# Patient Record
Sex: FEMALE | Race: WHITE | Employment: OTHER | URBAN - NONMETROPOLITAN AREA
[De-identification: names, ages, dates, MRNs, and addresses within clinical notes are randomized per-mention and may not be internally consistent; named-entity substitution may affect disease eponyms.]

---

## 2017-01-12 ENCOUNTER — TELEPHONE (OUTPATIENT)
Dept: PRIMARY CARE CLINIC | Age: 72
End: 2017-01-12

## 2017-01-16 ENCOUNTER — HOSPITAL ENCOUNTER (OUTPATIENT)
Dept: PULMONOLOGY | Age: 72
Discharge: HOME | End: 2017-01-16
Payer: MEDICARE

## 2017-01-16 DIAGNOSIS — R06.09 DYSPNEA ON EXERTION: ICD-10-CM

## 2017-01-16 LAB
BASE EXCESS ARTERIAL: 1.7 MMOL/L (ref -2–2)
CARBOXYHEMOGLOBIN ARTERIAL: 1.3 % (ref 0–5)
HCO3 ARTERIAL: 26.6 MMOL/L (ref 22–26)
HEMOGLOBIN, ART, EXTENDED: 11.6 G/DL (ref 12–16)
METHEMOGLOBIN ARTERIAL: 1.1 %
O2 CONTENT ARTERIAL: 15.6 ML/DL
O2 SAT, ARTERIAL: 95.2 %
O2 THERAPY: ABNORMAL
PCO2 ARTERIAL: 42 MMHG (ref 35–45)
PH ARTERIAL: 7.41 (ref 7.35–7.45)
PO2 ARTERIAL: 77 MMHG (ref 80–100)
POTASSIUM, WHOLE BLOOD: 4.2

## 2017-01-16 PROCEDURE — 94060 EVALUATION OF WHEEZING: CPT

## 2017-01-16 PROCEDURE — 6360000002 HC RX W HCPCS: Performed by: INTERNAL MEDICINE

## 2017-01-16 PROCEDURE — 36600 WITHDRAWAL OF ARTERIAL BLOOD: CPT

## 2017-01-16 PROCEDURE — 94727 GAS DIL/WSHOT DETER LNG VOL: CPT

## 2017-01-16 PROCEDURE — 82803 BLOOD GASES ANY COMBINATION: CPT

## 2017-01-16 PROCEDURE — 94729 DIFFUSING CAPACITY: CPT

## 2017-01-16 PROCEDURE — 84132 ASSAY OF SERUM POTASSIUM: CPT

## 2017-01-16 RX ORDER — ALBUTEROL SULFATE 2.5 MG/3ML
2.5 SOLUTION RESPIRATORY (INHALATION) EVERY 6 HOURS PRN
Status: DISCONTINUED | OUTPATIENT
Start: 2017-01-16 | End: 2017-01-18 | Stop reason: HOSPADM

## 2017-01-27 ENCOUNTER — TELEPHONE (OUTPATIENT)
Dept: PRIMARY CARE CLINIC | Age: 72
End: 2017-01-27

## 2017-03-24 ENCOUNTER — TELEPHONE (OUTPATIENT)
Dept: GASTROENTEROLOGY | Facility: CLINIC | Age: 72
End: 2017-03-24

## 2017-03-27 ENCOUNTER — OFFICE VISIT (OUTPATIENT)
Dept: PRIMARY CARE CLINIC | Age: 72
End: 2017-03-27
Payer: MEDICARE

## 2017-03-27 VITALS
BODY MASS INDEX: 32.77 KG/M2 | HEART RATE: 78 BPM | HEIGHT: 67 IN | DIASTOLIC BLOOD PRESSURE: 81 MMHG | WEIGHT: 208.8 LBS | OXYGEN SATURATION: 98 % | RESPIRATION RATE: 18 BRPM | TEMPERATURE: 98.4 F | SYSTOLIC BLOOD PRESSURE: 121 MMHG

## 2017-03-27 DIAGNOSIS — H61.23 EXCESSIVE CERUMEN IN BOTH EAR CANALS: ICD-10-CM

## 2017-03-27 DIAGNOSIS — R05.9 COUGH: Primary | ICD-10-CM

## 2017-03-27 DIAGNOSIS — J30.9 ALLERGIC RHINITIS, UNSPECIFIED ALLERGIC RHINITIS TRIGGER, UNSPECIFIED RHINITIS SEASONALITY: ICD-10-CM

## 2017-03-27 PROCEDURE — 3014F SCREEN MAMMO DOC REV: CPT | Performed by: NURSE PRACTITIONER

## 2017-03-27 PROCEDURE — 1123F ACP DISCUSS/DSCN MKR DOCD: CPT | Performed by: NURSE PRACTITIONER

## 2017-03-27 PROCEDURE — G8484 FLU IMMUNIZE NO ADMIN: HCPCS | Performed by: NURSE PRACTITIONER

## 2017-03-27 PROCEDURE — 3017F COLORECTAL CA SCREEN DOC REV: CPT | Performed by: NURSE PRACTITIONER

## 2017-03-27 PROCEDURE — G8400 PT W/DXA NO RESULTS DOC: HCPCS | Performed by: NURSE PRACTITIONER

## 2017-03-27 PROCEDURE — G8419 CALC BMI OUT NRM PARAM NOF/U: HCPCS | Performed by: NURSE PRACTITIONER

## 2017-03-27 PROCEDURE — 4040F PNEUMOC VAC/ADMIN/RCVD: CPT | Performed by: NURSE PRACTITIONER

## 2017-03-27 PROCEDURE — G8427 DOCREV CUR MEDS BY ELIG CLIN: HCPCS | Performed by: NURSE PRACTITIONER

## 2017-03-27 PROCEDURE — 1036F TOBACCO NON-USER: CPT | Performed by: NURSE PRACTITIONER

## 2017-03-27 PROCEDURE — 1090F PRES/ABSN URINE INCON ASSESS: CPT | Performed by: NURSE PRACTITIONER

## 2017-03-27 PROCEDURE — 99213 OFFICE O/P EST LOW 20 MIN: CPT | Performed by: NURSE PRACTITIONER

## 2017-03-27 RX ORDER — BENZONATATE 100 MG/1
100 CAPSULE ORAL 3 TIMES DAILY PRN
Qty: 90 CAPSULE | Refills: 0 | Status: SHIPPED | OUTPATIENT
Start: 2017-03-27 | End: 2017-04-03

## 2017-03-27 ASSESSMENT — ENCOUNTER SYMPTOMS
SINUS PRESSURE: 1
COUGH: 1
EYES NEGATIVE: 1
SORE THROAT: 1
GASTROINTESTINAL NEGATIVE: 1

## 2017-03-29 RX ORDER — SIMVASTATIN 10 MG
10 TABLET ORAL NIGHTLY
Qty: 30 TABLET | Refills: 11 | Status: SHIPPED | OUTPATIENT
Start: 2017-03-29 | End: 2018-01-18 | Stop reason: SDUPTHER

## 2017-03-29 RX ORDER — MESALAMINE 1.2 G/1
TABLET, DELAYED RELEASE ORAL
Qty: 120 TABLET | Refills: 5 | Status: SHIPPED | OUTPATIENT
Start: 2017-03-29 | End: 2017-08-17 | Stop reason: SDUPTHER

## 2017-03-29 NOTE — TELEPHONE ENCOUNTER
Pharmacy faxed refill request for Sabrina.  Past last seen 3/2016.  Note sent back patient needs a follow up appointment to continue refills.

## 2017-04-28 RX ORDER — FLUTICASONE PROPIONATE 50 MCG
2 SPRAY, SUSPENSION (ML) NASAL DAILY
Qty: 1 BOTTLE | Refills: 5 | Status: SHIPPED | OUTPATIENT
Start: 2017-04-28 | End: 2017-12-27 | Stop reason: SDUPTHER

## 2017-04-28 RX ORDER — RAMIPRIL 10 MG/1
10 CAPSULE ORAL DAILY
Qty: 30 CAPSULE | Refills: 5 | Status: SHIPPED | OUTPATIENT
Start: 2017-04-28 | End: 2017-12-27 | Stop reason: SDUPTHER

## 2017-06-29 ENCOUNTER — OFFICE VISIT (OUTPATIENT)
Dept: PRIMARY CARE CLINIC | Age: 72
End: 2017-06-29
Payer: MEDICARE

## 2017-06-29 VITALS
TEMPERATURE: 98.6 F | OXYGEN SATURATION: 98 % | DIASTOLIC BLOOD PRESSURE: 72 MMHG | WEIGHT: 207 LBS | SYSTOLIC BLOOD PRESSURE: 118 MMHG | HEIGHT: 67 IN | HEART RATE: 89 BPM | RESPIRATION RATE: 16 BRPM | BODY MASS INDEX: 32.49 KG/M2

## 2017-06-29 DIAGNOSIS — Z13.820 OSTEOPOROSIS SCREENING: ICD-10-CM

## 2017-06-29 DIAGNOSIS — Z23 NEED FOR PROPHYLACTIC VACCINATION AGAINST STREPTOCOCCUS PNEUMONIAE (PNEUMOCOCCUS): ICD-10-CM

## 2017-06-29 DIAGNOSIS — K51.80 OTHER ULCERATIVE COLITIS WITHOUT COMPLICATION (HCC): ICD-10-CM

## 2017-06-29 DIAGNOSIS — H61.23 BILATERAL IMPACTED CERUMEN: ICD-10-CM

## 2017-06-29 DIAGNOSIS — C64.2 RENAL CELL CARCINOMA, LEFT (HCC): Primary | ICD-10-CM

## 2017-06-29 PROCEDURE — G8400 PT W/DXA NO RESULTS DOC: HCPCS | Performed by: INTERNAL MEDICINE

## 2017-06-29 PROCEDURE — 1123F ACP DISCUSS/DSCN MKR DOCD: CPT | Performed by: INTERNAL MEDICINE

## 2017-06-29 PROCEDURE — 3017F COLORECTAL CA SCREEN DOC REV: CPT | Performed by: INTERNAL MEDICINE

## 2017-06-29 PROCEDURE — 3014F SCREEN MAMMO DOC REV: CPT | Performed by: INTERNAL MEDICINE

## 2017-06-29 PROCEDURE — G8417 CALC BMI ABV UP PARAM F/U: HCPCS | Performed by: INTERNAL MEDICINE

## 2017-06-29 PROCEDURE — 4040F PNEUMOC VAC/ADMIN/RCVD: CPT | Performed by: INTERNAL MEDICINE

## 2017-06-29 PROCEDURE — 1036F TOBACCO NON-USER: CPT | Performed by: INTERNAL MEDICINE

## 2017-06-29 PROCEDURE — 1090F PRES/ABSN URINE INCON ASSESS: CPT | Performed by: INTERNAL MEDICINE

## 2017-06-29 PROCEDURE — G8427 DOCREV CUR MEDS BY ELIG CLIN: HCPCS | Performed by: INTERNAL MEDICINE

## 2017-06-29 PROCEDURE — 99214 OFFICE O/P EST MOD 30 MIN: CPT | Performed by: INTERNAL MEDICINE

## 2017-06-29 RX ORDER — RALOXIFENE HYDROCHLORIDE 60 MG/1
60 TABLET, FILM COATED ORAL DAILY
COMMUNITY
End: 2017-06-29 | Stop reason: SDUPTHER

## 2017-06-29 RX ORDER — RALOXIFENE HYDROCHLORIDE 60 MG/1
60 TABLET, FILM COATED ORAL DAILY
Qty: 30 TABLET | Refills: 5 | Status: SHIPPED | OUTPATIENT
Start: 2017-06-29 | End: 2017-12-27 | Stop reason: SDUPTHER

## 2017-06-29 RX ORDER — ESOMEPRAZOLE MAGNESIUM 20 MG/1
20 FOR SUSPENSION ORAL DAILY
COMMUNITY

## 2017-06-29 RX ORDER — TRAMADOL HYDROCHLORIDE 50 MG/1
50 TABLET ORAL EVERY 6 HOURS PRN
Qty: 90 TABLET | Refills: 3 | Status: SHIPPED | OUTPATIENT
Start: 2017-06-29 | End: 2017-07-29

## 2017-06-29 ASSESSMENT — PATIENT HEALTH QUESTIONNAIRE - PHQ9
2. FEELING DOWN, DEPRESSED OR HOPELESS: 0
SUM OF ALL RESPONSES TO PHQ9 QUESTIONS 1 & 2: 0
SUM OF ALL RESPONSES TO PHQ QUESTIONS 1-9: 0
1. LITTLE INTEREST OR PLEASURE IN DOING THINGS: 0

## 2017-06-29 ASSESSMENT — ENCOUNTER SYMPTOMS
CONSTIPATION: 0
COUGH: 0
DIARRHEA: 0
BACK PAIN: 0
NAUSEA: 0
SHORTNESS OF BREATH: 1
VOMITING: 0

## 2017-06-30 ENCOUNTER — PATIENT MESSAGE (OUTPATIENT)
Dept: PRIMARY CARE CLINIC | Age: 72
End: 2017-06-30

## 2017-07-20 ENCOUNTER — OFFICE VISIT (OUTPATIENT)
Dept: OTOLARYNGOLOGY | Age: 72
End: 2017-07-20
Payer: MEDICARE

## 2017-07-20 VITALS
HEART RATE: 80 BPM | RESPIRATION RATE: 20 BRPM | DIASTOLIC BLOOD PRESSURE: 64 MMHG | HEIGHT: 67 IN | WEIGHT: 207 LBS | OXYGEN SATURATION: 99 % | BODY MASS INDEX: 32.49 KG/M2 | SYSTOLIC BLOOD PRESSURE: 128 MMHG

## 2017-07-20 DIAGNOSIS — H61.23 BILATERAL IMPACTED CERUMEN: Primary | ICD-10-CM

## 2017-07-20 PROCEDURE — 1090F PRES/ABSN URINE INCON ASSESS: CPT | Performed by: OTOLARYNGOLOGY

## 2017-07-20 PROCEDURE — 3014F SCREEN MAMMO DOC REV: CPT | Performed by: OTOLARYNGOLOGY

## 2017-07-20 PROCEDURE — 99202 OFFICE O/P NEW SF 15 MIN: CPT | Performed by: OTOLARYNGOLOGY

## 2017-07-20 PROCEDURE — 1123F ACP DISCUSS/DSCN MKR DOCD: CPT | Performed by: OTOLARYNGOLOGY

## 2017-07-20 PROCEDURE — 4040F PNEUMOC VAC/ADMIN/RCVD: CPT | Performed by: OTOLARYNGOLOGY

## 2017-07-20 PROCEDURE — G8427 DOCREV CUR MEDS BY ELIG CLIN: HCPCS | Performed by: OTOLARYNGOLOGY

## 2017-07-20 PROCEDURE — G8400 PT W/DXA NO RESULTS DOC: HCPCS | Performed by: OTOLARYNGOLOGY

## 2017-07-20 PROCEDURE — G8417 CALC BMI ABV UP PARAM F/U: HCPCS | Performed by: OTOLARYNGOLOGY

## 2017-07-20 PROCEDURE — 3017F COLORECTAL CA SCREEN DOC REV: CPT | Performed by: OTOLARYNGOLOGY

## 2017-07-20 PROCEDURE — 1036F TOBACCO NON-USER: CPT | Performed by: OTOLARYNGOLOGY

## 2017-07-20 PROCEDURE — 69210 REMOVE IMPACTED EAR WAX UNI: CPT | Performed by: OTOLARYNGOLOGY

## 2017-07-20 ASSESSMENT — ENCOUNTER SYMPTOMS
GASTROINTESTINAL NEGATIVE: 1
ALLERGIC/IMMUNOLOGIC NEGATIVE: 1
RESPIRATORY NEGATIVE: 1
EYES NEGATIVE: 1

## 2017-08-17 ENCOUNTER — OFFICE VISIT (OUTPATIENT)
Dept: GASTROENTEROLOGY | Facility: CLINIC | Age: 72
End: 2017-08-17

## 2017-08-17 VITALS
BODY MASS INDEX: 33.27 KG/M2 | DIASTOLIC BLOOD PRESSURE: 76 MMHG | SYSTOLIC BLOOD PRESSURE: 120 MMHG | HEART RATE: 87 BPM | WEIGHT: 212 LBS | HEIGHT: 67 IN | TEMPERATURE: 98.4 F

## 2017-08-17 DIAGNOSIS — K51.90 ULCERATIVE COLITIS WITHOUT COMPLICATIONS, UNSPECIFIED LOCATION (HCC): Primary | ICD-10-CM

## 2017-08-17 PROCEDURE — 99213 OFFICE O/P EST LOW 20 MIN: CPT | Performed by: INTERNAL MEDICINE

## 2017-08-17 RX ORDER — MESALAMINE 1.2 G/1
TABLET, DELAYED RELEASE ORAL
Qty: 120 TABLET | Refills: 11 | Status: SHIPPED | OUTPATIENT
Start: 2017-08-17 | End: 2018-08-02 | Stop reason: SDUPTHER

## 2017-08-17 NOTE — PROGRESS NOTES
Chief Complaint   Patient presents with   • Ulcerative Colitis     yearly follow up yearly on uc     Subjective   HPI    Coco Steele is a 71 y.o. female who presents with a history of Ulcerative Colitis.   Status of disease is active and stable.  The severity is described as Mild.  She reports abdominal cramping and diarrhea occasional BRB on toilet paper.  Stools occur 2-3 times in the morning, up to 5, loose.  LUQ pain since starting physical therapy.  Not associated with BM.  Utilizes stool softeners.  Previous diagnostic test include  colonoscopy (2016).      Past Medical History:   Diagnosis Date   • Abdominal pain, epigastric    • Abdominal pain, left lower quadrant    • Abnormal findings on diagnostic imaging of abdomen     ABFND, RADIOLOGICAL, ABDOMINAL AREA    • Allergic rhinitis    • Arthritis    • Chronic ulcerative colitis without complication    • Constipation    • Diarrhea    • Diverticulosis    • Encounter for Hemoccult screening     HEMOCCULT POSITIVE STOOLS    • Gastroesophageal reflux disease     esophagitis presence not specified   • GERD (gastroesophageal reflux disease)    • Herpes zoster    • Hypertension    • Nausea    • Obesity    • Rectal bleeding    • Rectal bleeding    • Regurgitation    • Ulcerative colitis      Outpatient Prescriptions Marked as Taking for the 8/17/17 encounter (Office Visit) with David Alonzo, DO   Medication Sig Dispense Refill   • Calcium Carbonate (CALCIUM 600) 1500 (600 CA) MG tablet Take  by mouth daily.     • esomeprazole (NexIUM) 40 MG capsule Take 40 mg by mouth daily.     • fluticasone (FLONASE) 50 MCG/ACT nasal spray into each nostril daily.     • fluticasone-salmeterol (ADVAIR DISKUS) 250-50 MCG/DOSE DISKUS Inhale 2 (Two) Times a Day.     • furosemide (LASIX) 20 MG tablet Take 20 mg by mouth daily.     • mesalamine (LIALDA) 1.2 g EC tablet Take 2 Tablets every 12 hours 120 tablet 11   • Multiple Vitamins-Minerals (CENTRUM SILVER PO) Take  by mouth daily.      • raloxifene (EVISTA) 60 MG tablet Take 60 mg by mouth daily.     • ramipril (ALTACE) 10 MG capsule Take 10 mg by mouth daily.     • ranitidine (ZANTAC) 150 MG capsule Take 150 mg by mouth as needed.     • simvastatin (ZOCOR) 10 MG tablet Take 10 mg by mouth daily.     • [DISCONTINUED] mesalamine (LIALDA) 1.2 G EC tablet Take 2 Tablets every 12 hours 120 tablet 5     No Known Allergies  Social History     Social History   • Marital status:      Spouse name: N/A   • Number of children: N/A   • Years of education: N/A     Occupational History   • Not on file.     Social History Main Topics   • Smoking status: Never Smoker   • Smokeless tobacco: Never Used   • Alcohol use No   • Drug use: No   • Sexual activity: Not on file      Comment: menopause     Other Topics Concern   • Not on file     Social History Narrative     Family History   Problem Relation Age of Onset   • Colon polyps Mother      Review of Systems   Constitutional: Negative for fatigue, fever and unexpected weight change.   HENT: Negative for hearing loss, sore throat and voice change.    Eyes: Negative for visual disturbance.   Respiratory: Negative for cough, shortness of breath and wheezing.    Cardiovascular: Negative for chest pain and palpitations.   Gastrointestinal: Positive for abdominal pain. Negative for blood in stool and vomiting.   Endocrine: Negative for polydipsia and polyuria.   Genitourinary: Negative for difficulty urinating, dysuria, hematuria and urgency.   Musculoskeletal: Negative for joint swelling and myalgias.   Skin: Negative for color change, rash and wound.   Neurological: Negative for dizziness, tremors, seizures and syncope.   Hematological: Does not bruise/bleed easily.   Psychiatric/Behavioral: Negative for agitation and confusion. The patient is not nervous/anxious.      Objective   Vitals:    08/17/17 1305   BP: 120/76   Pulse: 87   Temp: 98.4 °F (36.9 °C)     Physical Exam   Constitutional: She is oriented  to person, place, and time. She appears well-developed and well-nourished.   HENT:   Head: Normocephalic and atraumatic.   Eyes: Conjunctivae are normal. Pupils are equal, round, and reactive to light. No scleral icterus.   Neck: No JVD present. No thyroid mass and no thyromegaly present.   Cardiovascular: Normal rate, regular rhythm and normal heart sounds.  Exam reveals no gallop and no friction rub.    No murmur heard.  Pulmonary/Chest: Effort normal and breath sounds normal. No accessory muscle usage. No respiratory distress. She has no wheezes. She has no rales.   Abdominal: Soft. Bowel sounds are normal. She exhibits no distension, no ascites and no mass. There is no splenomegaly or hepatomegaly. There is no tenderness. There is no rebound and no guarding.   Genitourinary:   Genitourinary Comments: Rectal-Did not examine   Musculoskeletal: Normal range of motion. She exhibits no edema.   Neurological: She is alert and oriented to person, place, and time.   Deemed a reliable historian, able to converse without difficulty and able to move all extremities without difficulty   Skin: Skin is warm and dry.   Psychiatric: She has a normal mood and affect. Her behavior is normal.     Imaging Results (most recent)     None        Assessment/Plan   Coco was seen today for ulcerative colitis.    Diagnoses and all orders for this visit:    Ulcerative colitis without complications, unspecified location  -     mesalamine (LIALDA) 1.2 g EC tablet; Take 2 Tablets every 12 hours    Abd pain r/t musculoskeletal???  Cscope in 2020  Stop stool softener, increase fiber  Cautiously may use NSAIDs for arthritis  * Surgery not found *  .

## 2017-10-02 DIAGNOSIS — C64.2 RENAL CELL CARCINOMA, LEFT (HCC): Primary | ICD-10-CM

## 2017-10-06 ENCOUNTER — HOSPITAL ENCOUNTER (OUTPATIENT)
Dept: ULTRASOUND IMAGING | Facility: HOSPITAL | Age: 72
Discharge: HOME | End: 2017-10-06
Attending: UROLOGY | Admitting: UROLOGY

## 2017-10-06 PROCEDURE — 76775 US EXAM ABDO BACK WALL LIM: CPT

## 2017-10-09 ENCOUNTER — OFFICE VISIT (OUTPATIENT)
Dept: UROLOGY | Facility: CLINIC | Age: 72
End: 2017-10-09

## 2017-10-09 VITALS
HEIGHT: 67 IN | WEIGHT: 212 LBS | SYSTOLIC BLOOD PRESSURE: 110 MMHG | DIASTOLIC BLOOD PRESSURE: 78 MMHG | TEMPERATURE: 96.7 F | BODY MASS INDEX: 33.27 KG/M2

## 2017-10-09 DIAGNOSIS — C64.2 RENAL CELL CARCINOMA, LEFT (HCC): Primary | ICD-10-CM

## 2017-10-09 DIAGNOSIS — N28.1 RENAL CYST, LEFT: ICD-10-CM

## 2017-10-09 LAB
BILIRUB BLD-MCNC: NEGATIVE MG/DL
CLARITY, POC: CLEAR
COLOR UR: YELLOW
GLUCOSE UR STRIP-MCNC: NEGATIVE MG/DL
KETONES UR QL: NEGATIVE
LEUKOCYTE EST, POC: NEGATIVE
NITRITE UR-MCNC: NEGATIVE MG/ML
PH UR: 5.5 [PH] (ref 5–8)
PROT UR STRIP-MCNC: NEGATIVE MG/DL
RBC # UR STRIP: NEGATIVE /UL
SP GR UR: 1.01 (ref 1–1.03)
UROBILINOGEN UR QL: NORMAL

## 2017-10-09 PROCEDURE — 81003 URINALYSIS AUTO W/O SCOPE: CPT | Performed by: UROLOGY

## 2017-10-09 PROCEDURE — 99213 OFFICE O/P EST LOW 20 MIN: CPT | Performed by: UROLOGY

## 2017-10-09 NOTE — PROGRESS NOTES
Ms. Steele is 71 y.o. female    CHIEF COMPLAINT: I am here to follow up on my renal cyst    HPI  Renal Cell Carcinoma  Patient is here for follow up for Renal Cell Carcinoma.  Renal Cell was diagnosed in 2014.  Pathologic staging is T1a.  Pathologic Histology is clear cell carcinoma. Previous management includes partial nephrectomy. .  Associated symptoms include no evidence of hematuria or flank pain.  Weight is been stable.. Severity, or the disease state, can be defined as Remission.       The following portions of the patient's history were reviewed and updated as appropriate: allergies, current medications, past family history, past medical history, past social history, past surgical history and problem list.    Review of Systems   Constitutional: Negative for chills and fever.   Gastrointestinal: Negative for abdominal pain, anal bleeding and blood in stool.   Genitourinary: Negative for flank pain and hematuria.         Current Outpatient Prescriptions:   •  Calcium Carbonate (CALCIUM 600) 1500 (600 CA) MG tablet, Take  by mouth daily., Disp: , Rfl:   •  esomeprazole (NexIUM) 40 MG capsule, Take 40 mg by mouth daily., Disp: , Rfl:   •  fluticasone (FLONASE) 50 MCG/ACT nasal spray, into each nostril daily., Disp: , Rfl:   •  fluticasone-salmeterol (ADVAIR DISKUS) 250-50 MCG/DOSE DISKUS, Inhale 2 (Two) Times a Day., Disp: , Rfl:   •  furosemide (LASIX) 20 MG tablet, Take 20 mg by mouth daily., Disp: , Rfl:   •  mesalamine (LIALDA) 1.2 g EC tablet, Take 2 Tablets every 12 hours, Disp: 120 tablet, Rfl: 11  •  Multiple Vitamins-Minerals (CENTRUM SILVER PO), Take  by mouth daily., Disp: , Rfl:   •  raloxifene (EVISTA) 60 MG tablet, Take 60 mg by mouth daily., Disp: , Rfl:   •  ramipril (ALTACE) 10 MG capsule, Take 10 mg by mouth daily., Disp: , Rfl:   •  ranitidine (ZANTAC) 150 MG capsule, Take 150 mg by mouth as needed., Disp: , Rfl:   •  simvastatin (ZOCOR) 10 MG tablet, Take 10 mg by mouth daily., Disp: , Rfl:      Past Medical History:   Diagnosis Date   • Abdominal pain, epigastric    • Abdominal pain, left lower quadrant    • Abnormal findings on diagnostic imaging of abdomen     ABFND, RADIOLOGICAL, ABDOMINAL AREA    • Allergic rhinitis    • Arthritis    • Chronic ulcerative colitis without complication    • Constipation    • Diarrhea    • Diverticulosis    • Encounter for Hemoccult screening     HEMOCCULT POSITIVE STOOLS    • Gastroesophageal reflux disease     esophagitis presence not specified   • GERD (gastroesophageal reflux disease)    • Herpes zoster    • Hypertension    • Nausea    • Obesity    • Rectal bleeding    • Rectal bleeding    • Regurgitation    • Ulcerative colitis        Past Surgical History:   Procedure Laterality Date   • CHOLECYSTECTOMY     • COLONOSCOPY  09/23/2014    left side bx-minimal active inflammation - 2 yr   • COLONOSCOPY  07/09/2014    active inflammation to extent of limited lower colonoscopy/45 cm   • COLONOSCOPY  02/21/2013    mild inflammation in sigmoid, left-sided diverticulosis   • COLONOSCOPY  03/05/2010    very tortuous colon not allowing visual of cecal base - 5 yrs   • COLONOSCOPY  11/14/2003    FAIRLY TORTUOUS AND SPASMODIC COLON - 5 YR   • COLONOSCOPY Left 9/28/2016    Procedure: COLONOSCOPY WITH ANESTHESIA;  Surgeon: David Alonzo DO;  Location: [facility] ENDOSCOPY;  Service:    • ENDOSCOPY  03/14/2014    Normal   • ENDOSCOPY  02/09/2006    MILD GASTRITIS   • NEPHRECTOMY PARTIAL Left    • OTHER SURGICAL HISTORY      Bilateral Eyelid Surgery    • TONSILLECTOMY     • WRIST SURGERY Left        Social History     Social History   • Marital status:      Spouse name: N/A   • Number of children: N/A   • Years of education: N/A     Social History Main Topics   • Smoking status: Never Smoker   • Smokeless tobacco: Never Used   • Alcohol use No   • Drug use: No   • Sexual activity: Not Asked      Comment: menopause     Other Topics Concern   • None     Social History  "Narrative       Family History   Problem Relation Age of Onset   • Colon polyps Mother        /78  Temp 96.7 °F (35.9 °C)  Ht 67\" (170.2 cm)  Wt 212 lb (96.2 kg)  BMI 33.2 kg/m2    Physical Exam  Alert and oriented ×3  Not agitated or distressed  No obvious deformities  No respiratory distress  Skin without pallor or diaphoresis      Results for orders placed or performed in visit on 10/09/17   POC Urinalysis Dipstick, Automated   Result Value Ref Range    Color Yellow Yellow, Straw, Dark Yellow, Sera    Clarity, UA Clear Clear    Glucose, UA Negative Negative, 1000 mg/dL (3+) mg/dL    Bilirubin Negative Negative    Ketones, UA Negative Negative    Specific Gravity  1.015 1.005 - 1.030    Blood, UA Negative Negative    pH, Urine 5.5 5.0 - 8.0    Protein, POC Negative Negative mg/dL    Urobilinogen, UA Normal Normal    Leukocytes Negative Negative    Nitrite, UA Negative Negative   Independent renal ultrasound review  The renal ultrasound is available for me to review.  Treatment recommendations require an independent review.  This film has been reviewed by the radiologist to determine any non urologic abnormalities that are presents.  However, I very closely inspected the kidneys for size, symmetry, contour, parenchymal thickness, perinephric reaction, presence of calcifications, and intrarenal dilation of the collecting system.  This US shows A simple left renal cyst is less than 2 cm but otherwise no abnormality.  Specifically no hydronephrosis or recurrent mass.  The right kidney is also normal.      Assessment and Plan  Diagnoses and all orders for this visit:    Renal cell carcinoma, left  -     Cancel: CT abdomen w wo contrast; Future  -     CT Abdomen Kidney With & Without Contrast; Future    Renal cyst, left  -     POC Urinalysis Dipstick, Automated    -no evidence of recurrence of left renal cell carcinoma.  Her left renal cyst is unchanged in size.    Sujit Luna MD  10/09/17  10:06 " AM      Cc: Brennan Lofton, DO

## 2017-12-27 RX ORDER — FUROSEMIDE 20 MG/1
20 TABLET ORAL DAILY PRN
Qty: 30 TABLET | Refills: 0 | Status: SHIPPED | OUTPATIENT
Start: 2017-12-27 | End: 2018-01-18 | Stop reason: SDUPTHER

## 2017-12-27 RX ORDER — RALOXIFENE HYDROCHLORIDE 60 MG/1
60 TABLET, FILM COATED ORAL DAILY
Qty: 30 TABLET | Refills: 0 | Status: SHIPPED | OUTPATIENT
Start: 2017-12-27 | End: 2018-01-18 | Stop reason: SDUPTHER

## 2017-12-27 RX ORDER — FLUTICASONE PROPIONATE 50 MCG
2 SPRAY, SUSPENSION (ML) NASAL DAILY
Qty: 1 BOTTLE | Refills: 0 | Status: SHIPPED | OUTPATIENT
Start: 2017-12-27 | End: 2018-01-18 | Stop reason: SDUPTHER

## 2017-12-27 RX ORDER — RAMIPRIL 10 MG/1
10 CAPSULE ORAL DAILY
Qty: 30 CAPSULE | Refills: 0 | Status: SHIPPED | OUTPATIENT
Start: 2017-12-27 | End: 2018-01-18 | Stop reason: SDUPTHER

## 2018-01-04 ENCOUNTER — TRANSCRIBE ORDERS (OUTPATIENT)
Dept: ADMINISTRATIVE | Facility: HOSPITAL | Age: 73
End: 2018-01-04

## 2018-01-04 ENCOUNTER — LAB (OUTPATIENT)
Dept: LAB | Facility: HOSPITAL | Age: 73
End: 2018-01-04
Attending: PEDIATRICS

## 2018-01-04 DIAGNOSIS — R50.9 FEVER, UNSPECIFIED FEVER CAUSE: ICD-10-CM

## 2018-01-04 DIAGNOSIS — R50.9 FEVER, UNSPECIFIED FEVER CAUSE: Primary | ICD-10-CM

## 2018-01-04 LAB
FLUAV AG NPH QL: NEGATIVE
FLUBV AG NPH QL IA: NEGATIVE
S PYO AG THROAT QL: NEGATIVE

## 2018-01-04 PROCEDURE — 87081 CULTURE SCREEN ONLY: CPT | Performed by: PEDIATRICS

## 2018-01-04 PROCEDURE — 87880 STREP A ASSAY W/OPTIC: CPT

## 2018-01-04 PROCEDURE — 87804 INFLUENZA ASSAY W/OPTIC: CPT

## 2018-01-06 LAB — BACTERIA SPEC AEROBE CULT: NORMAL

## 2018-01-17 DIAGNOSIS — Z23 NEED FOR PROPHYLACTIC VACCINATION AGAINST STREPTOCOCCUS PNEUMONIAE (PNEUMOCOCCUS): ICD-10-CM

## 2018-01-17 DIAGNOSIS — Z13.820 OSTEOPOROSIS SCREENING: ICD-10-CM

## 2018-01-17 DIAGNOSIS — K51.90 ULCERATIVE COLITIS WITHOUT COMPLICATIONS, UNSPECIFIED LOCATION (HCC): ICD-10-CM

## 2018-01-17 DIAGNOSIS — C64.2 RENAL CELL CARCINOMA, LEFT (HCC): ICD-10-CM

## 2018-01-17 DIAGNOSIS — H61.23 BILATERAL IMPACTED CERUMEN: ICD-10-CM

## 2018-01-17 LAB
ALBUMIN SERPL-MCNC: 4.2 G/DL (ref 3.5–5.2)
ALP BLD-CCNC: 48 U/L (ref 35–104)
ALT SERPL-CCNC: 22 U/L (ref 5–33)
ANION GAP SERPL CALCULATED.3IONS-SCNC: 13 MMOL/L (ref 7–19)
AST SERPL-CCNC: 26 U/L (ref 5–32)
BILIRUB SERPL-MCNC: 0.3 MG/DL (ref 0.2–1.2)
BUN BLDV-MCNC: 16 MG/DL (ref 8–23)
CALCIUM SERPL-MCNC: 9.3 MG/DL (ref 8.8–10.2)
CHLORIDE BLD-SCNC: 104 MMOL/L (ref 98–111)
CO2: 27 MMOL/L (ref 22–29)
CREAT SERPL-MCNC: 1 MG/DL (ref 0.5–0.9)
CREATININE URINE: 224.8 MG/DL (ref 4.2–622)
GFR NON-AFRICAN AMERICAN: 55
GLUCOSE BLD-MCNC: 96 MG/DL (ref 74–109)
HCT VFR BLD CALC: 39.5 % (ref 37–47)
HEMOGLOBIN: 12.5 G/DL (ref 12–16)
MCH RBC QN AUTO: 29.5 PG (ref 27–31)
MCHC RBC AUTO-ENTMCNC: 31.6 G/DL (ref 33–37)
MCV RBC AUTO: 93.2 FL (ref 81–99)
PDW BLD-RTO: 13.2 % (ref 11.5–14.5)
PLATELET # BLD: 237 K/UL (ref 130–400)
PMV BLD AUTO: 10.1 FL (ref 9.4–12.3)
POTASSIUM SERPL-SCNC: 4.6 MMOL/L (ref 3.5–5)
RBC # BLD: 4.24 M/UL (ref 4.2–5.4)
SODIUM BLD-SCNC: 144 MMOL/L (ref 136–145)
TOTAL PROTEIN: 7.2 G/DL (ref 6.6–8.7)
WBC # BLD: 4.7 K/UL (ref 4.8–10.8)

## 2018-01-18 ENCOUNTER — OFFICE VISIT (OUTPATIENT)
Dept: PRIMARY CARE CLINIC | Age: 73
End: 2018-01-18
Payer: MEDICARE

## 2018-01-18 VITALS
HEIGHT: 67 IN | DIASTOLIC BLOOD PRESSURE: 76 MMHG | OXYGEN SATURATION: 98 % | SYSTOLIC BLOOD PRESSURE: 120 MMHG | WEIGHT: 208.8 LBS | BODY MASS INDEX: 32.77 KG/M2 | HEART RATE: 84 BPM

## 2018-01-18 DIAGNOSIS — I10 ESSENTIAL HYPERTENSION: ICD-10-CM

## 2018-01-18 DIAGNOSIS — C64.2 RENAL CELL CARCINOMA, LEFT (HCC): Primary | ICD-10-CM

## 2018-01-18 DIAGNOSIS — K51.80 OTHER ULCERATIVE COLITIS WITHOUT COMPLICATION (HCC): ICD-10-CM

## 2018-01-18 PROCEDURE — 4040F PNEUMOC VAC/ADMIN/RCVD: CPT | Performed by: INTERNAL MEDICINE

## 2018-01-18 PROCEDURE — 1036F TOBACCO NON-USER: CPT | Performed by: INTERNAL MEDICINE

## 2018-01-18 PROCEDURE — G8427 DOCREV CUR MEDS BY ELIG CLIN: HCPCS | Performed by: INTERNAL MEDICINE

## 2018-01-18 PROCEDURE — 1090F PRES/ABSN URINE INCON ASSESS: CPT | Performed by: INTERNAL MEDICINE

## 2018-01-18 PROCEDURE — 3014F SCREEN MAMMO DOC REV: CPT | Performed by: INTERNAL MEDICINE

## 2018-01-18 PROCEDURE — 99214 OFFICE O/P EST MOD 30 MIN: CPT | Performed by: INTERNAL MEDICINE

## 2018-01-18 PROCEDURE — G8417 CALC BMI ABV UP PARAM F/U: HCPCS | Performed by: INTERNAL MEDICINE

## 2018-01-18 PROCEDURE — 3017F COLORECTAL CA SCREEN DOC REV: CPT | Performed by: INTERNAL MEDICINE

## 2018-01-18 PROCEDURE — G8484 FLU IMMUNIZE NO ADMIN: HCPCS | Performed by: INTERNAL MEDICINE

## 2018-01-18 PROCEDURE — G8400 PT W/DXA NO RESULTS DOC: HCPCS | Performed by: INTERNAL MEDICINE

## 2018-01-18 PROCEDURE — 1123F ACP DISCUSS/DSCN MKR DOCD: CPT | Performed by: INTERNAL MEDICINE

## 2018-01-18 RX ORDER — FLUTICASONE PROPIONATE 50 MCG
2 SPRAY, SUSPENSION (ML) NASAL DAILY
Qty: 1 BOTTLE | Refills: 2 | Status: SHIPPED | OUTPATIENT
Start: 2018-01-18 | End: 2018-01-18 | Stop reason: SDUPTHER

## 2018-01-18 RX ORDER — SIMVASTATIN 10 MG
10 TABLET ORAL NIGHTLY
Qty: 30 TABLET | Refills: 11 | Status: SHIPPED | OUTPATIENT
Start: 2018-01-18

## 2018-01-18 RX ORDER — FUROSEMIDE 20 MG/1
20 TABLET ORAL DAILY PRN
Qty: 30 TABLET | Refills: 5 | Status: SHIPPED | OUTPATIENT
Start: 2018-01-18 | End: 2018-05-15 | Stop reason: SDUPTHER

## 2018-01-18 RX ORDER — RALOXIFENE HYDROCHLORIDE 60 MG/1
60 TABLET, FILM COATED ORAL DAILY
Qty: 30 TABLET | Refills: 3 | Status: SHIPPED | OUTPATIENT
Start: 2018-01-18 | End: 2018-01-18 | Stop reason: SDUPTHER

## 2018-01-18 RX ORDER — RAMIPRIL 10 MG/1
10 CAPSULE ORAL DAILY
Qty: 30 CAPSULE | Refills: 3 | Status: SHIPPED | OUTPATIENT
Start: 2018-01-18 | End: 2018-01-18 | Stop reason: SDUPTHER

## 2018-01-18 RX ORDER — RAMIPRIL 10 MG/1
10 CAPSULE ORAL DAILY
Qty: 30 CAPSULE | Refills: 5 | Status: SHIPPED | OUTPATIENT
Start: 2018-01-18 | End: 2018-05-15 | Stop reason: SDUPTHER

## 2018-01-18 RX ORDER — RALOXIFENE HYDROCHLORIDE 60 MG/1
60 TABLET, FILM COATED ORAL DAILY
Qty: 30 TABLET | Refills: 5 | Status: SHIPPED | OUTPATIENT
Start: 2018-01-18 | End: 2018-05-15 | Stop reason: SDUPTHER

## 2018-01-18 RX ORDER — FUROSEMIDE 20 MG/1
20 TABLET ORAL DAILY PRN
Qty: 30 TABLET | Refills: 3 | Status: SHIPPED | OUTPATIENT
Start: 2018-01-18 | End: 2018-01-18 | Stop reason: SDUPTHER

## 2018-01-18 RX ORDER — FLUTICASONE PROPIONATE 50 MCG
2 SPRAY, SUSPENSION (ML) NASAL DAILY
Qty: 1 BOTTLE | Refills: 5 | Status: SHIPPED | OUTPATIENT
Start: 2018-01-18 | End: 2018-05-15 | Stop reason: SDUPTHER

## 2018-01-18 ASSESSMENT — ENCOUNTER SYMPTOMS
SINUS PAIN: 1
VOMITING: 0
DIARRHEA: 0
CONSTIPATION: 0
NAUSEA: 0
SHORTNESS OF BREATH: 0
BACK PAIN: 0
RHINORRHEA: 1
COUGH: 0

## 2018-01-18 NOTE — PROGRESS NOTES
30 tablet     Refill:  5    furosemide (LASIX) 20 MG tablet     Sig: Take 1 tablet by mouth daily as needed (edema)     Dispense:  30 tablet     Refill:  5    fluticasone-salmeterol (ADVAIR) 250-50 MCG/DOSE AEPB     Sig: Inhale 1 puff into the lungs every 12 hours     Dispense:  60 each     Refill:  11    fluticasone (FLONASE) 50 MCG/ACT nasal spray     Si sprays by Nasal route daily     Dispense:  1 Bottle     Refill:  5       Patient given educational materials - see patient instructions. Discussed use, benefit, and side effects of prescribed medications. All patient questions answered. Pt voiced understanding. Reviewed health maintenance. Instructed to continue current medications, diet and exercise. Patient agreed with treatment plan. Follow up as directed.      Electronically signed by Loyola Nageotte, DO on 2018 at 10:44 AM

## 2018-03-08 ENCOUNTER — TELEPHONE (OUTPATIENT)
Dept: UROLOGY | Facility: CLINIC | Age: 73
End: 2018-03-08

## 2018-03-08 NOTE — TELEPHONE ENCOUNTER
Patient called and said she usually only come once a year to see Dr Luna. His notes says 6 months. Can you ask Dr Luna about this?

## 2018-05-15 RX ORDER — RAMIPRIL 10 MG/1
10 CAPSULE ORAL DAILY
Qty: 30 CAPSULE | Refills: 5 | Status: SHIPPED | OUTPATIENT
Start: 2018-05-15 | End: 2018-11-12 | Stop reason: SDUPTHER

## 2018-05-15 RX ORDER — RALOXIFENE HYDROCHLORIDE 60 MG/1
60 TABLET, FILM COATED ORAL DAILY
Qty: 30 TABLET | Refills: 5 | Status: SHIPPED | OUTPATIENT
Start: 2018-05-15 | End: 2018-11-12 | Stop reason: SDUPTHER

## 2018-05-15 RX ORDER — FLUTICASONE PROPIONATE 50 MCG
2 SPRAY, SUSPENSION (ML) NASAL DAILY
Qty: 1 BOTTLE | Refills: 5 | Status: SHIPPED | OUTPATIENT
Start: 2018-05-15 | End: 2018-11-12 | Stop reason: SDUPTHER

## 2018-05-15 RX ORDER — FUROSEMIDE 20 MG/1
20 TABLET ORAL DAILY PRN
Qty: 30 TABLET | Refills: 5 | Status: SHIPPED | OUTPATIENT
Start: 2018-05-15 | End: 2018-12-15 | Stop reason: SDUPTHER

## 2018-07-18 ENCOUNTER — OFFICE VISIT (OUTPATIENT)
Dept: PRIMARY CARE CLINIC | Age: 73
End: 2018-07-18
Payer: MEDICARE

## 2018-07-18 ENCOUNTER — TRANSCRIBE ORDERS (OUTPATIENT)
Dept: SLEEP MEDICINE | Facility: HOSPITAL | Age: 73
End: 2018-07-18

## 2018-07-18 VITALS
SYSTOLIC BLOOD PRESSURE: 124 MMHG | DIASTOLIC BLOOD PRESSURE: 68 MMHG | HEIGHT: 67 IN | HEART RATE: 82 BPM | OXYGEN SATURATION: 98 % | TEMPERATURE: 99 F | WEIGHT: 215.2 LBS | BODY MASS INDEX: 33.78 KG/M2

## 2018-07-18 DIAGNOSIS — M70.52 PES ANSERINUS BURSITIS OF LEFT KNEE: Primary | ICD-10-CM

## 2018-07-18 DIAGNOSIS — K51.90 ULCERATIVE COLITIS WITHOUT COMPLICATIONS, UNSPECIFIED LOCATION (HCC): ICD-10-CM

## 2018-07-18 DIAGNOSIS — Z12.31 ENCOUNTER FOR SCREENING MAMMOGRAM FOR MALIGNANT NEOPLASM OF BREAST: Primary | ICD-10-CM

## 2018-07-18 DIAGNOSIS — I10 ESSENTIAL HYPERTENSION: ICD-10-CM

## 2018-07-18 DIAGNOSIS — Z23 NEED FOR PROPHYLACTIC VACCINATION AGAINST STREPTOCOCCUS PNEUMONIAE (PNEUMOCOCCUS): ICD-10-CM

## 2018-07-18 DIAGNOSIS — Z12.31 ENCOUNTER FOR SCREENING MAMMOGRAM FOR BREAST CANCER: ICD-10-CM

## 2018-07-18 DIAGNOSIS — C64.2 RENAL CELL CARCINOMA, LEFT (HCC): ICD-10-CM

## 2018-07-18 PROCEDURE — 1036F TOBACCO NON-USER: CPT | Performed by: FAMILY MEDICINE

## 2018-07-18 PROCEDURE — G8400 PT W/DXA NO RESULTS DOC: HCPCS | Performed by: FAMILY MEDICINE

## 2018-07-18 PROCEDURE — 20551 NJX 1 TENDON ORIGIN/INSJ: CPT | Performed by: FAMILY MEDICINE

## 2018-07-18 PROCEDURE — 90670 PCV13 VACCINE IM: CPT | Performed by: FAMILY MEDICINE

## 2018-07-18 PROCEDURE — G0009 ADMIN PNEUMOCOCCAL VACCINE: HCPCS | Performed by: FAMILY MEDICINE

## 2018-07-18 PROCEDURE — 99214 OFFICE O/P EST MOD 30 MIN: CPT | Performed by: FAMILY MEDICINE

## 2018-07-18 PROCEDURE — G8417 CALC BMI ABV UP PARAM F/U: HCPCS | Performed by: FAMILY MEDICINE

## 2018-07-18 PROCEDURE — 1123F ACP DISCUSS/DSCN MKR DOCD: CPT | Performed by: FAMILY MEDICINE

## 2018-07-18 PROCEDURE — 4040F PNEUMOC VAC/ADMIN/RCVD: CPT | Performed by: FAMILY MEDICINE

## 2018-07-18 PROCEDURE — 1101F PT FALLS ASSESS-DOCD LE1/YR: CPT | Performed by: FAMILY MEDICINE

## 2018-07-18 PROCEDURE — 3017F COLORECTAL CA SCREEN DOC REV: CPT | Performed by: FAMILY MEDICINE

## 2018-07-18 PROCEDURE — G8427 DOCREV CUR MEDS BY ELIG CLIN: HCPCS | Performed by: FAMILY MEDICINE

## 2018-07-18 PROCEDURE — 1090F PRES/ABSN URINE INCON ASSESS: CPT | Performed by: FAMILY MEDICINE

## 2018-07-18 RX ORDER — TRIAMCINOLONE ACETONIDE 40 MG/ML
40 INJECTION, SUSPENSION INTRA-ARTICULAR; INTRAMUSCULAR ONCE
Status: COMPLETED | OUTPATIENT
Start: 2018-07-18 | End: 2018-07-18

## 2018-07-18 RX ADMIN — TRIAMCINOLONE ACETONIDE 40 MG: 40 INJECTION, SUSPENSION INTRA-ARTICULAR; INTRAMUSCULAR at 14:14

## 2018-07-18 ASSESSMENT — ENCOUNTER SYMPTOMS
COUGH: 0
SHORTNESS OF BREATH: 0
COLOR CHANGE: 0

## 2018-07-18 NOTE — PROGRESS NOTES
Rashmi Gibson is a 71 y.o. female    Chief Complaint   Patient presents with    Follow-up     6 months       Knee Pain    There was no injury mechanism. The pain is present in the left knee. The quality of the pain is described as aching. The pain is moderate. The pain has been worsening since onset. Pertinent negatives include no inability to bear weight, loss of motion, loss of sensation, muscle weakness, numbness or tingling. She reports no foreign bodies present. The symptoms are aggravated by movement, palpation and weight bearing. She has tried rest for the symptoms. The treatment provided moderate relief. Review of Systems   Constitutional: Negative for chills and fever. Respiratory: Negative for cough and shortness of breath. Cardiovascular: Negative for chest pain and leg swelling. Musculoskeletal: Positive for neck pain. Skin: Negative for color change and rash. Neurological: Negative for tingling and numbness. Prior to Admission medications    Medication Sig Start Date End Date Taking?  Authorizing Provider   ramipril (ALTACE) 10 MG capsule Take 1 capsule by mouth daily 5/15/18  Yes Ni Gomez MD   raloxifene (EVISTA) 60 MG tablet Take 1 tablet by mouth daily 5/15/18  Yes Ni Gomez MD   furosemide (LASIX) 20 MG tablet Take 1 tablet by mouth daily as needed (edema) 5/15/18  Yes Ni Gomez MD   fluticasone [facility]) 50 MCG/ACT nasal spray 2 sprays by Nasal route daily 5/15/18  Yes Ni Gomez MD   simvastatin (ZOCOR) 10 MG tablet Take 1 tablet by mouth nightly 1/18/18  Yes Cricket Candelaria, DO   fluticasone-salmeterol (ADVAIR) 250-50 MCG/DOSE AEPB Inhale 1 puff into the lungs every 12 hours 1/18/18  Yes Cricket Candelaria, DO   esomeprazole Magnesium (NEXIUM) 20 MG PACK Take 20 mg by mouth daily   Yes Historical Provider, MD   Cetirizine HCl (ZYRTEC ALLERGY) 10 MG CAPS Take 10 mg by mouth daily 3/27/17  Yes MINA Leger

## 2018-07-18 NOTE — PROGRESS NOTES
After obtaining consent, and per orders of Dr. Yogesh Alas, injection of Prevnar-13 given in Left deltoid by Gena Carias. Patient instructed to remain in clinic for 20 minutes afterwards, and to report any adverse reaction to me immediately.

## 2018-07-26 ENCOUNTER — HOSPITAL ENCOUNTER (OUTPATIENT)
Dept: MAMMOGRAPHY | Facility: HOSPITAL | Age: 73
Discharge: HOME | End: 2018-07-26
Attending: FAMILY MEDICINE | Admitting: FAMILY MEDICINE

## 2018-07-26 DIAGNOSIS — Z12.31 ENCOUNTER FOR SCREENING MAMMOGRAM FOR MALIGNANT NEOPLASM OF BREAST: ICD-10-CM

## 2018-07-26 PROCEDURE — 77063 BREAST TOMOSYNTHESIS BI: CPT

## 2018-07-26 PROCEDURE — 77067 SCR MAMMO BI INCL CAD: CPT

## 2018-08-02 ENCOUNTER — OFFICE VISIT (OUTPATIENT)
Dept: GASTROENTEROLOGY | Facility: CLINIC | Age: 73
End: 2018-08-02

## 2018-08-02 VITALS
SYSTOLIC BLOOD PRESSURE: 120 MMHG | WEIGHT: 212 LBS | TEMPERATURE: 97 F | HEIGHT: 67 IN | DIASTOLIC BLOOD PRESSURE: 80 MMHG | OXYGEN SATURATION: 98 % | HEART RATE: 76 BPM | BODY MASS INDEX: 33.27 KG/M2

## 2018-08-02 DIAGNOSIS — K51.90 ULCERATIVE COLITIS WITHOUT COMPLICATIONS, UNSPECIFIED LOCATION (HCC): ICD-10-CM

## 2018-08-02 DIAGNOSIS — K51.00 ULCERATIVE PANCOLITIS WITHOUT COMPLICATION (HCC): Primary | ICD-10-CM

## 2018-08-02 PROCEDURE — 99213 OFFICE O/P EST LOW 20 MIN: CPT | Performed by: INTERNAL MEDICINE

## 2018-08-02 RX ORDER — MESALAMINE 1.2 G/1
TABLET, DELAYED RELEASE ORAL
Qty: 120 TABLET | Refills: 11 | Status: SHIPPED | OUTPATIENT
Start: 2018-08-02 | End: 2018-11-28 | Stop reason: CLARIF

## 2018-08-02 NOTE — PROGRESS NOTES
Chief Complaint   Patient presents with   • Ulcerative Colitis     yearly follow up on u c       Subjective     Ulcerative Colitis   This is a recurrent problem. The current episode started more than 1 month ago. The problem occurs intermittently. The problem has been unchanged. Pertinent negatives include no abdominal pain, anorexia, change in bowel habit, chest pain, coughing, fatigue, fever, joint swelling, myalgias, nausea, rash, sore throat, vomiting or weakness. Nothing aggravates the symptoms. Treatments tried: Lialda BID. The treatment provided significant relief.       Past Medical History:   Diagnosis Date   • Abdominal pain, epigastric    • Abdominal pain, left lower quadrant    • Abnormal findings on diagnostic imaging of abdomen     ABFND, RADIOLOGICAL, ABDOMINAL AREA    • Allergic rhinitis    • Arthritis    • Chronic ulcerative colitis without complication (CMS/HCC)    • Constipation    • Diarrhea    • Diverticulosis    • Encounter for Hemoccult screening     HEMOCCULT POSITIVE STOOLS    • Gastroesophageal reflux disease     esophagitis presence not specified   • GERD (gastroesophageal reflux disease)    • Herpes zoster    • Hypertension    • Nausea    • Obesity    • Rectal bleeding    • Rectal bleeding    • Regurgitation    • Ulcerative colitis (CMS/HCC)        Past Surgical History:   Procedure Laterality Date   • CHOLECYSTECTOMY     • COLONOSCOPY  09/23/2014    left side bx-minimal active inflammation - 2 yr   • COLONOSCOPY  07/09/2014    active inflammation to extent of limited lower colonoscopy/45 cm   • COLONOSCOPY  02/21/2013    mild inflammation in sigmoid, left-sided diverticulosis   • COLONOSCOPY  03/05/2010    very tortuous colon not allowing visual of cecal base - 5 yrs   • COLONOSCOPY  11/14/2003    FAIRLY TORTUOUS AND SPASMODIC COLON - 5 YR   • COLONOSCOPY Left 9/28/2016    Procedure: COLONOSCOPY WITH ANESTHESIA;  Surgeon: David Alonzo DO;  Location: [facility] ENDOSCOPY;  Service:    •  ENDOSCOPY  03/14/2014    Normal   • ENDOSCOPY  02/09/2006    MILD GASTRITIS   • NEPHRECTOMY PARTIAL Left    • OTHER SURGICAL HISTORY      Bilateral Eyelid Surgery    • TONSILLECTOMY     • WRIST SURGERY Left        Outpatient Prescriptions Marked as Taking for the 8/2/18 encounter (Office Visit) with David Alonzo, DO   Medication Sig Dispense Refill   • Calcium Carbonate (CALCIUM 600) 1500 (600 CA) MG tablet Take  by mouth daily.     • esomeprazole (NexIUM) 40 MG capsule Take 40 mg by mouth daily.     • fluticasone (FLONASE) 50 MCG/ACT nasal spray into each nostril daily.     • fluticasone-salmeterol (ADVAIR DISKUS) 250-50 MCG/DOSE DISKUS Inhale 2 (Two) Times a Day.     • furosemide (LASIX) 20 MG tablet Take 20 mg by mouth daily.     • mesalamine (LIALDA) 1.2 g EC tablet Take 2 Tablets every 12 hours 120 tablet 11   • Multiple Vitamins-Minerals (CENTRUM SILVER PO) Take  by mouth daily.     • raloxifene (EVISTA) 60 MG tablet Take 60 mg by mouth daily.     • ramipril (ALTACE) 10 MG capsule Take 10 mg by mouth daily.     • ranitidine (ZANTAC) 150 MG capsule Take 150 mg by mouth as needed.     • simvastatin (ZOCOR) 10 MG tablet Take 10 mg by mouth daily.         No Known Allergies    Social History     Social History   • Marital status:      Spouse name: N/A   • Number of children: N/A   • Years of education: N/A     Occupational History   • Not on file.     Social History Main Topics   • Smoking status: Never Smoker   • Smokeless tobacco: Never Used   • Alcohol use No   • Drug use: No   • Sexual activity: Not on file      Comment: menopause     Other Topics Concern   • Not on file     Social History Narrative   • No narrative on file       Family History   Problem Relation Age of Onset   • Colon polyps Mother    • Breast cancer Neg Hx        Review of Systems   Constitutional: Negative for fatigue, fever and unexpected weight change.   HENT: Negative for hearing loss, sore throat and voice change.    Eyes:  "Negative for visual disturbance.   Respiratory: Negative for cough, shortness of breath and wheezing.    Cardiovascular: Negative for chest pain and palpitations.   Gastrointestinal: Negative for abdominal pain, anorexia, blood in stool, change in bowel habit, nausea and vomiting.   Endocrine: Negative for polydipsia and polyuria.   Genitourinary: Negative for difficulty urinating, dysuria, hematuria and urgency.   Musculoskeletal: Negative for joint swelling and myalgias.   Skin: Negative for color change, rash and wound.   Neurological: Negative for dizziness, tremors, seizures, syncope and weakness.   Hematological: Does not bruise/bleed easily.   Psychiatric/Behavioral: Negative for agitation and confusion. The patient is not nervous/anxious.        Objective     Vitals:    08/02/18 1346   BP: 120/80   Pulse: 76   Temp: 97 °F (36.1 °C)   SpO2: 98%   Weight: 96.2 kg (212 lb)   Height: 170.2 cm (67\")     Body mass index is 33.2 kg/m².    Physical Exam   Constitutional: She is oriented to person, place, and time. She appears well-developed and well-nourished.   HENT:   Head: Normocephalic and atraumatic.   Eyes: Pupils are equal, round, and reactive to light. Conjunctivae are normal. No scleral icterus.   Neck: No JVD present. No thyroid mass and no thyromegaly present.   Cardiovascular: Normal rate, regular rhythm and normal heart sounds.  Exam reveals no gallop and no friction rub.    No murmur heard.  Pulmonary/Chest: Effort normal and breath sounds normal. No accessory muscle usage. No respiratory distress. She has no wheezes. She has no rales.   Abdominal: Soft. Bowel sounds are normal. She exhibits no distension, no ascites and no mass. There is no splenomegaly or hepatomegaly. There is no tenderness. There is no rebound and no guarding.   Genitourinary:   Genitourinary Comments: Rectal-Did not examine   Musculoskeletal: Normal range of motion. She exhibits no edema.   Neurological: She is alert and oriented " to person, place, and time.   Deemed a reliable historian, able to converse without difficulty and able to move all extremities without difficulty   Skin: Skin is warm and dry.   Psychiatric: She has a normal mood and affect. Her behavior is normal.       Imaging Results (most recent)     None          Body mass index is 33.2 kg/m².    Assessment/Plan     Coco was seen today for ulcerative colitis.    Diagnoses and all orders for this visit:    Ulcerative pancolitis without complication (CMS/[facility])    plan  To have the pt f/u in 6 months or sooner if sx dictate  c-scope 2020    Continue Lialda  * Surgery not found *    Patient's Body mass index is 33.2 kg/m². BMI is above normal parameters. Recommendations include: no follow-up required.      There are no Patient Instructions on file for this visit.

## 2018-10-02 NOTE — PROGRESS NOTES
Ms. Steele is 72 y.o. female    CHIEF COMPLAINT: I am here for follow up on left renal cell carcinoma.     HPI  Follow-up renal cell carcinoma  Location: Left kidney  Quality:  Clear cell adenocarcinoma of the kidney  Severity: Organ confined disease grade 1/4 (low risk lesion).  Duration: Diagnosed October 2014  Timing: Unknown onset  Context: Evaluation of mass found on CT as incidental finding.  Modifying factors: No evidence recurrent since robot-assisted lap scopic partial nephrectomy  Associated signs or symptoms: No hematuria or flank pain  History related to this issue:   - 10/2014: Left Robot Assist Partial nephrectomy    Final Diagnosis   Left kidney, wedge resection:         A.     Renal clear cell carcinoma, Sahra nuclear grade 1.         B.     Greatest tumor dimension is 2.1 cm.          C.     Tumor confined within the renal capsule.          D.     Renal parenchymal and perinephric adipose tissue margins free of    malignant infiltrate.   Other issues to be addressed at this visit:         The following portions of the patient's history were reviewed and updated as appropriate: allergies, current medications, past family history, past medical history, past social history, past surgical history and problem list.      Review of Systems   Constitutional: Negative for chills and fever.   Gastrointestinal: Negative for abdominal pain, anal bleeding and blood in stool.   Genitourinary: Negative for dysuria, frequency, hematuria and urgency.           Current Outpatient Prescriptions:   •  Calcium Carbonate (CALCIUM 600) 1500 (600 CA) MG tablet, Take  by mouth daily., Disp: , Rfl:   •  esomeprazole (NexIUM) 40 MG capsule, Take 40 mg by mouth daily., Disp: , Rfl:   •  fluticasone (FLONASE) 50 MCG/ACT nasal spray, into each nostril daily., Disp: , Rfl:   •  fluticasone-salmeterol (ADVAIR DISKUS) 250-50 MCG/DOSE DISKUS, Inhale 2 (Two) Times a Day., Disp: , Rfl:   •  furosemide (LASIX) 20 MG tablet, Take 20  mg by mouth daily., Disp: , Rfl:   •  mesalamine (LIALDA) 1.2 g EC tablet, Take 2 Tablets every 12 hours, Disp: 120 tablet, Rfl: 11  •  Multiple Vitamins-Minerals (CENTRUM SILVER PO), Take  by mouth daily., Disp: , Rfl:   •  raloxifene (EVISTA) 60 MG tablet, Take 60 mg by mouth daily., Disp: , Rfl:   •  ramipril (ALTACE) 10 MG capsule, Take 10 mg by mouth daily., Disp: , Rfl:   •  ranitidine (ZANTAC) 150 MG capsule, Take 150 mg by mouth as needed., Disp: , Rfl:   •  simvastatin (ZOCOR) 10 MG tablet, Take 10 mg by mouth daily., Disp: , Rfl:     Past Medical History:   Diagnosis Date   • Abdominal pain, epigastric    • Abdominal pain, left lower quadrant    • Abnormal findings on diagnostic imaging of abdomen     ABFND, RADIOLOGICAL, ABDOMINAL AREA    • Allergic rhinitis    • Arthritis    • Chronic ulcerative colitis without complication (CMS/HCC)    • Constipation    • Diarrhea    • Diverticulosis    • Encounter for Hemoccult screening     HEMOCCULT POSITIVE STOOLS    • Gastroesophageal reflux disease     esophagitis presence not specified   • GERD (gastroesophageal reflux disease)    • Herpes zoster    • Hypertension    • Nausea    • Obesity    • Rectal bleeding    • Rectal bleeding    • Regurgitation    • Ulcerative colitis (CMS/HCC)        Past Surgical History:   Procedure Laterality Date   • CHOLECYSTECTOMY     • COLONOSCOPY  09/23/2014    left side bx-minimal active inflammation - 2 yr   • COLONOSCOPY  07/09/2014    active inflammation to extent of limited lower colonoscopy/45 cm   • COLONOSCOPY  02/21/2013    mild inflammation in sigmoid, left-sided diverticulosis   • COLONOSCOPY  03/05/2010    very tortuous colon not allowing visual of cecal base - 5 yrs   • COLONOSCOPY  11/14/2003    FAIRLY TORTUOUS AND SPASMODIC COLON - 5 YR   • COLONOSCOPY Left 9/28/2016    Procedure: COLONOSCOPY WITH ANESTHESIA;  Surgeon: David Alonzo DO;  Location: [facility] ENDOSCOPY;  Service:    • ENDOSCOPY  03/14/2014    Normal   •  "ENDOSCOPY  02/09/2006    MILD GASTRITIS   • NEPHRECTOMY PARTIAL Left    • OTHER SURGICAL HISTORY      Bilateral Eyelid Surgery    • TONSILLECTOMY     • WRIST SURGERY Left        Social History     Social History   • Marital status:      Social History Main Topics   • Smoking status: Never Smoker   • Smokeless tobacco: Never Used   • Alcohol use No   • Drug use: No     Other Topics Concern   • Not on file       Family History   Problem Relation Age of Onset   • Colon polyps Mother    • Breast cancer Neg Hx          /76   Temp 97.5 °F (36.4 °C)   Ht 170.2 cm (67\")   Wt 97.1 kg (214 lb)   BMI 33.52 kg/m²       Physical Exam  Constitutional:  They  appear well-developed and well-nourished. There are no obvious deformities. No distress.   Pulmonary/Chest: Effort normal.   GI: Soft. The patient exhibits no distension and no mass. There is no tenderness. There is no rebound and no guarding. No hernia.   Neurological: Patient is alert and oriented to person, place, and time.   Skin: Skin is warm and dry. Not diaphoretic.   Psychiatric:  normal mood and affect. Not agitated.         Data  Independent review of CT scan of the abdomen with and without contrast  The patient has undergone a CT scan of the abdomen with and without intravenous contrast along with coronal reconstructive images.  Assessment of the renal parenchyma with regards to thickness, scarring, symmetry in appearance and function, presence of masses both pre-and postcontrast, and calcifications are noted.  The collecting system with regard to dilatation, presence of calcifications, and masses were reviewed.  The course and caliber the ureters also noted.  The renal vessels and retroperitoneum is inspected for pathology.  The solid viscera and bowel pattern are briefly reviewed, but will also be inspected by the radiologist.  This study shows area of previous partial nephrectomy show some scarring but otherwise negative study.  She does have " some small renal cysts noted that appear benign..      Assessment and Plan  Diagnoses and all orders for this visit:    Renal cell carcinoma, left (CMS/HCC)  -     POC Urinalysis Dipstick, Multipro  -     CT Abdomen Kidney With & Without Contrast; Future      No evidence of recurrent disease.       (Please note that portions of this note were completed with a voice recognition program.)  Sujit Luna MD  10/08/18  11:28 AM      Cc: Mitchell Delgado MD

## 2018-10-05 ENCOUNTER — HOSPITAL ENCOUNTER (OUTPATIENT)
Dept: CT IMAGING | Facility: HOSPITAL | Age: 73
Discharge: HOME | End: 2018-10-05
Attending: UROLOGY | Admitting: UROLOGY

## 2018-10-05 DIAGNOSIS — C64.2 RENAL CELL CARCINOMA, LEFT (HCC): ICD-10-CM

## 2018-10-05 LAB — CREAT BLDA-MCNC: 1 MG/DL (ref 0.6–1.3)

## 2018-10-05 PROCEDURE — 25010000002 IOPAMIDOL 61 % SOLUTION: Performed by: UROLOGY

## 2018-10-05 PROCEDURE — 74170 CT ABD WO CNTRST FLWD CNTRST: CPT

## 2018-10-05 PROCEDURE — 82565 ASSAY OF CREATININE: CPT

## 2018-10-05 RX ADMIN — IOPAMIDOL 100 ML: 612 INJECTION, SOLUTION INTRAVENOUS at 10:32

## 2018-10-08 ENCOUNTER — OFFICE VISIT (OUTPATIENT)
Dept: UROLOGY | Facility: CLINIC | Age: 73
End: 2018-10-08

## 2018-10-08 VITALS
WEIGHT: 214 LBS | SYSTOLIC BLOOD PRESSURE: 140 MMHG | BODY MASS INDEX: 33.59 KG/M2 | TEMPERATURE: 97.5 F | DIASTOLIC BLOOD PRESSURE: 76 MMHG | HEIGHT: 67 IN

## 2018-10-08 DIAGNOSIS — C64.2 RENAL CELL CARCINOMA, LEFT (HCC): Primary | ICD-10-CM

## 2018-10-08 LAB
BILIRUB BLD-MCNC: NEGATIVE MG/DL
CLARITY, POC: CLEAR
COLOR UR: YELLOW
GLUCOSE UR STRIP-MCNC: NEGATIVE MG/DL
KETONES UR QL: NEGATIVE
LEUKOCYTE EST, POC: NEGATIVE
NITRITE UR-MCNC: NEGATIVE MG/ML
PH UR: 6 [PH] (ref 5–8)
PROT UR STRIP-MCNC: NEGATIVE MG/DL
RBC # UR STRIP: NEGATIVE /UL
SP GR UR: 1 (ref 1–1.03)
UROBILINOGEN UR QL: NORMAL

## 2018-10-08 PROCEDURE — 99213 OFFICE O/P EST LOW 20 MIN: CPT | Performed by: UROLOGY

## 2018-10-08 PROCEDURE — 81003 URINALYSIS AUTO W/O SCOPE: CPT | Performed by: UROLOGY

## 2018-10-08 NOTE — PATIENT INSTRUCTIONS

## 2018-11-12 DIAGNOSIS — I10 ESSENTIAL HYPERTENSION: Primary | ICD-10-CM

## 2018-11-12 DIAGNOSIS — J30.9 ALLERGIC RHINITIS, UNSPECIFIED SEASONALITY, UNSPECIFIED TRIGGER: ICD-10-CM

## 2018-11-12 DIAGNOSIS — M19.90 OSTEOARTHRITIS, UNSPECIFIED OSTEOARTHRITIS TYPE, UNSPECIFIED SITE: ICD-10-CM

## 2018-11-12 RX ORDER — FLUTICASONE PROPIONATE 50 MCG
2 SPRAY, SUSPENSION (ML) NASAL DAILY
Qty: 1 BOTTLE | Refills: 5 | Status: SHIPPED | OUTPATIENT
Start: 2018-11-12 | End: 2018-12-12

## 2018-11-12 RX ORDER — RAMIPRIL 10 MG/1
10 CAPSULE ORAL DAILY
Qty: 30 CAPSULE | Refills: 5 | Status: SHIPPED | OUTPATIENT
Start: 2018-11-12 | End: 2018-12-12

## 2018-11-12 RX ORDER — RALOXIFENE HYDROCHLORIDE 60 MG/1
60 TABLET, FILM COATED ORAL DAILY
Qty: 30 TABLET | Refills: 5 | Status: SHIPPED | OUTPATIENT
Start: 2018-11-12

## 2018-11-12 NOTE — TELEPHONE ENCOUNTER
From: Gamaliel Gomez  Sent: 11/11/2018 8:36 PM CST  Subject: Medication Renewal Request    Alisson Rendon would like a refill of the following medications:     ramipril (ALTACE) 10 MG capsule [Davie Vallejo MD]     raloxifene (EVISTA) 60 MG tablet [Davie Vallejo MD]     fluticasone [facility]) 50 MCG/ACT nasal spray Arvind Peterson MD]    Preferred pharmacy: [place] [id number] [address] - P [phone] F [phone]

## 2018-11-28 ENCOUNTER — OFFICE VISIT (OUTPATIENT)
Dept: GASTROENTEROLOGY | Facility: CLINIC | Age: 73
End: 2018-11-28

## 2018-11-28 VITALS
HEART RATE: 74 BPM | TEMPERATURE: 96.6 F | DIASTOLIC BLOOD PRESSURE: 78 MMHG | OXYGEN SATURATION: 98 % | BODY MASS INDEX: 34.59 KG/M2 | WEIGHT: 220.4 LBS | SYSTOLIC BLOOD PRESSURE: 120 MMHG | HEIGHT: 67 IN

## 2018-11-28 DIAGNOSIS — K51.90 ULCERATIVE COLITIS WITHOUT COMPLICATIONS, UNSPECIFIED LOCATION (HCC): Primary | ICD-10-CM

## 2018-11-28 PROCEDURE — 99213 OFFICE O/P EST LOW 20 MIN: CPT | Performed by: INTERNAL MEDICINE

## 2018-11-28 RX ORDER — MESALAMINE 1.2 G/1
2400 TABLET, DELAYED RELEASE ORAL 2 TIMES DAILY
Qty: 120 TABLET | Refills: 11 | Status: SHIPPED | OUTPATIENT
Start: 2018-11-28 | End: 2018-12-28

## 2018-11-28 NOTE — PROGRESS NOTES
Chief Complaint   Patient presents with   • Medication Problem     insurance coverage       Subjective     HPI     Hx of UC, dx in 2014.  Coarse is constant.  Currently maintained on Lialda.  Insurance will stop coverage of Lialda in Jan 2019.  She intermittently experiences rectal bleeding.  Bowels move regular and without difficulty.  No weight loss.  No abdominal pain,    Colonoscopy 2016.  Currently maintained on Lialda 2.4 bid.    Past Medical History:   Diagnosis Date   • Abdominal pain, epigastric    • Abdominal pain, left lower quadrant    • Abnormal findings on diagnostic imaging of abdomen     ABFND, RADIOLOGICAL, ABDOMINAL AREA    • Allergic rhinitis    • Arthritis    • Chronic ulcerative colitis without complication (CMS/HCC)    • Constipation    • Diarrhea    • Diverticulosis    • Encounter for Hemoccult screening     HEMOCCULT POSITIVE STOOLS    • Gastroesophageal reflux disease     esophagitis presence not specified   • GERD (gastroesophageal reflux disease)    • Herpes zoster    • Hypertension    • Nausea    • Obesity    • Rectal bleeding    • Rectal bleeding    • Regurgitation    • Ulcerative colitis (CMS/HCC)        Past Surgical History:   Procedure Laterality Date   • CHOLECYSTECTOMY     • COLONOSCOPY  09/23/2014    left side bx-minimal active inflammation - 2 yr   • COLONOSCOPY  07/09/2014    active inflammation to extent of limited lower colonoscopy/45 cm   • COLONOSCOPY  02/21/2013    mild inflammation in sigmoid, left-sided diverticulosis   • COLONOSCOPY  03/05/2010    very tortuous colon not allowing visual of cecal base - 5 yrs   • COLONOSCOPY  11/14/2003    FAIRLY TORTUOUS AND SPASMODIC COLON - 5 YR   • ENDOSCOPY  03/14/2014    Normal   • ENDOSCOPY  02/09/2006    MILD GASTRITIS   • NEPHRECTOMY PARTIAL Left    • OTHER SURGICAL HISTORY      Bilateral Eyelid Surgery    • TONSILLECTOMY     • WRIST SURGERY Left        Outpatient Medications Marked as Taking for the 11/28/18 encounter (Office  Visit) with David Alonzo, DO   Medication Sig Dispense Refill   • Calcium Carbonate (CALCIUM 600) 1500 (600 CA) MG tablet Take  by mouth daily.     • esomeprazole (NexIUM) 40 MG capsule Take 40 mg by mouth daily.     • fluticasone (FLONASE) 50 MCG/ACT nasal spray into each nostril daily.     • fluticasone-salmeterol (ADVAIR DISKUS) 250-50 MCG/DOSE DISKUS Inhale 2 (Two) Times a Day.     • furosemide (LASIX) 20 MG tablet Take 20 mg by mouth daily.     • Multiple Vitamins-Minerals (CENTRUM SILVER PO) Take  by mouth daily.     • raloxifene (EVISTA) 60 MG tablet Take 60 mg by mouth daily.     • ramipril (ALTACE) 10 MG capsule Take 10 mg by mouth daily.     • ranitidine (ZANTAC) 150 MG capsule Take 150 mg by mouth as needed.     • simvastatin (ZOCOR) 10 MG tablet Take 10 mg by mouth daily.     • [DISCONTINUED] mesalamine (LIALDA) 1.2 g EC tablet Take 2 Tablets every 12 hours 120 tablet 11       No Known Allergies    Social History     Socioeconomic History   • Marital status:      Spouse name: Not on file   • Number of children: Not on file   • Years of education: Not on file   • Highest education level: Not on file   Social Needs   • Financial resource strain: Not on file   • Food insecurity - worry: Not on file   • Food insecurity - inability: Not on file   • Transportation needs - medical: Not on file   • Transportation needs - non-medical: Not on file   Occupational History   • Not on file   Tobacco Use   • Smoking status: Never Smoker   • Smokeless tobacco: Never Used   Substance and Sexual Activity   • Alcohol use: No   • Drug use: No   • Sexual activity: Not on file     Comment: menopause   Other Topics Concern   • Not on file   Social History Narrative   • Not on file       Family History   Problem Relation Age of Onset   • Colon polyps Mother    • Breast cancer Neg Hx        Review of Systems   Constitutional: Negative for fatigue, fever and unexpected weight change.   HENT: Negative for hearing loss,  "sore throat and voice change.    Eyes: Negative for visual disturbance.   Respiratory: Negative for cough, shortness of breath and wheezing.    Cardiovascular: Negative for chest pain and palpitations.   Gastrointestinal: Negative for abdominal pain, blood in stool and vomiting.   Endocrine: Negative for polydipsia and polyuria.   Genitourinary: Negative for difficulty urinating, dysuria, hematuria and urgency.   Musculoskeletal: Negative for joint swelling and myalgias.   Skin: Negative for color change, rash and wound.   Neurological: Negative for dizziness, tremors, seizures and syncope.   Hematological: Does not bruise/bleed easily.   Psychiatric/Behavioral: Negative for agitation and confusion. The patient is not nervous/anxious.        Objective     Vitals:    11/28/18 1432   BP: 120/78   Pulse: 74   Temp: 96.6 °F (35.9 °C)   SpO2: 98%   Weight: 100 kg (220 lb 6.4 oz)   Height: 170.2 cm (67\")     Body mass index is 34.52 kg/m².    Physical Exam   Constitutional: She is oriented to person, place, and time. She appears well-developed and well-nourished. She is cooperative.   HENT:   Head: Normocephalic and atraumatic.   Eyes: Conjunctivae are normal. Pupils are equal, round, and reactive to light. No scleral icterus.   Neck: Normal range of motion. Neck supple. No JVD present. No thyroid mass and no thyromegaly present.   Cardiovascular: Normal rate, regular rhythm and normal heart sounds. Exam reveals no gallop and no friction rub.   No murmur heard.  Pulmonary/Chest: Effort normal and breath sounds normal. No accessory muscle usage. No respiratory distress. She has no wheezes. She has no rales.   Abdominal: Soft. Normal appearance and bowel sounds are normal. She exhibits no distension, no ascites and no mass. There is no hepatosplenomegaly. There is no tenderness. There is no rebound and no guarding.   Musculoskeletal: Normal range of motion. She exhibits no edema or tenderness.     Vascular Status -  Her " right foot exhibits normal foot vasculature  and no edema. Her left foot exhibits normal foot vasculature  and no edema.  Lymphadenopathy:     She has no cervical adenopathy.   Neurological: She is alert and oriented to person, place, and time. She has normal strength. Gait normal.   Skin: Skin is warm, dry and intact. No rash noted.       Imaging Results (most recent)     None          Body mass index is 34.52 kg/m².    Assessment/Plan     Ccoo was seen today for medication problem.    Diagnoses and all orders for this visit:    Ulcerative colitis without complications, unspecified location (CMS/HCC)  -     mesalamine (LIALDA) 1.2 g EC tablet; Take 2 tablets by mouth 2 (Two) Times a Day for 30 days.        * Surgery not found *     Mesalamine 1.2 gm and mesalamine 800 mg daily are both approved on pt insurance per notification brought in from insurance company    Anticipate repeat cscope in 2020, sooner if sx develop that warrant earlier procedure    F/u 6 months    Patient's Body mass index is 34.52 kg/m². BMI is above normal parameters. Recommendations include: no follow-up required.      There are no Patient Instructions on file for this visit.

## 2018-12-16 RX ORDER — FUROSEMIDE 20 MG/1
TABLET ORAL
Qty: 30 TABLET | Refills: 0 | Status: SHIPPED | OUTPATIENT
Start: 2018-12-16

## 2019-05-23 ENCOUNTER — OFFICE VISIT (OUTPATIENT)
Dept: GASTROENTEROLOGY | Facility: CLINIC | Age: 74
End: 2019-05-23

## 2019-05-23 VITALS
HEIGHT: 67 IN | BODY MASS INDEX: 34.21 KG/M2 | DIASTOLIC BLOOD PRESSURE: 80 MMHG | WEIGHT: 218 LBS | OXYGEN SATURATION: 99 % | TEMPERATURE: 97 F | HEART RATE: 72 BPM | SYSTOLIC BLOOD PRESSURE: 120 MMHG

## 2019-05-23 DIAGNOSIS — K51.90 ULCERATIVE COLITIS WITHOUT COMPLICATIONS, UNSPECIFIED LOCATION (HCC): Primary | ICD-10-CM

## 2019-05-23 DIAGNOSIS — K59.00 CONSTIPATION, UNSPECIFIED CONSTIPATION TYPE: ICD-10-CM

## 2019-05-23 PROCEDURE — 99213 OFFICE O/P EST LOW 20 MIN: CPT | Performed by: INTERNAL MEDICINE

## 2019-05-23 RX ORDER — MESALAMINE 1.2 G/1
1200 TABLET, DELAYED RELEASE ORAL 2 TIMES DAILY
COMMUNITY
End: 2020-07-02 | Stop reason: SDUPTHER

## 2019-05-23 RX ORDER — SENNOSIDES 8.6 MG
650 CAPSULE ORAL EVERY 8 HOURS PRN
COMMUNITY

## 2019-05-23 RX ORDER — DOCUSATE SODIUM 100 MG/1
100 CAPSULE, LIQUID FILLED ORAL DAILY
COMMUNITY

## 2019-05-23 RX ORDER — MELATONIN 10 MG
CAPSULE ORAL
COMMUNITY

## 2019-05-23 RX ORDER — CETIRIZINE HYDROCHLORIDE 5 MG/1
5 TABLET ORAL DAILY
COMMUNITY
End: 2020-05-22

## 2019-05-23 NOTE — PROGRESS NOTES
Chief Complaint   Patient presents with   • Ulcerative Colitis     was seen 6 months ago doing ok        PCP: Josue Whitaker MD  REFER: No ref. provider found      Subjective   HPI    Coco Steele is a 73 y.o. female who presents with a history of Ulcerative Colitis.   Status of disease is quiet and stable.  The severity is described as Mild.  She denies  abdominal cramping, diarrhea, bloody stool and anorexia.  Previous diagnostic test include  colonoscopy.  Maintained on 4 mesalamine per day.  Previous sx improved with starting Mesalamine.  More difficulty with constipation, Metamucil improved constipation.    Past Medical History:   Diagnosis Date   • Abdominal pain, epigastric    • Abdominal pain, left lower quadrant    • Abnormal findings on diagnostic imaging of abdomen     ABFND, RADIOLOGICAL, ABDOMINAL AREA    • Allergic rhinitis    • Arthritis    • Chronic ulcerative colitis without complication (CMS/HCC)    • Constipation    • Diarrhea    • Diverticulosis    • Encounter for Hemoccult screening     HEMOCCULT POSITIVE STOOLS    • Gastroesophageal reflux disease     esophagitis presence not specified   • GERD (gastroesophageal reflux disease)    • Herpes zoster    • Hypertension    • Nausea    • Obesity    • Rectal bleeding    • Rectal bleeding    • Regurgitation    • Ulcerative colitis (CMS/HCC)      Outpatient Medications Marked as Taking for the 5/23/19 encounter (Office Visit) with David Alonzo, DO   Medication Sig Dispense Refill   • acetaminophen (TYLENOL) 650 MG 8 hr tablet Take 650 mg by mouth Every 8 (Eight) Hours As Needed for Mild Pain .     • Calcium Carbonate (CALCIUM 600) 1500 (600 CA) MG tablet Take  by mouth daily.     • cetirizine (zyrTEC) 5 MG tablet Take 5 mg by mouth Daily.     • docusate sodium (COLACE) 100 MG capsule Take 100 mg by mouth Daily.     • esomeprazole (NexIUM) 40 MG capsule Take 40 mg by mouth daily.     • fluticasone (FLONASE) 50 MCG/ACT nasal spray into each  nostril daily.     • fluticasone-salmeterol (ADVAIR DISKUS) 250-50 MCG/DOSE DISKUS Inhale 2 (Two) Times a Day.     • furosemide (LASIX) 20 MG tablet Take 20 mg by mouth daily.     • Melatonin 10 MG capsule Take  by mouth.     • mesalamine (LIALDA) 1.2 g EC tablet Take 1,200 mg by mouth 2 (Two) Times a Day.     • Multiple Vitamins-Minerals (CENTRUM SILVER PO) Take  by mouth daily.     • Potassium 99 MG tablet Take  by mouth.     • psyllium (METAMUCIL) 58.6 % packet Take 1 packet by mouth Daily.     • raloxifene (EVISTA) 60 MG tablet Take 60 mg by mouth daily.     • ramipril (ALTACE) 10 MG capsule Take 10 mg by mouth daily.     • ranitidine (ZANTAC) 150 MG capsule Take 150 mg by mouth as needed.     • simvastatin (ZOCOR) 10 MG tablet Take 10 mg by mouth daily.     • SUPER B COMPLEX/C PO Take  by mouth.       No Known Allergies  Social History     Socioeconomic History   • Marital status:      Spouse name: Not on file   • Number of children: Not on file   • Years of education: Not on file   • Highest education level: Not on file   Tobacco Use   • Smoking status: Never Smoker   • Smokeless tobacco: Never Used   Substance and Sexual Activity   • Alcohol use: No   • Drug use: No     Family History   Problem Relation Age of Onset   • Colon polyps Mother    • Breast cancer Neg Hx      Review of Systems   Constitutional: Negative for fatigue, fever and unexpected weight change.   HENT: Negative for hearing loss, sore throat and voice change.    Eyes: Negative for visual disturbance.   Respiratory: Negative for cough, shortness of breath and wheezing.    Cardiovascular: Negative for chest pain and palpitations.   Gastrointestinal: Positive for constipation. Negative for abdominal pain, blood in stool and vomiting.   Endocrine: Negative for polydipsia and polyuria.   Genitourinary: Negative for difficulty urinating, dysuria, hematuria and urgency.   Musculoskeletal: Negative for joint swelling and myalgias.   Skin:  Negative for color change, rash and wound.   Neurological: Negative for dizziness, tremors, seizures and syncope.   Hematological: Does not bruise/bleed easily.   Psychiatric/Behavioral: Negative for agitation and confusion. The patient is not nervous/anxious.      Objective   Vitals:    05/23/19 1325   BP: 120/80   Pulse: 72   Temp: 97 °F (36.1 °C)   SpO2: 99%     Physical Exam   Constitutional: She is oriented to person, place, and time. She appears well-developed and well-nourished. She is cooperative.   HENT:   Head: Normocephalic and atraumatic.   Eyes: Conjunctivae are normal. Pupils are equal, round, and reactive to light. No scleral icterus.   Neck: Normal range of motion. Neck supple. No JVD present. No thyroid mass and no thyromegaly present.   Cardiovascular: Normal rate, regular rhythm and normal heart sounds. Exam reveals no gallop and no friction rub.   No murmur heard.  Pulmonary/Chest: Effort normal and breath sounds normal. No accessory muscle usage. No respiratory distress. She has no wheezes. She has no rales.   Abdominal: Soft. Normal appearance and bowel sounds are normal. She exhibits no distension, no ascites and no mass. There is no hepatosplenomegaly. There is no tenderness. There is no rebound and no guarding.   Musculoskeletal: Normal range of motion. She exhibits no edema or tenderness.     Vascular Status -  Her right foot exhibits normal foot vasculature  and no edema. Her left foot exhibits normal foot vasculature  and no edema.  Lymphadenopathy:     She has no cervical adenopathy.   Neurological: She is alert and oriented to person, place, and time. She has normal strength. Gait normal.   Skin: Skin is warm, dry and intact. No rash noted.     Imaging Results (most recent)     None        Body mass index is 34.14 kg/m².  Assessment/Plan   Coco was seen today for ulcerative colitis.    Diagnoses and all orders for this visit:    Ulcerative colitis without complications, unspecified  location (CMS/HCC)    Constipation, unspecified constipation type      * Surgery not found *    Cont Metamucil  If constipation worsens decrease mesalamine by one pill to try and achieve relief  Colonoscopy anticipated 2020, unless sx develop that warrants procedure  Return office 1 yr, schedule colonoscopy at that time.    Patient's Body mass index is 34.14 kg/m². BMI is above normal parameters. Recommendations include: no follow up.

## 2019-08-28 ENCOUNTER — TRANSCRIBE ORDERS (OUTPATIENT)
Dept: ADMINISTRATIVE | Facility: HOSPITAL | Age: 74
End: 2019-08-28

## 2019-08-28 DIAGNOSIS — Z78.0 POST-MENOPAUSE: Primary | ICD-10-CM

## 2019-09-04 ENCOUNTER — TRANSCRIBE ORDERS (OUTPATIENT)
Dept: ADMINISTRATIVE | Facility: HOSPITAL | Age: 74
End: 2019-09-04

## 2019-09-04 DIAGNOSIS — Z12.39 BREAST SCREENING: Primary | ICD-10-CM

## 2019-09-09 ENCOUNTER — HOSPITAL ENCOUNTER (OUTPATIENT)
Dept: MAMMOGRAPHY | Facility: HOSPITAL | Age: 74
Discharge: HOME | End: 2019-09-09
Admitting: INTERNAL MEDICINE

## 2019-09-09 ENCOUNTER — HOSPITAL ENCOUNTER (OUTPATIENT)
Dept: BONE DENSITY | Facility: HOSPITAL | Age: 74
Discharge: HOME | End: 2019-09-09

## 2019-09-09 DIAGNOSIS — Z12.39 BREAST SCREENING: ICD-10-CM

## 2019-09-09 DIAGNOSIS — Z78.0 POST-MENOPAUSE: ICD-10-CM

## 2019-09-09 PROCEDURE — 77067 SCR MAMMO BI INCL CAD: CPT

## 2019-09-09 PROCEDURE — 77063 BREAST TOMOSYNTHESIS BI: CPT

## 2019-09-09 PROCEDURE — 77080 DXA BONE DENSITY AXIAL: CPT

## 2019-10-07 ENCOUNTER — HOSPITAL ENCOUNTER (OUTPATIENT)
Dept: CT IMAGING | Facility: HOSPITAL | Age: 74
Discharge: HOME | End: 2019-10-07
Admitting: UROLOGY

## 2019-10-07 DIAGNOSIS — C64.2 RENAL CELL CARCINOMA, LEFT (HCC): ICD-10-CM

## 2019-10-07 LAB — CREAT BLDA-MCNC: 1 MG/DL (ref 0.6–1.3)

## 2019-10-07 PROCEDURE — 74170 CT ABD WO CNTRST FLWD CNTRST: CPT

## 2019-10-07 PROCEDURE — 25010000002 IOPAMIDOL 61 % SOLUTION: Performed by: UROLOGY

## 2019-10-07 PROCEDURE — 82565 ASSAY OF CREATININE: CPT

## 2019-10-07 RX ADMIN — IOPAMIDOL 100 ML: 612 INJECTION, SOLUTION INTRAVENOUS at 08:58

## 2019-10-21 NOTE — PROGRESS NOTES
Ms. Steele is 73 y.o. female    CHIEF COMPLAINT: I am here for my yearly follow up for renal cell carcinoma.     HPI  Follow-up renal cell carcinoma  Location: Left kidney  Quality:  Clear cell adenocarcinoma of the kidney  Severity: Organ confined disease grade 1/4 (low risk lesion).  Duration: Diagnosed October 2014  Timing: Unknown onset  Context: Evaluation of mass found on CT as incidental finding.  Modifying factors: No evidence recurrent since robot-assisted lap scopic partial nephrectomy  Associated signs or symptoms: No hematuria or flank pain  History related to this issue:   - 10/2014: Left Robot Assist Partial nephrectomy                          Final Diagnosis              Left kidney, wedge resection:               A.     Renal clear cell carcinoma, Sahra nuclear grade 1.               B.     Greatest tumor dimension is 2.1 cm.                C.     Tumor confined within the renal capsule.                D.     Renal parenchymal and perinephric adipose tissue margins free of                          malignant infiltrate.   Other issues to be addressed at this visit:        The following portions of the patient's history were reviewed and updated as appropriate: allergies, current medications, past family history, past medical history, past social history, past surgical history and problem list.      Review of Systems   Constitutional: Negative for chills and fever.   Gastrointestinal: Negative for abdominal pain, anal bleeding and blood in stool.   Genitourinary: Negative for dysuria and hematuria.         Current Outpatient Medications:   •  acetaminophen (TYLENOL) 650 MG 8 hr tablet, Take 650 mg by mouth Every 8 (Eight) Hours As Needed for Mild Pain ., Disp: , Rfl:   •  Calcium Carbonate (CALCIUM 600) 1500 (600 CA) MG tablet, Take  by mouth daily., Disp: , Rfl:   •  cetirizine (zyrTEC) 5 MG tablet, Take 5 mg by mouth Daily., Disp: , Rfl:   •  docusate sodium (COLACE) 100 MG capsule, Take 100 mg  by mouth Daily., Disp: , Rfl:   •  esomeprazole (NexIUM) 40 MG capsule, Take 40 mg by mouth daily., Disp: , Rfl:   •  fluticasone (FLONASE) 50 MCG/ACT nasal spray, into each nostril daily., Disp: , Rfl:   •  fluticasone-salmeterol (ADVAIR DISKUS) 250-50 MCG/DOSE DISKUS, Inhale 2 (Two) Times a Day., Disp: , Rfl:   •  furosemide (LASIX) 20 MG tablet, Take 20 mg by mouth daily., Disp: , Rfl:   •  Melatonin 10 MG capsule, Take  by mouth., Disp: , Rfl:   •  mesalamine (LIALDA) 1.2 g EC tablet, Take 1,200 mg by mouth 2 (Two) Times a Day., Disp: , Rfl:   •  Multiple Vitamins-Minerals (CENTRUM SILVER PO), Take  by mouth daily., Disp: , Rfl:   •  Potassium 99 MG tablet, Take  by mouth., Disp: , Rfl:   •  psyllium (METAMUCIL) 58.6 % packet, Take 1 packet by mouth Daily., Disp: , Rfl:   •  raloxifene (EVISTA) 60 MG tablet, Take 60 mg by mouth daily., Disp: , Rfl:   •  ramipril (ALTACE) 10 MG capsule, Take 10 mg by mouth daily., Disp: , Rfl:   •  ranitidine (ZANTAC) 150 MG capsule, Take 150 mg by mouth as needed., Disp: , Rfl:   •  simvastatin (ZOCOR) 10 MG tablet, Take 10 mg by mouth daily., Disp: , Rfl:   •  SUPER B COMPLEX/C PO, Take  by mouth., Disp: , Rfl:     Past Medical History:   Diagnosis Date   • Abdominal pain, epigastric    • Abdominal pain, left lower quadrant    • Abnormal findings on diagnostic imaging of abdomen     ABFND, RADIOLOGICAL, ABDOMINAL AREA    • Allergic rhinitis    • Arthritis    • Chronic ulcerative colitis without complication (CMS/HCC)    • Constipation    • Diarrhea    • Diverticulosis    • Encounter for Hemoccult screening     HEMOCCULT POSITIVE STOOLS    • Gastroesophageal reflux disease     esophagitis presence not specified   • GERD (gastroesophageal reflux disease)    • Herpes zoster    • Hypertension    • Nausea    • Obesity    • Rectal bleeding    • Rectal bleeding    • Regurgitation    • Ulcerative colitis (CMS/HCC)        Past Surgical History:   Procedure Laterality Date   •  CHOLECYSTECTOMY     • COLONOSCOPY  09/23/2014    left side bx-minimal active inflammation - 2 yr   • COLONOSCOPY  07/09/2014    active inflammation to extent of limited lower colonoscopy/45 cm   • COLONOSCOPY  02/21/2013    mild inflammation in sigmoid, left-sided diverticulosis   • COLONOSCOPY  03/05/2010    very tortuous colon not allowing visual of cecal base - 5 yrs   • COLONOSCOPY  11/14/2003    FAIRLY TORTUOUS AND SPASMODIC COLON - 5 YR   • COLONOSCOPY Left 9/28/2016    Procedure: COLONOSCOPY WITH ANESTHESIA;  Surgeon: David Alonzo DO;  Location: [facility] ENDOSCOPY;  Service:    • ENDOSCOPY  03/14/2014    Normal   • ENDOSCOPY  02/09/2006    MILD GASTRITIS   • NEPHRECTOMY PARTIAL Left    • OTHER SURGICAL HISTORY      Bilateral Eyelid Surgery    • TONSILLECTOMY     • WRIST SURGERY Left        Social History     Socioeconomic History   • Marital status:      Spouse name: Not on file   • Number of children: Not on file   • Years of education: Not on file   • Highest education level: Not on file   Tobacco Use   • Smoking status: Never Smoker   • Smokeless tobacco: Never Used   Substance and Sexual Activity   • Alcohol use: No   • Drug use: No       Family History   Problem Relation Age of Onset   • Colon polyps Mother    • Breast cancer Neg Hx          There were no vitals taken for this visit.      Physical Exam  Constitutional:  They  appear well-developed and well-nourished. There are no obvious deformities. No distress. The vital signs are reviewed  Pulmonary/Chest: Effort normal.   GI: Soft. The patient exhibits no distension and no mass. There is no tenderness. There is no rebound and no guarding. No hernia.   Neurological: Patient is alert and oriented to person, place, and time.   Skin: Skin is warm and dry. Not diaphoretic.   Psychiatric:  normal mood and affect. Not agitated.         Data  Results for orders placed or performed during the hospital encounter of 10/07/19   POC Creatinine   Result  Value Ref Range    Creatinine 1.00 0.60 - 1.30 mg/dL         EXAMINATION: CT ABDOMEN KIDNEY W WO CONTRAST-      10/7/2019 8:55 AM CDT     HISTORY: hx left partial nephrectomy for renal cell carcinoma.;  C64.2-Malignant neoplasm of left kidney, except renal pelvis     In order to have a CT radiation dose as low as reasonably achievable  Automated Exposure Control was utilized for adjustment of the mA and/or  KV according to patient size.     DLP in mGycm= 915.     Pre and postcontrast CT imaging of the abdomen.     Comparison is made with 10/5/2018 and 8/11/2015.     Noncontrast imaging shows cholecystectomy clips, splenic granulomas, and  postsurgical changes at the left kidney.     Postcontrast imaging shows normal heart size.  Clear lung bases.  Small low-density focus within the right hepatic lobe has decreased in  size compared with 10/05/2018. A few other tiny low density foci are  stable and have the appearance of cysts. There is no new liver disease.  No biliary dilation.  Cholecystectomy clips are present.     Normal pancreas and spleen.  Normal and symmetric adrenal glands.  20 x 13 mm upper pole left renal cyst is unchanged.  Partial left nephrectomy changes noted.  A few tiny right renal cysts are stable.     No aortic aneurysm.  No retroperitoneal lymphadenopathy.     No bowel dilation or free fluid.  Pelvis not included.     Summary:  1. Stable abdomen/pelvis CT. No new abnormality. No evidence of  metastatic disease.  This report was finalized on 10/07/2019 09:45 by Dr. Kendall Baxter MD.    These images were made available to me to review independently.  I did review the radiologist's report described above with regard to the urologic findings.   /Sujit Luna MD          Assessment and Plan  Diagnoses and all orders for this visit:    Renal cell carcinoma, left (CMS/HCC)  -     POC Urinalysis Dipstick, Multipro    She has no evidence of recurrent disease.This concludes 5 years of annual follow-up.   We will obtain a biannual renal ultrasound.        F/U: No evidence of recurrent disease.       (Please note that portions of this note were completed with a voice recognition program.)  Juanjose Collins  10/20/19  10:16 PM

## 2019-10-24 ENCOUNTER — OFFICE VISIT (OUTPATIENT)
Dept: UROLOGY | Facility: CLINIC | Age: 74
End: 2019-10-24

## 2019-10-24 VITALS — BODY MASS INDEX: 34.21 KG/M2 | HEIGHT: 67 IN | TEMPERATURE: 98 F | WEIGHT: 218 LBS

## 2019-10-24 DIAGNOSIS — C64.2 RENAL CELL CARCINOMA, LEFT (HCC): Primary | ICD-10-CM

## 2019-10-24 LAB
BILIRUB BLD-MCNC: NEGATIVE MG/DL
CLARITY, POC: CLEAR
COLOR UR: YELLOW
GLUCOSE UR STRIP-MCNC: NEGATIVE MG/DL
KETONES UR QL: NEGATIVE
LEUKOCYTE EST, POC: NEGATIVE
NITRITE UR-MCNC: NEGATIVE MG/ML
PH UR: 5.5 [PH] (ref 5–8)
PROT UR STRIP-MCNC: NEGATIVE MG/DL
RBC # UR STRIP: ABNORMAL /UL
SP GR UR: 1 (ref 1–1.03)
UROBILINOGEN UR QL: NORMAL

## 2019-10-24 PROCEDURE — 99213 OFFICE O/P EST LOW 20 MIN: CPT | Performed by: UROLOGY

## 2019-10-24 PROCEDURE — 81003 URINALYSIS AUTO W/O SCOPE: CPT | Performed by: UROLOGY

## 2019-10-25 ENCOUNTER — TELEPHONE (OUTPATIENT)
Dept: PRIMARY CARE CLINIC | Age: 74
End: 2019-10-25

## 2019-12-09 ENCOUNTER — TELEPHONE (OUTPATIENT)
Dept: GASTROENTEROLOGY | Facility: CLINIC | Age: 74
End: 2019-12-09

## 2020-05-22 ENCOUNTER — OFFICE VISIT (OUTPATIENT)
Dept: INTERNAL MEDICINE | Facility: CLINIC | Age: 75
End: 2020-05-22

## 2020-05-22 ENCOUNTER — LAB (OUTPATIENT)
Dept: LAB | Facility: HOSPITAL | Age: 75
End: 2020-05-22

## 2020-05-22 VITALS
HEIGHT: 67 IN | OXYGEN SATURATION: 97 % | WEIGHT: 211.2 LBS | BODY MASS INDEX: 33.15 KG/M2 | SYSTOLIC BLOOD PRESSURE: 113 MMHG | TEMPERATURE: 98.6 F | RESPIRATION RATE: 16 BRPM | HEART RATE: 78 BPM | DIASTOLIC BLOOD PRESSURE: 78 MMHG

## 2020-05-22 DIAGNOSIS — M85.80 OSTEOPENIA, UNSPECIFIED LOCATION: ICD-10-CM

## 2020-05-22 DIAGNOSIS — Z11.59 ENCOUNTER FOR HEPATITIS C SCREENING TEST FOR LOW RISK PATIENT: ICD-10-CM

## 2020-05-22 DIAGNOSIS — I10 ESSENTIAL HYPERTENSION: ICD-10-CM

## 2020-05-22 DIAGNOSIS — E78.5 HYPERLIPIDEMIA, UNSPECIFIED HYPERLIPIDEMIA TYPE: ICD-10-CM

## 2020-05-22 DIAGNOSIS — Z00.00 ENCOUNTER FOR PREVENTIVE CARE: ICD-10-CM

## 2020-05-22 DIAGNOSIS — M79.674 PAIN OF TOE OF RIGHT FOOT: ICD-10-CM

## 2020-05-22 DIAGNOSIS — I10 ESSENTIAL HYPERTENSION: Primary | ICD-10-CM

## 2020-05-22 LAB
25(OH)D3 SERPL-MCNC: 35.8 NG/ML (ref 30–100)
ALBUMIN SERPL-MCNC: 4.5 G/DL (ref 3.5–5.2)
ALBUMIN/GLOB SERPL: 1.4 G/DL
ALP SERPL-CCNC: 56 U/L (ref 39–117)
ALT SERPL W P-5'-P-CCNC: 23 U/L (ref 1–33)
ANION GAP SERPL CALCULATED.3IONS-SCNC: 15.2 MMOL/L (ref 5–15)
AST SERPL-CCNC: 30 U/L (ref 1–32)
BASOPHILS # BLD AUTO: 0.02 10*3/MM3 (ref 0–0.2)
BASOPHILS NFR BLD AUTO: 0.4 % (ref 0–1.5)
BILIRUB SERPL-MCNC: 0.2 MG/DL (ref 0.2–1.2)
BUN BLD-MCNC: 18 MG/DL (ref 8–23)
BUN/CREAT SERPL: 16.8 (ref 7–25)
CALCIUM SPEC-SCNC: 9.8 MG/DL (ref 8.6–10.5)
CHLORIDE SERPL-SCNC: 100 MMOL/L (ref 98–107)
CHOLEST SERPL-MCNC: 143 MG/DL (ref 0–200)
CO2 SERPL-SCNC: 23.8 MMOL/L (ref 22–29)
CREAT BLD-MCNC: 1.07 MG/DL (ref 0.57–1)
DEPRECATED RDW RBC AUTO: 41.3 FL (ref 37–54)
EOSINOPHIL # BLD AUTO: 0.04 10*3/MM3 (ref 0–0.4)
EOSINOPHIL NFR BLD AUTO: 0.8 % (ref 0.3–6.2)
ERYTHROCYTE [DISTWIDTH] IN BLOOD BY AUTOMATED COUNT: 13.1 % (ref 12.3–15.4)
GFR SERPL CREATININE-BSD FRML MDRD: 50 ML/MIN/1.73
GLOBULIN UR ELPH-MCNC: 3.2 GM/DL
GLUCOSE BLD-MCNC: 117 MG/DL (ref 65–99)
HCT VFR BLD AUTO: 36.5 % (ref 34–46.6)
HCV AB SER DONR QL: NORMAL
HDLC SERPL-MCNC: 64 MG/DL (ref 40–60)
HGB BLD-MCNC: 12.5 G/DL (ref 12–15.9)
IMM GRANULOCYTES # BLD AUTO: 0.02 10*3/MM3 (ref 0–0.05)
IMM GRANULOCYTES NFR BLD AUTO: 0.4 % (ref 0–0.5)
LDLC SERPL CALC-MCNC: 56 MG/DL (ref 0–100)
LDLC/HDLC SERPL: 0.88 {RATIO}
LYMPHOCYTES # BLD AUTO: 0.82 10*3/MM3 (ref 0.7–3.1)
LYMPHOCYTES NFR BLD AUTO: 16.1 % (ref 19.6–45.3)
MCH RBC QN AUTO: 29.9 PG (ref 26.6–33)
MCHC RBC AUTO-ENTMCNC: 34.2 G/DL (ref 31.5–35.7)
MCV RBC AUTO: 87.3 FL (ref 79–97)
MONOCYTES # BLD AUTO: 0.31 10*3/MM3 (ref 0.1–0.9)
MONOCYTES NFR BLD AUTO: 6.1 % (ref 5–12)
NEUTROPHILS # BLD AUTO: 3.88 10*3/MM3 (ref 1.7–7)
NEUTROPHILS NFR BLD AUTO: 76.2 % (ref 42.7–76)
NRBC BLD AUTO-RTO: 0 /100 WBC (ref 0–0.2)
PLATELET # BLD AUTO: 204 10*3/MM3 (ref 140–450)
PMV BLD AUTO: 10.6 FL (ref 6–12)
POTASSIUM BLD-SCNC: 4.6 MMOL/L (ref 3.5–5.2)
PROT SERPL-MCNC: 7.7 G/DL (ref 6–8.5)
RBC # BLD AUTO: 4.18 10*6/MM3 (ref 3.77–5.28)
SODIUM BLD-SCNC: 139 MMOL/L (ref 136–145)
TRIGL SERPL-MCNC: 115 MG/DL (ref 0–150)
URATE SERPL-MCNC: 6.3 MG/DL (ref 2.4–5.7)
VLDLC SERPL-MCNC: 23 MG/DL (ref 5–40)
WBC NRBC COR # BLD: 5.09 10*3/MM3 (ref 3.4–10.8)

## 2020-05-22 PROCEDURE — 85025 COMPLETE CBC W/AUTO DIFF WBC: CPT | Performed by: INTERNAL MEDICINE

## 2020-05-22 PROCEDURE — 99204 OFFICE O/P NEW MOD 45 MIN: CPT | Performed by: INTERNAL MEDICINE

## 2020-05-22 PROCEDURE — 84550 ASSAY OF BLOOD/URIC ACID: CPT | Performed by: INTERNAL MEDICINE

## 2020-05-22 PROCEDURE — 86803 HEPATITIS C AB TEST: CPT | Performed by: INTERNAL MEDICINE

## 2020-05-22 PROCEDURE — 80061 LIPID PANEL: CPT | Performed by: INTERNAL MEDICINE

## 2020-05-22 PROCEDURE — 82306 VITAMIN D 25 HYDROXY: CPT | Performed by: INTERNAL MEDICINE

## 2020-05-22 PROCEDURE — 36415 COLL VENOUS BLD VENIPUNCTURE: CPT

## 2020-05-22 PROCEDURE — 80053 COMPREHEN METABOLIC PANEL: CPT | Performed by: INTERNAL MEDICINE

## 2020-05-22 RX ORDER — FEXOFENADINE HCL 180 MG/1
180 TABLET ORAL DAILY
COMMUNITY
End: 2020-11-23

## 2020-05-22 RX ORDER — RALOXIFENE HYDROCHLORIDE 60 MG/1
60 TABLET, FILM COATED ORAL DAILY
Qty: 30 TABLET | Refills: 5 | Status: SHIPPED | OUTPATIENT
Start: 2020-05-22 | End: 2020-11-23 | Stop reason: SDUPTHER

## 2020-05-22 RX ORDER — FUROSEMIDE 20 MG/1
20 TABLET ORAL DAILY
Qty: 30 TABLET | Refills: 5 | Status: SHIPPED | OUTPATIENT
Start: 2020-05-22 | End: 2020-11-23 | Stop reason: SDUPTHER

## 2020-05-22 RX ORDER — SIMVASTATIN 10 MG
10 TABLET ORAL DAILY
Qty: 30 TABLET | Refills: 5 | Status: SHIPPED | OUTPATIENT
Start: 2020-05-22 | End: 2020-11-23 | Stop reason: SDUPTHER

## 2020-05-22 RX ORDER — RAMIPRIL 10 MG/1
10 CAPSULE ORAL DAILY
Qty: 30 CAPSULE | Refills: 5 | Status: SHIPPED | OUTPATIENT
Start: 2020-05-22 | End: 2020-11-23 | Stop reason: SDUPTHER

## 2020-05-22 NOTE — PROGRESS NOTES
Chief Complaint   Patient presents with   • Establish Care         History:  Coco Steele is a 74 y.o. female who presents today for evaluation of the above problems.      She is here to establish care. She is a previous patient of Dr. Honorio Whitaker. She has UC, GERD without esophagitis, HTN, HLD, renal cell carcinoma followed by Dr. Luna s/p left partial nephrectomy. No recurrence.  She has elected to get yearly renal ultrasounds to make sure no recurrence    Her last blood work was March 2019 and this was reviewed today.  Labs were unremarkable    She had a bone density scan and mammogram in September 9, 2019.  This showed osteopenia and her next mammogram was normal    She has ulcerative colitis followed by Dr. Alonzo.  She is doing well with Lialda without symptoms.  She had a colonoscopy last year    Her blood pressure is well controlled with ramipril 10 mg daily    Her cholesterol is controlled with simvastatin 10 mg daily as well    She reports a couple weeks ago right fourth toe pain that occurred without any injury.  Still some mild pain but overall improved greatly.        HPI    ROS:  Review of Systems   Constitutional: Negative for activity change, appetite change, fatigue, fever and unexpected weight change.   HENT: Negative for nosebleeds, sore throat and trouble swallowing.    Eyes: Negative for pain and visual disturbance.   Respiratory: Negative for cough, chest tightness and shortness of breath.    Cardiovascular: Positive for leg swelling (Intermittent, sometimes at the end of the day). Negative for chest pain and palpitations.   Gastrointestinal: Negative for abdominal pain, constipation, diarrhea, nausea and vomiting.   Endocrine: Negative for cold intolerance and heat intolerance.   Genitourinary: Negative for dysuria and hematuria.   Musculoskeletal: Positive for arthralgias. Negative for back pain, neck pain and neck stiffness.   Skin: Negative for rash and wound.   Neurological: Negative  for dizziness, syncope, weakness, light-headedness and headaches.   Hematological: Negative for adenopathy. Does not bruise/bleed easily.   Psychiatric/Behavioral: Negative for agitation, behavioral problems and confusion.       No Known Allergies  Past Medical History:   Diagnosis Date   • Abdominal pain, epigastric    • Abdominal pain, left lower quadrant    • Abnormal findings on diagnostic imaging of abdomen     ABFND, RADIOLOGICAL, ABDOMINAL AREA    • Allergic rhinitis    • Arthritis    • Cancer (CMS/HCC) 10/14 removed    Renal Cell carcinoma   • Chronic ulcerative colitis without complication (CMS/HCC)    • Constipation    • Diarrhea    • Diverticulosis    • Encounter for Hemoccult screening     HEMOCCULT POSITIVE STOOLS    • Gastroesophageal reflux disease     esophagitis presence not specified   • GERD (gastroesophageal reflux disease)    • Herpes zoster    • Hypertension    • Nausea    • Obesity    • Rectal bleeding    • Rectal bleeding    • Regurgitation    • Ulcerative colitis (CMS/HCC)      Past Surgical History:   Procedure Laterality Date   • CHOLECYSTECTOMY     • COLONOSCOPY  09/23/2014    left side bx-minimal active inflammation - 2 yr   • COLONOSCOPY  07/09/2014    active inflammation to extent of limited lower colonoscopy/45 cm   • COLONOSCOPY  02/21/2013    mild inflammation in sigmoid, left-sided diverticulosis   • COLONOSCOPY  03/05/2010    very tortuous colon not allowing visual of cecal base - 5 yrs   • COLONOSCOPY  11/14/2003    FAIRLY TORTUOUS AND SPASMODIC COLON - 5 YR   • COLONOSCOPY Left 9/28/2016    Procedure: COLONOSCOPY WITH ANESTHESIA;  Surgeon: David Alonzo DO;  Location: [facility] ENDOSCOPY;  Service:    • ENDOSCOPY  03/14/2014    Normal   • ENDOSCOPY  02/09/2006    MILD GASTRITIS   • NEPHRECTOMY PARTIAL Left    • OTHER SURGICAL HISTORY      Bilateral Eyelid Surgery    • TONSILLECTOMY     • WRIST SURGERY Left      Family History   Problem Relation Age of Onset   • Colon polyps  Mother    • Breast cancer Neg Hx      Coco  reports that she has never smoked. She has never used smokeless tobacco. She reports that she does not drink alcohol or use drugs.    I have reviewed and updated the above documentation (if necessary) including but not limited to chief complaint, ROS, PFSH, allergies and medications        Current Outpatient Medications:   •  acetaminophen (TYLENOL) 650 MG 8 hr tablet, Take 650 mg by mouth Every 8 (Eight) Hours As Needed for Mild Pain ., Disp: , Rfl:   •  Calcium Carbonate (CALCIUM 600) 1500 (600 CA) MG tablet, Take  by mouth daily., Disp: , Rfl:   •  docusate sodium (COLACE) 100 MG capsule, Take 100 mg by mouth Daily., Disp: , Rfl:   •  esomeprazole (NexIUM) 40 MG capsule, Take 40 mg by mouth daily., Disp: , Rfl:   •  fexofenadine (ALLEGRA) 180 MG tablet, Take 180 mg by mouth Daily., Disp: , Rfl:   •  furosemide (LASIX) 20 MG tablet, Take 1 tablet by mouth Daily., Disp: 30 tablet, Rfl: 5  •  Melatonin 10 MG capsule, Take  by mouth., Disp: , Rfl:   •  mesalamine (LIALDA) 1.2 g EC tablet, Take 1,200 mg by mouth 2 (Two) Times a Day., Disp: , Rfl:   •  Multiple Vitamins-Minerals (CENTRUM SILVER PO), Take  by mouth daily., Disp: , Rfl:   •  Potassium 99 MG tablet, Take  by mouth., Disp: , Rfl:   •  raloxifene (EVISTA) 60 MG tablet, Take 1 tablet by mouth Daily., Disp: 30 tablet, Rfl: 5  •  ramipril (ALTACE) 10 MG capsule, Take 1 capsule by mouth Daily., Disp: 30 capsule, Rfl: 5  •  simvastatin (ZOCOR) 10 MG tablet, Take 1 tablet by mouth Daily., Disp: 30 tablet, Rfl: 5  •  SUPER B COMPLEX/C PO, Take  by mouth., Disp: , Rfl:     PHQ-9 Depression Screening  Little interest or pleasure in doing things?     Feeling down, depressed, or hopeless?     Trouble falling or staying asleep, or sleeping too much?     Feeling tired or having little energy?     Poor appetite or overeating?     Feeling bad about yourself - or that you are a failure or have let yourself or your family down?    "  Trouble concentrating on things, such as reading the newspaper or watching television?     Moving or speaking so slowly that other people could have noticed? Or the opposite - being so fidgety or restless that you have been moving around a lot more than usual?     Thoughts that you would be better off dead, or of hurting yourself in some way?     PHQ-9 Total Score     If you checked off any problems, how difficult have these problems made it for you to do your work, take care of things at home, or get along with other people?       PHQ-9 Total Score:      OBJECTIVE:  Visit Vitals  /78 (BP Location: Left arm, Patient Position: Sitting, Cuff Size: Adult)   Pulse 78   Temp 98.6 °F (37 °C) (Oral)   Resp 16   Ht 170.2 cm (67.01\")   Wt 95.8 kg (211 lb 3.2 oz)   SpO2 97%   BMI 33.07 kg/m²      Physical Exam   Constitutional: She is oriented to person, place, and time. She appears well-developed and well-nourished. No distress.   HENT:   Head: Normocephalic and atraumatic.   Mouth/Throat: Oropharynx is clear and moist. No oropharyngeal exudate.   Eyes: Pupils are equal, round, and reactive to light. Conjunctivae and EOM are normal. No scleral icterus.   Neck: Normal range of motion. Neck supple. No JVD present. No thyromegaly present.   Cardiovascular: Normal rate, regular rhythm and normal heart sounds.  Occasional extrasystoles are present. Exam reveals no gallop and no friction rub.   No murmur heard.  Pulmonary/Chest: Effort normal and breath sounds normal. No stridor. She has no wheezes. She has no rales.   Abdominal: Soft. Bowel sounds are normal. She exhibits no distension. There is no tenderness. There is no rebound and no guarding.   Musculoskeletal: She exhibits no edema or tenderness (Mild right fourth toe tenderness and erythema).   Lymphadenopathy:     She has no cervical adenopathy.   Neurological: She is alert and oriented to person, place, and time. No cranial nerve deficit.   Skin: Skin is warm and " dry.   Psychiatric: She has a normal mood and affect. Her behavior is normal. Judgment and thought content normal.       MDM  Number of Diagnoses or Management Options  Encounter for hepatitis C screening test for low risk patient: new, needed workup  Encounter for preventive care:   Essential hypertension: new, needed workup  Hyperlipidemia, unspecified hyperlipidemia type: new, needed workup  Osteopenia, unspecified location: new, needed workup  Pain of toe of right foot: new, needed workup     Amount and/or Complexity of Data Reviewed  Clinical lab tests: reviewed and ordered  Tests in the radiology section of CPT®: reviewed  Tests in the medicine section of CPT®: ordered and reviewed  Decide to obtain previous medical records or to obtain history from someone other than the patient: yes  Review and summarize past medical records: yes    Risk of Complications, Morbidity, and/or Mortality  Presenting problems: moderate  Diagnostic procedures: moderate  Management options: moderate        Assessment/Plan    Coco was seen today for establish care.    Diagnoses and all orders for this visit:    Essential hypertension  -     CBC & Differential; Future  -     Comprehensive Metabolic Panel; Future  -     ramipril (ALTACE) 10 MG capsule; Take 1 capsule by mouth Daily.    Osteopenia, unspecified location  -     Vitamin D 25 hydroxy; Future  -     raloxifene (EVISTA) 60 MG tablet; Take 1 tablet by mouth Daily.    Hyperlipidemia, unspecified hyperlipidemia type  -     Lipid Panel; Future  -     simvastatin (ZOCOR) 10 MG tablet; Take 1 tablet by mouth Daily.    Encounter for preventive care    Encounter for hepatitis C screening test for low risk patient  -     Hepatitis C antibody; Future    Pain of toe of right foot  -     Uric acid; Future    Other orders  -     furosemide (LASIX) 20 MG tablet; Take 1 tablet by mouth Daily.      Her last mammogram and DEXA scan was September 9, 2019.      Check labs as above    Refilled  medications as above    Overall, her chronic clinical problems are stable, but further medication changes or work-up based on the findings as above.    Follow-up 6 months with annual wellness visit or sooner if clinically indicated.    Patient's Body mass index is 33.07 kg/m². BMI is above normal parameters. Recommendations include: exercise counseling.      Education materials and an After Visit Summary were printed and given to the patient at discharge.  Return in about 6 months (around 11/22/2020) for Medicare Wellness.         JOHAN Mcnally MD   10:23 AM  5/22/2020

## 2020-05-26 RX ORDER — ALLOPURINOL 100 MG/1
100 TABLET ORAL DAILY
Qty: 30 TABLET | Refills: 5 | Status: SHIPPED | OUTPATIENT
Start: 2020-05-26 | End: 2020-11-23 | Stop reason: SDUPTHER

## 2020-06-01 ENCOUNTER — OFFICE VISIT (OUTPATIENT)
Dept: GASTROENTEROLOGY | Facility: CLINIC | Age: 75
End: 2020-06-01

## 2020-06-01 VITALS
HEART RATE: 74 BPM | HEIGHT: 67 IN | TEMPERATURE: 98.3 F | DIASTOLIC BLOOD PRESSURE: 76 MMHG | BODY MASS INDEX: 32.96 KG/M2 | OXYGEN SATURATION: 98 % | WEIGHT: 210 LBS | SYSTOLIC BLOOD PRESSURE: 120 MMHG

## 2020-06-01 DIAGNOSIS — K51.00 ULCERATIVE PANCOLITIS WITHOUT COMPLICATION (HCC): Primary | ICD-10-CM

## 2020-06-01 PROCEDURE — 99213 OFFICE O/P EST LOW 20 MIN: CPT | Performed by: INTERNAL MEDICINE

## 2020-06-01 NOTE — H&P (VIEW-ONLY)
"Chief Complaint   Patient presents with   • Ulcerative Colitis     has ulcerative colitis is having problems with getting meds covered with insurance        PCP: LIBRADO Mcnally MD  REFER: No ref. provider found    Subjective     HPI     Doing well at this time  Could not tolerate the \"generic\" now back on her name brand Lialda 4X/day  Symptoms increased with generic mesalamine.   Denies abd pain or bleeding  Taking po well  Denies n/v          Past Medical History:   Diagnosis Date   • Abdominal pain, epigastric    • Abdominal pain, left lower quadrant    • Abnormal findings on diagnostic imaging of abdomen     ABFND, RADIOLOGICAL, ABDOMINAL AREA    • Allergic rhinitis    • Arthritis    • Cancer (CMS/HCC) 10/14 removed    Renal Cell carcinoma   • Chronic ulcerative colitis without complication (CMS/HCC)    • Constipation    • Diarrhea    • Diverticulosis    • Encounter for Hemoccult screening     HEMOCCULT POSITIVE STOOLS    • Gastroesophageal reflux disease     esophagitis presence not specified   • GERD (gastroesophageal reflux disease)    • Herpes zoster    • Hypertension    • Nausea    • Obesity    • Rectal bleeding    • Rectal bleeding    • Regurgitation    • Ulcerative colitis (CMS/HCC)        Past Surgical History:   Procedure Laterality Date   • CHOLECYSTECTOMY     • COLONOSCOPY  09/23/2014    left side bx-minimal active inflammation - 2 yr   • COLONOSCOPY  07/09/2014    active inflammation to extent of limited lower colonoscopy/45 cm   • COLONOSCOPY  02/21/2013    mild inflammation in sigmoid, left-sided diverticulosis   • COLONOSCOPY  03/05/2010    very tortuous colon not allowing visual of cecal base - 5 yrs   • COLONOSCOPY  11/14/2003    FAIRLY TORTUOUS AND SPASMODIC COLON - 5 YR   • COLONOSCOPY Left 9/28/2016    Procedure: COLONOSCOPY WITH ANESTHESIA;  Surgeon: David Alonzo DO;  Location: [facility] ENDOSCOPY;  Service:    • ENDOSCOPY  03/14/2014    Normal   • ENDOSCOPY  02/09/2006    MILD " GASTRITIS   • NEPHRECTOMY PARTIAL Left    • OTHER SURGICAL HISTORY      Bilateral Eyelid Surgery    • TONSILLECTOMY     • WRIST SURGERY Left        Outpatient Medications Marked as Taking for the 6/1/20 encounter (Office Visit) with David Alonzo, DO   Medication Sig Dispense Refill   • acetaminophen (TYLENOL) 650 MG 8 hr tablet Take 650 mg by mouth Every 8 (Eight) Hours As Needed for Mild Pain .     • allopurinol (Zyloprim) 100 MG tablet Take 1 tablet by mouth Daily. 30 tablet 5   • Calcium Carbonate (CALCIUM 600) 1500 (600 CA) MG tablet Take  by mouth daily.     • docusate sodium (COLACE) 100 MG capsule Take 100 mg by mouth Daily.     • esomeprazole (NexIUM) 40 MG capsule Take 40 mg by mouth daily.     • fexofenadine (ALLEGRA) 180 MG tablet Take 180 mg by mouth Daily.     • furosemide (LASIX) 20 MG tablet Take 1 tablet by mouth Daily. 30 tablet 5   • Melatonin 10 MG capsule Take  by mouth.     • mesalamine (LIALDA) 1.2 g EC tablet Take 1,200 mg by mouth 2 (Two) Times a Day.     • Multiple Vitamins-Minerals (CENTRUM SILVER PO) Take  by mouth daily.     • Potassium 99 MG tablet Take  by mouth.     • raloxifene (EVISTA) 60 MG tablet Take 1 tablet by mouth Daily. 30 tablet 5   • ramipril (ALTACE) 10 MG capsule Take 1 capsule by mouth Daily. 30 capsule 5   • simvastatin (ZOCOR) 10 MG tablet Take 1 tablet by mouth Daily. 30 tablet 5   • SUPER B COMPLEX/C PO Take  by mouth.         No Known Allergies    Social History     Socioeconomic History   • Marital status:      Spouse name: Not on file   • Number of children: Not on file   • Years of education: Not on file   • Highest education level: Not on file   Tobacco Use   • Smoking status: Never Smoker   • Smokeless tobacco: Never Used   Substance and Sexual Activity   • Alcohol use: No   • Drug use: No   • Sexual activity: Never     Comment: menopause       Family History   Problem Relation Age of Onset   • Colon polyps Mother    • Breast cancer Neg Hx   "      Review of Systems   Constitutional: Negative for fatigue, fever and unexpected weight change.   HENT: Negative for hearing loss, sore throat and voice change.    Eyes: Negative for visual disturbance.   Respiratory: Negative for cough, shortness of breath and wheezing.    Cardiovascular: Negative for chest pain and palpitations.   Gastrointestinal: Negative for abdominal pain, blood in stool and vomiting.   Endocrine: Negative for polydipsia and polyuria.   Genitourinary: Negative for difficulty urinating, dysuria, hematuria and urgency.   Musculoskeletal: Negative for joint swelling and myalgias.   Skin: Negative for color change, rash and wound.   Neurological: Negative for dizziness, tremors, seizures and syncope.   Hematological: Does not bruise/bleed easily.   Psychiatric/Behavioral: Negative for agitation and confusion. The patient is not nervous/anxious.        Objective     Vitals:    06/01/20 1314   BP: 120/76   Pulse: 74   Temp: 98.3 °F (36.8 °C)   SpO2: 98%   Weight: 95.3 kg (210 lb)   Height: 170.2 cm (67\")     Body mass index is 32.89 kg/m².    Physical Exam   Constitutional: She is oriented to person, place, and time. She appears well-developed and well-nourished.   HENT:   Head: Normocephalic and atraumatic.   Eyes: Pupils are equal, round, and reactive to light. Conjunctivae are normal. No scleral icterus.   Neck: No JVD present. No thyroid mass and no thyromegaly present.   Cardiovascular: Normal rate, regular rhythm and normal heart sounds. Exam reveals no gallop and no friction rub.   No murmur heard.  Pulmonary/Chest: Effort normal and breath sounds normal. No accessory muscle usage. No respiratory distress. She has no wheezes. She has no rales.   Abdominal: Soft. Bowel sounds are normal. She exhibits no distension, no ascites and no mass. There is no splenomegaly or hepatomegaly. There is no tenderness. There is no rebound and no guarding.   Genitourinary:   Genitourinary Comments: " Rectal-Did not examine   Musculoskeletal: Normal range of motion. She exhibits no edema.   Neurological: She is alert and oriented to person, place, and time.   Deemed a reliable historian, able to converse without difficulty and able to move all extremities without difficulty   Skin: Skin is warm and dry.   Psychiatric: She has a normal mood and affect. Her behavior is normal.       Imaging Results (Most Recent)     None          Body mass index is 32.89 kg/m².    Assessment/Plan     Coco was seen today for ulcerative colitis.    Diagnoses and all orders for this visit:    Ulcerative pancolitis without complication (CMS/[facility])  -     Case Request; Standing  -     Case Request    Other orders  -     Follow Anesthesia Guidelines / Standing Orders; Future  -     Obtain Informed Consent; Future  -     polyethylene glycol (GoLYTELY) 236 g solution; Take 3,785 mL by mouth 1 (One) Time for 1 dose. Take as directed        COLONOSCOPY WITH ANESTHESIA (N/A)    1200 dollars per month for her Lialda  Will submit her name for consideration of a biologic      Precautions are currently being put in place due to COVID-19.  I have explained to Coco Steele they will be required to undergo COVID testing prior to their procedure.  Coco Steele verbalized understanding and was willing to proceed.          David Alonzo, DO  06/01/20          There are no Patient Instructions on file for this visit.

## 2020-06-01 NOTE — PROGRESS NOTES
"Chief Complaint   Patient presents with   • Ulcerative Colitis     has ulcerative colitis is having problems with getting meds covered with insurance        PCP: LIBRADO Mcnally MD  REFER: No ref. provider found    Subjective     HPI     Doing well at this time  Could not tolerate the \"generic\" now back on her name brand Lialda 4X/day  Symptoms increased with generic mesalamine.   Denies abd pain or bleeding  Taking po well  Denies n/v          Past Medical History:   Diagnosis Date   • Abdominal pain, epigastric    • Abdominal pain, left lower quadrant    • Abnormal findings on diagnostic imaging of abdomen     ABFND, RADIOLOGICAL, ABDOMINAL AREA    • Allergic rhinitis    • Arthritis    • Cancer (CMS/HCC) 10/14 removed    Renal Cell carcinoma   • Chronic ulcerative colitis without complication (CMS/HCC)    • Constipation    • Diarrhea    • Diverticulosis    • Encounter for Hemoccult screening     HEMOCCULT POSITIVE STOOLS    • Gastroesophageal reflux disease     esophagitis presence not specified   • GERD (gastroesophageal reflux disease)    • Herpes zoster    • Hypertension    • Nausea    • Obesity    • Rectal bleeding    • Rectal bleeding    • Regurgitation    • Ulcerative colitis (CMS/HCC)        Past Surgical History:   Procedure Laterality Date   • CHOLECYSTECTOMY     • COLONOSCOPY  09/23/2014    left side bx-minimal active inflammation - 2 yr   • COLONOSCOPY  07/09/2014    active inflammation to extent of limited lower colonoscopy/45 cm   • COLONOSCOPY  02/21/2013    mild inflammation in sigmoid, left-sided diverticulosis   • COLONOSCOPY  03/05/2010    very tortuous colon not allowing visual of cecal base - 5 yrs   • COLONOSCOPY  11/14/2003    FAIRLY TORTUOUS AND SPASMODIC COLON - 5 YR   • COLONOSCOPY Left 9/28/2016    Procedure: COLONOSCOPY WITH ANESTHESIA;  Surgeon: David Alonzo DO;  Location: [facility] ENDOSCOPY;  Service:    • ENDOSCOPY  03/14/2014    Normal   • ENDOSCOPY  02/09/2006    MILD " GASTRITIS   • NEPHRECTOMY PARTIAL Left    • OTHER SURGICAL HISTORY      Bilateral Eyelid Surgery    • TONSILLECTOMY     • WRIST SURGERY Left        Outpatient Medications Marked as Taking for the 6/1/20 encounter (Office Visit) with David Alonzo, DO   Medication Sig Dispense Refill   • acetaminophen (TYLENOL) 650 MG 8 hr tablet Take 650 mg by mouth Every 8 (Eight) Hours As Needed for Mild Pain .     • allopurinol (Zyloprim) 100 MG tablet Take 1 tablet by mouth Daily. 30 tablet 5   • Calcium Carbonate (CALCIUM 600) 1500 (600 CA) MG tablet Take  by mouth daily.     • docusate sodium (COLACE) 100 MG capsule Take 100 mg by mouth Daily.     • esomeprazole (NexIUM) 40 MG capsule Take 40 mg by mouth daily.     • fexofenadine (ALLEGRA) 180 MG tablet Take 180 mg by mouth Daily.     • furosemide (LASIX) 20 MG tablet Take 1 tablet by mouth Daily. 30 tablet 5   • Melatonin 10 MG capsule Take  by mouth.     • mesalamine (LIALDA) 1.2 g EC tablet Take 1,200 mg by mouth 2 (Two) Times a Day.     • Multiple Vitamins-Minerals (CENTRUM SILVER PO) Take  by mouth daily.     • Potassium 99 MG tablet Take  by mouth.     • raloxifene (EVISTA) 60 MG tablet Take 1 tablet by mouth Daily. 30 tablet 5   • ramipril (ALTACE) 10 MG capsule Take 1 capsule by mouth Daily. 30 capsule 5   • simvastatin (ZOCOR) 10 MG tablet Take 1 tablet by mouth Daily. 30 tablet 5   • SUPER B COMPLEX/C PO Take  by mouth.         No Known Allergies    Social History     Socioeconomic History   • Marital status:      Spouse name: Not on file   • Number of children: Not on file   • Years of education: Not on file   • Highest education level: Not on file   Tobacco Use   • Smoking status: Never Smoker   • Smokeless tobacco: Never Used   Substance and Sexual Activity   • Alcohol use: No   • Drug use: No   • Sexual activity: Never     Comment: menopause       Family History   Problem Relation Age of Onset   • Colon polyps Mother    • Breast cancer Neg Hx   "      Review of Systems   Constitutional: Negative for fatigue, fever and unexpected weight change.   HENT: Negative for hearing loss, sore throat and voice change.    Eyes: Negative for visual disturbance.   Respiratory: Negative for cough, shortness of breath and wheezing.    Cardiovascular: Negative for chest pain and palpitations.   Gastrointestinal: Negative for abdominal pain, blood in stool and vomiting.   Endocrine: Negative for polydipsia and polyuria.   Genitourinary: Negative for difficulty urinating, dysuria, hematuria and urgency.   Musculoskeletal: Negative for joint swelling and myalgias.   Skin: Negative for color change, rash and wound.   Neurological: Negative for dizziness, tremors, seizures and syncope.   Hematological: Does not bruise/bleed easily.   Psychiatric/Behavioral: Negative for agitation and confusion. The patient is not nervous/anxious.        Objective     Vitals:    06/01/20 1314   BP: 120/76   Pulse: 74   Temp: 98.3 °F (36.8 °C)   SpO2: 98%   Weight: 95.3 kg (210 lb)   Height: 170.2 cm (67\")     Body mass index is 32.89 kg/m².    Physical Exam   Constitutional: She is oriented to person, place, and time. She appears well-developed and well-nourished.   HENT:   Head: Normocephalic and atraumatic.   Eyes: Pupils are equal, round, and reactive to light. Conjunctivae are normal. No scleral icterus.   Neck: No JVD present. No thyroid mass and no thyromegaly present.   Cardiovascular: Normal rate, regular rhythm and normal heart sounds. Exam reveals no gallop and no friction rub.   No murmur heard.  Pulmonary/Chest: Effort normal and breath sounds normal. No accessory muscle usage. No respiratory distress. She has no wheezes. She has no rales.   Abdominal: Soft. Bowel sounds are normal. She exhibits no distension, no ascites and no mass. There is no splenomegaly or hepatomegaly. There is no tenderness. There is no rebound and no guarding.   Genitourinary:   Genitourinary Comments: " Rectal-Did not examine   Musculoskeletal: Normal range of motion. She exhibits no edema.   Neurological: She is alert and oriented to person, place, and time.   Deemed a reliable historian, able to converse without difficulty and able to move all extremities without difficulty   Skin: Skin is warm and dry.   Psychiatric: She has a normal mood and affect. Her behavior is normal.       Imaging Results (Most Recent)     None          Body mass index is 32.89 kg/m².    Assessment/Plan     Coco was seen today for ulcerative colitis.    Diagnoses and all orders for this visit:    Ulcerative pancolitis without complication (CMS/[facility])  -     Case Request; Standing  -     Case Request    Other orders  -     Follow Anesthesia Guidelines / Standing Orders; Future  -     Obtain Informed Consent; Future  -     polyethylene glycol (GoLYTELY) 236 g solution; Take 3,785 mL by mouth 1 (One) Time for 1 dose. Take as directed        COLONOSCOPY WITH ANESTHESIA (N/A)    1200 dollars per month for her Lialda  Will submit her name for consideration of a biologic      Precautions are currently being put in place due to COVID-19.  I have explained to Coco Steele they will be required to undergo COVID testing prior to their procedure.  Coco Steele verbalized understanding and was willing to proceed.          David Alonzo, DO  06/01/20          There are no Patient Instructions on file for this visit.

## 2020-06-02 PROBLEM — K51.00 ULCERATIVE PANCOLITIS WITHOUT COMPLICATION: Status: ACTIVE | Noted: 2020-06-02

## 2020-06-03 ENCOUNTER — TRANSCRIBE ORDERS (OUTPATIENT)
Dept: ADMINISTRATIVE | Facility: HOSPITAL | Age: 75
End: 2020-06-03

## 2020-06-03 DIAGNOSIS — Z01.818 PRE-OP TESTING: Primary | ICD-10-CM

## 2020-06-08 ENCOUNTER — LAB (OUTPATIENT)
Dept: LAB | Facility: HOSPITAL | Age: 75
End: 2020-06-08

## 2020-06-08 PROCEDURE — U0003 INFECTIOUS AGENT DETECTION BY NUCLEIC ACID (DNA OR RNA); SEVERE ACUTE RESPIRATORY SYNDROME CORONAVIRUS 2 (SARS-COV-2) (CORONAVIRUS DISEASE [COVID-19]), AMPLIFIED PROBE TECHNIQUE, MAKING USE OF HIGH THROUGHPUT TECHNOLOGIES AS DESCRIBED BY CMS-2020-01-R: HCPCS | Performed by: INTERNAL MEDICINE

## 2020-06-09 LAB
COVID LABCORP PRIORITY: NORMAL
SARS-COV-2 RNA RESP QL NAA+PROBE: NOT DETECTED

## 2020-06-11 ENCOUNTER — ANESTHESIA (OUTPATIENT)
Dept: GASTROENTEROLOGY | Facility: HOSPITAL | Age: 75
End: 2020-06-11

## 2020-06-11 ENCOUNTER — ANESTHESIA EVENT (OUTPATIENT)
Dept: GASTROENTEROLOGY | Facility: HOSPITAL | Age: 75
End: 2020-06-11

## 2020-06-11 ENCOUNTER — HOSPITAL ENCOUNTER (OUTPATIENT)
Facility: HOSPITAL | Age: 75
Setting detail: HOSPITAL OUTPATIENT SURGERY
Discharge: HOME | End: 2020-06-11
Attending: INTERNAL MEDICINE | Admitting: INTERNAL MEDICINE

## 2020-06-11 VITALS
HEART RATE: 75 BPM | WEIGHT: 210 LBS | DIASTOLIC BLOOD PRESSURE: 72 MMHG | SYSTOLIC BLOOD PRESSURE: 118 MMHG | TEMPERATURE: 98.6 F | OXYGEN SATURATION: 100 % | RESPIRATION RATE: 19 BRPM | HEIGHT: 67 IN | BODY MASS INDEX: 32.96 KG/M2

## 2020-06-11 DIAGNOSIS — K51.00 ULCERATIVE PANCOLITIS WITHOUT COMPLICATION (HCC): ICD-10-CM

## 2020-06-11 PROCEDURE — 25010000002 PROPOFOL 10 MG/ML EMULSION: Performed by: NURSE ANESTHETIST, CERTIFIED REGISTERED

## 2020-06-11 PROCEDURE — 88305 TISSUE EXAM BY PATHOLOGIST: CPT | Performed by: INTERNAL MEDICINE

## 2020-06-11 PROCEDURE — 45380 COLONOSCOPY AND BIOPSY: CPT | Performed by: INTERNAL MEDICINE

## 2020-06-11 RX ORDER — SODIUM CHLORIDE 0.9 % (FLUSH) 0.9 %
10 SYRINGE (ML) INJECTION AS NEEDED
Status: DISCONTINUED | OUTPATIENT
Start: 2020-06-11 | End: 2020-06-11 | Stop reason: HOSPADM

## 2020-06-11 RX ORDER — SODIUM CHLORIDE 9 MG/ML
500 INJECTION, SOLUTION INTRAVENOUS CONTINUOUS PRN
Status: DISCONTINUED | OUTPATIENT
Start: 2020-06-11 | End: 2020-06-11 | Stop reason: HOSPADM

## 2020-06-11 RX ORDER — PROPOFOL 10 MG/ML
VIAL (ML) INTRAVENOUS AS NEEDED
Status: DISCONTINUED | OUTPATIENT
Start: 2020-06-11 | End: 2020-06-11 | Stop reason: SURG

## 2020-06-11 RX ORDER — PREDNISONE 10 MG/1
10 TABLET ORAL 2 TIMES DAILY
Qty: 100 TABLET | Refills: 1 | Status: SHIPPED | OUTPATIENT
Start: 2020-06-11 | End: 2020-11-23

## 2020-06-11 RX ORDER — LIDOCAINE HYDROCHLORIDE 10 MG/ML
0.5 INJECTION, SOLUTION EPIDURAL; INFILTRATION; INTRACAUDAL; PERINEURAL ONCE AS NEEDED
Status: CANCELLED | OUTPATIENT
Start: 2020-06-11

## 2020-06-11 RX ADMIN — SODIUM CHLORIDE 500 ML: 9 INJECTION, SOLUTION INTRAVENOUS at 07:36

## 2020-06-11 RX ADMIN — PROPOFOL 50 MG: 10 INJECTION, EMULSION INTRAVENOUS at 08:11

## 2020-06-11 NOTE — ANESTHESIA POSTPROCEDURE EVALUATION
"Patient: Coco Steele    Procedure Summary     Date:  06/11/20 Room / Location:  [facility] ENDOSCOPY 5 / [facility] ENDOSCOPY    Anesthesia Start:  0806 Anesthesia Stop:  0838    Procedure:  COLONOSCOPY WITH ANESTHESIA (N/A ) Diagnosis:       Ulcerative pancolitis without complication (CMS/HCC)      (Ulcerative pancolitis without complication (CMS/HCC) [K51.00])    Surgeon:  David Alonzo DO Provider:  Akhil Ballard CRNA    Anesthesia Type:  MAC ASA Status:  2          Anesthesia Type: MAC    Vitals  Vitals Value Taken Time   /72 6/11/2020  8:50 AM   Temp     Pulse 75 6/11/2020  8:50 AM   Resp 19 6/11/2020  8:50 AM   SpO2 100 % 6/11/2020  8:50 AM           Post Anesthesia Care and Evaluation    Patient location during evaluation: PACU  Patient participation: complete - patient participated  Level of consciousness: awake and alert  Pain management: adequate  Airway patency: patent  Anesthetic complications: No anesthetic complications    Cardiovascular status: acceptable  Respiratory status: acceptable  Hydration status: acceptable    Comments: Blood pressure 118/72, pulse 75, temperature 98.6 °F (37 °C), temperature source Temporal, resp. rate 19, height 170.2 cm (67\"), weight 95.3 kg (210 lb), SpO2 100 %, not currently breastfeeding.    Pt discharged from PACU based on luisana score >8      "

## 2020-06-11 NOTE — ANESTHESIA PREPROCEDURE EVALUATION
Anesthesia Evaluation     Patient summary reviewed and Nursing notes reviewed   NPO Solid Status: > 8 hours  NPO Liquid Status: > 2 hours           Airway   Mallampati: I  TM distance: >3 FB  Neck ROM: full  No difficulty expected  Dental      Pulmonary    (-) asthma, not a smoker  Cardiovascular   Exercise tolerance: good (4-7 METS)    (+) hypertension, hyperlipidemia,   (-) CAD, angina      Neuro/Psych  (-) seizures, TIA  GI/Hepatic/Renal/Endo    (+) obesity,  GERD,    (-) liver disease, no renal disease, diabetes    ROS Comment: Ulcerative colitis    Musculoskeletal     Abdominal    Substance History      OB/GYN          Other      history of cancer (reanl cell carcinoma s/p surgery in remission) remission                    Anesthesia Plan    ASA 2     MAC     intravenous induction     Anesthetic plan, all risks, benefits, and alternatives have been provided, discussed and informed consent has been obtained with: patient.

## 2020-06-12 LAB
LAB AP CASE REPORT: NORMAL
LAB AP DIAGNOSIS COMMENT: NORMAL
PATH REPORT.FINAL DX SPEC: NORMAL
PATH REPORT.GROSS SPEC: NORMAL

## 2020-06-16 NOTE — PROGRESS NOTES
Talked to her just now about her bx  Told her the bx essentially are ok  She will f/u in the office on July 2  Colon recall in 1 yr

## 2020-06-18 ENCOUNTER — TELEPHONE (OUTPATIENT)
Dept: GASTROENTEROLOGY | Facility: CLINIC | Age: 75
End: 2020-06-18

## 2020-06-18 NOTE — TELEPHONE ENCOUNTER
----- Message from David Alonzo, DO sent at 6/16/2020  6:14 PM CDT -----  Talked to her just now about her bx  Told her the bx essentially are ok  She will f/u in the office on July 2  Colon recall in 1 yr        Put in 1 year colon recall

## 2020-07-02 ENCOUNTER — OFFICE VISIT (OUTPATIENT)
Dept: GASTROENTEROLOGY | Facility: CLINIC | Age: 75
End: 2020-07-02

## 2020-07-02 VITALS
SYSTOLIC BLOOD PRESSURE: 144 MMHG | WEIGHT: 210 LBS | BODY MASS INDEX: 32.96 KG/M2 | OXYGEN SATURATION: 98 % | HEART RATE: 82 BPM | TEMPERATURE: 98.5 F | HEIGHT: 67 IN | DIASTOLIC BLOOD PRESSURE: 84 MMHG

## 2020-07-02 DIAGNOSIS — K51.00 ULCERATIVE PANCOLITIS WITHOUT COMPLICATION (HCC): Primary | ICD-10-CM

## 2020-07-02 PROCEDURE — 99213 OFFICE O/P EST LOW 20 MIN: CPT | Performed by: INTERNAL MEDICINE

## 2020-07-02 RX ORDER — MESALAMINE 1.2 G/1
2400 TABLET, DELAYED RELEASE ORAL 2 TIMES DAILY
Qty: 120 TABLET | Refills: 11 | Status: SHIPPED | OUTPATIENT
Start: 2020-07-02 | End: 2021-07-09 | Stop reason: SDUPTHER

## 2020-07-02 NOTE — PROGRESS NOTES
Chief Complaint   Patient presents with   • Ulcerative Colitis     last colon 06/11/2020     Subjective   HPI     Coco Steele is a 74 y.o. female who underwent colonoscopy on 6/11/2020 for further evaluation of ulcerative colitis.  Pt tolerated procedure well without any complications.   Endoscopically She  was found to have biopsies from inflammed nodular mucosa colon.  Histologically biopsies from inflammed nodular mucosa colon was found to be benign.  She currently denies difficulty with abdominal pain, changes in bowels.  No brbpr.  Bowels described as loose.  She feels well at this time.  Currently utilizing 20 mg prednisone as well as Lialda.  She has attempted 3 Lialda per day with failure in past.    Past Medical History:   Diagnosis Date   • Abdominal pain, epigastric    • Abdominal pain, left lower quadrant    • Abnormal findings on diagnostic imaging of abdomen     ABFND, RADIOLOGICAL, ABDOMINAL AREA    • Allergic rhinitis    • Arthritis    • Cancer (CMS/HCC) 10/14 removed    Renal Cell carcinoma   • Chronic ulcerative colitis without complication (CMS/HCC)    • Constipation    • Diarrhea    • Diverticulosis    • Encounter for Hemoccult screening     HEMOCCULT POSITIVE STOOLS    • Gastroesophageal reflux disease     esophagitis presence not specified   • GERD (gastroesophageal reflux disease)    • Herpes zoster    • Hyperlipidemia    • Hypertension    • Nausea    • Obesity    • Rectal bleeding    • Rectal bleeding    • Regurgitation    • Ulcerative colitis (CMS/HCC)        Current Outpatient Medications:   •  acetaminophen (TYLENOL) 650 MG 8 hr tablet, Take 650 mg by mouth Every 8 (Eight) Hours As Needed for Mild Pain ., Disp: , Rfl:   •  allopurinol (Zyloprim) 100 MG tablet, Take 1 tablet by mouth Daily., Disp: 30 tablet, Rfl: 5  •  Calcium Carbonate (CALCIUM 600) 1500 (600 CA) MG tablet, Take  by mouth daily., Disp: , Rfl:   •  docusate sodium (COLACE) 100 MG capsule, Take 100 mg by mouth Daily., Disp:  , Rfl:   •  esomeprazole (NexIUM) 40 MG capsule, Take 40 mg by mouth daily., Disp: , Rfl:   •  fexofenadine (ALLEGRA) 180 MG tablet, Take 180 mg by mouth Daily., Disp: , Rfl:   •  furosemide (LASIX) 20 MG tablet, Take 1 tablet by mouth Daily., Disp: 30 tablet, Rfl: 5  •  Melatonin 10 MG capsule, Take  by mouth., Disp: , Rfl:   •  mesalamine (LIALDA) 1.2 g EC tablet, Take 2 tablets by mouth 2 (Two) Times a Day., Disp: 120 tablet, Rfl: 11  •  Multiple Vitamins-Minerals (CENTRUM SILVER PO), Take  by mouth daily., Disp: , Rfl:   •  Potassium 99 MG tablet, Take  by mouth., Disp: , Rfl:   •  predniSONE (DELTASONE) 10 MG tablet, Take 1 tablet by mouth 2 (Two) Times a Day., Disp: 100 tablet, Rfl: 1  •  raloxifene (EVISTA) 60 MG tablet, Take 1 tablet by mouth Daily., Disp: 30 tablet, Rfl: 5  •  ramipril (ALTACE) 10 MG capsule, Take 1 capsule by mouth Daily., Disp: 30 capsule, Rfl: 5  •  simvastatin (ZOCOR) 10 MG tablet, Take 1 tablet by mouth Daily., Disp: 30 tablet, Rfl: 5  •  SUPER B COMPLEX/C PO, Take  by mouth., Disp: , Rfl:   No Known Allergies  Social History     Socioeconomic History   • Marital status:      Spouse name: Not on file   • Number of children: Not on file   • Years of education: Not on file   • Highest education level: Not on file   Tobacco Use   • Smoking status: Never Smoker   • Smokeless tobacco: Never Used   Substance and Sexual Activity   • Alcohol use: No   • Drug use: No   • Sexual activity: Never     Comment: menopause     Family History   Problem Relation Age of Onset   • Colon polyps Mother    • Breast cancer Neg Hx    • Colon cancer Neg Hx      Review of Systems   Constitutional: Negative for fatigue, fever and unexpected weight change.   HENT: Negative for hearing loss, sore throat and voice change.    Eyes: Negative for visual disturbance.   Respiratory: Negative for cough, shortness of breath and wheezing.    Cardiovascular: Negative for chest pain and palpitations.    Gastrointestinal: Negative for abdominal pain, blood in stool and vomiting.   Endocrine: Negative for polydipsia and polyuria.   Genitourinary: Negative for difficulty urinating, dysuria, hematuria and urgency.   Musculoskeletal: Negative for joint swelling and myalgias.   Skin: Negative for color change, rash and wound.   Neurological: Negative for dizziness, tremors, seizures and syncope.   Hematological: Does not bruise/bleed easily.   Psychiatric/Behavioral: Negative for agitation and confusion. The patient is not nervous/anxious.      Objective   Vitals:    07/02/20 1333   BP: 144/84   Pulse: 82   Temp: 98.5 °F (36.9 °C)   SpO2: 98%     Physical Exam   Constitutional: She is oriented to person, place, and time. She appears well-developed and well-nourished. She is cooperative.   HENT:   Head: Normocephalic and atraumatic.   Eyes: Pupils are equal, round, and reactive to light. Conjunctivae are normal. No scleral icterus.   Neck: Normal range of motion. Neck supple. No JVD present. No thyroid mass and no thyromegaly present.   Cardiovascular: Normal rate, regular rhythm and normal heart sounds. Exam reveals no gallop and no friction rub.   No murmur heard.  Pulmonary/Chest: Effort normal and breath sounds normal. No accessory muscle usage. No respiratory distress. She has no wheezes. She has no rales.   Abdominal: Soft. Normal appearance and bowel sounds are normal. She exhibits no distension, no ascites and no mass. There is no hepatosplenomegaly. There is no tenderness. There is no rebound and no guarding.   Musculoskeletal: Normal range of motion. She exhibits no edema or tenderness.     Vascular Status -  Her right foot exhibits normal foot vasculature  and no edema. Her left foot exhibits normal foot vasculature  and no edema.  Lymphadenopathy:     She has no cervical adenopathy.   Neurological: She is alert and oriented to person, place, and time. She has normal strength. Gait normal.   Skin: Skin is  warm, dry and intact. No rash noted.     Imaging Results (Most Recent)     None        Assessment/Plan   Coco was seen today for ulcerative colitis.    Diagnoses and all orders for this visit:    Ulcerative pancolitis without complication (CMS/HCC)    Other orders  -     mesalamine (LIALDA) 1.2 g EC tablet; Take 2 tablets by mouth 2 (Two) Times a Day.      * Surgery not found *      Decrease prednisone by 5 mg weekly  Lialda has been approved by insurance for 50%  F/u 6 months  Colon recall in 1 years

## 2020-10-27 ENCOUNTER — TRANSCRIBE ORDERS (OUTPATIENT)
Dept: ADMINISTRATIVE | Facility: HOSPITAL | Age: 75
End: 2020-10-27

## 2020-10-27 DIAGNOSIS — Z12.31 SCREENING MAMMOGRAM, ENCOUNTER FOR: Primary | ICD-10-CM

## 2020-11-10 ENCOUNTER — HOSPITAL ENCOUNTER (OUTPATIENT)
Dept: MAMMOGRAPHY | Facility: HOSPITAL | Age: 75
Discharge: HOME | End: 2020-11-10
Admitting: INTERNAL MEDICINE

## 2020-11-10 PROCEDURE — 77067 SCR MAMMO BI INCL CAD: CPT

## 2020-11-10 PROCEDURE — 77063 BREAST TOMOSYNTHESIS BI: CPT

## 2020-11-23 ENCOUNTER — OFFICE VISIT (OUTPATIENT)
Dept: INTERNAL MEDICINE | Facility: CLINIC | Age: 75
End: 2020-11-23

## 2020-11-23 ENCOUNTER — LAB (OUTPATIENT)
Dept: LAB | Facility: HOSPITAL | Age: 75
End: 2020-11-23

## 2020-11-23 VITALS
RESPIRATION RATE: 16 BRPM | BODY MASS INDEX: 32.33 KG/M2 | HEIGHT: 67 IN | DIASTOLIC BLOOD PRESSURE: 70 MMHG | HEART RATE: 80 BPM | WEIGHT: 206 LBS | TEMPERATURE: 97.3 F | SYSTOLIC BLOOD PRESSURE: 122 MMHG | OXYGEN SATURATION: 98 %

## 2020-11-23 DIAGNOSIS — I10 ESSENTIAL HYPERTENSION: ICD-10-CM

## 2020-11-23 DIAGNOSIS — Z00.00 MEDICARE ANNUAL WELLNESS VISIT, SUBSEQUENT: Primary | ICD-10-CM

## 2020-11-23 DIAGNOSIS — E78.5 HYPERLIPIDEMIA, UNSPECIFIED HYPERLIPIDEMIA TYPE: ICD-10-CM

## 2020-11-23 DIAGNOSIS — M85.80 OSTEOPENIA, UNSPECIFIED LOCATION: ICD-10-CM

## 2020-11-23 LAB
ALBUMIN SERPL-MCNC: 4.3 G/DL (ref 3.5–5.2)
ALBUMIN/GLOB SERPL: 1.5 G/DL
ALP SERPL-CCNC: 60 U/L (ref 39–117)
ALT SERPL W P-5'-P-CCNC: 18 U/L (ref 1–33)
ANION GAP SERPL CALCULATED.3IONS-SCNC: 7 MMOL/L (ref 5–15)
AST SERPL-CCNC: 25 U/L (ref 1–32)
BILIRUB SERPL-MCNC: 0.2 MG/DL (ref 0–1.2)
BUN SERPL-MCNC: 21 MG/DL (ref 8–23)
BUN/CREAT SERPL: 24.4 (ref 7–25)
CALCIUM SPEC-SCNC: 9.3 MG/DL (ref 8.6–10.5)
CHLORIDE SERPL-SCNC: 103 MMOL/L (ref 98–107)
CO2 SERPL-SCNC: 30 MMOL/L (ref 22–29)
CREAT SERPL-MCNC: 0.86 MG/DL (ref 0.57–1)
GFR SERPL CREATININE-BSD FRML MDRD: 65 ML/MIN/1.73
GLOBULIN UR ELPH-MCNC: 2.8 GM/DL
GLUCOSE SERPL-MCNC: 130 MG/DL (ref 65–99)
POTASSIUM SERPL-SCNC: 3.9 MMOL/L (ref 3.5–5.2)
PROT SERPL-MCNC: 7.1 G/DL (ref 6–8.5)
SODIUM SERPL-SCNC: 140 MMOL/L (ref 136–145)

## 2020-11-23 PROCEDURE — 80053 COMPREHEN METABOLIC PANEL: CPT

## 2020-11-23 PROCEDURE — 36415 COLL VENOUS BLD VENIPUNCTURE: CPT

## 2020-11-23 PROCEDURE — G0439 PPPS, SUBSEQ VISIT: HCPCS | Performed by: INTERNAL MEDICINE

## 2020-11-23 RX ORDER — RAMIPRIL 10 MG/1
10 CAPSULE ORAL DAILY
Qty: 30 CAPSULE | Refills: 5 | Status: SHIPPED | OUTPATIENT
Start: 2020-11-23 | End: 2021-06-02 | Stop reason: SDUPTHER

## 2020-11-23 RX ORDER — RALOXIFENE HYDROCHLORIDE 60 MG/1
60 TABLET, FILM COATED ORAL DAILY
Qty: 30 TABLET | Refills: 5 | Status: SHIPPED | OUTPATIENT
Start: 2020-11-23 | End: 2021-06-02 | Stop reason: SDUPTHER

## 2020-11-23 RX ORDER — ALLOPURINOL 100 MG/1
100 TABLET ORAL DAILY
Qty: 30 TABLET | Refills: 5 | Status: SHIPPED | OUTPATIENT
Start: 2020-11-23 | End: 2021-05-07 | Stop reason: SDUPTHER

## 2020-11-23 RX ORDER — FUROSEMIDE 20 MG/1
20 TABLET ORAL DAILY
Qty: 30 TABLET | Refills: 5 | Status: SHIPPED | OUTPATIENT
Start: 2020-11-23 | End: 2021-06-02 | Stop reason: SDUPTHER

## 2020-11-23 RX ORDER — SIMVASTATIN 10 MG
10 TABLET ORAL DAILY
Qty: 30 TABLET | Refills: 5 | Status: SHIPPED | OUTPATIENT
Start: 2020-11-23 | End: 2021-06-02 | Stop reason: SDUPTHER

## 2020-11-23 RX ORDER — FLUTICASONE PROPIONATE 50 MCG
1 SPRAY, SUSPENSION (ML) NASAL DAILY
COMMUNITY

## 2020-11-23 NOTE — PROGRESS NOTES
The ABCs of the Annual Wellness Visit  Subsequent Medicare Wellness Visit    Chief Complaint   Patient presents with   • Medicare Wellness-subsequent       Subjective   History of Present Illness:  Coco Steele is a 74 y.o. female who presents for a Subsequent Medicare Wellness Visit.  She denies any new issues or complaints.  She did have a left renal cell carcinoma removed in October 2014.  She saw Dr. Luna October 2019 and was recommended for biannual imaging.  CT scan October 2019 was unremarkable    She has arthritis in her knees but the pain is not bad.  She does get sore sometimes when standing        HEALTH RISK ASSESSMENT    Recent Hospitalizations:  No hospitalization(s) within the last year.    Current Medical Providers:  Patient Care Team:  LIBRADO Mcnally MD as PCP - General (Internal Medicine)  Sujit Luna MD as Consulting Physician (Urology)  David Alonzo DO as Consulting Physician (Gastroenterology)    Smoking Status:  Social History     Tobacco Use   Smoking Status Never Smoker   Smokeless Tobacco Never Used       Alcohol Consumption:  Social History     Substance and Sexual Activity   Alcohol Use No       Depression Screen:   PHQ-2/PHQ-9 Depression Screening 11/23/2020   Little interest or pleasure in doing things 0   Feeling down, depressed, or hopeless 0   Total Score 0       Fall Risk Screen:  STEADI Fall Risk Assessment was completed, and patient is at LOW risk for falls.Assessment completed on:11/23/2020    Health Habits and Functional and Cognitive Screening:  Functional & Cognitive Status 11/23/2020   Do you have difficulty preparing food and eating? No   Do you have difficulty bathing yourself, getting dressed or grooming yourself? No   Do you have difficulty using the toilet? No   Do you have difficulty moving around from place to place? No   Do you have trouble with steps or getting out of a bed or a chair? No   Current Diet Well Balanced Diet   Dental Exam Up to date    Eye Exam Not up to date   Current Exercise Activities Include Housecleaning   Do you need help using the phone?  No   Are you deaf or do you have serious difficulty hearing?  No   Do you need help with transportation? No   Do you need help shopping? No   Do you need help preparing meals?  No   Do you need help with housework?  No   Do you need help with laundry? No   Do you need help taking your medications? No   Do you need help managing money? No   Do you ever drive or ride in a car without wearing a seat belt? No   Have you felt unusual stress, anger or loneliness in the last month? No   Who do you live with? Alone   If you need help, do you have trouble finding someone available to you? No   Have you been bothered in the last four weeks by sexual problems? No   Do you have difficulty concentrating, remembering or making decisions? No       Does the patient have evidence of cognitive impairment? No    Asprin use counseling:Start ASA 81 mg daily     The 10-year ASCVD risk score (Wilder CANTU Jr., et al., 2013) is: 14.7%    Values used to calculate the score:      Age: 74 years      Sex: Female      Is Non- : No      Diabetic: No      Tobacco smoker: No      Systolic Blood Pressure: 122 mmHg      Is BP treated: Yes      HDL Cholesterol: 64 mg/dL      Total Cholesterol: 143 mg/dL         Age-appropriate Screening Schedule:  Refer to the list below for future screening recommendations based on patient's age, sex and/or medical conditions. Orders for these recommended tests are listed in the plan section. The patient has been provided with a written plan.    Health Maintenance   Topic Date Due   • ZOSTER VACCINE (2 of 3) 09/12/2014   • LIPID PANEL  05/22/2021   • COLONOSCOPY  06/11/2021   • DXA SCAN  09/09/2021   • MAMMOGRAM  11/10/2021   • TDAP/TD VACCINES (2 - Td) 02/25/2026   • INFLUENZA VACCINE  Completed          The following portions of the patient's history were reviewed and updated as  appropriate: allergies, current medications, past family history, past medical history, past social history, past surgical history and problem list.    Outpatient Medications Prior to Visit   Medication Sig Dispense Refill   • acetaminophen (TYLENOL) 650 MG 8 hr tablet Take 650 mg by mouth Every 8 (Eight) Hours As Needed for Mild Pain .     • Calcium Carbonate (CALCIUM 600) 1500 (600 CA) MG tablet Take  by mouth daily.     • docusate sodium (COLACE) 100 MG capsule Take 100 mg by mouth Daily.     • esomeprazole (NexIUM) 40 MG capsule Take 40 mg by mouth daily.     • fluticasone (FLONASE) 50 MCG/ACT nasal spray 1 spray into the nostril(s) as directed by provider Daily.     • Melatonin 10 MG capsule Take  by mouth.     • mesalamine (LIALDA) 1.2 g EC tablet Take 2 tablets by mouth 2 (Two) Times a Day. 120 tablet 11   • Multiple Vitamins-Minerals (CENTRUM SILVER PO) Take  by mouth daily.     • Potassium 99 MG tablet Take  by mouth.     • SUPER B COMPLEX/C PO Take  by mouth.     • allopurinol (Zyloprim) 100 MG tablet Take 1 tablet by mouth Daily. 30 tablet 5   • furosemide (LASIX) 20 MG tablet Take 1 tablet by mouth Daily. 30 tablet 5   • raloxifene (EVISTA) 60 MG tablet Take 1 tablet by mouth Daily. 30 tablet 5   • ramipril (ALTACE) 10 MG capsule Take 1 capsule by mouth Daily. 30 capsule 5   • simvastatin (ZOCOR) 10 MG tablet Take 1 tablet by mouth Daily. 30 tablet 5   • fexofenadine (ALLEGRA) 180 MG tablet Take 180 mg by mouth Daily.     • predniSONE (DELTASONE) 10 MG tablet Take 1 tablet by mouth 2 (Two) Times a Day. 100 tablet 1     No facility-administered medications prior to visit.        Patient Active Problem List   Diagnosis   • Ulcerative pancolitis without complication (CMS/HCC)   • Osteopenia   • Essential hypertension   • Hyperlipidemia       Advanced Care Planning:  ACP discussion was held with the patient during this visit. Patient has an advance directive in EMR which is still valid.     Review of Systems  "  Constitutional: Negative for activity change, appetite change, fatigue, fever and unexpected weight change.   HENT: Negative for nosebleeds, sore throat and trouble swallowing.    Eyes: Negative for pain and visual disturbance.   Respiratory: Negative for cough, chest tightness and shortness of breath.    Cardiovascular: Positive for leg swelling (Intermittent, sometimes at the end of the day). Negative for chest pain and palpitations.   Gastrointestinal: Negative for abdominal pain, constipation, diarrhea, nausea and vomiting.   Endocrine: Negative for cold intolerance and heat intolerance.   Genitourinary: Negative for dysuria and hematuria.   Musculoskeletal: Positive for arthralgias (knees). Negative for back pain, neck pain and neck stiffness.   Skin: Negative for rash and wound.   Neurological: Negative for dizziness, syncope, weakness, light-headedness and headaches.   Hematological: Negative for adenopathy. Does not bruise/bleed easily.   Psychiatric/Behavioral: Negative for agitation, behavioral problems and confusion.       Compared to one year ago, the patient feels her physical health is the same.  Compared to one year ago, the patient feels her mental health is the same.    Reviewed chart for potential of high risk medication in the elderly: yes  Reviewed chart for potential of harmful drug interactions in the elderly:yes    Objective         Vitals:    11/23/20 1020   BP: 122/70   BP Location: Left arm   Patient Position: Sitting   Cuff Size: Adult   Pulse: 80   Resp: 16   Temp: 97.3 °F (36.3 °C)   TempSrc: Oral   SpO2: 98%   Weight: 93.4 kg (206 lb)   Height: 170.2 cm (67.01\")       Body mass index is 32.26 kg/m².  Discussed the patient's BMI with her. The BMI is above average; BMI management plan is completed.    Physical Exam  Vitals signs reviewed.   Constitutional:       General: She is not in acute distress.     Appearance: She is well-developed. She is not diaphoretic.   HENT:      Head: " Normocephalic and atraumatic.   Eyes:      Pupils: Pupils are equal, round, and reactive to light.   Neck:      Thyroid: No thyromegaly.      Trachea: Phonation normal.   Pulmonary:      Effort: No respiratory distress.   Skin:     Coloration: Skin is not pale.      Findings: No erythema.   Neurological:      Mental Status: She is alert.   Psychiatric:         Behavior: Behavior normal.         Thought Content: Thought content normal.         Judgment: Judgment normal.               Assessment/Plan   Medicare Risks and Personalized Health Plan  CMS Preventative Services Quick Reference  Advance Directive Discussion  Breast Cancer/Mammogram Screening  Cardiovascular risk  Dementia/Memory   Depression/Dysphoria  Diabetic Lab Screening   Immunizations Discussed/Encouraged (specific immunizations; Influenza, Pneumococcal 23 and Shingrix )  Inadequate Social Support, Isolation, Loneliness, Lack of Transportation, Financial Difficulties, or Caregiver Stress   Inactivity/Sedentary  Obesity/Overweight     The above risks/problems have been discussed with the patient.  Pertinent information has been shared with the patient in the After Visit Summary.  Follow up plans and orders are seen below in the Assessment/Plan Section.    Diagnoses and all orders for this visit:    1. Medicare annual wellness visit, subsequent (Primary)    2. Hyperlipidemia, unspecified hyperlipidemia type  -     simvastatin (ZOCOR) 10 MG tablet; Take 1 tablet by mouth Daily.  Dispense: 30 tablet; Refill: 5  -     Comprehensive Metabolic Panel; Future    3. Essential hypertension  -     ramipril (ALTACE) 10 MG capsule; Take 1 capsule by mouth Daily.  Dispense: 30 capsule; Refill: 5  -     Comprehensive Metabolic Panel; Future    4. Osteopenia, unspecified location  -     raloxifene (EVISTA) 60 MG tablet; Take 1 tablet by mouth Daily.  Dispense: 30 tablet; Refill: 5    Other orders  -     furosemide (LASIX) 20 MG tablet; Take 1 tablet by mouth Daily.   Dispense: 30 tablet; Refill: 5  -     allopurinol (Zyloprim) 100 MG tablet; Take 1 tablet by mouth Daily.  Dispense: 30 tablet; Refill: 5  -     Discontinue: pneumococcal polysaccharide 23-valent (PNEUMOVAX-23) vaccine 0.5 mL       She does not require any labs at this time.  Most recent labs were reviewed today.  Refill labs as above    I reviewed her health care maintenance.  She is had the influenza vaccine this year.  She is due for Pneumovax 23, but we do not have any in the office at this time.  She will go to her local pharmacy and get the Shingrix.  She had Zostavax July 2014.  She is up-to-date on her mammogram and colonoscopy.  Given her 10-year ASCVD risk of 14% I recommended her to start a baby aspirin.  Her depression screen was negative with a PHQ 2 of 0    Follow-up 6 months or sooner if clinically indicated      Follow Up:  Return in about 6 months (around 5/23/2021) for Recheck.     An After Visit Summary and PPPS were given to the patient.

## 2020-11-30 ENCOUNTER — CLINICAL SUPPORT (OUTPATIENT)
Dept: INTERNAL MEDICINE | Facility: CLINIC | Age: 75
End: 2020-11-30

## 2020-11-30 DIAGNOSIS — Z23 NEED FOR INFLUENZA VACCINATION: Primary | ICD-10-CM

## 2020-11-30 PROCEDURE — G0009 ADMIN PNEUMOCOCCAL VACCINE: HCPCS | Performed by: INTERNAL MEDICINE

## 2020-11-30 PROCEDURE — 90732 PPSV23 VACC 2 YRS+ SUBQ/IM: CPT | Performed by: INTERNAL MEDICINE

## 2021-01-18 ENCOUNTER — OFFICE VISIT (OUTPATIENT)
Dept: GASTROENTEROLOGY | Facility: CLINIC | Age: 76
End: 2021-01-18

## 2021-01-18 VITALS
WEIGHT: 204 LBS | OXYGEN SATURATION: 98 % | HEIGHT: 67 IN | DIASTOLIC BLOOD PRESSURE: 80 MMHG | SYSTOLIC BLOOD PRESSURE: 140 MMHG | HEART RATE: 88 BPM | TEMPERATURE: 97 F | BODY MASS INDEX: 32.02 KG/M2

## 2021-01-18 DIAGNOSIS — K51.00 ULCERATIVE PANCOLITIS WITHOUT COMPLICATION (HCC): Primary | ICD-10-CM

## 2021-01-18 PROCEDURE — 99213 OFFICE O/P EST LOW 20 MIN: CPT | Performed by: INTERNAL MEDICINE

## 2021-01-18 RX ORDER — ASPIRIN 81 MG/1
81 TABLET ORAL DAILY
COMMUNITY

## 2021-01-18 NOTE — PROGRESS NOTES
Chief Complaint   Patient presents with   • Colonoscopy     6- had colon took steriods has [facility] no problems now       PCP: LIBRADO Mcnally MD  REFER: No ref. provider found    Subjective     HPI    Doing well thus far on 4 Lialda/day  Denies abdominal pain/or bleeding  Denies gerd  deniies abdominal pain         Past Medical History:   Diagnosis Date   • Abdominal pain, epigastric    • Abdominal pain, left lower quadrant    • Abnormal findings on diagnostic imaging of abdomen     ABFND, RADIOLOGICAL, ABDOMINAL AREA    • Allergic rhinitis    • Arthritis    • Cancer (CMS/HCC) 10/14 removed    Renal Cell carcinoma   • Chronic ulcerative colitis without complication (CMS/HCC)    • Constipation    • Diarrhea    • Diverticulosis    • Encounter for Hemoccult screening     HEMOCCULT POSITIVE STOOLS    • Gastroesophageal reflux disease     esophagitis presence not specified   • GERD (gastroesophageal reflux disease)    • Herpes zoster    • Hyperlipidemia    • Hypertension    • Nausea    • Obesity    • Rectal bleeding    • Rectal bleeding    • Regurgitation    • Ulcerative colitis (CMS/HCC)        Past Surgical History:   Procedure Laterality Date   • CHOLECYSTECTOMY     • COLONOSCOPY  09/23/2014    left side bx-minimal active inflammation - 2 yr   • COLONOSCOPY  07/09/2014    active inflammation to extent of limited lower colonoscopy/45 cm   • COLONOSCOPY  02/21/2013    mild inflammation in sigmoid, left-sided diverticulosis   • COLONOSCOPY  03/05/2010    very tortuous colon not allowing visual of cecal base - 5 yrs   • COLONOSCOPY  11/14/2003    FAIRLY TORTUOUS AND SPASMODIC COLON - 5 YR   • COLONOSCOPY Left 9/28/2016    Procedure: COLONOSCOPY WITH ANESTHESIA;  Surgeon: David Alonzo DO;  Location: [facility] ENDOSCOPY;  Service:    • COLONOSCOPY N/A 6/11/2020    Procedure: COLONOSCOPY WITH ANESTHESIA;  Surgeon: David Alonzo DO;  Location: [facility] ENDOSCOPY;  Service: Gastroenterology;  Laterality: N/A;  pre hx  ulcerative colitis  post hx ulcerative colitis, diverticulosis  dr benedicto kaur   • ENDOSCOPY  03/14/2014    Normal   • ENDOSCOPY  02/09/2006    MILD GASTRITIS   • NEPHRECTOMY PARTIAL Left    • OTHER SURGICAL HISTORY      Bilateral Eyelid Surgery    • TONSILLECTOMY     • WRIST SURGERY Left        Outpatient Medications Marked as Taking for the 1/18/21 encounter (Office Visit) with David Alonzo, DO   Medication Sig Dispense Refill   • acetaminophen (TYLENOL) 650 MG 8 hr tablet Take 650 mg by mouth Every 8 (Eight) Hours As Needed for Mild Pain .     • allopurinol (Zyloprim) 100 MG tablet Take 1 tablet by mouth Daily. 30 tablet 5   • aspirin 81 MG EC tablet Take 81 mg by mouth Daily.     • Calcium Carbonate (CALCIUM 600) 1500 (600 CA) MG tablet Take  by mouth daily.     • docusate sodium (COLACE) 100 MG capsule Take 100 mg by mouth Daily.     • esomeprazole (NexIUM) 40 MG capsule Take 40 mg by mouth daily.     • fluticasone (FLONASE) 50 MCG/ACT nasal spray 1 spray into the nostril(s) as directed by provider Daily.     • furosemide (LASIX) 20 MG tablet Take 1 tablet by mouth Daily. 30 tablet 5   • Melatonin 10 MG capsule Take  by mouth.     • mesalamine (LIALDA) 1.2 g EC tablet Take 2 tablets by mouth 2 (Two) Times a Day. 120 tablet 11   • Multiple Vitamins-Minerals (CENTRUM SILVER PO) Take  by mouth daily.     • Potassium 99 MG tablet Take  by mouth.     • raloxifene (EVISTA) 60 MG tablet Take 1 tablet by mouth Daily. 30 tablet 5   • ramipril (ALTACE) 10 MG capsule Take 1 capsule by mouth Daily. 30 capsule 5   • simvastatin (ZOCOR) 10 MG tablet Take 1 tablet by mouth Daily. 30 tablet 5   • SUPER B COMPLEX/C PO Take  by mouth.         No Known Allergies    Social History     Socioeconomic History   • Marital status:      Spouse name: Not on file   • Number of children: Not on file   • Years of education: Not on file   • Highest education level: Not on file   Tobacco Use   • Smoking status: Never Smoker   •  "Smokeless tobacco: Never Used   Substance and Sexual Activity   • Alcohol use: No   • Drug use: No   • Sexual activity: Never     Comment: menopause       Review of Systems   Constitutional: Negative for unexpected weight change.   Respiratory: Negative for shortness of breath.    Cardiovascular: Negative for chest pain.   Gastrointestinal: Negative for abdominal pain and anal bleeding.       Objective     Vitals:    01/18/21 1328   BP: 140/80   Pulse: 88   Temp: 97 °F (36.1 °C)   SpO2: 98%   Weight: 92.5 kg (204 lb)   Height: 170.2 cm (67\")     Body mass index is 31.95 kg/m².    Physical Exam  Constitutional:       Appearance: Normal appearance. She is well-developed.   Eyes:      General: No scleral icterus.  Neck:      Thyroid: No thyroid mass or thyromegaly.      Vascular: No JVD.   Pulmonary:      Effort: Pulmonary effort is normal. No accessory muscle usage.   Abdominal:      General: There is no distension.      Tenderness: There is no abdominal tenderness. There is no guarding.   Skin:     Coloration: Skin is not jaundiced.   Neurological:      Mental Status: She is alert.   Psychiatric:         Behavior: Behavior is cooperative.         Judgment: Judgment normal.         Imaging Results (Most Recent)     None          Body mass index is 31.95 kg/m².    Assessment/Plan     Diagnoses and all orders for this visit:    1. Ulcerative pancolitis without complication (CMS/HCC) (Primary)         Doing well at present  Reviewed Dr Mcnally's notes   Continue current rx for now  Ov in 6 months     * Surgery not found *        Precautions are currently being put in place due to COVID-19.  I have explained to Coco Steele they will be required to undergo COVID testing prior to their procedure.  Coco Steele verbalized understanding and was willing to proceed.    Patient's Body mass index is 31.95 kg/m². BMI is within normal parameters. No follow-up required..       David Alonzo, DO  01/18/21          There are no " Patient Instructions on file for this visit.

## 2021-02-16 ENCOUNTER — APPOINTMENT (OUTPATIENT)
Dept: VACCINE CLINIC | Facility: HOSPITAL | Age: 76
End: 2021-02-16

## 2021-02-23 ENCOUNTER — IMMUNIZATION (OUTPATIENT)
Dept: VACCINE CLINIC | Facility: HOSPITAL | Age: 76
End: 2021-02-23

## 2021-02-23 PROCEDURE — 0011A: CPT | Performed by: OBSTETRICS & GYNECOLOGY

## 2021-02-23 PROCEDURE — 91301 HC SARSCO02 VAC 100MCG/0.5ML IM: CPT | Performed by: OBSTETRICS & GYNECOLOGY

## 2021-03-23 ENCOUNTER — IMMUNIZATION (OUTPATIENT)
Dept: VACCINE CLINIC | Facility: HOSPITAL | Age: 76
End: 2021-03-23

## 2021-03-23 PROCEDURE — 0012A: CPT | Performed by: OBSTETRICS & GYNECOLOGY

## 2021-03-23 PROCEDURE — 91301 HC SARSCO02 VAC 100MCG/0.5ML IM: CPT | Performed by: OBSTETRICS & GYNECOLOGY

## 2021-05-07 RX ORDER — ALLOPURINOL 100 MG/1
100 TABLET ORAL DAILY
Qty: 30 TABLET | Refills: 5 | Status: SHIPPED | OUTPATIENT
Start: 2021-05-07 | End: 2021-11-01

## 2021-06-02 DIAGNOSIS — M85.80 OSTEOPENIA, UNSPECIFIED LOCATION: ICD-10-CM

## 2021-06-02 DIAGNOSIS — E78.5 HYPERLIPIDEMIA, UNSPECIFIED HYPERLIPIDEMIA TYPE: ICD-10-CM

## 2021-06-02 DIAGNOSIS — I10 ESSENTIAL HYPERTENSION: ICD-10-CM

## 2021-06-02 RX ORDER — RALOXIFENE HYDROCHLORIDE 60 MG/1
60 TABLET, FILM COATED ORAL DAILY
Qty: 30 TABLET | Refills: 5 | Status: SHIPPED | OUTPATIENT
Start: 2021-06-02 | End: 2023-02-28

## 2021-06-02 RX ORDER — FUROSEMIDE 20 MG/1
20 TABLET ORAL DAILY
Qty: 30 TABLET | Refills: 5 | Status: SHIPPED | OUTPATIENT
Start: 2021-06-02 | End: 2023-02-28

## 2021-06-02 RX ORDER — RAMIPRIL 10 MG/1
10 CAPSULE ORAL DAILY
Qty: 30 CAPSULE | Refills: 5 | Status: SHIPPED | OUTPATIENT
Start: 2021-06-02

## 2021-06-02 RX ORDER — SIMVASTATIN 10 MG
10 TABLET ORAL DAILY
Qty: 30 TABLET | Refills: 5 | Status: SHIPPED | OUTPATIENT
Start: 2021-06-02

## 2021-07-06 RX ORDER — MESALAMINE 1.2 G/1
TABLET, DELAYED RELEASE ORAL
Qty: 120 TABLET | Refills: 11 | OUTPATIENT
Start: 2021-07-06

## 2021-07-09 ENCOUNTER — TELEPHONE (OUTPATIENT)
Dept: GASTROENTEROLOGY | Facility: CLINIC | Age: 76
End: 2021-07-09

## 2021-07-09 DIAGNOSIS — K51.00 ULCERATIVE PANCOLITIS WITHOUT COMPLICATION (HCC): Primary | ICD-10-CM

## 2021-07-09 RX ORDER — MESALAMINE 1.2 G/1
2400 TABLET, DELAYED RELEASE ORAL 2 TIMES DAILY
Qty: 120 TABLET | Refills: 3 | Status: SHIPPED | OUTPATIENT
Start: 2021-07-09 | End: 2021-10-06 | Stop reason: SDUPTHER

## 2021-07-09 NOTE — TELEPHONE ENCOUNTER
I spoke to Coco Steele via phone    She is not having any complaints.  I have sent refill of Lialda to [facility] with #3 RF    I have entered referral for [facility] to resume care    Appointment scheduled with Dr Alonzo for July 19 needs to be cancelled     Thank you

## 2021-07-19 DIAGNOSIS — Z12.11 SCREENING FOR COLON CANCER: Primary | ICD-10-CM

## 2021-07-19 RX ORDER — SODIUM, POTASSIUM,MAG SULFATES 17.5-3.13G
1 SOLUTION, RECONSTITUTED, ORAL ORAL TAKE AS DIRECTED
Qty: 354 ML | Refills: 0 | Status: SHIPPED | OUTPATIENT
Start: 2021-07-19 | End: 2022-08-16 | Stop reason: SDUPTHER

## 2021-08-09 ENCOUNTER — OUTSIDE FACILITY SERVICE (OUTPATIENT)
Dept: GASTROENTEROLOGY | Facility: CLINIC | Age: 76
End: 2021-08-09

## 2021-08-09 PROCEDURE — 45385 COLONOSCOPY W/LESION REMOVAL: CPT | Performed by: INTERNAL MEDICINE

## 2021-08-09 PROCEDURE — 88305 TISSUE EXAM BY PATHOLOGIST: CPT | Performed by: INTERNAL MEDICINE

## 2021-08-09 PROCEDURE — 45380 COLONOSCOPY AND BIOPSY: CPT | Performed by: INTERNAL MEDICINE

## 2021-08-10 ENCOUNTER — LAB REQUISITION (OUTPATIENT)
Dept: LAB | Facility: HOSPITAL | Age: 76
End: 2021-08-10

## 2021-08-10 DIAGNOSIS — K57.30 DIVERTICULOSIS OF LARGE INTESTINE WITHOUT PERFORATION OR ABSCESS WITHOUT BLEEDING: ICD-10-CM

## 2021-08-10 DIAGNOSIS — K51.00 ULCERATIVE (CHRONIC) PANCOLITIS WITHOUT COMPLICATIONS (HCC): ICD-10-CM

## 2021-08-10 DIAGNOSIS — K64.0 FIRST DEGREE HEMORRHOIDS: ICD-10-CM

## 2021-08-10 DIAGNOSIS — K63.5 POLYP OF COLON: ICD-10-CM

## 2021-08-11 LAB
CYTO UR: NORMAL
LAB AP CASE REPORT: NORMAL
LAB AP CLINICAL INFORMATION: NORMAL
LAB AP DIAGNOSIS COMMENT: NORMAL
PATH REPORT.FINAL DX SPEC: NORMAL
PATH REPORT.GROSS SPEC: NORMAL

## 2021-08-12 ENCOUNTER — TELEPHONE (OUTPATIENT)
Dept: GASTROENTEROLOGY | Facility: CLINIC | Age: 76
End: 2021-08-12

## 2021-08-12 NOTE — TELEPHONE ENCOUNTER
I tried to call Ms Steele to give biopsy results. No answer; no voice mail set up. I will send results to my chart and the mail.

## 2021-08-12 NOTE — TELEPHONE ENCOUNTER
----- Message from Ac Gill MD sent at 8/11/2021  5:12 PM EDT -----  Let Ms. Steele know there was 1 adenoma type polyp.  The other colon biopsies were negative for any evidence of active colitis.  She will have a repeat examination in 1 year.  She can continue the Lialda

## 2021-10-04 RX ORDER — MESALAMINE 1.2 G/1
TABLET, DELAYED RELEASE ORAL
Qty: 120 TABLET | Refills: 3 | OUTPATIENT
Start: 2021-10-04

## 2021-10-06 DIAGNOSIS — K51.00 ULCERATIVE PANCOLITIS WITHOUT COMPLICATION (HCC): Primary | ICD-10-CM

## 2021-10-06 RX ORDER — MESALAMINE 1.2 G/1
2400 TABLET, DELAYED RELEASE ORAL 2 TIMES DAILY
Qty: 120 TABLET | Refills: 3 | Status: SHIPPED | OUTPATIENT
Start: 2021-10-06 | End: 2022-01-31

## 2021-11-01 RX ORDER — ALLOPURINOL 100 MG/1
100 TABLET ORAL DAILY
Qty: 30 TABLET | Refills: 5 | Status: CANCELLED | OUTPATIENT
Start: 2021-11-01

## 2021-11-01 RX ORDER — ALLOPURINOL 100 MG/1
100 TABLET ORAL DAILY
Qty: 30 TABLET | Refills: 0 | Status: SHIPPED | OUTPATIENT
Start: 2021-11-01

## 2021-11-01 NOTE — TELEPHONE ENCOUNTER
PATIENT HAS BEEN CALLED, SHE HAS MOVED TO [place] AND IS GOING TO BE SEEING A NEW DR ON NOV 30th.

## 2021-11-01 NOTE — TELEPHONE ENCOUNTER
1 refill.  Her last visit was November 23, 2020.  She needs an appointment for annual Medicare wellness visit, but after November 23, 2021

## 2021-12-01 DIAGNOSIS — I10 ESSENTIAL HYPERTENSION: ICD-10-CM

## 2021-12-01 DIAGNOSIS — E78.5 HYPERLIPIDEMIA, UNSPECIFIED HYPERLIPIDEMIA TYPE: ICD-10-CM

## 2021-12-01 RX ORDER — FUROSEMIDE 20 MG/1
20 TABLET ORAL DAILY
Qty: 30 TABLET | Refills: 5 | Status: CANCELLED | OUTPATIENT
Start: 2021-12-01

## 2021-12-01 RX ORDER — RAMIPRIL 10 MG/1
10 CAPSULE ORAL DAILY
Qty: 30 CAPSULE | Refills: 5 | OUTPATIENT
Start: 2021-12-01

## 2021-12-01 RX ORDER — SIMVASTATIN 10 MG
10 TABLET ORAL DAILY
Qty: 30 TABLET | Refills: 5 | OUTPATIENT
Start: 2021-12-01

## 2021-12-01 RX ORDER — ALLOPURINOL 100 MG/1
100 TABLET ORAL DAILY
Qty: 30 TABLET | Refills: 0 | OUTPATIENT
Start: 2021-12-01

## 2021-12-01 RX ORDER — FUROSEMIDE 20 MG/1
20 TABLET ORAL DAILY
Qty: 30 TABLET | Refills: 5 | OUTPATIENT
Start: 2021-12-01

## 2022-01-30 DIAGNOSIS — K51.00 ULCERATIVE PANCOLITIS WITHOUT COMPLICATION: ICD-10-CM

## 2022-01-31 RX ORDER — MESALAMINE 1.2 G/1
TABLET, DELAYED RELEASE ORAL
Qty: 120 TABLET | Refills: 3 | Status: SHIPPED | OUTPATIENT
Start: 2022-01-31 | End: 2022-06-07

## 2022-03-01 DIAGNOSIS — M85.80 OSTEOPENIA, UNSPECIFIED LOCATION: ICD-10-CM

## 2022-03-01 RX ORDER — RALOXIFENE HYDROCHLORIDE 60 MG/1
60 TABLET, FILM COATED ORAL DAILY
Qty: 30 TABLET | Refills: 5 | OUTPATIENT
Start: 2022-03-01

## 2022-05-23 ENCOUNTER — PRIOR AUTHORIZATION (OUTPATIENT)
Dept: GASTROENTEROLOGY | Facility: CLINIC | Age: 77
End: 2022-05-23

## 2022-06-04 DIAGNOSIS — K51.00 ULCERATIVE PANCOLITIS WITHOUT COMPLICATION: ICD-10-CM

## 2022-06-07 RX ORDER — MESALAMINE 1.2 G/1
TABLET, DELAYED RELEASE ORAL
Qty: 120 TABLET | Refills: 3 | Status: SHIPPED | OUTPATIENT
Start: 2022-06-07 | End: 2022-09-20 | Stop reason: SDUPTHER

## 2022-08-16 DIAGNOSIS — Z12.11 SCREENING FOR COLON CANCER: ICD-10-CM

## 2022-08-16 RX ORDER — SODIUM, POTASSIUM,MAG SULFATES 17.5-3.13G
1 SOLUTION, RECONSTITUTED, ORAL ORAL TAKE AS DIRECTED
Qty: 354 ML | Refills: 0 | Status: SHIPPED | OUTPATIENT
Start: 2022-08-16 | End: 2023-02-28

## 2022-08-29 ENCOUNTER — OUTSIDE FACILITY SERVICE (OUTPATIENT)
Dept: GASTROENTEROLOGY | Facility: CLINIC | Age: 77
End: 2022-08-29

## 2022-08-29 PROCEDURE — 88305 TISSUE EXAM BY PATHOLOGIST: CPT | Performed by: INTERNAL MEDICINE

## 2022-08-29 PROCEDURE — 45380 COLONOSCOPY AND BIOPSY: CPT | Performed by: INTERNAL MEDICINE

## 2022-08-30 ENCOUNTER — LAB REQUISITION (OUTPATIENT)
Dept: LAB | Facility: HOSPITAL | Age: 77
End: 2022-08-30

## 2022-08-30 DIAGNOSIS — Z86.010 PERSONAL HISTORY OF COLONIC POLYPS: ICD-10-CM

## 2022-08-30 DIAGNOSIS — K57.30 DIVERTICULOSIS OF LARGE INTESTINE WITHOUT PERFORATION OR ABSCESS WITHOUT BLEEDING: ICD-10-CM

## 2022-08-30 DIAGNOSIS — K64.8 OTHER HEMORRHOIDS: ICD-10-CM

## 2022-08-31 ENCOUNTER — TELEPHONE (OUTPATIENT)
Dept: GASTROENTEROLOGY | Facility: CLINIC | Age: 77
End: 2022-08-31

## 2022-09-20 DIAGNOSIS — K51.00 ULCERATIVE PANCOLITIS WITHOUT COMPLICATION: ICD-10-CM

## 2022-09-21 RX ORDER — MESALAMINE 1.2 G/1
2.4 TABLET, DELAYED RELEASE ORAL 2 TIMES DAILY
Qty: 120 TABLET | Refills: 3 | Status: SHIPPED | OUTPATIENT
Start: 2022-09-21 | End: 2022-12-27 | Stop reason: SDUPTHER

## 2022-12-27 DIAGNOSIS — K51.00 ULCERATIVE PANCOLITIS WITHOUT COMPLICATION: ICD-10-CM

## 2022-12-27 RX ORDER — MESALAMINE 1.2 G/1
2.4 TABLET, DELAYED RELEASE ORAL 2 TIMES DAILY
Qty: 120 TABLET | Refills: 3 | Status: SHIPPED | OUTPATIENT
Start: 2022-12-27 | End: 2023-02-28 | Stop reason: SDUPTHER

## 2023-02-28 ENCOUNTER — LAB (OUTPATIENT)
Dept: LAB | Facility: HOSPITAL | Age: 78
End: 2023-02-28
Payer: MEDICARE

## 2023-02-28 ENCOUNTER — OFFICE VISIT (OUTPATIENT)
Dept: GASTROENTEROLOGY | Facility: CLINIC | Age: 78
End: 2023-02-28
Payer: MEDICARE

## 2023-02-28 VITALS
TEMPERATURE: 97.3 F | BODY MASS INDEX: 29.88 KG/M2 | SYSTOLIC BLOOD PRESSURE: 120 MMHG | WEIGHT: 190.4 LBS | HEART RATE: 72 BPM | DIASTOLIC BLOOD PRESSURE: 74 MMHG | HEIGHT: 67 IN | OXYGEN SATURATION: 98 %

## 2023-02-28 DIAGNOSIS — K51.00 ULCERATIVE PANCOLITIS WITHOUT COMPLICATION: ICD-10-CM

## 2023-02-28 LAB
ALBUMIN SERPL-MCNC: 4.5 G/DL (ref 3.5–5.2)
ALBUMIN/GLOB SERPL: 1.4 G/DL
ALP SERPL-CCNC: 61 U/L (ref 39–117)
ALT SERPL W P-5'-P-CCNC: 23 U/L (ref 1–33)
ANION GAP SERPL CALCULATED.3IONS-SCNC: 8.8 MMOL/L (ref 5–15)
AST SERPL-CCNC: 24 U/L (ref 1–32)
BASOPHILS # BLD AUTO: 0.03 10*3/MM3 (ref 0–0.2)
BASOPHILS NFR BLD AUTO: 0.7 % (ref 0–1.5)
BILIRUB SERPL-MCNC: 0.2 MG/DL (ref 0–1.2)
BUN SERPL-MCNC: 12 MG/DL (ref 8–23)
BUN/CREAT SERPL: 11.3 (ref 7–25)
CALCIUM SPEC-SCNC: 9.9 MG/DL (ref 8.6–10.5)
CHLORIDE SERPL-SCNC: 104 MMOL/L (ref 98–107)
CO2 SERPL-SCNC: 27.2 MMOL/L (ref 22–29)
CREAT SERPL-MCNC: 1.06 MG/DL (ref 0.57–1)
DEPRECATED RDW RBC AUTO: 42.6 FL (ref 37–54)
EGFRCR SERPLBLD CKD-EPI 2021: 54.6 ML/MIN/1.73
EOSINOPHIL # BLD AUTO: 0.03 10*3/MM3 (ref 0–0.4)
EOSINOPHIL NFR BLD AUTO: 0.7 % (ref 0.3–6.2)
ERYTHROCYTE [DISTWIDTH] IN BLOOD BY AUTOMATED COUNT: 13.3 % (ref 12.3–15.4)
GLOBULIN UR ELPH-MCNC: 3.2 GM/DL
GLUCOSE SERPL-MCNC: 115 MG/DL (ref 65–99)
HCT VFR BLD AUTO: 37.3 % (ref 34–46.6)
HGB BLD-MCNC: 12.5 G/DL (ref 12–15.9)
IMM GRANULOCYTES # BLD AUTO: 0.01 10*3/MM3 (ref 0–0.05)
IMM GRANULOCYTES NFR BLD AUTO: 0.2 % (ref 0–0.5)
LYMPHOCYTES # BLD AUTO: 1 10*3/MM3 (ref 0.7–3.1)
LYMPHOCYTES NFR BLD AUTO: 23.3 % (ref 19.6–45.3)
MCH RBC QN AUTO: 29.8 PG (ref 26.6–33)
MCHC RBC AUTO-ENTMCNC: 33.5 G/DL (ref 31.5–35.7)
MCV RBC AUTO: 88.8 FL (ref 79–97)
MONOCYTES # BLD AUTO: 0.26 10*3/MM3 (ref 0.1–0.9)
MONOCYTES NFR BLD AUTO: 6 % (ref 5–12)
NEUTROPHILS NFR BLD AUTO: 2.97 10*3/MM3 (ref 1.7–7)
NEUTROPHILS NFR BLD AUTO: 69.1 % (ref 42.7–76)
NRBC BLD AUTO-RTO: 0 /100 WBC (ref 0–0.2)
PLATELET # BLD AUTO: 213 10*3/MM3 (ref 140–450)
PMV BLD AUTO: 10.3 FL (ref 6–12)
POTASSIUM SERPL-SCNC: 4.8 MMOL/L (ref 3.5–5.2)
PROT SERPL-MCNC: 7.7 G/DL (ref 6–8.5)
RBC # BLD AUTO: 4.2 10*6/MM3 (ref 3.77–5.28)
SODIUM SERPL-SCNC: 140 MMOL/L (ref 136–145)
WBC NRBC COR # BLD: 4.3 10*3/MM3 (ref 3.4–10.8)

## 2023-02-28 PROCEDURE — 36415 COLL VENOUS BLD VENIPUNCTURE: CPT

## 2023-02-28 PROCEDURE — 80053 COMPREHEN METABOLIC PANEL: CPT

## 2023-02-28 PROCEDURE — 99214 OFFICE O/P EST MOD 30 MIN: CPT | Performed by: NURSE PRACTITIONER

## 2023-02-28 PROCEDURE — 85025 COMPLETE CBC W/AUTO DIFF WBC: CPT

## 2023-02-28 RX ORDER — MESALAMINE 1.2 G/1
2.4 TABLET, DELAYED RELEASE ORAL 2 TIMES DAILY
Qty: 120 TABLET | Refills: 5 | Status: SHIPPED | OUTPATIENT
Start: 2023-02-28

## 2023-02-28 RX ORDER — MULTIPLE VITAMINS W/ MINERALS TAB 9MG-400MCG
TAB ORAL DAILY
COMMUNITY

## 2023-02-28 NOTE — PROGRESS NOTES
"     Follow Up      Patient Name: Coco Steele  : 1945   MRN: [id number]     Chief Complaint:    Chief Complaint   Patient presents with   • Colonoscopy     Follow Up         History of Present Illness: Coco Steele is a 77 y.o. female who is here today for follow up on UC.    UC:  Dx in .  Treatments: lialda  No hospitalizations/ surgeries/complications.    Currently having about 1-2  formed bms a day.  Occasionally, blood in the stool but not often.  She did miss her lialda for a few days last month accidentally and had \"a lot of cramping\".  Has tried to wean down in the past unsuccessfully her previous GI.  Doing well on current dose.        Hx of renal cell carcinoma- caught early- had partial Right nephrectomy-did not require chemo or radiation treatments    SCANNED - COLONOSCOPY (2022)-nonbleeding internal hemorrhoids, diverticula throughout.  Biopsy with chronic inactive colitis in the sigmoid    Subjective      Review of Systems:   Review of Systems   Constitutional: Negative for appetite change and unexpected weight loss.   HENT: Negative for trouble swallowing.    Gastrointestinal: Positive for anal bleeding. Negative for abdominal distention, abdominal pain, blood in stool, constipation, diarrhea, nausea, rectal pain, vomiting, GERD and indigestion.       Medications:     Current Outpatient Medications:   •  acetaminophen (TYLENOL) 650 MG 8 hr tablet, Take 1 tablet by mouth Every 8 (Eight) Hours As Needed for Mild Pain., Disp: , Rfl:   •  allopurinol (ZYLOPRIM) 100 MG tablet, Take 1 tablet by mouth Daily., Disp: 30 tablet, Rfl: 0  •  aspirin 81 MG EC tablet, Take 1 tablet by mouth Daily., Disp: , Rfl:   •  Calcium Carbonate 1500 (600 Ca) MG tablet, Take  by mouth daily., Disp: , Rfl:   •  docusate sodium (COLACE) 100 MG capsule, Take 1 capsule by mouth Daily., Disp: , Rfl:   •  esomeprazole (NexIUM) 40 MG capsule, Take 1 capsule by mouth Daily., Disp: , Rfl:   •  fluticasone (FLONASE) 50 " MCG/ACT nasal spray, 1 spray into the nostril(s) as directed by provider Daily., Disp: , Rfl:   •  Melatonin 10 MG capsule, Take  by mouth., Disp: , Rfl:   •  mesalamine (LIALDA) 1.2 g EC tablet, Take 2 tablets by mouth 2 (Two) Times a Day., Disp: 120 tablet, Rfl: 5  •  Multiple Vitamins-Minerals (CENTRUM SILVER PO), Take  by mouth daily., Disp: , Rfl:   •  Potassium 99 MG tablet, Take  by mouth., Disp: , Rfl:   •  ramipril (ALTACE) 10 MG capsule, Take 1 capsule by mouth Daily., Disp: 30 capsule, Rfl: 5  •  simvastatin (ZOCOR) 10 MG tablet, Take 1 tablet by mouth Daily., Disp: 30 tablet, Rfl: 5  •  SUPER B COMPLEX/C PO, Take  by mouth., Disp: , Rfl:   •  multivitamin with minerals tablet tablet, Take  by mouth Daily., Disp: , Rfl:     Allergies:   No Known Allergies    Social History:   Social History     Socioeconomic History   • Marital status:    Tobacco Use   • Smoking status: Never   • Smokeless tobacco: Never   Substance and Sexual Activity   • Alcohol use: No   • Drug use: No   • Sexual activity: Not Currently     Comment: menopause        Surgical History:   Past Surgical History:   Procedure Laterality Date   • CHOLECYSTECTOMY     • COLONOSCOPY  09/23/2014    left side bx-minimal active inflammation - 2 yr   • COLONOSCOPY  07/09/2014    active inflammation to extent of limited lower colonoscopy/45 cm   • COLONOSCOPY  02/21/2013    mild inflammation in sigmoid, left-sided diverticulosis   • COLONOSCOPY  03/05/2010    very tortuous colon not allowing visual of cecal base - 5 yrs   • COLONOSCOPY  11/14/2003    FAIRLY TORTUOUS AND SPASMODIC COLON - 5 YR   • COLONOSCOPY Left 09/28/2016    Procedure: COLONOSCOPY WITH ANESTHESIA;  Surgeon: David Alonzo DO;  Location: [facility] ENDOSCOPY;  Service:    • COLONOSCOPY N/A 06/11/2020    Procedure: COLONOSCOPY WITH ANESTHESIA;  Surgeon: David Alonzo DO;  Location: [facility] ENDOSCOPY;  Service: Gastroenterology;  Laterality: N/A;  pre hx ulcerative  "colitis  post hx ulcerative colitis, diverticulosis  dr benedicto kaur   • ENDOSCOPY  03/14/2014    Normal   • ENDOSCOPY  02/09/2006    MILD GASTRITIS   • NEPHRECTOMY PARTIAL Left    • OTHER SURGICAL HISTORY      Bilateral Eyelid Surgery    • TONSILLECTOMY     • UPPER GASTROINTESTINAL ENDOSCOPY  03/14/2014    Dr David Alonzo   • WRIST SURGERY Left         Medical History:   Past Medical History:   Diagnosis Date   • Abdominal pain, epigastric    • Abdominal pain, left lower quadrant    • Abnormal findings on diagnostic imaging of abdomen     ABFND, RADIOLOGICAL, ABDOMINAL AREA    • Allergic rhinitis    • Arthritis    • Cancer (HCC) 10/14 removed    Renal Cell carcinoma   • Chronic ulcerative colitis without complication (HCC)    • Colon polyp    • Constipation    • Diarrhea    • Diverticulosis    • Encounter for Hemoccult screening     HEMOCCULT POSITIVE STOOLS    • Gastroesophageal reflux disease     esophagitis presence not specified   • GERD (gastroesophageal reflux disease)    • Herpes zoster    • Hyperlipidemia    • Hypertension    • Irritable bowel syndrome    • Nausea    • Obesity    • Rectal bleeding    • Rectal bleeding    • Regurgitation    • Ulcerative colitis (HCC)         Objective     Physical Exam:  Vital Signs:   Vitals:    02/28/23 1111   BP: 120/74   BP Location: Left arm   Patient Position: Sitting   Cuff Size: Adult   Pulse: 72   Temp: 97.3 °F (36.3 °C)   TempSrc: Infrared   SpO2: 98%   Weight: 86.4 kg (190 lb 6.4 oz)   Height: 170.2 cm (67.01\")     Body mass index is 29.81 kg/m².     Physical Exam  Vitals and nursing note reviewed.   Constitutional:       General: She is not in acute distress.     Appearance: She is well-developed. She is not diaphoretic.   Eyes:      General: No scleral icterus.     Conjunctiva/sclera: Conjunctivae normal.   Neck:      Thyroid: No thyromegaly.   Cardiovascular:      Rate and Rhythm: Normal rate and regular rhythm.   Pulmonary:      Effort: Pulmonary effort is " normal.      Breath sounds: Normal breath sounds.   Abdominal:      General: Bowel sounds are normal. There is no distension.      Palpations: Abdomen is soft.      Tenderness: There is no abdominal tenderness. There is no guarding or rebound.      Hernia: No hernia is present.   Musculoskeletal:      Cervical back: Neck supple.      Right lower leg: No edema.      Left lower leg: No edema.   Skin:     General: Skin is warm and dry.      Capillary Refill: Capillary refill takes 2 to 3 seconds.      Coloration: Skin is not jaundiced or pale.      Findings: No bruising or petechiae.      Nails: There is no clubbing.   Neurological:      Mental Status: She is alert and oriented to person, place, and time.   Psychiatric:         Behavior: Behavior normal.         Thought Content: Thought content normal.         Judgment: Judgment normal.         Assessment / Plan      Assessment/Plan:   Diagnoses and all orders for this visit:    1. Ulcerative pancolitis without complication (HCC)  -     mesalamine (LIALDA) 1.2 g EC tablet; Take 2 tablets by mouth 2 (Two) Times a Day.  Dispense: 120 tablet; Refill: 5  -     CBC & Differential; Future  -     Comprehensive Metabolic Panel; Future  -     Ambulatory Referral For Screening Colonoscopy    Stable on current dose of Liotta.  Has failed attempts at weaning in the past.  We discussed importance of well-balanced diet with adequate hydration.  Discussed importance of staying up-to-date on routine healthcare maintenance, especially vaccinations.  We discussed importance of dietary fiber and avoidance of excessive alcohol, and tobacco.    Follow Up:   Return in about 1 year (around 2/28/2024), or if symptoms worsen or fail to improve.    Plan of care reviewed with the patient at the conclusion of today's visit.  Education was provided regarding diagnosis, management, and any prescribed or recommended OTC medications.  Patient verbalized understanding of and agreement with management  plan.         RAMÍREZ Ewing  [facility]

## 2023-03-01 NOTE — ACP (ADVANCE CARE PLANNING)
[facility] received a request to update healthcare care surrogate information for Ms Steele, per her request. Ms Steele has completed a new living will naming Vonnie Berger as her primary surrogate, and Tammy Berger as an alternate.    The updated living will has been added to her medical chart, and the surrogate roles have been updated.

## 2023-08-16 ENCOUNTER — OFFICE VISIT (OUTPATIENT)
Dept: ORTHOPEDIC SURGERY | Facility: CLINIC | Age: 78
End: 2023-08-16
Payer: MEDICARE

## 2023-08-16 VITALS — WEIGHT: 186 LBS | BODY MASS INDEX: 29.19 KG/M2 | HEIGHT: 67 IN

## 2023-08-16 DIAGNOSIS — M25.511 CHRONIC RIGHT SHOULDER PAIN: Primary | ICD-10-CM

## 2023-08-16 DIAGNOSIS — S46.011S RUPTURE OF RIGHT SUPRASPINATUS TENDON, SEQUELA: ICD-10-CM

## 2023-08-16 DIAGNOSIS — M19.019 SHOULDER ARTHRITIS: ICD-10-CM

## 2023-08-16 DIAGNOSIS — G89.29 CHRONIC RIGHT SHOULDER PAIN: Primary | ICD-10-CM

## 2023-08-16 NOTE — PROGRESS NOTES
[facility]        Subjective     Pain of the Right Shoulder (Fell 2014, intermittent pain since then. )      HPI    Coco Steele is a 77 y.o. female.  She injured her shoulder in 2014 with a fall in a parking lot.  No treatment for that.  He got better.  A few weeks ago she started having increasing pain.  Urgent care gave her a cortisone injection in her but.  That helped some.    Past Medical History:   Diagnosis Date    Abdominal pain, epigastric     Abdominal pain, left lower quadrant     Abnormal findings on diagnostic imaging of abdomen     ABFND, RADIOLOGICAL, ABDOMINAL AREA     Allergic rhinitis     Arthritis     Cancer 10/14 removed    Renal Cell carcinoma    Chronic ulcerative colitis without complication     Colon polyp     Constipation     Diarrhea     Diverticulosis     Encounter for Hemoccult screening     HEMOCCULT POSITIVE STOOLS     Gastroesophageal reflux disease     esophagitis presence not specified    GERD (gastroesophageal reflux disease)     Herpes zoster     Hyperlipidemia     Hypertension     Irritable bowel syndrome     Nausea     Obesity     Rectal bleeding     Rectal bleeding     Regurgitation     Ulcerative colitis       Past Surgical History:   Procedure Laterality Date    CHOLECYSTECTOMY      COLONOSCOPY  09/23/2014    left side bx-minimal active inflammation - 2 yr    COLONOSCOPY  07/09/2014    active inflammation to extent of limited lower colonoscopy/45 cm    COLONOSCOPY  02/21/2013    mild inflammation in sigmoid, left-sided diverticulosis    COLONOSCOPY  03/05/2010    very tortuous colon not allowing visual of cecal base - 5 yrs    COLONOSCOPY  11/14/2003    FAIRLY TORTUOUS AND SPASMODIC COLON - 5 YR    COLONOSCOPY Left 09/28/2016    Procedure: COLONOSCOPY WITH ANESTHESIA;  Surgeon: David Alonzo DO;  Location: [facility] ENDOSCOPY;  Service:     COLONOSCOPY N/A 06/11/2020    Procedure: COLONOSCOPY WITH ANESTHESIA;  Surgeon: David Alonzo DO;   Location: [facility] ENDOSCOPY;  Service: Gastroenterology;  Laterality: N/A;  pre hx ulcerative colitis  [facility]    [facility]OSCOPY  03/14/2014    Normal    ENDOSCOPY  02/09/2006    MILD GASTRITIS    NEPHRECTOMY PARTIAL Left     OTHER SURGICAL HISTORY      Bilateral Eyelid Surgery     TONSILLECTOMY      UPPER GASTROINTESTINAL ENDOSCOPY  03/14/2014    Dr David Alonzo    WRIST SURGERY Left       Family History   Problem Relation Age of Onset    Colon polyps Mother     Breast cancer Neg Hx     Colon cancer Neg Hx      Social History     Socioeconomic History    Marital status:    Tobacco Use    Smoking status: Never    Smokeless tobacco: Never   Substance and Sexual Activity    Alcohol use: No    Drug use: No    Sexual activity: Not Currently     Comment: menopause      Current Outpatient Medications on File Prior to Visit   Medication Sig Dispense Refill    acetaminophen (TYLENOL) 650 MG 8 hr tablet Take 1 tablet by mouth Every 8 (Eight) Hours As Needed for Mild Pain.      allopurinol (ZYLOPRIM) 100 MG tablet Take 1 tablet by mouth Daily. 30 tablet 0    aspirin 81 MG EC tablet Take 1 tablet by mouth Daily.      Calcium Carbonate 1500 (600 Ca) MG tablet Take  by mouth daily.      docusate sodium (COLACE) 100 MG capsule Take 1 capsule by mouth Daily.      esomeprazole (NexIUM) 40 MG capsule Take 1 capsule by mouth Daily.      fluticasone (FLONASE) 50 MCG/ACT nasal spray 1 spray into the nostril(s) as directed by provider Daily.      Melatonin 10 MG capsule Take  by mouth.      mesalamine (LIALDA) 1.2 g EC tablet Take 2 tablets by mouth 2 (Two) Times a Day. 120 tablet 5    Multiple Vitamins-Minerals (CENTRUM SILVER PO) Take  by mouth daily.      Potassium 99 MG tablet Take  by mouth.      ramipril (ALTACE) 10 MG capsule Take 1 capsule by mouth Daily. 30 capsule 5    simvastatin (ZOCOR) 10 MG tablet Take 1 tablet by mouth Daily. 30 tablet 5    SUPER B COMPLEX/C PO Take  " by mouth.      [DISCONTINUED] Diclofenac Sodium (VOLTAREN) 1 % gel gel Apply 4 g topically to the appropriate area as directed 4 (Four) Times a Day As Needed (for shoulder pain). 100 g 0    [DISCONTINUED] multivitamin with minerals tablet tablet Take  by mouth Daily.       No current facility-administered medications on file prior to visit.      No Known Allergies       Review of Systems     I reviewed the patient's chief complaint, history of present illness, review of systems, past medical history, surgical history, family history, social history, medications and allergy list.        Objective      Physical Exam  Ht 169 cm (66.54\")   Wt 84.4 kg (186 lb)   BMI 29.54 kg/mý     Body mass index is 29.54 kg/mý.    General  Mental Status - alert  General Appearance - cooperative, well groomed, not in acute distress  Orientation - Oriented X3  Build & Nutrition - well developed and well nourished  Posture - normal posture  Gait - normal gait       Ortho Exam  Right shoulder with near full motion.  She has weakness of the supraspinatus.  No tenderness.  Positive impingement    Imaging/Studies  Imaging Results (Last 24 Hours)       ** No results found for the last 24 hours. **          I viewed and personally interpreted radiographs from August 9 of her right shoulder which show the greater tuberosity articulating with the acromion consistent with chronic rotator cuff tear arthropathy    Assessment    Assessment:  1. Chronic right shoulder pain    2. Shoulder arthritis    3. Rupture of right supraspinatus tendon, sequela        Plan:  Continue over-the-counter medication as needed for discomfort  I have ordered physical therapy.  She will do that at [facility].  I offered her a steroid injection to the shoulder but she would like to try physical therapy first.  She will follow-up in 4 weeks.        Paul Whitaker MD  08/16/23  13:05 EDT      Dictated Utilizing Dragon Dictation.    "

## 2023-08-24 ENCOUNTER — TREATMENT (OUTPATIENT)
Dept: PHYSICAL THERAPY | Facility: CLINIC | Age: 78
End: 2023-08-24
Payer: MEDICARE

## 2023-08-24 DIAGNOSIS — G89.29 CHRONIC RIGHT SHOULDER PAIN: Primary | ICD-10-CM

## 2023-08-24 DIAGNOSIS — M25.511 CHRONIC RIGHT SHOULDER PAIN: Primary | ICD-10-CM

## 2023-08-24 PROCEDURE — 97110 THERAPEUTIC EXERCISES: CPT | Performed by: PHYSICAL THERAPIST

## 2023-08-24 PROCEDURE — 97161 PT EVAL LOW COMPLEX 20 MIN: CPT | Performed by: PHYSICAL THERAPIST

## 2023-08-24 NOTE — PROGRESS NOTES
Assessment   1. Strain of right rotator cuff capsule, initial encounter (S46.011A)   2. Rotator cuff tear arthropathy of right shoulder (M12.811)    Plan   1. Lidocaine HCl - 1 % Injection Solution   2. MethylPREDNISolone Acetate 40 MG/ML Injection Suspension   (DEPO-Medrol)   3. XR SHOULDER RT 3V; Status:Complete;   Done: [id number]    Impression    Reason for X-Ray: RIGHT Shoulder pain    AP / Scapular-Y / Axillary Lateral RIGHT Shoulder: High riding humeral head with loss of the acromiohumeral distance. Calcifications noted. Positive arthritis at the acromioclavicular joint. No fracture or dislocation. Type 2 acromion noted. No fracture.     Diagnosis: Loss of acromiohumeral distance with degenerative changes and calcifications.      Signatures Electronically signed by : MISTI SANTOS MD; May 22 2018  8:12AM CST     Form is faxed back to [facility] at fax # [phone].  Artem Beckman,  [facility]   Physical Therapy Initial Evaluation and Plan of Care    [facility] [place]  [address] [place] [address] [phone]    Patient: Coco Steele   : 1945  Diagnosis/ICD-10 Code:  Chronic right shoulder pain [M25.511, G89.29]  Referring practitioner: Paul Whitaker MD    Subjective Evaluation    History of Present Illness  Date of onset: 2023    Subjective comment: States that she has had increased popping and clicking in the right shoulder the past few months.  Fell onto her knee and right shoulder in .  Noticed some popping and clicking after the fall, but no pain.  Denies numbness and tingling int he right arm.  Patient Occupation: Retired-accounting Quality of life: excellent    Pain  Alleviating factors: Heat; topical analgesics.  Exacerbated by: forward elevation (pain when reaching overhead->feels resistance); pushing off from right elbow; placing hand on top of steering; R S/L.  Progression: worsening    Social Support  Lives with: alone    Hand dominance: right    Treatments  Previous treatment: home therapy  Patient Goals  Patient goals for therapy: decreased pain, increased motion and increased strength         *QD:  20    Objective          Active Range of Motion   Left Shoulder   Flexion: 170 degrees   External rotation 0ø: 80 degrees     Right Shoulder   Flexion: 145 degrees   External rotation 0ø: 75 degrees     Additional Active Range of Motion Details  *HBB:  10 cm      Tests     Right Shoulder   Negative belly press, drop arm, external rotation lag sign and internal rotation lag sign.         Assessment & Plan       Assessment  Impairments: abnormal or restricted ROM, activity intolerance, impaired physical strength, lacks appropriate home exercise program and pain with function   Functional limitations: carrying objects, lifting, reaching overhead and unable to perform repetitive tasks   Assessment details: Ms. Steele is a very  pleasant 77 year old RHD female that presents to physical therapy with chronic right shoulder pain.  PMH was covered and reviewed during interview.  Right shoulder mobility is limited in forward elevation and has a minimal shrug sign.  No lag signs in ER or IR.  Has pain with full can testing, but no aris weakness.  Will focus care on improving force coupling of the right shoulder into forward elevation.  Followed by a graded loading program.    Prognosis: good    Goals  Plan Goals: STGs:  1.)  QD improved x 5 points in 4 weeks.  2.)  Have 160 deg of forward elevation active motion in 6 weeks.  LTGs:  1.)  Have no pain with reaching overhead and lowering arm in 8 weeks.  2.)  Report at least 50% improvement in pain and function in 12 weeks.      Plan  Therapy options: will be seen for skilled therapy services  Planned modality interventions: thermotherapy (hydrocollator packs)  Planned therapy interventions: therapeutic activities, stretching, strengthening, manual therapy, functional ROM exercises and home exercise program  Frequency: 1x week  Duration in visits: 12  Duration in weeks: 12  Treatment plan discussed with: patient      Manual Therapy:         mins  56863;  Therapeutic Exercise:    15     mins  16189;     Neuromuscular Ajay:        mins  88497;    Therapeutic Activity:          mins  43040;     Gait Training:           mins  13744;     Ultrasound:          mins  27468;    Electrical Stimulation:         mins  36888 ( );  Dry Needling         mins self-pay    Timed Treatment:   15   mins   Total Treatment:     50   mins    PT SIGNATURE: Dilip Hsu, PT   DATE TREATMENT INITIATED: 8/24/2023    Initial Certification  Certification Period: 11/22/2023  I certify that the therapy services are furnished while this patient is under my care.  The services outlined above are required by this patient, and will be reviewed every 90 days.     PHYSICIAN: Paul Whitaker MD  NPI: [id number]                                       DATE:    Please sign and return via fax to [phone].. Thank you, [facility].

## 2023-09-12 ENCOUNTER — TREATMENT (OUTPATIENT)
Dept: PHYSICAL THERAPY | Facility: CLINIC | Age: 78
End: 2023-09-12
Payer: MEDICARE

## 2023-09-12 DIAGNOSIS — M25.511 CHRONIC RIGHT SHOULDER PAIN: Primary | ICD-10-CM

## 2023-09-12 DIAGNOSIS — G89.29 CHRONIC RIGHT SHOULDER PAIN: Primary | ICD-10-CM

## 2023-09-12 PROCEDURE — 97110 THERAPEUTIC EXERCISES: CPT | Performed by: PHYSICAL THERAPIST

## 2023-09-12 PROCEDURE — 97530 THERAPEUTIC ACTIVITIES: CPT | Performed by: PHYSICAL THERAPIST

## 2023-09-12 PROCEDURE — 97140 MANUAL THERAPY 1/> REGIONS: CPT | Performed by: PHYSICAL THERAPIST

## 2023-09-12 NOTE — PROGRESS NOTES
Physical Therapy Daily Treatment Note  [facility] [place]  [address]  [facility] [address]      Patient: Coco Steele   : 1945  Diagnosis/ICD-10 Code:  Chronic right shoulder pain [M25.511, G89.29]  Referring practitioner: Paul Whitaker MD  Date of Initial Visit: Type: THERAPY  Noted: 2023  Today's Date: 2023  Patient seen for 2 sessions         Visit Diagnoses:    ICD-10-CM ICD-9-CM   1. Chronic right shoulder pain  M25.511 719.41    G89.29 338.29       Subjective   Coco Steele reports: shoulders feel a little loser, easier to move. Does not awaken her.    Objective          Static Posture     Head  Forward.    Shoulders  Rounded.    Scapulae  Left protracted and right protracted.    Thoracic Spine  Hyperkyphosis.    Tests     Left Shoulder   Positive Neer's.     Right Shoulder   Positive Neer's.       See Exercise, Manual, and Modality Logs for complete treatment.     Access Code: [email or web address]  URL: [email or web address]  Date: 2023  Prepared by: Hamilton Wright    Exercises  - Seated Thoracic Lumbar Extension with Pectoralis Stretch  - 2 x daily - 7 x weekly - 2 sets - 10 reps  - Supine Thoracic Mobilization Towel Roll Vertical  - 2 x daily - 7 x weekly - 1 sets - 1 reps - 5 minutes hold  - Doorway Pec Stretch at 90 Degrees Abduction  - 2 x daily - 7 x weekly - 1 sets - 3 reps - 20 hold  - Standing Shoulder Internal Rotation Stretch with Towel  - 1 x daily - 7 x weekly - 1 sets - 20 reps  Assessment/Plan  Pt postural dysfunction of shoulder girdle and thoracic spine may be contributing to her impingement syndrome of the right shoulder.  Improving the excessive thoracic kyphosis and protracted shoulder girdle, may be beneficial to her shoulder girdle rehab.  Pt seem to have a understanding about postural issues present.  Progress per Plan of Care and Progress strengthening /stabilization /functional activity           Timed:         Manual Therapy:    10      mins  41457;     Therapeutic Exercise:    33     mins  11169;     Neuromuscular Ajay:        mins  18198;    Therapeutic Activity:     15     mins  10269;     Gait Training:           mins  67388;     Ultrasound:          mins  44858;    Ionto                                   mins   54338  Self Care                            mins   78355  Canalith Repos         mins 78351      Un-Timed:  Electrical Stimulation:         mins  02053 ( );  Dry Needling          mins self-pay  Traction          mins 69742      Timed Treatment:   58   mins   Total Treatment:     58   mins    Hamilton Wright, PT  [place] License: [id number]

## 2023-09-15 ENCOUNTER — TREATMENT (OUTPATIENT)
Dept: PHYSICAL THERAPY | Facility: CLINIC | Age: 78
End: 2023-09-15
Payer: MEDICARE

## 2023-09-15 DIAGNOSIS — G89.29 CHRONIC RIGHT SHOULDER PAIN: Primary | ICD-10-CM

## 2023-09-15 DIAGNOSIS — M25.511 CHRONIC RIGHT SHOULDER PAIN: Primary | ICD-10-CM

## 2023-09-15 PROCEDURE — 97110 THERAPEUTIC EXERCISES: CPT | Performed by: PHYSICAL THERAPIST

## 2023-09-15 PROCEDURE — 97530 THERAPEUTIC ACTIVITIES: CPT | Performed by: PHYSICAL THERAPIST

## 2023-09-15 PROCEDURE — 97140 MANUAL THERAPY 1/> REGIONS: CPT | Performed by: PHYSICAL THERAPIST

## 2023-09-15 NOTE — PROGRESS NOTES
Physical Therapy Daily Progress Note    Patient: Coco Steele   : 1945  Diagnosis/ICD-10 Code:  Chronic right shoulder pain [M25.511, G89.29]  Referring practitioner: Paul Whitaker MD  Date of Initial Visit: Type: THERAPY  Noted: 2023  Today's Date: 9/15/2023  Patient seen for 3 sessions         Coco Steele reports a little less stiffness when elevating arm up, but still feels some resistance.  Reaching behind the back is still challenging.  Having pain in right S/L, but can do it.        Subjective     Objective   See Exercise, Manual, and Modality Logs for complete treatment.       Assessment/Plan  Continued emphasis on improving right forward elevation mobility and posterior cuff strength today.  Has improved right active GHJ flx compared to start of care.  Needs continued focus on improving rotator cuff strength to improve overhead mobility.  Will            Manual Therapy:    10     mins  66760;  Therapeutic Exercise:    18     mins  88684;     Neuromuscular Ajay:        mins  47953;    Therapeutic Activity:     12     mins  86945;     Gait Training:           mins  76215;     Ultrasound:          mins  94918;    Electrical Stimulation:         mins  43448 ( );  Dry Needling          mins self-pay    Timed Treatment:   40   mins   Total Treatment:     40   mins    Dilip Hsu PT  Physical Therapist

## 2023-09-18 ENCOUNTER — OFFICE VISIT (OUTPATIENT)
Dept: ORTHOPEDIC SURGERY | Facility: CLINIC | Age: 78
End: 2023-09-18
Payer: MEDICARE

## 2023-09-18 VITALS
BODY MASS INDEX: 29.47 KG/M2 | SYSTOLIC BLOOD PRESSURE: 139 MMHG | DIASTOLIC BLOOD PRESSURE: 82 MMHG | HEIGHT: 67 IN | WEIGHT: 187.8 LBS

## 2023-09-18 DIAGNOSIS — M25.511 CHRONIC RIGHT SHOULDER PAIN: Primary | ICD-10-CM

## 2023-09-18 DIAGNOSIS — G89.29 CHRONIC RIGHT SHOULDER PAIN: Primary | ICD-10-CM

## 2023-09-18 DIAGNOSIS — S46.011S RUPTURE OF RIGHT SUPRASPINATUS TENDON, SEQUELA: ICD-10-CM

## 2023-09-18 DIAGNOSIS — M19.019 SHOULDER ARTHRITIS: ICD-10-CM

## 2023-09-18 NOTE — PROGRESS NOTES
"    [facility] [place] [facility] Note        Subjective     CC: Follow-up (4 week f/u - Chronic right shoulder pain)      HPI    Coco Steele is a 77 y.o. female.   Coco Steele is a 77 y.o. female.  She injured her shoulder in 2014 with a fall in a parking lot.  No treatment for that.  He got better.  A few weeks ago she started having increasing pain.  Urgent care gave her a cortisone injection in her but.  That helped some.      Overall, patient's symptoms are better.  She is going to physical therapy at [facility].    ROS:    Constiutional:Pt denies fever, chills, nausea, or vomiting.  MSK:as above        Objective      Past Medical History  Past Medical History:   Diagnosis Date    Abdominal pain, epigastric     Abdominal pain, left lower quadrant     Abnormal findings on diagnostic imaging of abdomen     ABFND, RADIOLOGICAL, ABDOMINAL AREA     Allergic rhinitis     Arthritis     Cancer 10/14 removed    Renal Cell carcinoma    Chronic ulcerative colitis without complication     Colon polyp     Constipation     Diarrhea     Diverticulosis     Encounter for Hemoccult screening     HEMOCCULT POSITIVE STOOLS     Gastroesophageal reflux disease     esophagitis presence not specified    GERD (gastroesophageal reflux disease)     Herpes zoster     Hyperlipidemia     Hypertension     Irritable bowel syndrome     Nausea     Obesity     Rectal bleeding     Rectal bleeding     Regurgitation     Ulcerative colitis      Social History     Socioeconomic History    Marital status:    Tobacco Use    Smoking status: Never    Smokeless tobacco: Never   Substance and Sexual Activity    Alcohol use: No    Drug use: No    Sexual activity: Not Currently     Comment: menopause          Physical Exam  /82   Ht 169 cm (66.54\")   Wt 85.2 kg (187 lb 12.8 oz)   BMI 29.83 kg/m²     Body mass index is 29.83 kg/m².    Patient is well nourished and well developed.        Ortho Exam  Right shoulder has improved " motion and strength.  She still has pain and weakness with drop arm test    Imaging/Labs/EMG Reviewed:  Imaging Results (Last 24 Hours)       ** No results found for the last 24 hours. **              Assessment    Assessment:  1. Chronic right shoulder pain    2. Shoulder arthritis    3. Rupture of right supraspinatus tendon, sequela        Plan:  Recommend over the counter anti-inflammatories for pain and/or swelling  I offered cortisone injection but she is not having pain for that she says she would like to continue physical therapy.  She will follow-up in 6 weeks.  She feels like she is getting better      Paul Whitaker MD  09/18/23  10:03 EDT      Dictated Utilizing Dragon Dictation.

## 2023-09-25 RX ORDER — SODIUM, POTASSIUM,MAG SULFATES 17.5-3.13G
1 SOLUTION, RECONSTITUTED, ORAL ORAL TAKE AS DIRECTED
Qty: 354 ML | Refills: 0 | Status: SHIPPED | OUTPATIENT
Start: 2023-09-25

## 2023-09-26 ENCOUNTER — TREATMENT (OUTPATIENT)
Dept: PHYSICAL THERAPY | Facility: CLINIC | Age: 78
End: 2023-09-26
Payer: MEDICARE

## 2023-09-26 DIAGNOSIS — G89.29 CHRONIC RIGHT SHOULDER PAIN: Primary | ICD-10-CM

## 2023-09-26 DIAGNOSIS — M25.511 CHRONIC RIGHT SHOULDER PAIN: Primary | ICD-10-CM

## 2023-09-26 PROCEDURE — 97110 THERAPEUTIC EXERCISES: CPT | Performed by: PHYSICAL THERAPIST

## 2023-09-26 PROCEDURE — 97140 MANUAL THERAPY 1/> REGIONS: CPT | Performed by: PHYSICAL THERAPIST

## 2023-09-26 NOTE — PROGRESS NOTES
Physical Therapy Daily Progress Note    Patient: Coco Steele   : 1945  Diagnosis/ICD-10 Code:  Chronic right shoulder pain [M25.511, G89.29]  Referring practitioner: Paul Whitaker MD  Date of Initial Visit: Type: THERAPY  Noted: 2023  Today's Date: 2023  Patient seen for 4 sessions         Coco Steele reports that she was feeling better when starting physical therapy.  Shoulder was becoming less stiff and was moving easier.  Has had more pain since last visit with new exercises.  Pain is located anteriorly.      Aggs:  *elevating arm      Subjective     Objective   See Exercise, Manual, and Modality Logs for complete treatment.     *QD:  20  *R GHJ flx/ER AROM:  150 deg/70 deg    Assessment/Plan  Ms. Steele has attended physical therapy for a total of 4 visits for chronic right shoulder pain.  Forward elevation and external rotation active mobility is better even though symptoms have increased since last visit.  Reduced loading exercises by keeping loading at waist level.  Will plan on sidelying active shoulder flexion at next visit.  Will f/u in 2 weeks.    [place]aves for [place] 10/24 and returns 10/29         Manual Therapy:    9     mins  69883;  Therapeutic Exercise:    15     mins  70050;     Neuromuscular Ajay:        mins  74260;    Therapeutic Activity:          mins  48300;     Gait Training:           mins  19952;     Ultrasound:          mins  14980;    Electrical Stimulation:         mins  30473 ( );  Dry Needling         mins self-pay    Timed Treatment:   24   mins   Total Treatment:     24   mins    Dilip Hsu, PT  Physical Therapist

## 2023-10-02 PROCEDURE — 88305 TISSUE EXAM BY PATHOLOGIST: CPT

## 2023-10-03 ENCOUNTER — LAB REQUISITION (OUTPATIENT)
Dept: LAB | Facility: HOSPITAL | Age: 78
End: 2023-10-03
Payer: MEDICARE

## 2023-10-03 DIAGNOSIS — K51.00 ULCERATIVE (CHRONIC) PANCOLITIS WITHOUT COMPLICATIONS: ICD-10-CM

## 2023-10-03 DIAGNOSIS — K57.30 DIVERTICULOSIS OF LARGE INTESTINE WITHOUT PERFORATION OR ABSCESS WITHOUT BLEEDING: ICD-10-CM

## 2023-10-03 DIAGNOSIS — D12.3 BENIGN NEOPLASM OF TRANSVERSE COLON: ICD-10-CM

## 2023-10-03 DIAGNOSIS — D12.2 BENIGN NEOPLASM OF ASCENDING COLON: ICD-10-CM

## 2023-10-03 DIAGNOSIS — K51.90 ULCERATIVE COLITIS, UNSPECIFIED, WITHOUT COMPLICATIONS: ICD-10-CM

## 2023-10-03 DIAGNOSIS — K51.40 INFLAMMATORY POLYPS OF COLON WITHOUT COMPLICATIONS: ICD-10-CM

## 2023-10-03 DIAGNOSIS — K64.8 OTHER HEMORRHOIDS: ICD-10-CM

## 2023-10-04 LAB — REF LAB TEST METHOD: NORMAL

## 2023-10-10 ENCOUNTER — TELEPHONE (OUTPATIENT)
Dept: GASTROENTEROLOGY | Facility: CLINIC | Age: 78
End: 2023-10-10
Payer: MEDICARE

## 2023-10-10 ENCOUNTER — TREATMENT (OUTPATIENT)
Dept: PHYSICAL THERAPY | Facility: CLINIC | Age: 78
End: 2023-10-10
Payer: MEDICARE

## 2023-10-10 DIAGNOSIS — G89.29 CHRONIC RIGHT SHOULDER PAIN: Primary | ICD-10-CM

## 2023-10-10 DIAGNOSIS — M25.511 CHRONIC RIGHT SHOULDER PAIN: Primary | ICD-10-CM

## 2023-10-10 PROCEDURE — 97140 MANUAL THERAPY 1/> REGIONS: CPT | Performed by: PHYSICAL THERAPIST

## 2023-10-10 PROCEDURE — 97110 THERAPEUTIC EXERCISES: CPT | Performed by: PHYSICAL THERAPIST

## 2023-10-10 NOTE — PROGRESS NOTES
Physical Therapy Daily Progress Note    Patient: Coco Steele   : 1945  Diagnosis/ICD-10 Code:  Chronic right shoulder pain [M25.511, G89.29]  Referring practitioner: Paul Whitaker MD  Date of Initial Visit: Type: THERAPY  Noted: 2023  Today's Date: 10/10/2023  Patient seen for 5 sessions         Coco Steele reports feeling better overall.  Does still have some moments where the shoulder wakes her up on occasion when rolling on it.  Still having some resistance with driving with right hand on steering wheel, but is better.    Aggs:  -drying hair with hair dryer  -lifting box of books (having to reduce weight by 50% in box)        Subjective     Objective   See Exercise, Manual, and Modality Logs for complete treatment.       Assessment/Plan  Continuing to add progressive loading to the shoulder via resistance and gravitational effect.  Still sensitive to load with anti-gravity flexion with resistance in ER.  Will f/u next week.           Manual Therapy:    10     mins  50338;  Therapeutic Exercise:    18     mins  21207;     Neuromuscular Ajay:        mins  21662;    Therapeutic Activity:          mins  60128;     Gait Training:           mins  16806;     Ultrasound:          mins  85309;    Electrical Stimulation:         mins  88360 ( );  Dry Needling          mins self-pay    Timed Treatment:   28   mins   Total Treatment:     28   mins    Dilip Hsu PT  Physical Therapist

## 2023-10-19 ENCOUNTER — TREATMENT (OUTPATIENT)
Dept: PHYSICAL THERAPY | Facility: CLINIC | Age: 78
End: 2023-10-19
Payer: MEDICARE

## 2023-10-19 DIAGNOSIS — G89.29 CHRONIC RIGHT SHOULDER PAIN: Primary | ICD-10-CM

## 2023-10-19 DIAGNOSIS — M25.511 CHRONIC RIGHT SHOULDER PAIN: Primary | ICD-10-CM

## 2023-10-19 PROCEDURE — 97530 THERAPEUTIC ACTIVITIES: CPT | Performed by: PHYSICAL THERAPIST

## 2023-10-19 PROCEDURE — 97110 THERAPEUTIC EXERCISES: CPT | Performed by: PHYSICAL THERAPIST

## 2023-10-19 PROCEDURE — 97140 MANUAL THERAPY 1/> REGIONS: CPT | Performed by: PHYSICAL THERAPIST

## 2023-10-19 NOTE — PROGRESS NOTES
Physical Therapy Daily Progress Note    Patient: Coco Steele   : 1945  Diagnosis/ICD-10 Code:  Chronic right shoulder pain [M25.511, G89.29]  Referring practitioner: Paul Whitaker MD  Date of Initial Visit: Type: THERAPY  Noted: 2023  Today's Date: 10/19/2023  Patient seen for 6 sessions         Coco Steele reports feeling better since last visit.  Notes no pain or stiffness since last visit.      Aggs:  -drying hair with hair dryer  -lifting box of books (having to reduce weight by 50% in box)    Subjective     Objective   See Exercise, Manual, and Modality Logs for complete treatment.       Assessment/Plan  Focused on increasing loading through the right shoulder with minimal shoulder pain reproduction today.  Still has pain with AROM flx in sidelying >90 deg today.  Expect symptoms to continue to ease over the next month.      *Started treatment at 11:10 AM         Manual Therapy:    12     mins  78469;  Therapeutic Exercise:    18     mins  40698;     Neuromuscular Ajay:        mins  03845;    Therapeutic Activity:     9     mins  86933;     Gait Training:           mins  32117;     Ultrasound:          mins  66242;    Electrical Stimulation:         mins  86410 ( );  Dry Needling          mins self-pay    Timed Treatment:   39   mins   Total Treatment:     39   mins    Dilip Hsu PT  Physical Therapist

## 2023-10-30 ENCOUNTER — OFFICE VISIT (OUTPATIENT)
Dept: ORTHOPEDIC SURGERY | Facility: CLINIC | Age: 78
End: 2023-10-30
Payer: MEDICARE

## 2023-10-30 VITALS
DIASTOLIC BLOOD PRESSURE: 84 MMHG | BODY MASS INDEX: 29.07 KG/M2 | SYSTOLIC BLOOD PRESSURE: 142 MMHG | WEIGHT: 185.2 LBS | HEIGHT: 67 IN

## 2023-10-30 DIAGNOSIS — M19.019 SHOULDER ARTHRITIS: ICD-10-CM

## 2023-10-30 DIAGNOSIS — G89.29 CHRONIC RIGHT SHOULDER PAIN: Primary | ICD-10-CM

## 2023-10-30 DIAGNOSIS — M25.511 CHRONIC RIGHT SHOULDER PAIN: Primary | ICD-10-CM

## 2023-10-30 DIAGNOSIS — S46.011S RUPTURE OF RIGHT SUPRASPINATUS TENDON, SEQUELA: ICD-10-CM

## 2023-10-30 PROCEDURE — 99213 OFFICE O/P EST LOW 20 MIN: CPT | Performed by: ORTHOPAEDIC SURGERY

## 2023-10-30 PROCEDURE — 3079F DIAST BP 80-89 MM HG: CPT | Performed by: ORTHOPAEDIC SURGERY

## 2023-10-30 PROCEDURE — 3077F SYST BP >= 140 MM HG: CPT | Performed by: ORTHOPAEDIC SURGERY

## 2023-10-30 NOTE — PROGRESS NOTES
"    [facility] [place] [facility] [place]        Subjective     CC: Follow-up (6 week follow up -- Chronic right shoulder pain)      HPI    Coco Steele is a 77 y.o. female.  She is 6 weeks following right shoulder pain.  She had pain since 2014 after a fall.  She feels like physical therapy is helping.    Overall, patient's symptoms are improving.    ROS:    Constiutional:Pt denies fever, chills, nausea, or vomiting.  MSK:as above        Objective      Past Medical History  Past Medical History:   Diagnosis Date    Abdominal pain, epigastric     Abdominal pain, left lower quadrant     Abnormal findings on diagnostic imaging of abdomen     ABFND, RADIOLOGICAL, ABDOMINAL AREA     Allergic rhinitis     Arthritis     Cancer 10/14 removed    Renal Cell carcinoma    Chronic ulcerative colitis without complication     Colon polyp     Constipation     Diarrhea     Diverticulosis     Encounter for Hemoccult screening     HEMOCCULT POSITIVE STOOLS     Gastroesophageal reflux disease     esophagitis presence not specified    GERD (gastroesophageal reflux disease)     Herpes zoster     Hyperlipidemia     Hypertension     Irritable bowel syndrome     Nausea     Obesity     Rectal bleeding     Rectal bleeding     Regurgitation     Ulcerative colitis      Social History     Socioeconomic History    Marital status:    Tobacco Use    Smoking status: Never    Smokeless tobacco: Never   Substance and Sexual Activity    Alcohol use: No    Drug use: No    Sexual activity: Not Currently     Comment: menopause          Physical Exam  /84   Ht 169 cm (66.54\")   Wt 84 kg (185 lb 3.2 oz)   BMI 29.41 kg/m²     Body mass index is 29.41 kg/m².    Patient is well nourished and well developed.        Ortho Exam  She has full motion but lacks a Spinale strength.  Positive drop arm test.    Imaging/Labs/EMG Reviewed:  Imaging Results (Last 24 Hours)       ** No results found for the last 24 hours. **              Assessment  "   Assessment:  1. Chronic right shoulder pain    2. Shoulder arthritis    3. Rupture of right supraspinatus tendon, sequela        Plan:  Recommend over the counter anti-inflammatories for pain and/or swelling  She will continue physical therapy at [facility].  Follow-up in 6 weeks.  If not better, consider MRI.      Paul Whitaker MD  10/30/23  09:44 EDT      Dictated Utilizing Dragon Dictation.

## 2023-11-02 ENCOUNTER — TREATMENT (OUTPATIENT)
Dept: PHYSICAL THERAPY | Facility: CLINIC | Age: 78
End: 2023-11-02
Payer: MEDICARE

## 2023-11-02 DIAGNOSIS — M25.511 CHRONIC RIGHT SHOULDER PAIN: Primary | ICD-10-CM

## 2023-11-02 DIAGNOSIS — G89.29 CHRONIC RIGHT SHOULDER PAIN: Primary | ICD-10-CM

## 2023-11-02 PROCEDURE — 97110 THERAPEUTIC EXERCISES: CPT | Performed by: PHYSICAL THERAPIST

## 2023-11-02 PROCEDURE — 97140 MANUAL THERAPY 1/> REGIONS: CPT | Performed by: PHYSICAL THERAPIST

## 2023-11-02 NOTE — PROGRESS NOTES
Physical Therapy Daily Progress Note    Patient: Coco Steele   : 1945  Diagnosis/ICD-10 Code:  Chronic right shoulder pain [M25.511, G89.29]  Referring practitioner: Paul Whitaker MD  Date of Initial Visit: Type: THERAPY  Noted: 2023  Today's Date: 2023  Patient seen for 7 sessions         Coco Steele reports feeling better. No more dull ache.  Less severity of pain.  Although, has been moving into a new home that has led to some recent aggravation of her shoulder.    Aggs:  -[forward elevation (pain when reaching overhead->feels resistance); pushing off from right elbow; placing hand on top of steering; R S/L.     Subjective     Objective   See Exercise, Manual, and Modality Logs for complete treatment.     *QD:  19  *R GHJ Flx/ER AROM:  130 deg/39 deg    Assessment/Plan  Ms. Steele has attended physical therapy for a total of 7 visits for chronic right shoulder pain.  Has made mild improvements in active mobility.  Passive>active mobility.  Had painful passive abduction today.  Thus, reduced loading exercises to allow for joint aggravation to resolve.  Will f/u next week and continue with physical therapy 1x/wk for the next 4 weeks.         Manual Therapy:    10     mins  07612;  Therapeutic Exercise:    18     mins  71645;     Neuromuscular Ajay:        mins  17497;    Therapeutic Activity:          mins  76448;     Gait Training:           mins  78297;     Ultrasound:          mins  77602;    Electrical Stimulation:         mins  20233 ( );  Dry Needling         mins self-pay    Timed Treatment:   28   mins   Total Treatment:     28   mins    Dilip Hsu PT  Physical Therapist

## 2023-11-05 DIAGNOSIS — K51.00 ULCERATIVE PANCOLITIS WITHOUT COMPLICATION: ICD-10-CM

## 2023-11-06 RX ORDER — MESALAMINE 1.2 G/1
2.4 TABLET, DELAYED RELEASE ORAL 2 TIMES DAILY
Qty: 120 TABLET | Refills: 2 | Status: SHIPPED | OUTPATIENT
Start: 2023-11-06

## 2023-11-06 NOTE — TELEPHONE ENCOUNTER
Rx Refill Note  Requested Prescriptions     Pending Prescriptions Disp Refills    mesalamine (LIALDA) 1.2 g EC tablet 120 tablet 5     Sig: Take 2 tablets by mouth 2 (Two) Times a Day.      Last office visit with prescribing clinician: 2/28/2023   Last telemedicine visit with prescribing clinician: Visit date not found   Next office visit with prescribing clinician: 2/29/2024                             BERENICE Zayas  11/06/23, 08:09 EST

## 2023-11-08 ENCOUNTER — TREATMENT (OUTPATIENT)
Dept: PHYSICAL THERAPY | Facility: CLINIC | Age: 78
End: 2023-11-08
Payer: MEDICARE

## 2023-11-08 DIAGNOSIS — M25.511 CHRONIC RIGHT SHOULDER PAIN: Primary | ICD-10-CM

## 2023-11-08 DIAGNOSIS — G89.29 CHRONIC RIGHT SHOULDER PAIN: Primary | ICD-10-CM

## 2023-11-08 PROCEDURE — 97110 THERAPEUTIC EXERCISES: CPT | Performed by: PHYSICAL THERAPIST

## 2023-11-08 PROCEDURE — 97140 MANUAL THERAPY 1/> REGIONS: CPT | Performed by: PHYSICAL THERAPIST

## 2023-11-08 NOTE — PROGRESS NOTES
Physical Therapy Daily Progress Note    Patient: Coco Steele   : 1945  Diagnosis/ICD-10 Code:  Chronic right shoulder pain [M25.511, G89.29]  Referring practitioner: Paul Whitaker MD  Date of Initial Visit: Type: THERAPY  Noted: 2023  Today's Date: 2023  Patient seen for 8 sessions         Coco Steele reports that her shoulder is less aggravated since last visit.        Subjective     Objective   See Exercise, Manual, and Modality Logs for complete treatment.       Assessment/Plan  Has less pain with PROM GHJ aBd today.  No changes in exercises to allow Ms. Steele to continue to adjust to them.           Manual Therapy:    8     mins  20279;  Therapeutic Exercise:    15     mins  72369;     Neuromuscular Ajay:        mins  37955;    Therapeutic Activity:          mins  17246;     Gait Training:           mins  51698;     Ultrasound:          mins  16556;    Electrical Stimulation:         mins  88344 ( );  Dry Needling          mins self-pay    Timed Treatment:   23   mins   Total Treatment:     23   mins    Dilip Hsu PT  Physical Therapist

## 2023-11-16 ENCOUNTER — TREATMENT (OUTPATIENT)
Dept: PHYSICAL THERAPY | Facility: CLINIC | Age: 78
End: 2023-11-16
Payer: MEDICARE

## 2023-11-16 DIAGNOSIS — G89.29 CHRONIC RIGHT SHOULDER PAIN: Primary | ICD-10-CM

## 2023-11-16 DIAGNOSIS — M25.511 CHRONIC RIGHT SHOULDER PAIN: Primary | ICD-10-CM

## 2023-11-16 PROCEDURE — 97140 MANUAL THERAPY 1/> REGIONS: CPT | Performed by: PHYSICAL THERAPIST

## 2023-11-16 PROCEDURE — 97110 THERAPEUTIC EXERCISES: CPT | Performed by: PHYSICAL THERAPIST

## 2023-11-16 NOTE — PROGRESS NOTES
Physical Therapy Daily Progress Note    Patient: Coco Steele   : 1945  Diagnosis/ICD-10 Code:  Chronic right shoulder pain [M25.511, G89.29]  Referring practitioner: Paul Whitaker MD  Date of Initial Visit: Type: THERAPY  Noted: 2023  Today's Date: 2023  Patient seen for 9 sessions         Coco Steele reports that her shoulder is doing pretty good since last visit.  Better than last visit.   No pain at night since last visit.         Subjective     Objective   See Exercise, Manual, and Modality Logs for complete treatment.       Assessment/Plan  Has improved passive mobility in all planes today.  Therefore, added overhead dynamic mobility exercise and more band resistance to rotator cuff strengthening today.  Will f/u next week.           Manual Therapy:    8     mins  14168;  Therapeutic Exercise:    20     mins  98433;     Neuromuscular Ajay:        mins  66694;    Therapeutic Activity:          mins  07835;     Gait Training:           mins  43279;     Ultrasound:          mins  05841;    Electrical Stimulation:         mins  61876 ( );  Dry Needling          mins self-pay    Timed Treatment:   28   mins   Total Treatment:     28   mins    Dilip Hsu PT  Physical Therapist

## 2023-11-21 ENCOUNTER — TREATMENT (OUTPATIENT)
Dept: PHYSICAL THERAPY | Facility: CLINIC | Age: 78
End: 2023-11-21
Payer: MEDICARE

## 2023-11-21 DIAGNOSIS — M25.511 CHRONIC RIGHT SHOULDER PAIN: Primary | ICD-10-CM

## 2023-11-21 DIAGNOSIS — G89.29 CHRONIC RIGHT SHOULDER PAIN: Primary | ICD-10-CM

## 2023-11-21 PROCEDURE — 97110 THERAPEUTIC EXERCISES: CPT | Performed by: PHYSICAL THERAPIST

## 2023-11-21 PROCEDURE — 97530 THERAPEUTIC ACTIVITIES: CPT | Performed by: PHYSICAL THERAPIST

## 2023-11-21 NOTE — PROGRESS NOTES
Physical Therapy Daily Progress Note    Patient: Coco Steele   : 1945  Diagnosis/ICD-10 Code:  Chronic right shoulder pain [M25.511, G89.29]  Referring practitioner: Paul Whitaker MD  Date of Initial Visit: Type: THERAPY  Noted: 2023  Today's Date: 2023  Patient seen for 10 sessions         Coco Steele reports that her shoulder is feeling pretty good since last visit.  Exercises are not aggravating her shoulder.  Still unpacking boxes in her new home.      Subjective     Objective   See Exercise, Manual, and Modality Logs for complete treatment.       Assessment/Plan  Focused visit on improving overhead mobility and rotator cuff strength at shoulder height.  Will f/u next week.           Manual Therapy:         mins  05499;  Therapeutic Exercise:    15     mins  25258;     Neuromuscular Ajay:        mins  28251;    Therapeutic Activity:     15     mins  40810;     Gait Training:           mins  71412;     Ultrasound:          mins  75182;    Electrical Stimulation:         mins  76251 ( );  Dry Needling          mins self-pay    Timed Treatment:   30   mins   Total Treatment:     30   mins    Dilip Hsu, PT  Physical Therapist

## 2023-11-30 ENCOUNTER — TREATMENT (OUTPATIENT)
Dept: PHYSICAL THERAPY | Facility: CLINIC | Age: 78
End: 2023-11-30
Payer: MEDICARE

## 2023-11-30 DIAGNOSIS — G89.29 CHRONIC RIGHT SHOULDER PAIN: Primary | ICD-10-CM

## 2023-11-30 DIAGNOSIS — M25.511 CHRONIC RIGHT SHOULDER PAIN: Primary | ICD-10-CM

## 2023-11-30 PROCEDURE — 97110 THERAPEUTIC EXERCISES: CPT | Performed by: PHYSICAL THERAPIST

## 2023-11-30 PROCEDURE — 97140 MANUAL THERAPY 1/> REGIONS: CPT | Performed by: PHYSICAL THERAPIST

## 2023-12-01 NOTE — PROGRESS NOTES
Physical Therapy Daily Progress Note    Patient: Coco Steele   : 1945  Diagnosis/ICD-10 Code:  Chronic right shoulder pain [M25.511, G89.29]  Referring practitioner: Paul Whitaker MD  Date of Initial Visit: Type: THERAPY  Noted: 2023  Today's Date: 2023  Patient seen for 11 sessions         Coco Steele reports that her shoulder is better overall.  Notes difficulty reaching hand behind head and back.  Overhead movement is less stiff and sore.        Subjective     Objective   See Exercise, Manual, and Modality Logs for complete treatment.       Assessment/Plan  Focused visit on improving total arc of motion to improve mobility as it relates to reaching hand behind head and back.  Will f/u one more visit before seeing her surgeon.           Manual Therapy:    8     mins  35069;  Therapeutic Exercise:    20     mins  57118;     Neuromuscular Ajay:        mins  21014;    Therapeutic Activity:          mins  53725;     Gait Training:           mins  31357;     Ultrasound:         mins  56341;    Electrical Stimulation:         mins  03406 ( );  Dry Needling          mins self-pay    Timed Treatment:   28   mins   Total Treatment:     28   mins    Dilip Hsu PT  Physical Therapist                     Problem: Chronic Conditions and Co-morbidities  Goal: Patient's chronic conditions and co-morbidity symptoms are monitored and maintained or improved  Outcome: Progressing     Problem: Pain  Goal: Verbalizes/displays adequate comfort level or baseline comfort level  Outcome: Progressing

## 2023-12-12 ENCOUNTER — TREATMENT (OUTPATIENT)
Dept: PHYSICAL THERAPY | Facility: CLINIC | Age: 78
End: 2023-12-12
Payer: MEDICARE

## 2023-12-12 DIAGNOSIS — G89.29 CHRONIC RIGHT SHOULDER PAIN: Primary | ICD-10-CM

## 2023-12-12 DIAGNOSIS — M25.511 CHRONIC RIGHT SHOULDER PAIN: Primary | ICD-10-CM

## 2023-12-12 PROCEDURE — 97110 THERAPEUTIC EXERCISES: CPT | Performed by: PHYSICAL THERAPIST

## 2023-12-12 PROCEDURE — 97140 MANUAL THERAPY 1/> REGIONS: CPT | Performed by: PHYSICAL THERAPIST

## 2023-12-12 NOTE — PROGRESS NOTES
Physical Therapy Daily Progress Note    Patient: Coco Steele   : 1945  Diagnosis/ICD-10 Code:  No primary diagnosis found.  Referring practitioner: Paul Whitaker MD  Date of Initial Visit: No linked episodes  Today's Date: 2023  Patient seen for Visit count could not be calculated. Make sure you are using a visit which is associated with an episode. sessions         Coco Steele reports feeling about 75% better since starting physical therapy.  Less feeling of resistance with elevating her arm above her head.  No pain with right side-lying or driving.  Feels like her shoulder stronger.  Follows up with her orthopedic surgeon tomorrow.    *Aggs:  -short lever abduction    Subjective     Objective   See Exercise, Manual, and Modality Logs for complete treatment.     *QD:  17  *R GHJ AROM Flx/ER/HBB:  153 deg/72 deg/T8  *R GHJ flx MMT:  3+/5-> pain    Assessment/Plan  Mrs. Steele has attended physical therapy for a total of 12 visits for chronic right shoulder pain.  Has improved mobility in all planes.  Still having strength deficits in the scaption and abduction planes.  Continued emphasis on improving forward elevation strength and dynamic control and total arc of motion.  Will follow-up in 2 weeks.           Manual Therapy:    10     mins  44469;  Therapeutic Exercise:    18     mins  11254;     Neuromuscular Ajay:        mins  73893;    Therapeutic Activity:          mins  55554;     Gait Training:          mins  41458;     Ultrasound:          mins  24139;    Electrical Stimulation:         mins  65638 ( );  Dry Needling          mins self-pay    Timed Treatment:   28   mins   Total Treatment:     28   mins    Dilip Hsu, PT  Physical Therapist

## 2023-12-13 ENCOUNTER — OFFICE VISIT (OUTPATIENT)
Dept: ORTHOPEDIC SURGERY | Facility: CLINIC | Age: 78
End: 2023-12-13
Payer: MEDICARE

## 2023-12-13 VITALS — DIASTOLIC BLOOD PRESSURE: 84 MMHG | SYSTOLIC BLOOD PRESSURE: 130 MMHG | BODY MASS INDEX: 29.22 KG/M2 | WEIGHT: 184 LBS

## 2023-12-13 DIAGNOSIS — M25.511 CHRONIC RIGHT SHOULDER PAIN: Primary | ICD-10-CM

## 2023-12-13 DIAGNOSIS — G89.29 CHRONIC RIGHT SHOULDER PAIN: Primary | ICD-10-CM

## 2023-12-13 DIAGNOSIS — S46.011S RUPTURE OF RIGHT SUPRASPINATUS TENDON, SEQUELA: ICD-10-CM

## 2023-12-13 DIAGNOSIS — M19.019 SHOULDER ARTHRITIS: ICD-10-CM

## 2023-12-13 RX ORDER — BUPIVACAINE HYDROCHLORIDE 5 MG/ML
4 INJECTION, SOLUTION EPIDURAL; INTRACAUDAL
Status: COMPLETED | OUTPATIENT
Start: 2023-12-13 | End: 2023-12-13

## 2023-12-13 RX ORDER — LIDOCAINE HYDROCHLORIDE 10 MG/ML
4 INJECTION, SOLUTION EPIDURAL; INFILTRATION; INTRACAUDAL; PERINEURAL
Status: COMPLETED | OUTPATIENT
Start: 2023-12-13 | End: 2023-12-13

## 2023-12-13 RX ORDER — TRIAMCINOLONE ACETONIDE 40 MG/ML
40 INJECTION, SUSPENSION INTRA-ARTICULAR; INTRAMUSCULAR
Status: COMPLETED | OUTPATIENT
Start: 2023-12-13 | End: 2023-12-13

## 2023-12-13 RX ADMIN — TRIAMCINOLONE ACETONIDE 40 MG: 40 INJECTION, SUSPENSION INTRA-ARTICULAR; INTRAMUSCULAR at 09:31

## 2023-12-13 RX ADMIN — BUPIVACAINE HYDROCHLORIDE 4 ML: 5 INJECTION, SOLUTION EPIDURAL; INTRACAUDAL at 09:31

## 2023-12-13 RX ADMIN — LIDOCAINE HYDROCHLORIDE 4 ML: 10 INJECTION, SOLUTION EPIDURAL; INFILTRATION; INTRACAUDAL; PERINEURAL at 09:31

## 2023-12-13 NOTE — PROGRESS NOTES
[facility] [place] [facility] Note        Subjective     CC: Follow-up (6 week follow-up: Chronic right shoulder pain )      HPI    Coco Steele is a 78 y.o. female.  She says she is better.  Her physical therapist communicated with me that she is 75% better than she was.  Pain started in 2014.  She does physical therapy at [facility]    Overall, patient's symptoms are improving but she had a setback Sunday with pain    ROS:    Constiutional:Pt denies fever, chills, nausea, or vomiting.  MSK:as above        Objective      Past Medical History  Past Medical History:   Diagnosis Date    Abdominal pain, epigastric     Abdominal pain, left lower quadrant     Abnormal findings on diagnostic imaging of abdomen     ABFND, RADIOLOGICAL, ABDOMINAL AREA     Allergic rhinitis     Arthritis     Cancer 10/14 removed    Renal Cell carcinoma    Chronic ulcerative colitis without complication     Colon polyp     Constipation     Diarrhea     Diverticulosis     Encounter for Hemoccult screening     HEMOCCULT POSITIVE STOOLS     Gastroesophageal reflux disease     esophagitis presence not specified    GERD (gastroesophageal reflux disease)     Herpes zoster     Hyperlipidemia     Hypertension     Irritable bowel syndrome     Nausea     Obesity     Rectal bleeding     Rectal bleeding     Regurgitation     Ulcerative colitis      Social History     Socioeconomic History    Marital status:    Tobacco Use    Smoking status: Never    Smokeless tobacco: Never   Vaping Use    Vaping Use: Never used   Substance and Sexual Activity    Alcohol use: No    Drug use: No    Sexual activity: Not Currently     Comment: menopause          Physical Exam  /84   Wt 83.5 kg (184 lb)   BMI 29.22 kg/m²     Body mass index is 29.22 kg/m².    Patient is well nourished and well developed.        Ortho Exam  Right shoulder forward flexion elevation 140 degrees.  Positive impingement.  Weakness of supraspinatus.  Positive drop arm  test.    Imaging/Labs/EMG Reviewed:  Imaging Results (Last 24 Hours)       ** No results found for the last 24 hours. **        Previous radiographs show glenohumeral joint arthritis with narrowing of the rotator cuff interval consistent with rotator cuff tear arthropathy      Assessment    Assessment:  1. Chronic right shoulder pain    2. Shoulder arthritis    3. Rupture of right supraspinatus tendon, sequela        Plan:  Recommend over the counter anti-inflammatories for pain and/or swelling  I injected her right shoulder subacromial space with cortisone.  I discussed with the patient the potential benefits of performing a therapeutic injection of the cortisone as well as potential risks including but not limited to infection, swelling, pain, bleeding, bruising, nerve/vessel damage, skin color changes, transient elevation in blood glucose levels, and fat atrophy. After informed consent and verifying correct patient, procedure site, and type of procedure, the area was prepped with Hibiclens, ethyl chloride was used to numb the skin. The patient tolerated the procedure well. There were no complications.  I have ordered more physical therapy and have communicated with her physical therapist.      Paul Whitaker MD  12/13/23  09:28 EST      Dictated Utilizing Dragon Dictation.

## 2023-12-13 NOTE — PROGRESS NOTES
Procedure   - Large Joint Arthrocentesis: R subacromial bursa on 12/13/2023 9:31 AM  Indications: pain  Details: 21 G needle, posterior approach  Medications: 4 mL lidocaine PF 1% 1 %; 40 mg triamcinolone acetonide 40 MG/ML; 4 mL bupivacaine (PF) 0.5 %  Outcome: tolerated well, no immediate complications  Procedure, treatment alternatives, risks and benefits explained, specific risks discussed. Consent was given by the patient. Immediately prior to procedure a time out was called to verify the correct patient, procedure, equipment, support staff and site/side marked as required. Patient was prepped and draped in the usual sterile fashion.

## 2023-12-28 ENCOUNTER — TREATMENT (OUTPATIENT)
Dept: PHYSICAL THERAPY | Facility: CLINIC | Age: 78
End: 2023-12-28
Payer: MEDICARE

## 2023-12-28 DIAGNOSIS — M25.511 CHRONIC RIGHT SHOULDER PAIN: Primary | ICD-10-CM

## 2023-12-28 DIAGNOSIS — G89.29 CHRONIC RIGHT SHOULDER PAIN: Primary | ICD-10-CM

## 2023-12-28 PROCEDURE — 97110 THERAPEUTIC EXERCISES: CPT | Performed by: PHYSICAL THERAPIST

## 2023-12-28 PROCEDURE — 97140 MANUAL THERAPY 1/> REGIONS: CPT | Performed by: PHYSICAL THERAPIST

## 2023-12-28 NOTE — PROGRESS NOTES
Physical Therapy Daily Progress Note    Patient: Coco Steele   : 1945  Diagnosis/ICD-10 Code:  Chronic right shoulder pain [M25.511, G89.29]  Referring practitioner: Paul Whitaker MD  Date of Initial Visit: Type: THERAPY  Noted: 2023  Today's Date: 2023  Patient seen for 13 sessions         Coco Steele reports feeling better since having a steroid injection in the right shoulder 2 weeks ago.  Notes less tension on elevating the arm.  Shoulder feels much more comfortable overall.      Subjective     Objective   See Exercise, Manual, and Modality Logs for complete treatment.       Assessment/Plan  Has full passive range of motion all planes.  Does have improved active forward elevation since last visit.  Did not add more loading exercise today to not aggravate the shoulder.  Will follow-up in 2 weeks and plan to discharge at that time.         Manual Therapy:    9  mins  14579;  Therapeutic Exercise:    20     mins  67349;     Neuromuscular Ajay:        mins  92098;    Therapeutic Activity:          mins  42172;     Gait Training:           mins  15835;     Ultrasound:          mins  00457;    Electrical Stimulation:         mins  29957 ( );  Dry Needling          mins self-pay    Timed Treatment:   29   mins   Total Treatment:     29   mins    Dilip Hsu PT  Physical Therapist

## 2024-01-04 NOTE — TELEPHONE ENCOUNTER
Per last notes, she has moved to [place] and seeing a new doctor.  
0 (no pain/absence of nonverbal indicators of pain)

## 2024-01-11 ENCOUNTER — TREATMENT (OUTPATIENT)
Dept: PHYSICAL THERAPY | Facility: CLINIC | Age: 79
End: 2024-01-11
Payer: MEDICARE

## 2024-01-11 DIAGNOSIS — M25.511 CHRONIC RIGHT SHOULDER PAIN: Primary | ICD-10-CM

## 2024-01-11 DIAGNOSIS — G89.29 CHRONIC RIGHT SHOULDER PAIN: Primary | ICD-10-CM

## 2024-01-11 PROCEDURE — 97530 THERAPEUTIC ACTIVITIES: CPT | Performed by: PHYSICAL THERAPIST

## 2024-01-11 PROCEDURE — 97110 THERAPEUTIC EXERCISES: CPT | Performed by: PHYSICAL THERAPIST

## 2024-01-11 PROCEDURE — 97140 MANUAL THERAPY 1/> REGIONS: CPT | Performed by: PHYSICAL THERAPIST

## 2024-01-11 NOTE — PROGRESS NOTES
Physical Therapy Daily Progress Note    Patient: Coco Steele   : 1945  Diagnosis/ICD-10 Code:  Chronic right shoulder pain [M25.511, G89.29]  Referring practitioner: Paul Whitaker MD  Date of Initial Visit: Type: THERAPY  Noted: 2023  Today's Date: 2024  Patient seen for 14 sessions         Coco Steele reports feeling better overall.  Notes that the cortisone injection has continued to help her.      Subjective     Objective   See Exercise, Manual, and Modality Logs for complete treatment.       Assessment/Plan  Focus visit on improving right shoulder mobility in all planes via manual therapy.  Combined with exercises to improve strength below shoulder height.  Will discharge from physical therapy services today, but with [facility] symptoms worsen when her surgeon recommends continue physical therapy services.  Follows up with orthopedic surgeon on January 15, 2024.           Manual Therapy:    8     mins  77163;  Therapeutic Exercise:    20     mins  22593;     Neuromuscular Ajay:       mins  85698;    Therapeutic Activity:     12     mins  58598;     Gait Training:           mins  58430;     Ultrasound:          mins  94181;    Electrical Stimulation:         mins  51131 ( );  Dry Needling          mins self-pay    Timed Treatment:   40   mins   Total Treatment:     40   mins    Dilip Hsu, WIN  Physical Therapist

## 2024-01-15 ENCOUNTER — OFFICE VISIT (OUTPATIENT)
Dept: ORTHOPEDIC SURGERY | Facility: CLINIC | Age: 79
End: 2024-01-15
Payer: MEDICARE

## 2024-01-15 VITALS — WEIGHT: 184 LBS | DIASTOLIC BLOOD PRESSURE: 84 MMHG | BODY MASS INDEX: 29.22 KG/M2 | SYSTOLIC BLOOD PRESSURE: 162 MMHG

## 2024-01-15 DIAGNOSIS — M25.511 CHRONIC RIGHT SHOULDER PAIN: Primary | ICD-10-CM

## 2024-01-15 DIAGNOSIS — M19.019 SHOULDER ARTHRITIS: ICD-10-CM

## 2024-01-15 DIAGNOSIS — G89.29 CHRONIC RIGHT SHOULDER PAIN: Primary | ICD-10-CM

## 2024-01-15 PROCEDURE — 3079F DIAST BP 80-89 MM HG: CPT | Performed by: ORTHOPAEDIC SURGERY

## 2024-01-15 PROCEDURE — 3077F SYST BP >= 140 MM HG: CPT | Performed by: ORTHOPAEDIC SURGERY

## 2024-01-15 PROCEDURE — 99213 OFFICE O/P EST LOW 20 MIN: CPT | Performed by: ORTHOPAEDIC SURGERY

## 2024-01-15 NOTE — PROGRESS NOTES
[facility] Note        Subjective     CC: Follow-up (1 month follow-up: Chronic right shoulder pain //)      HPI    Coco Steele is a 78 y.o. female.  She has rotator cuff tear arthropathy of the right shoulder.  She is doing much better.  The cortisone injection December 13 helped a lot.  She does physical therapy at [facility] at [facility].  She is now doing a home exercise program and would like to continue that.  She may want to see me in the future about knee pain.  Overall, patient's symptoms are much improved.  Her previous diagnosis of knee arthritis was treated at the arthritis Center.    ROS:    Constiutional:Pt denies fever, chills, nausea, or vomiting.  MSK:as above        Objective      Past Medical History  Past Medical History:   Diagnosis Date    Abdominal pain, epigastric     Abdominal pain, left lower quadrant     Abnormal findings on diagnostic imaging of abdomen     ABFND, RADIOLOGICAL, ABDOMINAL AREA     Allergic rhinitis     Arthritis     Cancer 10/14 removed    Renal Cell carcinoma    Chronic ulcerative colitis without complication     Colon polyp     Constipation     Diarrhea     Diverticulosis     Encounter for Hemoccult screening     HEMOCCULT POSITIVE STOOLS     Gastroesophageal reflux disease     esophagitis presence not specified    GERD (gastroesophageal reflux disease)     Herpes zoster     Hyperlipidemia     Hypertension     Irritable bowel syndrome     Nausea     Obesity     Rectal bleeding     Rectal bleeding     Regurgitation     Ulcerative colitis      Social History     Socioeconomic History    Marital status:    Tobacco Use    Smoking status: Never     Passive exposure: Past    Smokeless tobacco: Never   Vaping Use    Vaping Use: Never used   Substance and Sexual Activity    Alcohol use: No    Drug use: No    Sexual activity: Not Currently     Comment: menopause          Physical Exam  /84   Wt 83.5 kg (184 lb)   BMI 29.22 kg/m²     Body  mass index is 29.22 kg/m².    Patient is well nourished and well developed.        Ortho Exam  Right shoulder has much improved motion and strength.  Greater than 170 degrees forward flexion abduction    Imaging/Labs/EMG Reviewed:  Imaging Results (Last 24 Hours)       ** No results found for the last 24 hours. **          Previous shoulder radiographs with rotator cuff tear arthropathy    Assessment    Assessment:  1. Chronic right shoulder pain    2. Shoulder arthritis        Plan:  Recommend over the counter anti-inflammatories for pain and/or swelling  She will continue the home exercise program.  She will follow-up as needed.  She may want to see me regarding knee arthritis in the future      Paul Whitaker MD  01/15/24  11:19 EST      Dictated Utilizing Dragon Dictation.

## 2024-02-29 ENCOUNTER — OFFICE VISIT (OUTPATIENT)
Dept: GASTROENTEROLOGY | Facility: CLINIC | Age: 79
End: 2024-02-29
Payer: MEDICARE

## 2024-02-29 VITALS
SYSTOLIC BLOOD PRESSURE: 148 MMHG | HEIGHT: 67 IN | WEIGHT: 182 LBS | HEART RATE: 91 BPM | OXYGEN SATURATION: 98 % | BODY MASS INDEX: 28.56 KG/M2 | DIASTOLIC BLOOD PRESSURE: 78 MMHG | TEMPERATURE: 96.6 F

## 2024-02-29 DIAGNOSIS — Z12.11 ENCOUNTER FOR SCREENING COLONOSCOPY: ICD-10-CM

## 2024-02-29 DIAGNOSIS — K51.00 ULCERATIVE PANCOLITIS WITHOUT COMPLICATION: Primary | ICD-10-CM

## 2024-02-29 RX ORDER — MESALAMINE 1.2 G/1
2.4 TABLET, DELAYED RELEASE ORAL 2 TIMES DAILY
Qty: 120 TABLET | Refills: 11 | Status: SHIPPED | OUTPATIENT
Start: 2024-02-29

## 2024-02-29 NOTE — PROGRESS NOTES
Follow Up      Patient Name: Coco Steele  : 1945   MRN: [id number]     Chief Complaint:    Chief Complaint   Patient presents with    Follow-up     UC        History of Present Illness: Coco Steele is a 78 y.o. female who is here today for follow up on UC     UC:  Dx in .  Treatments: lialda  No hospitalizations/ surgeries/complications.        Currently having about 1-2  formed bms a day.  Occasionally, blood on the TP.  Compliant with her lialda. Denies abdominal pain, nausea, vomiting.  Certain foods bother her stomach like a lot of salad.    No new rash, lesions, masses, eye complaints.       She is UTD on her vaccines.       SCANNED - COLONOSCOPY (10/02/2023) (Dr Gill)-fair prep, one 4 mm polyp in the ascending colon, one 4 mm tubular adenoma in the transverse colon, mild diverticulosis, pseudopolyps, mild erythema in the sigmoid, nonbleeding internal hemorrhoids-biopsies with mild acute sigmoid colitis-1 year recall     Hx of renal cell carcinoma- caught early- had partial Right nephrectomy-did not require chemo or radiation treatments    Subjective      Review of Systems:   Review of Systems   Constitutional:  Negative for appetite change and unexpected weight loss.   HENT:  Negative for trouble swallowing.    Gastrointestinal:  Negative for abdominal distention, abdominal pain, anal bleeding, blood in stool, constipation, diarrhea, nausea, rectal pain, vomiting, GERD and indigestion.       Medications:     Current Outpatient Medications:     mesalamine (LIALDA) 1.2 g EC tablet, Take 2 tablets by mouth 2 (Two) Times a Day., Disp: 120 tablet, Rfl: 11    acetaminophen (TYLENOL) 650 MG 8 hr tablet, Take 1 tablet by mouth Every 8 (Eight) Hours As Needed for Mild Pain., Disp: , Rfl:     allopurinol (ZYLOPRIM) 100 MG tablet, Take 1 tablet by mouth Daily., Disp: 30 tablet, Rfl: 0    aspirin 81 MG EC tablet, Take 1 tablet by mouth Daily., Disp: , Rfl:     Calcium Carbonate 1500 (600 Ca) MG  tablet, Take  by mouth daily., Disp: , Rfl:     docusate sodium (COLACE) 100 MG capsule, Take 1 capsule by mouth Daily., Disp: , Rfl:     esomeprazole (NexIUM) 40 MG capsule, Take 1 capsule by mouth Daily., Disp: , Rfl:     fluticasone (FLONASE) 50 MCG/ACT nasal spray, 1 spray into the nostril(s) as directed by provider Daily., Disp: , Rfl:     Melatonin 10 MG capsule, Take  by mouth., Disp: , Rfl:     Multiple Vitamins-Minerals (CENTRUM SILVER PO), Take  by mouth daily., Disp: , Rfl:     Potassium 99 MG tablet, Take  by mouth., Disp: , Rfl:     ramipril (ALTACE) 10 MG capsule, Take 1 capsule by mouth Daily., Disp: 30 capsule, Rfl: 5    simvastatin (ZOCOR) 10 MG tablet, Take 1 tablet by mouth Daily., Disp: 30 tablet, Rfl: 5    SUPER B COMPLEX/C PO, Take  by mouth., Disp: , Rfl:     Allergies:   No Known Allergies    Social History:   Social History     Socioeconomic History    Marital status:    Tobacco Use    Smoking status: Never     Passive exposure: Past    Smokeless tobacco: Never   Vaping Use    Vaping Use: Never used   Substance and Sexual Activity    Alcohol use: No    Drug use: No    Sexual activity: Not Currently     Comment: menopause        Surgical History:   Past Surgical History:   Procedure Laterality Date    CHOLECYSTECTOMY      COLONOSCOPY  09/23/2014    left side bx-minimal active inflammation - 2 yr    COLONOSCOPY  07/09/2014    active inflammation to extent of limited lower colonoscopy/45 cm    COLONOSCOPY  02/21/2013    mild inflammation in sigmoid, left-sided diverticulosis    COLONOSCOPY  03/05/2010    very tortuous colon not allowing visual of cecal base - 5 yrs    COLONOSCOPY  11/14/2003    FAIRLY TORTUOUS AND SPASMODIC COLON - 5 YR    COLONOSCOPY Left 09/28/2016    Procedure: COLONOSCOPY WITH ANESTHESIA;  Surgeon: David Alonzo DO;  Location: [facility] ENDOSCOPY;  Service:     COLONOSCOPY N/A 06/11/2020    Procedure: COLONOSCOPY WITH ANESTHESIA;  Surgeon: David Alonzo DO;   "Location: [facility] ENDOSCOPY;  Service: Gastroenterology;  Laterality: N/A;  pre hx ulcerative colitis  [facility]    ENDOSCOPY  03/14/2014    Normal    ENDOSCOPY  02/09/2006    MILD GASTRITIS    NEPHRECTOMY PARTIAL Left     OTHER SURGICAL HISTORY      Bilateral Eyelid Surgery     TONSILLECTOMY      UPPER GASTROINTESTINAL ENDOSCOPY  03/14/2014    Dr David Alonzo    WRIST SURGERY Left         Medical History:   Past Medical History:   Diagnosis Date    Abdominal pain, epigastric     Abdominal pain, left lower quadrant     Abnormal findings on diagnostic imaging of abdomen     ABFND, RADIOLOGICAL, ABDOMINAL AREA     Allergic rhinitis     Arthritis     Cancer 10/14 removed    Renal Cell carcinoma    Chronic ulcerative colitis without complication     Colon polyp     Constipation     Diarrhea     Diverticulosis     Encounter for Hemoccult screening     HEMOCCULT POSITIVE STOOLS     Gastroesophageal reflux disease     esophagitis presence not specified    GERD (gastroesophageal reflux disease)     Herpes zoster     Hyperlipidemia     Hypertension     Irritable bowel syndrome     Nausea     Obesity     Rectal bleeding     Rectal bleeding     Regurgitation     Ulcerative colitis         Objective     Physical Exam:  Vital Signs:   Vitals:    02/29/24 0936   BP: 148/78   BP Location: Right arm   Patient Position: Sitting   Cuff Size: Adult   Pulse: 91   Temp: 96.6 °F (35.9 °C)   TempSrc: Temporal   SpO2: 98%   Weight: 82.6 kg (182 lb)   Height: 169 cm (66.54\")     Body mass index is 28.91 kg/m².     Physical Exam  Constitutional:       General: She is not in acute distress.     Appearance: She is well-developed.   Pulmonary:      Effort: Pulmonary effort is normal. No accessory muscle usage or respiratory distress.   Abdominal:      General: Bowel sounds are normal. There is no distension.      Palpations: Abdomen is soft.      Tenderness: There is no abdominal tenderness. " There is no guarding.   Skin:     Coloration: Skin is not pale.      Findings: No erythema.   Neurological:      Mental Status: She is alert and oriented to person, place, and time.   Psychiatric:         Speech: Speech normal.         Behavior: Behavior normal.         Thought Content: Thought content normal.         Judgment: Judgment normal.         Assessment / Plan      Assessment/Plan:   Diagnoses and all orders for this visit:    1. Ulcerative pancolitis without complication (Primary)  -     CBC & Differential; Future  -     Comprehensive Metabolic Panel; Future  -     C-reactive Protein; Future  -     mesalamine (LIALDA) 1.2 g EC tablet; Take 2 tablets by mouth 2 (Two) Times a Day.  Dispense: 120 tablet; Refill: 11    Clinical remission with mucosal healing.  -     Stable, continue current treatment plan     2. Encounter for screening colonoscopy  -     Ambulatory Referral For Screening Colonoscopy         Follow Up:   Return in about 1 year (around 2/28/2025), or if symptoms worsen or fail to improve.    Plan of care reviewed with the patient at the conclusion of today's visit.  Education was provided regarding diagnosis, management, and any prescribed or recommended OTC medications.  Patient verbalized understanding of and agreement with management plan.       RAMÍREZ Ewing  [facility] [place] [facility]

## 2024-03-01 ENCOUNTER — OFFICE VISIT (OUTPATIENT)
Dept: INTERNAL MEDICINE | Facility: CLINIC | Age: 79
End: 2024-03-01
Payer: MEDICARE

## 2024-03-01 VITALS
HEART RATE: 69 BPM | WEIGHT: 185 LBS | OXYGEN SATURATION: 99 % | SYSTOLIC BLOOD PRESSURE: 140 MMHG | BODY MASS INDEX: 29.73 KG/M2 | HEIGHT: 66 IN | DIASTOLIC BLOOD PRESSURE: 78 MMHG | TEMPERATURE: 97.7 F

## 2024-03-01 DIAGNOSIS — E78.5 HYPERLIPIDEMIA, UNSPECIFIED HYPERLIPIDEMIA TYPE: ICD-10-CM

## 2024-03-01 DIAGNOSIS — M1A.9XX0 CHRONIC GOUT INVOLVING TOE OF RIGHT FOOT WITHOUT TOPHUS, UNSPECIFIED CAUSE: ICD-10-CM

## 2024-03-01 DIAGNOSIS — I10 ESSENTIAL HYPERTENSION: Primary | ICD-10-CM

## 2024-03-01 DIAGNOSIS — H61.23 IMPACTED CERUMEN OF BOTH EARS: ICD-10-CM

## 2024-03-01 PROBLEM — K51.90 CHRONIC ULCERATIVE COLITIS WITHOUT COMPLICATION: Status: ACTIVE | Noted: 2024-03-01

## 2024-03-01 RX ORDER — SIMVASTATIN 10 MG
10 TABLET ORAL DAILY
Qty: 90 TABLET | Refills: 1 | Status: SHIPPED | OUTPATIENT
Start: 2024-03-01

## 2024-03-01 RX ORDER — ALLOPURINOL 100 MG/1
100 TABLET ORAL DAILY
Qty: 90 TABLET | Refills: 1 | Status: SHIPPED | OUTPATIENT
Start: 2024-03-01

## 2024-03-01 RX ORDER — RAMIPRIL 10 MG/1
10 CAPSULE ORAL DAILY
Qty: 90 CAPSULE | Refills: 1 | Status: SHIPPED | OUTPATIENT
Start: 2024-03-01

## 2024-03-01 NOTE — PROGRESS NOTES
"Subjective   Coco Steele is a 78 y.o. female.     Chief Complaint   Patient presents with    Establish Care       Visit Vitals  /78 (BP Location: Right arm, Patient Position: Sitting, Cuff Size: Adult)   Pulse 69   Temp 97.7 °F (36.5 °C)   Ht 166.4 cm (65.5\")   Wt 83.9 kg (185 lb)   SpO2 99%   BMI 30.32 kg/m²         Hypertension  This is a chronic problem. The current episode started more than 1 year ago. The problem is unchanged. The problem is controlled. Associated symptoms include headaches. Pertinent negatives include no anxiety, blurred vision, chest pain, malaise/fatigue, neck pain, orthopnea, palpitations, peripheral edema, PND, shortness of breath or sweats. There are no associated agents to hypertension. Risk factors for coronary artery disease include dyslipidemia, post-menopausal state and family history. Current antihypertension treatment includes ACE inhibitors. There are no compliance problems.  Hypertensive end-organ damage includes kidney disease. There is no history of angina, CAD/MI, CVA, heart failure, left ventricular hypertrophy, PVD or retinopathy. Identifiable causes of hypertension include chronic renal disease. There is no history of sleep apnea or a thyroid problem.   Hyperlipidemia  This is a chronic problem. The current episode started more than 1 year ago. The problem is controlled. Recent lipid tests were reviewed and are normal. Exacerbating diseases include chronic renal disease. She has no history of diabetes, hypothyroidism, liver disease, obesity or nephrotic syndrome. There are no known factors aggravating her hyperlipidemia. Pertinent negatives include no chest pain, focal sensory loss, focal weakness, leg pain, myalgias or shortness of breath. Current antihyperlipidemic treatment includes statins. The current treatment provides significant improvement of lipids. There are no compliance problems.  Risk factors for coronary artery disease include dyslipidemia, " hypertension and post-menopausal.      Pt here to establish care.   Pt has ulcerative colitis and hx of colon polyps. Pt is seeing Dr Gill for her colonoscopies and medication.  Pt had left partial nephrectomy for renal cancer. Pt has not had a recurrence of kidney cancer.     Pt has had gout in one of her toes and is taking allopurinol.  Pt has HTN and is taking Ramipirl  Pt has hyperlipidemia and is on simvastatin.   Pt sees Dr Whitaker in Ortho for multiple joint pains, with hx of joint injection in the shoulder last month.     The following portions of the patient's history were reviewed and updated as appropriate: allergies, current medications, past family history, past medical history, past social history, past surgical history, and problem list.    Past Medical History:   Diagnosis Date    Abdominal pain, epigastric     Abdominal pain, left lower quadrant     Abnormal findings on diagnostic imaging of abdomen     ABFND, RADIOLOGICAL, ABDOMINAL AREA     Adenomatous colon polyp 10/02/2023    Dr Gill    Allergic     Allergic rhinitis     Arthritis     Cancer 10/2014 removed    Renal Cell carcinoma left    Chronic gout involving toe without tophus 03/01/2024    Chronic ulcerative colitis without complication     Colon polyp     Constipation     Diarrhea     Diverticulosis     Encounter for Hemoccult screening     HEMOCCULT POSITIVE STOOLS     Gastroesophageal reflux disease     esophagitis presence not specified    GERD (gastroesophageal reflux disease)     Headache As long as I can remember    Herpes zoster     Hyperlipidemia     Hypertension     Irritable bowel syndrome     Nausea     Obesity     Rectal bleeding     Rectal bleeding     Regurgitation     Scoliosis     Ulcerative colitis       Past Surgical History:   Procedure Laterality Date    CHOLECYSTECTOMY      COLONOSCOPY  09/23/2014    left side bx-minimal active inflammation - 2 yr    COLONOSCOPY  07/09/2014    active inflammation to extent of  limited lower colonoscopy/45 cm    COLONOSCOPY  02/21/2013    mild inflammation in sigmoid, left-sided diverticulosis    COLONOSCOPY  03/05/2010    very tortuous colon not allowing visual of cecal base - 5 yrs    COLONOSCOPY  11/14/2003    FAIRLY TORTUOUS AND SPASMODIC COLON - 5 YR    COLONOSCOPY Left 09/28/2016    Procedure: COLONOSCOPY WITH ANESTHESIA;  Surgeon: David Alonzo DO;  Location: [facility] ENDOSCOPY;  Service:     COLONOSCOPY N/A 06/11/2020    Procedure: COLONOSCOPY WITH ANESTHESIA;  Surgeon: David Alonzo DO;  Location: [facility] ENDOSCOPY;  Service: Gastroenterology;  Laterality: N/A;  pre hx ulcerative colitis  post hx ulcerative colitis, diverticulosis  dr benedicto kaur    COLONOSCOPY W/ BIOPSIES  08/29/2022    COLONOSCOPY W/ BIOPSIES  08/09/2021    Dr Gill    COLONOSCOPY W/ POLYPECTOMY  10/02/2023    adenomatous polyp Dr Gill 1 yr f/u    ENDOSCOPY  03/14/2014    Normal    ENDOSCOPY  02/09/2006    MILD GASTRITIS    NEPHRECTOMY PARTIAL Left 2014    OTHER SURGICAL HISTORY      Bilateral Eyelid Surgery     TONSILLECTOMY      UPPER GASTROINTESTINAL ENDOSCOPY  03/14/2014    Dr David Alonzo    WRIST SURGERY Left       Family History   Problem Relation Age of Onset    Colon polyps Mother     Arthritis Mother     Alzheimer's disease Mother     Heart disease Father         enlarged heart, used NTG    COPD Father     Alzheimer's disease Paternal Aunt     Crohn's disease Cousin     Breast cancer Neg Hx     Colon cancer Neg Hx       Social History     Socioeconomic History    Marital status:    Tobacco Use    Smoking status: Never     Passive exposure: Past    Smokeless tobacco: Never   Vaping Use    Vaping Use: Never used   Substance and Sexual Activity    Alcohol use: No    Drug use: No    Sexual activity: Not Currently     Comment: menopause      No Known Allergies    Review of Systems   Constitutional:  Negative for malaise/fatigue.   Eyes:  Negative for blurred vision.   Respiratory:   Negative for shortness of breath.    Cardiovascular:  Negative for chest pain, palpitations, orthopnea and PND.   Musculoskeletal:  Negative for myalgias and neck pain.   Neurological:  Positive for headaches. Negative for focal weakness.       Objective   Physical Exam  Vitals and nursing note reviewed.   Constitutional:       Appearance: She is well-developed.   HENT:      Head: Normocephalic.      Right Ear: Ear canal and external ear normal.      Left Ear: External ear normal. There is impacted cerumen.      Ears:      Comments: Right excessive cerumen    TMs clear wax removed with irrigation. Irritated area on inferior left canal after irrigation.      Nose: Nose normal.   Eyes:      General: Lids are normal.      Conjunctiva/sclera: Conjunctivae normal.      Pupils: Pupils are equal, round, and reactive to light.   Neck:      Thyroid: No thyroid mass or thyromegaly.      Vascular: No carotid bruit.      Trachea: Trachea normal.   Cardiovascular:      Rate and Rhythm: Normal rate and regular rhythm.      Heart sounds: No murmur heard.  Pulmonary:      Effort: Pulmonary effort is normal. No respiratory distress.      Breath sounds: Normal breath sounds. No decreased breath sounds, wheezing, rhonchi or rales.   Chest:      Chest wall: No tenderness.   Abdominal:      General: Bowel sounds are normal.      Palpations: Abdomen is soft.      Tenderness: There is no abdominal tenderness.   Musculoskeletal:         General: Normal range of motion.      Cervical back: Normal range of motion and neck supple.   Skin:     General: Skin is warm and dry.   Neurological:      Mental Status: She is alert and oriented to person, place, and time.   Psychiatric:         Behavior: Behavior normal.         Assessment & Plan   Problems Addressed this Visit          Cardiac and Vasculature    Essential hypertension - Primary    Relevant Medications    ramipril (ALTACE) 10 MG capsule    Other Relevant Orders    TSH Rfx On Abnormal To  Free T4    Lipid Panel    Hyperlipidemia    Relevant Medications    simvastatin (ZOCOR) 10 MG tablet    Other Relevant Orders    Lipid Panel     Other Visit Diagnoses       Chronic gout involving toe 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, unspecified cause        Relevant Medications    allopurinol (ZYLOPRIM) 100 MG tablet    Other Relevant Orders    Uric Acid    Impacted cerumen of both ears        Relevant Orders    Ear Cerumen Removal          Diagnoses         Codes Comments    Essential hypertension    -  Primary ICD-10-CM: I10  ICD-9-CM: 401.9     Hyperlipidemia, unspecified hyperlipidemia type     ICD-10-CM: E78.5  ICD-9-CM: 272.4     Chronic gout involving toe 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, unspecified cause     ICD-10-CM: M1A.9XX0  ICD-9-CM: 274.02     Impacted cerumen of both ears     ICD-10-CM: H61.23  ICD-9-CM: 380.4                        Current Outpatient Medications:     acetaminophen (TYLENOL) 650 MG 8 hr tablet, Take 1 tablet by mouth Every 8 (Eight) Hours As Needed for Mild Pain., Disp: , Rfl:     allopurinol (ZYLOPRIM) 100 MG tablet, Take 1 tablet by mouth Daily., Disp: 90 tablet, Rfl: 1    aspirin 81 MG EC tablet, Take 1 tablet by mouth Daily., Disp: , Rfl:     Calcium Carbonate 1500 (600 Ca) MG tablet, Take  by mouth daily., Disp: , Rfl:     docusate sodium (COLACE) 100 MG capsule, Take 1 capsule by mouth Daily., Disp: , Rfl:     esomeprazole (NexIUM) 40 MG capsule, Take 1 capsule by mouth Daily., Disp: , Rfl:     fluticasone (FLONASE) 50 MCG/ACT nasal spray, 1 spray into the nostril(s) as directed by provider Daily., Disp: , Rfl:     Melatonin 10 MG capsule, Take  by mouth., Disp: , Rfl:     mesalamine (LIALDA) 1.2 g EC tablet, Take 2 tablets by mouth 2 (Two) Times a Day., Disp: 120 tablet, Rfl: 11    Multiple Vitamins-Minerals (CENTRUM SILVER PO), Take  by mouth daily., Disp: , Rfl:     Potassium 99 MG tablet, Take  by mouth., Disp: , Rfl:     ramipril (ALTACE) 10 MG capsule, Take 1 capsule by mouth  Daily., Disp: 90 capsule, Rfl: 1    simvastatin (ZOCOR) 10 MG tablet, Take 1 tablet by mouth Daily., Disp: 90 tablet, Rfl: 1    SUPER B COMPLEX/C PO, Take  by mouth., Disp: , Rfl:     Return in about 3 months (around 6/1/2024), or if symptoms worsen or fail to improve, for Medicare Wellness, Recheck.    Ear Cerumen Removal    Date/Time: 3/1/2024 2:31 PM    Performed by: Radha Berger MD  Authorized by: Radha Berger MD  Location details: left ear and right ear  Patient tolerance: patient tolerated the procedure well with no immediate complications  Procedure type: irrigation   Sedation:  Patient sedated: no

## 2024-03-04 ENCOUNTER — LAB (OUTPATIENT)
Dept: INTERNAL MEDICINE | Facility: CLINIC | Age: 79
End: 2024-03-04
Payer: MEDICARE

## 2024-03-04 DIAGNOSIS — E78.5 HYPERLIPIDEMIA, UNSPECIFIED HYPERLIPIDEMIA TYPE: ICD-10-CM

## 2024-03-04 DIAGNOSIS — M1A.9XX0 CHRONIC GOUT INVOLVING TOE OF RIGHT FOOT WITHOUT TOPHUS, UNSPECIFIED CAUSE: ICD-10-CM

## 2024-03-04 DIAGNOSIS — K51.00 ULCERATIVE PANCOLITIS WITHOUT COMPLICATION: ICD-10-CM

## 2024-03-04 DIAGNOSIS — I10 ESSENTIAL HYPERTENSION: ICD-10-CM

## 2024-03-04 LAB
ALBUMIN SERPL-MCNC: 4.3 G/DL (ref 3.5–5.2)
ALBUMIN/GLOB SERPL: 1.5 G/DL
ALP SERPL-CCNC: 55 U/L (ref 39–117)
ALT SERPL W P-5'-P-CCNC: 20 U/L (ref 1–33)
ANION GAP SERPL CALCULATED.3IONS-SCNC: 12.6 MMOL/L (ref 5–15)
AST SERPL-CCNC: 25 U/L (ref 1–32)
BASOPHILS # BLD AUTO: 0.04 10*3/MM3 (ref 0–0.2)
BASOPHILS NFR BLD AUTO: 1 % (ref 0–1.5)
BILIRUB SERPL-MCNC: 0.3 MG/DL (ref 0–1.2)
BUN SERPL-MCNC: 15 MG/DL (ref 8–23)
BUN/CREAT SERPL: 16 (ref 7–25)
CALCIUM SPEC-SCNC: 9.7 MG/DL (ref 8.6–10.5)
CHLORIDE SERPL-SCNC: 106 MMOL/L (ref 98–107)
CHOLEST SERPL-MCNC: 166 MG/DL (ref 0–200)
CO2 SERPL-SCNC: 22.4 MMOL/L (ref 22–29)
CREAT SERPL-MCNC: 0.94 MG/DL (ref 0.57–1)
CRP SERPL-MCNC: <0.3 MG/DL (ref 0–0.5)
DEPRECATED RDW RBC AUTO: 42.3 FL (ref 37–54)
EGFRCR SERPLBLD CKD-EPI 2021: 62.6 ML/MIN/1.73
EOSINOPHIL # BLD AUTO: 0.09 10*3/MM3 (ref 0–0.4)
EOSINOPHIL NFR BLD AUTO: 2.2 % (ref 0.3–6.2)
ERYTHROCYTE [DISTWIDTH] IN BLOOD BY AUTOMATED COUNT: 13 % (ref 12.3–15.4)
GLOBULIN UR ELPH-MCNC: 2.8 GM/DL
GLUCOSE SERPL-MCNC: 105 MG/DL (ref 65–99)
HCT VFR BLD AUTO: 36.4 % (ref 34–46.6)
HDLC SERPL-MCNC: 86 MG/DL (ref 40–60)
HGB BLD-MCNC: 12.1 G/DL (ref 12–15.9)
IMM GRANULOCYTES # BLD AUTO: 0.01 10*3/MM3 (ref 0–0.05)
IMM GRANULOCYTES NFR BLD AUTO: 0.2 % (ref 0–0.5)
LDLC SERPL CALC-MCNC: 63 MG/DL (ref 0–100)
LDLC/HDLC SERPL: 0.71 {RATIO}
LYMPHOCYTES # BLD AUTO: 1.29 10*3/MM3 (ref 0.7–3.1)
LYMPHOCYTES NFR BLD AUTO: 31.8 % (ref 19.6–45.3)
MCH RBC QN AUTO: 29.7 PG (ref 26.6–33)
MCHC RBC AUTO-ENTMCNC: 33.2 G/DL (ref 31.5–35.7)
MCV RBC AUTO: 89.2 FL (ref 79–97)
MONOCYTES # BLD AUTO: 0.33 10*3/MM3 (ref 0.1–0.9)
MONOCYTES NFR BLD AUTO: 8.1 % (ref 5–12)
NEUTROPHILS NFR BLD AUTO: 2.3 10*3/MM3 (ref 1.7–7)
NEUTROPHILS NFR BLD AUTO: 56.7 % (ref 42.7–76)
NRBC BLD AUTO-RTO: 0 /100 WBC (ref 0–0.2)
PLATELET # BLD AUTO: 220 10*3/MM3 (ref 140–450)
PMV BLD AUTO: 9.9 FL (ref 6–12)
POTASSIUM SERPL-SCNC: 5.5 MMOL/L (ref 3.5–5.2)
PROT SERPL-MCNC: 7.1 G/DL (ref 6–8.5)
RBC # BLD AUTO: 4.08 10*6/MM3 (ref 3.77–5.28)
SODIUM SERPL-SCNC: 141 MMOL/L (ref 136–145)
TRIGL SERPL-MCNC: 94 MG/DL (ref 0–150)
TSH SERPL DL<=0.05 MIU/L-ACNC: 2.18 UIU/ML (ref 0.27–4.2)
URATE SERPL-MCNC: 4.4 MG/DL (ref 2.4–5.7)
VLDLC SERPL-MCNC: 17 MG/DL (ref 5–40)
WBC NRBC COR # BLD AUTO: 4.06 10*3/MM3 (ref 3.4–10.8)

## 2024-03-04 PROCEDURE — 84550 ASSAY OF BLOOD/URIC ACID: CPT | Performed by: NURSE PRACTITIONER

## 2024-03-04 PROCEDURE — 86140 C-REACTIVE PROTEIN: CPT | Performed by: NURSE PRACTITIONER

## 2024-03-04 PROCEDURE — 84443 ASSAY THYROID STIM HORMONE: CPT | Performed by: NURSE PRACTITIONER

## 2024-03-04 PROCEDURE — 80053 COMPREHEN METABOLIC PANEL: CPT | Performed by: NURSE PRACTITIONER

## 2024-03-04 PROCEDURE — 80061 LIPID PANEL: CPT | Performed by: NURSE PRACTITIONER

## 2024-03-04 PROCEDURE — 85025 COMPLETE CBC W/AUTO DIFF WBC: CPT | Performed by: NURSE PRACTITIONER

## 2024-03-04 PROCEDURE — 36415 COLL VENOUS BLD VENIPUNCTURE: CPT | Performed by: FAMILY MEDICINE

## 2024-03-05 DIAGNOSIS — E87.5 HYPERKALEMIA: Primary | ICD-10-CM

## 2024-04-08 ENCOUNTER — LAB (OUTPATIENT)
Dept: INTERNAL MEDICINE | Facility: CLINIC | Age: 79
End: 2024-04-08
Payer: MEDICARE

## 2024-04-08 DIAGNOSIS — E87.5 HYPERKALEMIA: Primary | ICD-10-CM

## 2024-04-08 PROCEDURE — 80048 BASIC METABOLIC PNL TOTAL CA: CPT | Performed by: NURSE PRACTITIONER

## 2024-04-08 PROCEDURE — 36415 COLL VENOUS BLD VENIPUNCTURE: CPT | Performed by: NURSE PRACTITIONER

## 2024-04-09 LAB
ANION GAP SERPL CALCULATED.3IONS-SCNC: 9 MMOL/L (ref 5–15)
BUN SERPL-MCNC: 16 MG/DL (ref 8–23)
BUN/CREAT SERPL: 14.2 (ref 7–25)
CALCIUM SPEC-SCNC: 9.3 MG/DL (ref 8.6–10.5)
CHLORIDE SERPL-SCNC: 104 MMOL/L (ref 98–107)
CO2 SERPL-SCNC: 26 MMOL/L (ref 22–29)
CREAT SERPL-MCNC: 1.13 MG/DL (ref 0.57–1)
EGFRCR SERPLBLD CKD-EPI 2021: 50.2 ML/MIN/1.73
GLUCOSE SERPL-MCNC: 98 MG/DL (ref 65–99)
POTASSIUM SERPL-SCNC: 4.6 MMOL/L (ref 3.5–5.2)
SODIUM SERPL-SCNC: 139 MMOL/L (ref 136–145)

## 2024-06-03 ENCOUNTER — OFFICE VISIT (OUTPATIENT)
Dept: INTERNAL MEDICINE | Facility: CLINIC | Age: 79
End: 2024-06-03
Payer: MEDICARE

## 2024-06-03 VITALS
DIASTOLIC BLOOD PRESSURE: 82 MMHG | OXYGEN SATURATION: 98 % | HEIGHT: 66 IN | WEIGHT: 187 LBS | TEMPERATURE: 97.8 F | SYSTOLIC BLOOD PRESSURE: 124 MMHG | BODY MASS INDEX: 30.05 KG/M2 | HEART RATE: 79 BPM

## 2024-06-03 DIAGNOSIS — I10 ESSENTIAL HYPERTENSION: Primary | ICD-10-CM

## 2024-06-03 DIAGNOSIS — E78.5 HYPERLIPIDEMIA, UNSPECIFIED HYPERLIPIDEMIA TYPE: ICD-10-CM

## 2024-06-03 DIAGNOSIS — M1A.9XX0 CHRONIC GOUT INVOLVING TOE OF RIGHT FOOT WITHOUT TOPHUS, UNSPECIFIED CAUSE: ICD-10-CM

## 2024-06-03 DIAGNOSIS — K51.00 ULCERATIVE CHRONIC PANCOLITIS WITHOUT COMPLICATIONS: ICD-10-CM

## 2024-06-03 PROCEDURE — 3079F DIAST BP 80-89 MM HG: CPT | Performed by: FAMILY MEDICINE

## 2024-06-03 PROCEDURE — 1160F RVW MEDS BY RX/DR IN RCRD: CPT | Performed by: FAMILY MEDICINE

## 2024-06-03 PROCEDURE — 1159F MED LIST DOCD IN RCRD: CPT | Performed by: FAMILY MEDICINE

## 2024-06-03 PROCEDURE — 99212 OFFICE O/P EST SF 10 MIN: CPT | Performed by: FAMILY MEDICINE

## 2024-06-03 PROCEDURE — 3074F SYST BP LT 130 MM HG: CPT | Performed by: FAMILY MEDICINE

## 2024-06-03 PROCEDURE — 1126F AMNT PAIN NOTED NONE PRSNT: CPT | Performed by: FAMILY MEDICINE

## 2024-06-03 NOTE — PROGRESS NOTES
"Subjective   Coco Steele is a 78 y.o. female.     Chief Complaint   Patient presents with    Hypertension    Hyperlipidemia       Visit Vitals  /82 (BP Location: Left arm, Patient Position: Sitting, Cuff Size: Adult)   Pulse 79   Temp 97.8 °F (36.6 °C)   Ht 166.4 cm (65.5\")   Wt 84.8 kg (187 lb)   SpO2 98%   BMI 30.65 kg/m²         Hypertension  This is a chronic problem. The current episode started more than 1 year ago. The problem is unchanged. The problem is controlled. Associated symptoms include peripheral edema (occasional). Pertinent negatives include no anxiety, blurred vision, chest pain, headaches, malaise/fatigue, neck pain, orthopnea, palpitations, PND, shortness of breath or sweats. There are no associated agents to hypertension. Risk factors for coronary artery disease include dyslipidemia and post-menopausal state. Current antihypertension treatment includes ACE inhibitors. The current treatment provides significant improvement. There are no compliance problems.  There is no history of angina, kidney disease, CAD/MI, CVA, heart failure, left ventricular hypertrophy, PVD or retinopathy. There is no history of chronic renal disease, sleep apnea or a thyroid problem.   Hyperlipidemia  This is a chronic problem. The current episode started more than 1 year ago. The problem is controlled. Recent lipid tests were reviewed and are normal. She has no history of chronic renal disease, diabetes, hypothyroidism, liver disease, obesity or nephrotic syndrome. Pertinent negatives include no chest pain, focal sensory loss, focal weakness, leg pain, myalgias or shortness of breath. Current antihyperlipidemic treatment includes statins. The current treatment provides significant improvement of lipids. There are no compliance problems.  Risk factors for coronary artery disease include dyslipidemia, family history, hypertension, post-menopausal and obesity.      Pt denies any gout flare since being on " allopurinol.   Pt has been stable with her UC.   The following portions of the patient's history were reviewed and updated as appropriate: allergies, current medications, past family history, past medical history, past social history, past surgical history, and problem list.    Past Medical History:   Diagnosis Date    Abdominal pain, epigastric     Abdominal pain, left lower quadrant     Abnormal findings on diagnostic imaging of abdomen     ABFND, RADIOLOGICAL, ABDOMINAL AREA     Adenomatous colon polyp 10/02/2023    Dr Gill    Allergic     Allergic rhinitis     Arthritis     Cancer 10/2014 removed    Renal Cell carcinoma left    Chronic gout involving toe without tophus 03/01/2024    Chronic ulcerative colitis without complication     Colon polyp     Constipation     Diarrhea     Diverticulosis     Encounter for Hemoccult screening     HEMOCCULT POSITIVE STOOLS     Gastroesophageal reflux disease     esophagitis presence not specified    GERD (gastroesophageal reflux disease)     Headache As long as I can remember    Herpes zoster     Hyperlipidemia     Hypertension     Irritable bowel syndrome     Nausea     Obesity     Rectal bleeding     Rectal bleeding     Regurgitation     Scoliosis     Ulcerative colitis       Past Surgical History:   Procedure Laterality Date    CHOLECYSTECTOMY      COLONOSCOPY  09/23/2014    left side bx-minimal active inflammation - 2 yr    COLONOSCOPY  07/09/2014    active inflammation to extent of limited lower colonoscopy/45 cm    COLONOSCOPY  02/21/2013    mild inflammation in sigmoid, left-sided diverticulosis    COLONOSCOPY  03/05/2010    very tortuous colon not allowing visual of cecal base - 5 yrs    COLONOSCOPY  11/14/2003    FAIRLY TORTUOUS AND SPASMODIC COLON - 5 YR    COLONOSCOPY Left 09/28/2016    Procedure: COLONOSCOPY WITH ANESTHESIA;  Surgeon: David Alonzo DO;  Location: [facility] ENDOSCOPY;  Service:     COLONOSCOPY N/A 06/11/2020    Procedure: COLONOSCOPY WITH  ANESTHESIA;  Surgeon: David Alonzo DO;  Location: [facility] ENDOSCOPY;  Service: Gastroenterology;  Laterality: N/A;  pre hx ulcerative colitis  post hx ulcerative colitis, diverticulosis  dr benedicto kaur    COLONOSCOPY W/ BIOPSIES  08/29/2022    COLONOSCOPY W/ BIOPSIES  08/09/2021    Dr Gill    COLONOSCOPY W/ POLYPECTOMY  10/02/2023    adenomatous polyp Dr Gill 1 yr f/u    ENDOSCOPY  03/14/2014    Normal    ENDOSCOPY  02/09/2006    MILD GASTRITIS    NEPHRECTOMY PARTIAL Left 2014    OTHER SURGICAL HISTORY      Bilateral Eyelid Surgery     TONSILLECTOMY      UPPER GASTROINTESTINAL ENDOSCOPY  03/14/2014    Dr David Alonzo    WRIST SURGERY Left       Family History   Problem Relation Age of Onset    Colon polyps Mother     Arthritis Mother     Alzheimer's disease Mother     Heart disease Father         enlarged heart, used NTG    COPD Father     Alzheimer's disease Paternal Aunt     Crohn's disease Cousin     Breast cancer Neg Hx     Colon cancer Neg Hx       Social History     Socioeconomic History    Marital status:    Tobacco Use    Smoking status: Never     Passive exposure: Past    Smokeless tobacco: Never   Vaping Use    Vaping status: Never Used   Substance and Sexual Activity    Alcohol use: No    Drug use: No    Sexual activity: Not Currently     Comment: menopause      No Known Allergies    Review of Systems   Constitutional:  Negative for malaise/fatigue.   Eyes:  Negative for blurred vision.   Respiratory:  Negative for shortness of breath.    Cardiovascular:  Negative for chest pain, palpitations, orthopnea and PND.   Musculoskeletal:  Negative for myalgias and neck pain.   Neurological:  Negative for focal weakness and headaches.       Objective   Physical Exam  Vitals and nursing note reviewed.   Constitutional:       Appearance: She is well-developed.   HENT:      Head: Normocephalic.      Right Ear: External ear normal.      Left Ear: External ear normal.      Nose: Nose normal.    Eyes:      General: Lids are normal.      Conjunctiva/sclera: Conjunctivae normal.      Pupils: Pupils are equal, round, and reactive to light.   Neck:      Thyroid: No thyroid mass or thyromegaly.      Vascular: No carotid bruit.      Trachea: Trachea normal.   Cardiovascular:      Rate and Rhythm: Normal rate and regular rhythm.      Heart sounds: No murmur heard.  Pulmonary:      Effort: Pulmonary effort is normal. No respiratory distress.      Breath sounds: Normal breath sounds. No decreased breath sounds, wheezing, rhonchi or rales.   Chest:      Chest wall: No tenderness.   Abdominal:      General: Bowel sounds are normal.      Palpations: Abdomen is soft.      Tenderness: There is no abdominal tenderness.   Musculoskeletal:         General: Normal range of motion.      Cervical back: Normal range of motion and neck supple.   Skin:     General: Skin is warm and dry.   Neurological:      Mental Status: She is alert and oriented to person, place, and time.   Psychiatric:         Behavior: Behavior normal.         Assessment & Plan   Problems Addressed this Visit          Cardiac and Vasculature    Essential hypertension - Primary    Hyperlipidemia       Gastrointestinal Abdominal     Chronic ulcerative colitis without complication     Other Visit Diagnoses       Chronic gout involving toe 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, unspecified cause              Diagnoses         Codes Comments    Essential hypertension    -  Primary ICD-10-CM: I10  ICD-9-CM: 401.9     Hyperlipidemia, unspecified hyperlipidemia type     ICD-10-CM: E78.5  ICD-9-CM: 272.4     Ulcerative chronic pancolitis without complications     ICD-10-CM: K51.00  ICD-9-CM: 556.6     Chronic gout involving toe 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, unspecified cause     ICD-10-CM: M1A.9XX0  ICD-9-CM: 274.02           Pt doing well with HTN and HL. Pt has enough medication.  Discussed covid booster. Pt has colonoscopy in October for UC follow up.  Gout stable with  no symptom flares             Current Outpatient Medications:     acetaminophen (TYLENOL) 650 MG 8 hr tablet, Take 1 tablet by mouth Every 8 (Eight) Hours As Needed for Mild Pain., Disp: , Rfl:     allopurinol (ZYLOPRIM) 100 MG tablet, Take 1 tablet by mouth Daily., Disp: 90 tablet, Rfl: 1    aspirin 81 MG EC tablet, Take 1 tablet by mouth Daily., Disp: , Rfl:     Calcium Carbonate 1500 (600 Ca) MG tablet, Take  by mouth daily., Disp: , Rfl:     docusate sodium (COLACE) 100 MG capsule, Take 1 capsule by mouth Daily., Disp: , Rfl:     esomeprazole (NexIUM) 40 MG capsule, Take 1 capsule by mouth Daily., Disp: , Rfl:     fluticasone (FLONASE) 50 MCG/ACT nasal spray, 1 spray into the nostril(s) as directed by provider Daily., Disp: , Rfl:     mesalamine (LIALDA) 1.2 g EC tablet, Take 2 tablets by mouth 2 (Two) Times a Day., Disp: 120 tablet, Rfl: 11    Multiple Vitamins-Minerals (CENTRUM SILVER PO), Take  by mouth daily., Disp: , Rfl:     ramipril (ALTACE) 10 MG capsule, Take 1 capsule by mouth Daily., Disp: 90 capsule, Rfl: 1    simvastatin (ZOCOR) 10 MG tablet, Take 1 tablet by mouth Daily., Disp: 90 tablet, Rfl: 1    SUPER B COMPLEX/C PO, Take  by mouth., Disp: , Rfl:       Recent Results (from the past 2688 hour(s))   Comprehensive Metabolic Panel    Collection Time: 03/04/24  8:24 AM    Specimen: Arm, Right; Blood   Result Value Ref Range    Glucose 105 (H) 65 - 99 mg/dL    BUN 15 8 - 23 mg/dL    Creatinine 0.94 0.57 - 1.00 mg/dL    Sodium 141 136 - 145 mmol/L    Potassium 5.5 (H) 3.5 - 5.2 mmol/L    Chloride 106 98 - 107 mmol/L    CO2 22.4 22.0 - 29.0 mmol/L    Calcium 9.7 8.6 - 10.5 mg/dL    Total Protein 7.1 6.0 - 8.5 g/dL    Albumin 4.3 3.5 - 5.2 g/dL    ALT (SGPT) 20 1 - 33 U/L    AST (SGOT) 25 1 - 32 U/L    Alkaline Phosphatase 55 39 - 117 U/L    Total Bilirubin 0.3 0.0 - 1.2 mg/dL    Globulin 2.8 gm/dL    A/G Ratio 1.5 g/dL    BUN/Creatinine Ratio 16.0 7.0 - 25.0    Anion Gap 12.6 5.0 - 15.0 mmol/L    eGFR  62.6 >60.0 mL/min/1.73   C-reactive Protein    Collection Time: 03/04/24  8:24 AM    Specimen: Arm, Right; Blood   Result Value Ref Range    C-Reactive Protein <0.30 0.00 - 0.50 mg/dL   TSH Rfx On Abnormal To Free T4    Collection Time: 03/04/24  8:24 AM    Specimen: Arm, Right; Blood   Result Value Ref Range    TSH 2.180 0.270 - 4.200 uIU/mL   Lipid Panel    Collection Time: 03/04/24  8:24 AM    Specimen: Arm, Right; Blood   Result Value Ref Range    Total Cholesterol 166 0 - 200 mg/dL    Triglycerides 94 0 - 150 mg/dL    HDL Cholesterol 86 (H) 40 - 60 mg/dL    LDL Cholesterol  63 0 - 100 mg/dL    VLDL Cholesterol 17 5 - 40 mg/dL    LDL/HDL Ratio 0.71    Uric Acid    Collection Time: 03/04/24  8:24 AM    Specimen: Arm, Right; Blood   Result Value Ref Range    Uric Acid 4.4 2.4 - 5.7 mg/dL   CBC Auto Differential    Collection Time: 03/04/24  8:24 AM    Specimen: Arm, Right; Blood   Result Value Ref Range    WBC 4.06 3.40 - 10.80 10*3/mm3    RBC 4.08 3.77 - 5.28 10*6/mm3    Hemoglobin 12.1 12.0 - 15.9 g/dL    Hematocrit 36.4 34.0 - 46.6 %    MCV 89.2 79.0 - 97.0 fL    MCH 29.7 26.6 - 33.0 pg    MCHC 33.2 31.5 - 35.7 g/dL    RDW 13.0 12.3 - 15.4 %    RDW-SD 42.3 37.0 - 54.0 fl    MPV 9.9 6.0 - 12.0 fL    Platelets 220 140 - 450 10*3/mm3    Neutrophil % 56.7 42.7 - 76.0 %    Lymphocyte % 31.8 19.6 - 45.3 %    Monocyte % 8.1 5.0 - 12.0 %    Eosinophil % 2.2 0.3 - 6.2 %    Basophil % 1.0 0.0 - 1.5 %    Immature Grans % 0.2 0.0 - 0.5 %    Neutrophils, Absolute 2.30 1.70 - 7.00 10*3/mm3    Lymphocytes, Absolute 1.29 0.70 - 3.10 10*3/mm3    Monocytes, Absolute 0.33 0.10 - 0.90 10*3/mm3    Eosinophils, Absolute 0.09 0.00 - 0.40 10*3/mm3    Basophils, Absolute 0.04 0.00 - 0.20 10*3/mm3    Immature Grans, Absolute 0.01 0.00 - 0.05 10*3/mm3    nRBC 0.0 0.0 - 0.2 /100 WBC   Basic Metabolic Panel    Collection Time: 04/08/24 11:09 AM    Specimen: Arm, Left; Blood   Result Value Ref Range    Glucose 98 65 - 99 mg/dL    BUN 16 8 -  23 mg/dL    Creatinine 1.13 (H) 0.57 - 1.00 mg/dL    Sodium 139 136 - 145 mmol/L    Potassium 4.6 3.5 - 5.2 mmol/L    Chloride 104 98 - 107 mmol/L    CO2 26.0 22.0 - 29.0 mmol/L    Calcium 9.3 8.6 - 10.5 mg/dL    BUN/Creatinine Ratio 14.2 7.0 - 25.0    Anion Gap 9.0 5.0 - 15.0 mmol/L    eGFR 50.2 (L) >60.0 mL/min/1.73       Return in 3 months (on 9/3/2024), or if symptoms worsen or fail to improve, for Medicare Wellness, Recheck bp.

## 2024-08-16 ENCOUNTER — OFFICE VISIT (OUTPATIENT)
Dept: ORTHOPEDIC SURGERY | Facility: CLINIC | Age: 79
End: 2024-08-16
Payer: MEDICARE

## 2024-08-16 VITALS
HEIGHT: 66 IN | DIASTOLIC BLOOD PRESSURE: 72 MMHG | SYSTOLIC BLOOD PRESSURE: 142 MMHG | BODY MASS INDEX: 30.05 KG/M2 | WEIGHT: 187 LBS

## 2024-08-16 DIAGNOSIS — I10 ESSENTIAL HYPERTENSION: ICD-10-CM

## 2024-08-16 DIAGNOSIS — M25.561 PAIN IN BOTH KNEES, UNSPECIFIED CHRONICITY: Primary | ICD-10-CM

## 2024-08-16 DIAGNOSIS — M17.0 PRIMARY OSTEOARTHRITIS OF BOTH KNEES: ICD-10-CM

## 2024-08-16 DIAGNOSIS — M25.562 PAIN IN BOTH KNEES, UNSPECIFIED CHRONICITY: Primary | ICD-10-CM

## 2024-08-16 RX ORDER — BUPIVACAINE HYDROCHLORIDE 2.5 MG/ML
4 INJECTION, SOLUTION EPIDURAL; INFILTRATION; INTRACAUDAL
Status: COMPLETED | OUTPATIENT
Start: 2024-08-16 | End: 2024-08-16

## 2024-08-16 RX ORDER — TRIAMCINOLONE ACETONIDE 40 MG/ML
40 INJECTION, SUSPENSION INTRA-ARTICULAR; INTRAMUSCULAR
Status: COMPLETED | OUTPATIENT
Start: 2024-08-16 | End: 2024-08-16

## 2024-08-16 RX ORDER — LIDOCAINE HYDROCHLORIDE 10 MG/ML
4 INJECTION, SOLUTION EPIDURAL; INFILTRATION; INTRACAUDAL; PERINEURAL
Status: COMPLETED | OUTPATIENT
Start: 2024-08-16 | End: 2024-08-16

## 2024-08-16 RX ADMIN — BUPIVACAINE HYDROCHLORIDE 4 ML: 2.5 INJECTION, SOLUTION EPIDURAL; INFILTRATION; INTRACAUDAL at 10:38

## 2024-08-16 RX ADMIN — LIDOCAINE HYDROCHLORIDE 4 ML: 10 INJECTION, SOLUTION EPIDURAL; INFILTRATION; INTRACAUDAL; PERINEURAL at 10:38

## 2024-08-16 RX ADMIN — TRIAMCINOLONE ACETONIDE 40 MG: 40 INJECTION, SUSPENSION INTRA-ARTICULAR; INTRAMUSCULAR at 10:38

## 2024-08-16 NOTE — PROGRESS NOTES
Procedure   - Large Joint Arthrocentesis: bilateral knee on 8/16/2024 10:38 AM  Indications: pain  Details: 21 G needle, anterolateral approach  Medications (Right): 4 mL lidocaine PF 1% 1 %; 40 mg triamcinolone acetonide 40 MG/ML; 4 mL bupivacaine (PF) 0.25 %  Medications (Left): 4 mL lidocaine PF 1% 1 %; 40 mg triamcinolone acetonide 40 MG/ML; 4 mL bupivacaine (PF) 0.25 %  Outcome: tolerated well, no immediate complications  Procedure, treatment alternatives, risks and benefits explained, specific risks discussed. Consent was given by the patient. Immediately prior to procedure a time out was called to verify the correct patient, procedure, equipment, support staff and site/side marked as required. Patient was prepped and draped in the usual sterile fashion.

## 2024-08-16 NOTE — PROGRESS NOTES
[facility] Note        Subjective     CC: Pain of the Left Knee and Pain of the Right Knee      HPI    Coco Steele is a 78 y.o. female.  She has bilateral knee pain.  She was first diagnosed with arthritis in 2012.  In [place].  She had cortisone shots then.  She thinks she has had over the years cortisone injections twice in his knees.  She had shots again in 2017.  No recent treatment.    Overall, patient's symptoms are worsening in both knees.    ROS:    Constiutional:Pt denies fever, chills, nausea, or vomiting.  MSK:as above        Objective      Past Medical History  Past Medical History:   Diagnosis Date    Abdominal pain, epigastric     Abdominal pain, left lower quadrant     Abnormal findings on diagnostic imaging of abdomen     ABFND, RADIOLOGICAL, ABDOMINAL AREA     Adenomatous colon polyp 10/02/2023    Dr Gill    Allergic     Allergic rhinitis     Arthritis     Cancer 10/2014 removed    Renal Cell carcinoma left    Chronic gout involving toe without tophus 03/01/2024    Chronic ulcerative colitis without complication     Colon polyp     Constipation     Diarrhea     Diverticulosis     Encounter for Hemoccult screening     HEMOCCULT POSITIVE STOOLS     Gastroesophageal reflux disease     esophagitis presence not specified    GERD (gastroesophageal reflux disease)     Headache As long as I can remember    Herpes zoster     Hyperlipidemia     Hypertension     Irritable bowel syndrome     Nausea     Obesity     Rectal bleeding     Rectal bleeding     Regurgitation     Scoliosis     Ulcerative colitis      Social History     Socioeconomic History    Marital status:    Tobacco Use    Smoking status: Never     Passive exposure: Past    Smokeless tobacco: Never   Vaping Use    Vaping status: Never Used   Substance and Sexual Activity    Alcohol use: No    Drug use: No    Sexual activity: Not Currently     Comment: menopause          Physical Exam  /72   Ht 166.4 cm  "(65.5\")   Wt 84.8 kg (187 lb)   BMI 30.65 kg/m²     Body mass index is 30.65 kg/m².    Patient is well nourished and well developed.        Ortho Exam  Bilateral knees tender medial joint line.  Stable ligaments.  No pain with hip range of motion.  Negative straight leg raise.  Range of motion 5 to 110 degrees    Imaging/Labs/EMG Reviewed and Interpreted:  Imaging Results (Last 24 Hours)       Procedure Component Value Units Date/Time    XR Knee 4+ View Bilateral [[id number]] Resulted: 08/16/24 1033     Updated: 08/16/24 1034    Narrative:      Bilateral Knee X-Rays  Indication: Pain    Upright AP, Lateral, skiers and Sunrise views of bilateral knees     Findings:  No fracture  Varus deformity both knees.  Kellgren-Kvng grade 4.  Completely   bone-on-bone in all 3 compartments  No prior studies were available for comparison.              Assessment    Assessment:  1. Pain in both knees, unspecified chronicity    2. Primary osteoarthritis of both knees    3. Essential hypertension        Plan:  Recommend over the counter anti-inflammatories for pain and/or swelling  I injected both knees with cortisone.   I discussed with the patient the potential benefits of performing a therapeutic injection of the cortisone as well as potential risks including but not limited to infection, swelling, pain, bleeding, bruising, nerve/vessel damage, skin color changes, transient elevation in blood glucose levels, and fat atrophy. After informed consent and verifying correct patient, procedure site, and type of procedure, the area was prepped with Hibiclens, ethyl chloride was used to numb the skin. The patient tolerated the procedure well. There were no complications.  She is aware the cortisone could increase her blood pressure.  She has tolerated cortisone well in the past.  She will follow-up when the injections wear off.      Paul Whitaker MD  08/16/24  10:38 EDT      Dictated Utilizing Dragon Dictation.  Answers " submitted by the patient for this visit:  Other (Submitted on 8/9/2024)  Please describe your symptoms.: Pain in left knee. Update evaluation of condition of knees.  Have you had these symptoms before?: Yes  How long have you been having these symptoms?: Greater than 2 weeks  Please list any medications you are currently taking for this condition.: Tylenol arthritis  Please describe any probable cause for these symptoms. : This is an on going problem.  I first saw Dr. Luis A Ennis at the [facility] in 12/12/2013. At that time the xray showed signs of arthritis and small spot of bone on bone in both knees.  I was experiencing knee pain and visited the walk in clinic at the [facility] in June of 2017.  Another xray was done at that time, their computer system had been upgraded and they could not fine my record for the first visit or the original xray for comparison.  At both visits I did receive a shot in the knee that was bothering me.  I also did some physical therapy at that time with Dr Garrick Ocampo.  Primary Reason for Visit (Submitted on 8/9/2024)  What is the primary reason for your visit?: Other

## 2024-09-09 ENCOUNTER — OFFICE VISIT (OUTPATIENT)
Dept: ORTHOPEDIC SURGERY | Facility: CLINIC | Age: 79
End: 2024-09-09
Payer: MEDICARE

## 2024-09-09 VITALS
HEIGHT: 66 IN | BODY MASS INDEX: 30.05 KG/M2 | WEIGHT: 187 LBS | SYSTOLIC BLOOD PRESSURE: 160 MMHG | DIASTOLIC BLOOD PRESSURE: 106 MMHG

## 2024-09-09 DIAGNOSIS — M17.0 PRIMARY OSTEOARTHRITIS OF BOTH KNEES: Primary | ICD-10-CM

## 2024-09-09 DIAGNOSIS — I10 ESSENTIAL HYPERTENSION: ICD-10-CM

## 2024-09-09 PROCEDURE — 3077F SYST BP >= 140 MM HG: CPT | Performed by: ORTHOPAEDIC SURGERY

## 2024-09-09 PROCEDURE — 99214 OFFICE O/P EST MOD 30 MIN: CPT | Performed by: ORTHOPAEDIC SURGERY

## 2024-09-09 PROCEDURE — 3080F DIAST BP >= 90 MM HG: CPT | Performed by: ORTHOPAEDIC SURGERY

## 2024-09-09 RX ORDER — PREGABALIN 150 MG/1
150 CAPSULE ORAL ONCE
OUTPATIENT
Start: 2024-09-09 | End: 2024-09-09

## 2024-09-09 RX ORDER — MELOXICAM 7.5 MG/1
15 TABLET ORAL ONCE
OUTPATIENT
Start: 2024-09-09 | End: 2024-09-09

## 2024-09-09 RX ORDER — ACETAMINOPHEN 325 MG/1
1000 TABLET ORAL ONCE
OUTPATIENT
Start: 2024-09-09 | End: 2024-09-09

## 2024-09-09 RX ORDER — CHLORHEXIDINE GLUCONATE 40 MG/ML
1 SOLUTION TOPICAL DAILY
Qty: 236 ML | Refills: 0 | Status: SHIPPED | OUTPATIENT
Start: 2024-09-09

## 2024-09-09 RX ORDER — CHLORHEXIDINE GLUCONATE 500 MG/1
CLOTH TOPICAL ONCE
OUTPATIENT
Start: 2024-09-09

## 2024-09-09 NOTE — PROGRESS NOTES
[facility] [place] [facility] Note        Subjective     CC: Follow-up (3 week follow up--Pain of the Left Knee and Pain of the Right Knee)      HPI    Coco Steele is a 78 y.o. female. he has bilateral knee pain. She was first diagnosed with arthritis in 2012. In [place]. She had cortisone shots then. She thinks she has had over the years cortisone injections twice in his knees. She had shots again in 2017.      Overall, patient's symptoms are worse. Injection 8/16 minimal relief.    ROS:    Constiutional:Pt denies fever, chills, nausea, or vomiting.  MSK:as above        Objective      Past Medical History  Past Medical History:   Diagnosis Date    Abdominal pain, epigastric     Abdominal pain, left lower quadrant     Abnormal findings on diagnostic imaging of abdomen     ABFND, RADIOLOGICAL, ABDOMINAL AREA     Adenomatous colon polyp 10/02/2023    Dr Gill    Allergic     Allergic rhinitis     Arthritis     Cancer 10/2014 removed    Renal Cell carcinoma left    Chronic gout involving toe without tophus 03/01/2024    Chronic ulcerative colitis without complication     Colon polyp     Constipation     Diarrhea     Diverticulosis     Encounter for Hemoccult screening     HEMOCCULT POSITIVE STOOLS     Gastroesophageal reflux disease     esophagitis presence not specified    GERD (gastroesophageal reflux disease)     Headache As long as I can remember    Herpes zoster     Hyperlipidemia     Hypertension     Irritable bowel syndrome     Nausea     Obesity     Rectal bleeding     Rectal bleeding     Regurgitation     Scoliosis     Ulcerative colitis      Social History     Socioeconomic History    Marital status:    Tobacco Use    Smoking status: Never     Passive exposure: Past    Smokeless tobacco: Never   Vaping Use    Vaping status: Never Used   Substance and Sexual Activity    Alcohol use: No    Drug use: No    Sexual activity: Not Currently     Comment: menopause          Physical Exam  BP (!)  "160/106   Ht 166.4 cm (65.51\")   Wt 84.8 kg (187 lb)   BMI 30.63 kg/m²     Body mass index is 30.63 kg/m².    Patient is well nourished and well developed.        Ortho Exam  Bilateral knees tender medial joint line. Stable ligaments. No pain with hip range of motion. Negative straight leg raise. Range of motion 5 to 110 degrees     Imaging/Labs/EMG Reviewed and Interpreted:  Imaging Results (Last 24 Hours)       ** No results found for the last 24 hours. **          Bilateral Knee X-Rays  Indication: Pain     Upright AP, Lateral, skiers and Sunrise views of bilateral knees     Findings:  No fracture  Varus deformity both knees.  Kellgren-Kvng grade 4.  Completely   bone-on-bone in all 3 compartments  No prior studies were available for comparison.              Assessment    Assessment:  1. Primary osteoarthritis of both knees    2. Essential hypertension        Plan:  The plan is for left total knee arthroplasty.Treatment options and alternatives were discussed with patient.  Surgical risks include but are not limited to pain, bleeding, infection, failure to relieve symptoms, need for further procedures, recurrence of symptoms, damage to healthy adjacent structures, hardware loosening/failure, stiffness, weakness, scar, blood clots/DVT/PE, loss of limb or life. We also discussed the postoperative protocol and expected outcome although no guarantees are possible with surgery. All questions were answered; the patient would like to proceed with surgical intervention.  She lives by herself near [place] but has a friend that can stay with her who is a nurse.  She will get medical clearance prior to surgery.  Surgery will have to be after November 16 because of a cortisone injection August 16.      Paul Whitaker MD  09/09/24  10:40 EDT      Dictated Utilizing Dragon Dictation.  "

## 2024-09-10 ENCOUNTER — LAB (OUTPATIENT)
Dept: INTERNAL MEDICINE | Facility: CLINIC | Age: 79
End: 2024-09-10
Payer: MEDICARE

## 2024-09-10 DIAGNOSIS — M17.0 PRIMARY OSTEOARTHRITIS OF BOTH KNEES: ICD-10-CM

## 2024-09-10 LAB
ANION GAP SERPL CALCULATED.3IONS-SCNC: 11.4 MMOL/L (ref 5–15)
APTT PPP: 49.7 SECONDS (ref 22–39)
BASOPHILS # BLD AUTO: 0.02 10*3/MM3 (ref 0–0.2)
BASOPHILS NFR BLD AUTO: 0.6 % (ref 0–1.5)
BUN SERPL-MCNC: 13 MG/DL (ref 8–23)
BUN/CREAT SERPL: 14.6 (ref 7–25)
CALCIUM SPEC-SCNC: 9.6 MG/DL (ref 8.6–10.5)
CHLORIDE SERPL-SCNC: 105 MMOL/L (ref 98–107)
CO2 SERPL-SCNC: 23.6 MMOL/L (ref 22–29)
CREAT SERPL-MCNC: 0.89 MG/DL (ref 0.57–1)
CRP SERPL-MCNC: <0.3 MG/DL (ref 0–0.5)
DEPRECATED RDW RBC AUTO: 42.8 FL (ref 37–54)
EGFRCR SERPLBLD CKD-EPI 2021: 66.9 ML/MIN/1.73
EOSINOPHIL # BLD AUTO: 0.07 10*3/MM3 (ref 0–0.4)
EOSINOPHIL NFR BLD AUTO: 2 % (ref 0.3–6.2)
ERYTHROCYTE [DISTWIDTH] IN BLOOD BY AUTOMATED COUNT: 13 % (ref 12.3–15.4)
ERYTHROCYTE [SEDIMENTATION RATE] IN BLOOD: 16 MM/HR (ref 0–30)
GLUCOSE SERPL-MCNC: 95 MG/DL (ref 65–99)
HBA1C MFR BLD: 5.4 % (ref 4.8–5.6)
HCT VFR BLD AUTO: 37.7 % (ref 34–46.6)
HGB BLD-MCNC: 12.5 G/DL (ref 12–15.9)
IMM GRANULOCYTES # BLD AUTO: 0.01 10*3/MM3 (ref 0–0.05)
IMM GRANULOCYTES NFR BLD AUTO: 0.3 % (ref 0–0.5)
INR PPP: 1.16 (ref 0.89–1.12)
LYMPHOCYTES # BLD AUTO: 1.05 10*3/MM3 (ref 0.7–3.1)
LYMPHOCYTES NFR BLD AUTO: 30.5 % (ref 19.6–45.3)
MCH RBC QN AUTO: 29.9 PG (ref 26.6–33)
MCHC RBC AUTO-ENTMCNC: 33.2 G/DL (ref 31.5–35.7)
MCV RBC AUTO: 90.2 FL (ref 79–97)
MONOCYTES # BLD AUTO: 0.25 10*3/MM3 (ref 0.1–0.9)
MONOCYTES NFR BLD AUTO: 7.3 % (ref 5–12)
NEUTROPHILS NFR BLD AUTO: 2.04 10*3/MM3 (ref 1.7–7)
NEUTROPHILS NFR BLD AUTO: 59.3 % (ref 42.7–76)
NRBC BLD AUTO-RTO: 0 /100 WBC (ref 0–0.2)
PLATELET # BLD AUTO: 234 10*3/MM3 (ref 140–450)
PMV BLD AUTO: 10.2 FL (ref 6–12)
POTASSIUM SERPL-SCNC: 4 MMOL/L (ref 3.5–5.2)
PROTHROMBIN TIME: 14.9 SECONDS (ref 12.2–14.5)
RBC # BLD AUTO: 4.18 10*6/MM3 (ref 3.77–5.28)
SODIUM SERPL-SCNC: 140 MMOL/L (ref 136–145)
WBC NRBC COR # BLD AUTO: 3.44 10*3/MM3 (ref 3.4–10.8)

## 2024-09-10 PROCEDURE — 85652 RBC SED RATE AUTOMATED: CPT | Performed by: ORTHOPAEDIC SURGERY

## 2024-09-10 PROCEDURE — 85730 THROMBOPLASTIN TIME PARTIAL: CPT | Performed by: ORTHOPAEDIC SURGERY

## 2024-09-10 PROCEDURE — 85025 COMPLETE CBC W/AUTO DIFF WBC: CPT | Performed by: ORTHOPAEDIC SURGERY

## 2024-09-10 PROCEDURE — 80048 BASIC METABOLIC PNL TOTAL CA: CPT | Performed by: ORTHOPAEDIC SURGERY

## 2024-09-10 PROCEDURE — 83036 HEMOGLOBIN GLYCOSYLATED A1C: CPT | Performed by: ORTHOPAEDIC SURGERY

## 2024-09-10 PROCEDURE — 85610 PROTHROMBIN TIME: CPT | Performed by: ORTHOPAEDIC SURGERY

## 2024-09-10 PROCEDURE — 36415 COLL VENOUS BLD VENIPUNCTURE: CPT | Performed by: FAMILY MEDICINE

## 2024-09-10 PROCEDURE — 86140 C-REACTIVE PROTEIN: CPT | Performed by: ORTHOPAEDIC SURGERY

## 2024-09-11 ENCOUNTER — OFFICE VISIT (OUTPATIENT)
Dept: INTERNAL MEDICINE | Facility: CLINIC | Age: 79
End: 2024-09-11
Payer: MEDICARE

## 2024-09-11 VITALS
BODY MASS INDEX: 30.05 KG/M2 | HEART RATE: 91 BPM | TEMPERATURE: 97.5 F | OXYGEN SATURATION: 98 % | SYSTOLIC BLOOD PRESSURE: 134 MMHG | WEIGHT: 187 LBS | DIASTOLIC BLOOD PRESSURE: 84 MMHG | HEIGHT: 66 IN

## 2024-09-11 DIAGNOSIS — M1A.9XX0 CHRONIC GOUT INVOLVING TOE OF RIGHT FOOT WITHOUT TOPHUS, UNSPECIFIED CAUSE: ICD-10-CM

## 2024-09-11 DIAGNOSIS — Z00.00 MEDICARE ANNUAL WELLNESS VISIT, SUBSEQUENT: Primary | ICD-10-CM

## 2024-09-11 DIAGNOSIS — Z78.0 MENOPAUSE: ICD-10-CM

## 2024-09-11 DIAGNOSIS — I10 ESSENTIAL HYPERTENSION: ICD-10-CM

## 2024-09-11 DIAGNOSIS — E78.5 HYPERLIPIDEMIA, UNSPECIFIED HYPERLIPIDEMIA TYPE: ICD-10-CM

## 2024-09-11 PROCEDURE — 1159F MED LIST DOCD IN RCRD: CPT | Performed by: FAMILY MEDICINE

## 2024-09-11 PROCEDURE — 99214 OFFICE O/P EST MOD 30 MIN: CPT | Performed by: FAMILY MEDICINE

## 2024-09-11 PROCEDURE — 1160F RVW MEDS BY RX/DR IN RCRD: CPT | Performed by: FAMILY MEDICINE

## 2024-09-11 PROCEDURE — 1126F AMNT PAIN NOTED NONE PRSNT: CPT | Performed by: FAMILY MEDICINE

## 2024-09-11 PROCEDURE — 3079F DIAST BP 80-89 MM HG: CPT | Performed by: FAMILY MEDICINE

## 2024-09-11 PROCEDURE — 3075F SYST BP GE 130 - 139MM HG: CPT | Performed by: FAMILY MEDICINE

## 2024-09-11 PROCEDURE — 1170F FXNL STATUS ASSESSED: CPT | Performed by: FAMILY MEDICINE

## 2024-09-11 PROCEDURE — G0439 PPPS, SUBSEQ VISIT: HCPCS | Performed by: FAMILY MEDICINE

## 2024-09-11 RX ORDER — ALLOPURINOL 100 MG/1
100 TABLET ORAL DAILY
Qty: 90 TABLET | Refills: 1 | Status: SHIPPED | OUTPATIENT
Start: 2024-09-11

## 2024-09-11 RX ORDER — RAMIPRIL 10 MG/1
10 CAPSULE ORAL DAILY
Qty: 90 CAPSULE | Refills: 1 | Status: SHIPPED | OUTPATIENT
Start: 2024-09-11

## 2024-09-11 RX ORDER — SIMVASTATIN 10 MG
10 TABLET ORAL DAILY
Qty: 90 TABLET | Refills: 1 | Status: SHIPPED | OUTPATIENT
Start: 2024-09-11

## 2024-09-11 NOTE — PROGRESS NOTES
Subjective   The ABCs of the Annual Wellness Visit  Medicare Wellness Visit      Coco Steele is a 78 y.o. patient who presents for a Medicare Wellness Visit.    Pt has seen Dr Mathias who is planning knee replacement surgery soon.   Pt reports Covid infection end of July 2024.   Pt has not had problems with gout since taking allopurinol.   The following portions of the patient's history were reviewed and   updated as appropriate: allergies, current medications, past family history, past medical history, past social history, past surgical history, and problem list.  Past Medical History:   Diagnosis Date    Abdominal pain, epigastric     Abdominal pain, left lower quadrant     Abnormal findings on diagnostic imaging of abdomen     ABFND, RADIOLOGICAL, ABDOMINAL AREA     Adenomatous colon polyp 10/02/2023    Dr Gill    Allergic     Allergic rhinitis     Arthritis     Cancer 10/2014 removed    Renal Cell carcinoma left    Chronic gout involving toe without tophus 03/01/2024    Chronic ulcerative colitis without complication     Colon polyp     Constipation     Diarrhea     Diverticulosis     Encounter for Hemoccult screening     HEMOCCULT POSITIVE STOOLS     Gastroesophageal reflux disease     esophagitis presence not specified    GERD (gastroesophageal reflux disease)     H/O mammogram 03/08/2024    Headache As long as I can remember    Herpes zoster     Hyperlipidemia     Hypertension     Irritable bowel syndrome     Nausea     Obesity     Rectal bleeding     Rectal bleeding     Regurgitation     Scoliosis     Ulcerative colitis        Past Surgical History:   Procedure Laterality Date    CHOLECYSTECTOMY      COLONOSCOPY  09/23/2014    left side bx-minimal active inflammation - 2 yr    COLONOSCOPY  07/09/2014    active inflammation to extent of limited lower colonoscopy/45 cm    COLONOSCOPY  02/21/2013    mild inflammation in sigmoid, left-sided diverticulosis    COLONOSCOPY  03/05/2010    very tortuous  colon not allowing visual of cecal base - 5 yrs    COLONOSCOPY  11/14/2003    FAIRLY TORTUOUS AND SPASMODIC COLON - 5 YR    COLONOSCOPY Left 09/28/2016    Procedure: COLONOSCOPY WITH ANESTHESIA;  Surgeon: David Alonzo DO;  Location:  [facility];  Service:     COLONOSCOPY N/A 06/11/2020    Procedure: COLONOSCOPY WITH ANESTHESIA;  Surgeon: David Alonzo DO;  Location: [facility];  Service: Gastroenterology;  Laterality: N/A;  pre hx ulcerative colitis  post hx ulcerative colitis, diverticulosis  dr benedicto kaur    COLONOSCOPY W/ BIOPSIES  08/29/2022    COLONOSCOPY W/ BIOPSIES  08/09/2021    Dr Gill    COLONOSCOPY W/ POLYPECTOMY  10/02/2023    adenomatous polyp Dr Gill 1 yr f/u    ENDOSCOPY  03/14/2014    Normal    ENDOSCOPY  02/09/2006    MILD GASTRITIS    NEPHRECTOMY PARTIAL Left 2014    OTHER SURGICAL HISTORY      Bilateral Eyelid Surgery     TONSILLECTOMY      UPPER GASTROINTESTINAL ENDOSCOPY  03/14/2014    Dr David Alonzo    WRIST SURGERY Left        No Known Allergies    Family History   Problem Relation Age of Onset    Colon polyps Mother     Arthritis Mother     Alzheimer's disease Mother     Heart disease Father         enlarged heart, used NTG    COPD Father     Alzheimer's disease Paternal Aunt     Crohn's disease Cousin     Breast cancer Neg Hx     Colon cancer Neg Hx        Social History     Socioeconomic History    Marital status:    Tobacco Use    Smoking status: Never     Passive exposure: Past    Smokeless tobacco: Never   Vaping Use    Vaping status: Never Used   Substance and Sexual Activity    Alcohol use: No    Drug use: No    Sexual activity: Not Currently     Comment: menopause         Compared to one year ago, the patient's physical   health is worse. Bilateral knee pain, increased fatigue  Compared to one year ago, the patient's mental   health is the same.    Recent Hospitalizations:  She was not admitted to the hospital during the last year.      Current Medical Providers:  Patient Care Team:  Radha Berger MD as PCP - General (Family Medicine)  Sujit Luna MD as Consulting Physician (Urology)  David Alonzo DO as Consulting Physician (Gastroenterology)  Ac Gill MD as Consulting Physician (Gastroenterology)  Paul Whitaker MD as Consulting Physician (Orthopedic Surgery)  Saulo Guo OD (Optometry)  Bryan Thompson MD (Dental General Practice)    Outpatient Medications Prior to Visit   Medication Sig Dispense Refill    acetaminophen (TYLENOL) 650 MG 8 hr tablet Take 1 tablet by mouth Every 8 (Eight) Hours As Needed for Mild Pain.      aspirin 81 MG EC tablet Take 1 tablet by mouth Daily.      Calcium Carbonate 1500 (600 Ca) MG tablet Take  by mouth daily.      docusate sodium (COLACE) 100 MG capsule Take 1 capsule by mouth Daily.      esomeprazole (NexIUM) 40 MG capsule Take 1 capsule by mouth Daily.      fluticasone (FLONASE) 50 MCG/ACT nasal spray 1 spray into the nostril(s) as directed by provider Daily.      mesalamine (LIALDA) 1.2 g EC tablet Take 2 tablets by mouth 2 (Two) Times a Day. 120 tablet 11    Multiple Vitamins-Minerals (CENTRUM SILVER PO) Take  by mouth daily.      SUPER B COMPLEX/C PO Take  by mouth.      allopurinol (ZYLOPRIM) 100 MG tablet Take 1 tablet by mouth Daily. 90 tablet 1    ramipril (ALTACE) 10 MG capsule Take 1 capsule by mouth Daily. 90 capsule 1    simvastatin (ZOCOR) 10 MG tablet Take 1 tablet by mouth Daily. 90 tablet 1    Chlorhexidine Gluconate 4 % solution Apply 1 Application topically to the appropriate area as directed Daily. Shower w/solution for 5 days before surgery. Bring to [facility] to be filled. (Patient not taking: Reported on 9/11/2024) 236 mL 0     No facility-administered medications prior to visit.     No opioid medication identified on active medication list. I have reviewed chart for other potential  high risk medication/s and harmful drug interactions in the  "elderly.      Aspirin is on active medication list. Aspirin use is indicated based on review of current medical condition/s. Pros and cons of this therapy have been discussed today. Benefits of this medication outweigh potential harm.  Patient has been encouraged to continue taking this medication.  .      Patient Active Problem List   Diagnosis    Ulcerative pancolitis without complication    Osteopenia    Essential hypertension    Hyperlipidemia    Chronic ulcerative colitis without complication     Advance Care Planning Advance Directive is on file.  ACP discussion was held with the patient during this visit. Patient has an advance directive in EMR which is still valid.             Objective   Vitals:    24 1353 24 1448   BP: 148/86 134/84   BP Location: Left arm Left arm   Patient Position: Sitting Sitting   Cuff Size: Adult Adult   Pulse: 91    Temp: 97.5 °F (36.4 °C)    SpO2: 98%    Weight: 84.8 kg (187 lb)    Height: 166.4 cm (65.5\")    PainSc: 0-No pain      Wt Readings from Last 3 Encounters:   24 84.8 kg (187 lb)   24 84.8 kg (187 lb)   24 84.8 kg (187 lb)       Estimated body mass index is 30.65 kg/m² as calculated from the following:    Height as of this encounter: 166.4 cm (65.5\").    Weight as of this encounter: 84.8 kg (187 lb).            Does the patient have evidence of cognitive impairment? No  Lab Results   Component Value Date    HGBA1C 5.40 09/10/2024                                                                                                Health  Risk Assessment    Smoking Status:  Social History     Tobacco Use   Smoking Status Never    Passive exposure: Past   Smokeless Tobacco Never     Alcohol Consumption:  Social History     Substance and Sexual Activity   Alcohol Use No       Fall Risk Screen  STEADI Fall Risk Assessment was completed, and patient is at LOW risk for falls.Assessment completed on:2024    Depression Screenin/11/2024     1:53 " PM   PHQ-2/PHQ-9 Depression Screening   Little Interest or Pleasure in Doing Things 0-->not at all   Feeling Down, Depressed or Hopeless 0-->not at all   PHQ-9: Brief Depression Severity Measure Score 0     Health Habits and Functional and Cognitive Screenin/11/2024     1:52 PM   Functional & Cognitive Status   Do you have difficulty preparing food and eating? No   Do you have difficulty bathing yourself, getting dressed or grooming yourself? No   Do you have difficulty using the toilet? No   Do you have difficulty moving around from place to place? No   Do you have trouble with steps or getting out of a bed or a chair? No   Current Diet Well Balanced Diet   Dental Exam Up to date        Dental Exam Comment 2024   Eye Exam Up to date        Eye Exam Comment 2023   Exercise (times per week) 0 times per week   Current Exercises Include No Regular Exercise   Do you need help using the phone?  No   Are you deaf or do you have serious difficulty hearing?  No   Do you need help to go to places out of walking distance? No   Do you need help shopping? No   Do you need help preparing meals?  No   Do you need help with housework?  No   Do you need help with laundry? No   Do you need help taking your medications? No   Do you need help managing money? No   Do you ever drive or ride in a car without wearing a seat belt? No   Have you felt unusual stress, anger or loneliness in the last month? No   Who do you live with? Alone   If you need help, do you have trouble finding someone available to you? No   Have you been bothered in the last four weeks by sexual problems? No   Do you have difficulty concentrating, remembering or making decisions? No           Age-appropriate Screening Schedule:  Refer to the list below for future screening recommendations based on patient's age, sex and/or medical conditions. Orders for these recommended tests are listed in the plan section. The patient has been provided with a  written plan.    Health Maintenance List  Health Maintenance   Topic Date Due    COVID-19 Vaccine (7 - 2023-24 season) 09/01/2024    INFLUENZA VACCINE  08/01/2024    COLORECTAL CANCER SCREENING  10/02/2024    DXA SCAN  10/13/2024    LIPID PANEL  03/04/2025    MAMMOGRAM  03/08/2025    ANNUAL WELLNESS VISIT  09/11/2025    BMI FOLLOWUP  09/11/2025    TDAP/TD VACCINES (2 - Td or Tdap) 02/25/2026    HEPATITIS C SCREENING  Completed    RSV Vaccine - Adults  Completed    Pneumococcal Vaccine 65+  Completed    ZOSTER VACCINE  Completed                                                                                                                                                CMS Preventative Services Quick Reference  Risk Factors Identified During Encounter  Chronic Pain: Chronic Pain Educational material Discussed and Printed for Patient.   Home exercise plan outlined.  Immunizations Discussed/Encouraged: Influenza and COVID19  Polypharmacy: Medication List reviewed and Medications are appropriate for patient    The above risks/problems have been discussed with the patient.  Pertinent information has been shared with the patient in the After Visit Summary.  An After Visit Summary and PPPS were made available to the patient.    Follow Up:   Next Medicare Wellness visit to be scheduled in 1 year.         Additional E&M Note during same encounter follows:  Patient has additional, significant, and separately identifiable condition(s)/problem(s) that require work above and beyond the Medicare Wellness Visit     Chief Complaint  Medicare Wellness-subsequent    Subjective   Hypertension  This is a chronic problem. The current episode started more than 1 year ago. The problem is unchanged. The problem is controlled. Associated symptoms include headaches. Pertinent negatives include no anxiety, blurred vision, chest pain, malaise/fatigue, neck pain, orthopnea, palpitations, peripheral edema, PND, shortness of breath or sweats.  There are no associated agents to hypertension. Risk factors for coronary artery disease include dyslipidemia, obesity, post-menopausal state and family history. Past treatments include ACE inhibitors. Current antihypertension treatment includes ACE inhibitors. The current treatment provides significant improvement. There are no compliance problems.  Hypertensive end-organ damage includes kidney disease. There is no history of angina, CAD/MI, CVA, heart failure, left ventricular hypertrophy, PVD or retinopathy. There is no history of chronic renal disease, sleep apnea or a thyroid problem.   Hyperlipidemia  This is a chronic problem. The current episode started more than 1 year ago. The problem is controlled. Recent lipid tests were reviewed and are normal. Exacerbating diseases include obesity. She has no history of chronic renal disease, diabetes, hypothyroidism, liver disease or nephrotic syndrome. There are no known factors aggravating her hyperlipidemia. Pertinent negatives include no chest pain, focal sensory loss, focal weakness, leg pain, myalgias or shortness of breath. Current antihyperlipidemic treatment includes statins. The current treatment provides significant improvement of lipids. There are no compliance problems.  Risk factors for coronary artery disease include dyslipidemia, hypertension, obesity and post-menopausal.     Coco is also being seen today for an annual adult preventative physical exam.  and Coco is also being seen today for additional medical problem/s.    Review of Systems   Constitutional:  Negative for activity change, appetite change, chills, diaphoresis, fatigue, fever and malaise/fatigue.   HENT:  Negative for congestion, dental problem, drooling, ear discharge, ear pain, facial swelling, hearing loss, mouth sores, nosebleeds, postnasal drip, rhinorrhea, sinus pressure, sneezing, sore throat, tinnitus and trouble swallowing.    Eyes:  Negative for blurred vision, photophobia,  "pain, discharge, redness, itching and visual disturbance.   Respiratory:  Negative for apnea, cough, choking, chest tightness, shortness of breath, wheezing and stridor.    Cardiovascular:  Negative for chest pain, palpitations, orthopnea, leg swelling and PND.   Gastrointestinal:  Negative for abdominal distention, abdominal pain, anal bleeding, blood in stool, constipation, diarrhea, nausea, rectal pain and vomiting.   Endocrine: Negative for cold intolerance, heat intolerance, polydipsia, polyphagia and polyuria.   Genitourinary:  Negative for decreased urine volume, difficulty urinating, dyspareunia, dysuria, enuresis, flank pain, frequency, genital sores, hematuria, menstrual problem, pelvic pain, urgency, vaginal bleeding, vaginal discharge and vaginal pain.   Musculoskeletal:  Positive for arthralgias (both knees). Negative for back pain, gait problem, joint swelling, myalgias, neck pain and neck stiffness.   Skin:  Negative for color change, pallor, rash and wound.   Allergic/Immunologic: Positive for environmental allergies. Negative for food allergies and immunocompromised state.   Neurological:  Negative for dizziness, tremors, focal weakness, seizures, syncope, facial asymmetry, speech difficulty, weakness, light-headedness, numbness and confusion.   Hematological:  Negative for adenopathy. Does not bruise/bleed easily.   Psychiatric/Behavioral:  Negative for agitation, behavioral problems, decreased concentration, dysphoric mood, hallucinations, self-injury, sleep disturbance and suicidal ideas. The patient is not nervous/anxious and is not hyperactive.               Objective   Vital Signs:  /84 (BP Location: Left arm, Patient Position: Sitting, Cuff Size: Adult)   Pulse 91   Temp 97.5 °F (36.4 °C)   Ht 166.4 cm (65.5\")   Wt 84.8 kg (187 lb)   SpO2 98%   BMI 30.65 kg/m²   Physical Exam  Vitals and nursing note reviewed.   Constitutional:       General: She is not in acute distress.     " Appearance: She is well-developed. She is not diaphoretic.   HENT:      Head: Normocephalic and atraumatic.      Right Ear: Tympanic membrane, ear canal and external ear normal.      Left Ear: Tympanic membrane, ear canal and external ear normal.      Nose: Nose normal.      Mouth/Throat:      Lips: Pink.      Mouth: Mucous membranes are moist. Mucous membranes are not pale, not dry and not cyanotic. No injury.      Dentition: Normal dentition.      Tongue: No lesions.      Palate: No mass.      Pharynx: Uvula midline. No pharyngeal swelling, oropharyngeal exudate, posterior oropharyngeal erythema or uvula swelling.   Eyes:      General: Lids are normal. No scleral icterus.        Right eye: No foreign body, discharge or hordeolum.         Left eye: No foreign body, discharge or hordeolum.      Extraocular Movements:      Right eye: Normal extraocular motion and no nystagmus.      Left eye: Normal extraocular motion and no nystagmus.      Conjunctiva/sclera: Conjunctivae normal.      Right eye: Right conjunctiva is not injected. No chemosis, exudate or hemorrhage.     Left eye: Left conjunctiva is not injected. No chemosis, exudate or hemorrhage.     Pupils: Pupils are equal, round, and reactive to light.   Neck:      Thyroid: No thyroid mass or thyromegaly.      Vascular: No carotid bruit.      Trachea: Trachea normal. No tracheal deviation.   Cardiovascular:      Rate and Rhythm: Normal rate and regular rhythm.      Pulses:           Dorsalis pedis pulses are 2+ on the right side and 2+ on the left side.        Posterior tibial pulses are 2+ on the right side and 2+ on the left side.      Heart sounds: Normal heart sounds. No murmur heard.     No friction rub. No gallop.   Pulmonary:      Effort: Pulmonary effort is normal. No respiratory distress.      Breath sounds: Normal breath sounds. No decreased breath sounds, wheezing, rhonchi or rales.   Chest:      Chest wall: No tenderness. There is no dullness to  percussion.   Breasts:     Yusuf Score is 5.      Right: Normal. No swelling, bleeding, inverted nipple, mass, nipple discharge, skin change or tenderness.      Left: Normal. No swelling, bleeding, inverted nipple, mass, nipple discharge, skin change or tenderness.   Abdominal:      General: Bowel sounds are normal. There is no distension.      Palpations: Abdomen is soft. There is no hepatomegaly, splenomegaly or mass.      Tenderness: There is no abdominal tenderness. There is no guarding or rebound.      Hernia: No hernia is present.   Musculoskeletal:         General: No tenderness or deformity. Normal range of motion.      Cervical back: Normal range of motion and neck supple.      Right lower leg: No edema.      Left lower leg: No edema.   Lymphadenopathy:      Head:      Right side of head: No submandibular adenopathy.      Left side of head: No submandibular adenopathy.      Cervical: No cervical adenopathy.      Right cervical: No superficial, deep or posterior cervical adenopathy.     Left cervical: No superficial, deep or posterior cervical adenopathy.      Upper Body:      Right upper body: No supraclavicular, axillary or pectoral adenopathy.      Left upper body: No supraclavicular, axillary or pectoral adenopathy.   Skin:     General: Skin is warm and dry.      Findings: No rash.      Nails: There is no clubbing.   Neurological:      Mental Status: She is alert and oriented to person, place, and time.      Cranial Nerves: No cranial nerve deficit.      Sensory: No sensory deficit.      Coordination: Coordination normal.      Deep Tendon Reflexes: Reflexes are normal and symmetric.      Reflex Scores:       Bicep reflexes are 2+ on the right side and 2+ on the left side.       Brachioradialis reflexes are 2+ on the right side and 2+ on the left side.       Patellar reflexes are 2+ on the right side and 2+ on the left side.  Psychiatric:         Attention and Perception: Attention and perception normal.          Mood and Affect: Mood and affect normal.         Speech: Speech normal.         Behavior: Behavior normal.         Thought Content: Thought content normal.         Cognition and Memory: Cognition and memory normal.         Judgment: Judgment normal.                 Assessment and Plan Additional age appropriate preventative wellness advice topics were discussed during today's preventative wellness exam(some topics already addressed during AWV portion of the note above):    Physical Activity: Advised cardiovascular activity 150 minutes per week as tolerated. (example brisk walk for 30 minutes, 5 days a week).     Nutrition: Discussed nutrition plan with patient. Information shared in after visit summary. Goal is for a well balanced diet to enhance overall health.     Healthy Weight: Discussed current and goal BMI with patient. Steps to attain this goal discussed. Information shared in after visit summary.     Motor Vehicle Safety Discussion:  Wearing Seatbelt While in Motor Vehicle recommendation. Adhering to posted speed limit recommendation.     Injury Prevention Discussion:  Information shared in after visit summary.                    Chronic gout involving toe 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, unspecified cause    Essential hypertension  Hypertension is stable and controlled  Continue current treatment regimen.  Blood pressure will be reassessed in 6 months.  Hyperlipidemia, unspecified hyperlipidemia type   Lipid abnormalities are stable    Plan:  Continue same medication/s without change.      Discussed medication dosage, use, side effects, and goals of treatment in detail.    Counseled patient on lifestyle modifications to help control hyperlipidemia.     Patient Treatment Goals:   LDL goal is less than 70    Followup in 6 months.  Medicare annual wellness visit, subsequent    Menopause      Orders Placed This Encounter   Procedures    DEXA Bone Density Axial     Standing Status:   Future     Standing  Expiration Date:   9/11/2025     Scheduling Instructions:      After 10/13/24     Order Specific Question:   Reason for Exam:     Answer:   menopause     Order Specific Question:   Release to patient     Answer:   Routine Release [[id number]]     Order Specific Question:   Does this patient have a diabetic monitoring/medication delivering device on?     Answer:   No     Order Specific Question:   Is patient taking or have taken long term Glucocorticoid (steroids)?     Answer:   No     Order Specific Question:   Does the patient have rheumatoid arthritis?     Answer:   No     Order Specific Question:   Does the patient have secondary osteoporosis?     Answer:   No     New Medications Ordered This Visit   Medications    allopurinol (ZYLOPRIM) 100 MG tablet     Sig: Take 1 tablet by mouth Daily.     Dispense:  90 tablet     Refill:  1    ramipril (ALTACE) 10 MG capsule     Sig: Take 1 capsule by mouth Daily.     Dispense:  90 capsule     Refill:  1    simvastatin (ZOCOR) 10 MG tablet     Sig: Take 1 tablet by mouth Daily.     Dispense:  90 tablet     Refill:  1        I spent 36 minutes caring for Coco on this date of service. This time includes time spent by me in the following activities:preparing for the visit, reviewing tests, obtaining and/or reviewing a separately obtained history, performing a medically appropriate examination and/or evaluation , counseling and educating the patient/family/caregiver, ordering medications, tests, or procedures, and documenting information in the medical record  Follow Up   Return in about 6 months (around 3/11/2025), or if symptoms worsen or fail to improve, for Recheck bp, lipid.  Patient was given instructions and counseling regarding her condition or for health maintenance advice. Please see specific information pulled into the AVS if appropriate.    Recent Results (from the past 168 hour(s))   Basic metabolic panel    Collection Time: 09/10/24  8:18 AM    Specimen: Arm, Left;  Blood   Result Value Ref Range    Glucose 95 65 - 99 mg/dL    BUN 13 8 - 23 mg/dL    Creatinine 0.89 0.57 - 1.00 mg/dL    Sodium 140 136 - 145 mmol/L    Potassium 4.0 3.5 - 5.2 mmol/L    Chloride 105 98 - 107 mmol/L    CO2 23.6 22.0 - 29.0 mmol/L    Calcium 9.6 8.6 - 10.5 mg/dL    BUN/Creatinine Ratio 14.6 7.0 - 25.0    Anion Gap 11.4 5.0 - 15.0 mmol/L    eGFR 66.9 >60.0 mL/min/1.73   Protime-INR    Collection Time: 09/10/24  8:18 AM    Specimen: Arm, Left; Blood   Result Value Ref Range    Protime 14.9 (H) 12.2 - 14.5 Seconds    INR 1.16 (H) 0.89 - 1.12   APTT    Collection Time: 09/10/24  8:18 AM    Specimen: Arm, Left; Blood   Result Value Ref Range    PTT 49.7 (H) 22.0 - 39.0 seconds   Hemoglobin A1c    Collection Time: 09/10/24  8:18 AM    Specimen: Arm, Left; Blood   Result Value Ref Range    Hemoglobin A1C 5.40 4.80 - 5.60 %   Sedimentation rate    Collection Time: 09/10/24  8:18 AM    Specimen: Arm, Left; Blood   Result Value Ref Range    Sed Rate 16 0 - 30 mm/hr   C-reactive protein    Collection Time: 09/10/24  8:18 AM    Specimen: Arm, Left; Blood   Result Value Ref Range    C-Reactive Protein <0.30 0.00 - 0.50 mg/dL   CBC Auto Differential    Collection Time: 09/10/24  8:18 AM    Specimen: Arm, Left; Blood   Result Value Ref Range    WBC 3.44 3.40 - 10.80 10*3/mm3    RBC 4.18 3.77 - 5.28 10*6/mm3    Hemoglobin 12.5 12.0 - 15.9 g/dL    Hematocrit 37.7 34.0 - 46.6 %    MCV 90.2 79.0 - 97.0 fL    MCH 29.9 26.6 - 33.0 pg    MCHC 33.2 31.5 - 35.7 g/dL    RDW 13.0 12.3 - 15.4 %    RDW-SD 42.8 37.0 - 54.0 fl    MPV 10.2 6.0 - 12.0 fL    Platelets 234 140 - 450 10*3/mm3    Neutrophil % 59.3 42.7 - 76.0 %    Lymphocyte % 30.5 19.6 - 45.3 %    Monocyte % 7.3 5.0 - 12.0 %    Eosinophil % 2.0 0.3 - 6.2 %    Basophil % 0.6 0.0 - 1.5 %    Immature Grans % 0.3 0.0 - 0.5 %    Neutrophils, Absolute 2.04 1.70 - 7.00 10*3/mm3    Lymphocytes, Absolute 1.05 0.70 - 3.10 10*3/mm3    Monocytes, Absolute 0.25 0.10 - 0.90  10*3/mm3    Eosinophils, Absolute 0.07 0.00 - 0.40 10*3/mm3    Basophils, Absolute 0.02 0.00 - 0.20 10*3/mm3    Immature Grans, Absolute 0.01 0.00 - 0.05 10*3/mm3    nRBC 0.0 0.0 - 0.2 /100 WBC     Handouts to pt on Fall risk, advance care directives, healthy diets such as Mediterranean or Dash or calorie counting, and healthy exercising.     Please follow a low animal fat diet that is also low in sugar, low in junk food, low in sweet drinks and low in alcohol.  Please increase the amount of fiber in your diet as well as increasing your daily exercise, such as walking.

## 2024-09-30 ENCOUNTER — TELEPHONE (OUTPATIENT)
Dept: ORTHOPEDIC SURGERY | Facility: CLINIC | Age: 79
End: 2024-09-30
Payer: MEDICARE

## 2024-09-30 NOTE — TELEPHONE ENCOUNTER
Caller: Coco Steele    Relationship to patient: Self    Best call back number: [phone]    Patient is needing: PT IS CALLING TO SEE IF THE SX HAS BEEN APPROVED BY THE INSURANCE AS OF YET.. SHE IS ALSO WANTING TO KNOW HOW SHE CAN COMPLETE THE PRE-SX TASKS THAT SHE HAS.. PLEASE ADVISE..

## 2024-10-14 PROBLEM — M17.0 PRIMARY OSTEOARTHRITIS OF BOTH KNEES: Status: ACTIVE | Noted: 2024-09-09

## 2024-10-18 RX ORDER — SODIUM, POTASSIUM,MAG SULFATES 17.5-3.13G
1 SOLUTION, RECONSTITUTED, ORAL ORAL TAKE AS DIRECTED
Qty: 354 ML | Refills: 0 | Status: SHIPPED | OUTPATIENT
Start: 2024-10-18

## 2024-10-25 ENCOUNTER — TELEPHONE (OUTPATIENT)
Dept: GASTROENTEROLOGY | Facility: CLINIC | Age: 79
End: 2024-10-25

## 2024-10-25 ENCOUNTER — OUTSIDE FACILITY SERVICE (OUTPATIENT)
Dept: GASTROENTEROLOGY | Facility: CLINIC | Age: 79
End: 2024-10-25
Payer: MEDICARE

## 2024-10-25 PROCEDURE — 45380 COLONOSCOPY AND BIOPSY: CPT | Performed by: INTERNAL MEDICINE

## 2024-10-25 PROCEDURE — 45385 COLONOSCOPY W/LESION REMOVAL: CPT | Performed by: INTERNAL MEDICINE

## 2024-10-25 PROCEDURE — 88305 TISSUE EXAM BY PATHOLOGIST: CPT | Performed by: INTERNAL MEDICINE

## 2024-10-25 NOTE — TELEPHONE ENCOUNTER
Name: Coco Steele    Relationship: Self    Best Callback Number: [phone]    Patient would like to Schedule a Follow-Up. Unable to schedule within the 3 week timeframe.     Patient is not having any symptoms. Please call patient. Pt has colonoscopy today and was told to call to schedule follow up with Bridget for a treatment plan. If not able to reach pt. It is okay to lvm.

## 2024-10-28 ENCOUNTER — LAB REQUISITION (OUTPATIENT)
Dept: LAB | Facility: HOSPITAL | Age: 79
End: 2024-10-28
Payer: MEDICARE

## 2024-10-28 DIAGNOSIS — K64.8 OTHER HEMORRHOIDS: ICD-10-CM

## 2024-10-28 DIAGNOSIS — K51.40 INFLAMMATORY POLYPS OF COLON WITHOUT COMPLICATIONS: ICD-10-CM

## 2024-10-28 DIAGNOSIS — Z83.719 FAMILY HISTORY OF COLON POLYPS, UNSPECIFIED: ICD-10-CM

## 2024-10-28 DIAGNOSIS — K52.9 NONINFECTIVE GASTROENTERITIS AND COLITIS, UNSPECIFIED: ICD-10-CM

## 2024-10-28 DIAGNOSIS — Z12.11 ENCOUNTER FOR SCREENING FOR MALIGNANT NEOPLASM OF COLON: ICD-10-CM

## 2024-10-28 DIAGNOSIS — K57.30 DIVERTICULOSIS OF LARGE INTESTINE WITHOUT PERFORATION OR ABSCESS WITHOUT BLEEDING: ICD-10-CM

## 2024-10-28 DIAGNOSIS — D12.2 BENIGN NEOPLASM OF ASCENDING COLON: ICD-10-CM

## 2024-10-28 DIAGNOSIS — Z86.0100 PERSONAL HISTORY OF COLON POLYPS, UNSPECIFIED: ICD-10-CM

## 2024-10-30 LAB — REF LAB TEST METHOD: NORMAL

## 2024-11-01 ENCOUNTER — TELEPHONE (OUTPATIENT)
Dept: GASTROENTEROLOGY | Facility: CLINIC | Age: 79
End: 2024-11-01
Payer: MEDICARE

## 2024-11-01 NOTE — TELEPHONE ENCOUNTER
----- Message from Ac Gill sent at 10/31/2024  5:00 PM EDT -----  There is some mild colitis in the sigmoid colon that is unchanged.  The remaining colon biopsies were unremarkable.  There was 1 adenoma type polyp.  She is scheduled for follow-up in the office with Demetria

## 2024-11-15 ENCOUNTER — OFFICE VISIT (OUTPATIENT)
Dept: ORTHOPEDIC SURGERY | Facility: CLINIC | Age: 79
End: 2024-11-15
Payer: MEDICARE

## 2024-11-15 VITALS
BODY MASS INDEX: 29.96 KG/M2 | HEIGHT: 66 IN | DIASTOLIC BLOOD PRESSURE: 98 MMHG | SYSTOLIC BLOOD PRESSURE: 150 MMHG | WEIGHT: 186.4 LBS

## 2024-11-15 DIAGNOSIS — I10 ESSENTIAL HYPERTENSION: ICD-10-CM

## 2024-11-15 DIAGNOSIS — M17.0 PRIMARY OSTEOARTHRITIS OF BOTH KNEES: Primary | ICD-10-CM

## 2024-11-15 PROCEDURE — 3080F DIAST BP >= 90 MM HG: CPT | Performed by: ORTHOPAEDIC SURGERY

## 2024-11-15 PROCEDURE — 3077F SYST BP >= 140 MM HG: CPT | Performed by: ORTHOPAEDIC SURGERY

## 2024-11-15 PROCEDURE — 1160F RVW MEDS BY RX/DR IN RCRD: CPT | Performed by: ORTHOPAEDIC SURGERY

## 2024-11-15 PROCEDURE — 99214 OFFICE O/P EST MOD 30 MIN: CPT | Performed by: ORTHOPAEDIC SURGERY

## 2024-11-15 PROCEDURE — 1159F MED LIST DOCD IN RCRD: CPT | Performed by: ORTHOPAEDIC SURGERY

## 2024-11-15 NOTE — PROGRESS NOTES
[facility] Note        Subjective     CC: Follow-up (2 month follow up--Primary osteoarthritis of both knees )      HPI    Coco Steele is a 78 y.o. female.  I last saw her September 9 to discuss left knee replacement but now her right knee is worse.  She would like to schedule her right total knee arthroplasty to be done in December rather than her left knee.    Overall, patient's symptoms are unchanged.  Worse arthritis in the right knee.    ROS:    Constiutional:Pt denies fever, chills, nausea, or vomiting.  MSK:as above        Objective      Past Medical History  Past Medical History:   Diagnosis Date    Abdominal pain, epigastric     Abdominal pain, left lower quadrant     Abnormal findings on diagnostic imaging of abdomen     ABFND, RADIOLOGICAL, ABDOMINAL AREA     Adenomatous colon polyp 10/02/2023    Dr Gill    Allergic     Allergic rhinitis     Arthritis     Cancer 10/2014 removed    Renal Cell carcinoma left    Chronic gout involving toe without tophus 03/01/2024    Chronic ulcerative colitis without complication     Colon polyp     Constipation     Diarrhea     Diverticulosis     Encounter for Hemoccult screening     HEMOCCULT POSITIVE STOOLS     Fracture of wrist 2009    Wrist-Hairline fracture    Gastroesophageal reflux disease     esophagitis presence not specified    GERD (gastroesophageal reflux disease)     H/O mammogram 03/08/2024    Headache As long as I can remember    Herpes zoster     Hyperlipidemia     Hypertension     Irritable bowel syndrome     Knee swelling `    Nausea     Obesity     Rectal bleeding     Rectal bleeding     Regurgitation     Rotator cuff syndrome     Scoliosis     Ulcerative colitis      Social History     Socioeconomic History    Marital status:    Tobacco Use    Smoking status: Never     Passive exposure: Past    Smokeless tobacco: Never   Vaping Use    Vaping status: Never Used   Substance and Sexual Activity    Alcohol use: No     "Drug use: No    Sexual activity: Not Currently     Comment: menopause          Physical Exam  /98   Ht 166.4 cm (65.51\")   Wt 84.6 kg (186 lb 6.4 oz)   BMI 30.54 kg/m²     Body mass index is 30.54 kg/m².    Patient is well nourished and well developed.        Ortho Exam  Severe arthritis in both knees.  Crepitus.  Medial lateral joint tenderness.    Imaging/Labs/EMG Reviewed and Interpreted:  Imaging Results (Last 24 Hours)       ** No results found for the last 24 hours. **          Severe arthritis both knees    Assessment    Assessment:  1. Primary osteoarthritis of both knees    2. Essential hypertension        Plan:  Recommend over the counter anti-inflammatories for pain and/or swelling  The plan be for right total knee arthroplasty.Treatment options and alternatives were discussed with patient.  Surgical risks include but are not limited to pain, bleeding, infection, failure to relieve symptoms, need for further procedures, recurrence of symptoms, damage to healthy adjacent structures, hardware loosening/failure, stiffness, weakness, scar, blood clots/DVT/PE, loss of limb or life. We also discussed the postoperative protocol and expected outcome although no guarantees are possible with surgery. All questions were answered; the patient would like to proceed with surgical intervention.      Paul Whitaker MD  11/15/24  09:32 EST      Dictated Utilizing Dragon Dictation.  "

## 2024-11-20 DIAGNOSIS — M17.11 PRIMARY OSTEOARTHRITIS OF RIGHT KNEE: Primary | ICD-10-CM

## 2024-12-04 ENCOUNTER — PRE-ADMISSION TESTING (OUTPATIENT)
Dept: PREADMISSION TESTING | Facility: HOSPITAL | Age: 79
End: 2024-12-04
Payer: MEDICARE

## 2024-12-04 VITALS — WEIGHT: 190.37 LBS | BODY MASS INDEX: 30.59 KG/M2 | HEIGHT: 66 IN

## 2024-12-04 LAB
ANION GAP SERPL CALCULATED.3IONS-SCNC: 12 MMOL/L (ref 5–15)
APTT PPP: 28.9 SECONDS (ref 22–39)
BUN SERPL-MCNC: 14 MG/DL (ref 8–23)
BUN/CREAT SERPL: 15.6 (ref 7–25)
CALCIUM SPEC-SCNC: 9.2 MG/DL (ref 8.6–10.5)
CHLORIDE SERPL-SCNC: 102 MMOL/L (ref 98–107)
CO2 SERPL-SCNC: 24 MMOL/L (ref 22–29)
CREAT SERPL-MCNC: 0.9 MG/DL (ref 0.57–1)
CRP SERPL-MCNC: <0.3 MG/DL (ref 0–0.5)
DEPRECATED RDW RBC AUTO: 45.5 FL (ref 37–54)
EGFRCR SERPLBLD CKD-EPI 2021: 65.6 ML/MIN/1.73
ERYTHROCYTE [DISTWIDTH] IN BLOOD BY AUTOMATED COUNT: 13.3 % (ref 12.3–15.4)
ERYTHROCYTE [SEDIMENTATION RATE] IN BLOOD: 18 MM/HR (ref 0–30)
GLUCOSE SERPL-MCNC: 104 MG/DL (ref 65–99)
HBA1C MFR BLD: 5.2 % (ref 4.8–5.6)
HCT VFR BLD AUTO: 37.5 % (ref 34–46.6)
HGB BLD-MCNC: 12 G/DL (ref 12–15.9)
INR PPP: 0.91 (ref 0.89–1.12)
MCH RBC QN AUTO: 29.9 PG (ref 26.6–33)
MCHC RBC AUTO-ENTMCNC: 32 G/DL (ref 31.5–35.7)
MCV RBC AUTO: 93.5 FL (ref 79–97)
PLATELET # BLD AUTO: 201 10*3/MM3 (ref 140–450)
PMV BLD AUTO: 9.4 FL (ref 6–12)
POTASSIUM SERPL-SCNC: 4.1 MMOL/L (ref 3.5–5.2)
PROTHROMBIN TIME: 12.4 SECONDS (ref 12.2–14.5)
RBC # BLD AUTO: 4.01 10*6/MM3 (ref 3.77–5.28)
SODIUM SERPL-SCNC: 138 MMOL/L (ref 136–145)
WBC NRBC COR # BLD AUTO: 3.86 10*3/MM3 (ref 3.4–10.8)

## 2024-12-04 PROCEDURE — 80048 BASIC METABOLIC PNL TOTAL CA: CPT

## 2024-12-04 PROCEDURE — 93005 ELECTROCARDIOGRAM TRACING: CPT

## 2024-12-04 PROCEDURE — 85730 THROMBOPLASTIN TIME PARTIAL: CPT

## 2024-12-04 PROCEDURE — 85610 PROTHROMBIN TIME: CPT

## 2024-12-04 PROCEDURE — 36415 COLL VENOUS BLD VENIPUNCTURE: CPT

## 2024-12-04 PROCEDURE — 85652 RBC SED RATE AUTOMATED: CPT

## 2024-12-04 PROCEDURE — 83036 HEMOGLOBIN GLYCOSYLATED A1C: CPT

## 2024-12-04 PROCEDURE — 85027 COMPLETE CBC AUTOMATED: CPT

## 2024-12-04 PROCEDURE — 86140 C-REACTIVE PROTEIN: CPT

## 2024-12-04 RX ORDER — PHENOL 1.4 %
1 AEROSOL, SPRAY (ML) MUCOUS MEMBRANE NIGHTLY
COMMUNITY

## 2024-12-04 NOTE — PAT
An arrival time for procedure was not provided during PAT visit. If patient had any questions or concerns about their arrival time, they were instructed to contact their surgeon/physician.  Additionally, if the patient referred to an arrival time that was acquired from their my chart account, patient was encouraged to verify that time with their surgeon/physician. Arrival times are NOT provided in Pre Admission Testing Department.    Clean catch urinalysis not indicated because patient denied recent urinary frequency, urinary urgency, burning/pain upon urination, or flank pain. No recent UTIs.    Patient denies any current skin issues.     Prescription for Chlorhexidine shower called into patient's pharmacy or [facility] pharmacy by patient's surgeon.  Reinforced with patient to  the prescription from applicable pharmacy if they haven't already.  Verbal and written instructions given regarding proper use of Chlorhexidine body wash to patient and/or famlily during PAT visit. Patient/family also instructed to complete checklist and return it to Pre-op on the day of surgery.  Patient and/or family verbalized understanding.    Patient to apply Chlorhexadine wipes  to surgical area (as instructed) the night before procedure and the AM of procedure. Wipes provided.    Patient instructed to drink 20 ounces of Gatorade or Gatorlyte (if diabetic) and it needs to be completed 1 hour (for Main OR patients) or 2 hours (scheduled  section & [facility] patients) before given arrival time for procedure (NO RED Gatorade and NO Gatorade Zero).    Patient verbalized understanding.    Discussed with patient options for receiving total joint replacement education and assessed patient's ability and preference. Joint Replacement Guide given to patient during PAT visit since not received a copy within the last year. Encouraged patient/family to read guide thoroughly and notify PAT staff with any questions or concerns. Handout provided  directing patient to links to watch online videos related to joint replacement surgery on the [facility] website. The handout gives detailed instructions for joining an online joint replacement class through Microsoft Teams or phone conference offered on the 1st and 3rd Thursdays of the month. Patient agreed to participate by watching videos online. Patient verbalized understanding of instructions. Encouraged to share information with family and/or . An overview of the joint replacement education was provided during the visit including general perioperative instructions that are routine for all surgical patients (PAT PASS, wipes, directions to pre-op, etc.).    Patient viewed general [facility] education video as instructed in their preoperative information received from their surgeon.  Patient stated the general PAT education video was viewed in its entirety and survey completed.  Copies of [facility] general education handouts (Incentive Spirometry, Meds to Beds Program, Patient Belongings, Pre-op skin preparation instructions, Blood Glucose testing, Visitor policy, Surgery FAQ, Code H) distributed to patient if not printed. Education related to the PAT pass and skin preparation for surgery (if applicable) completed in [facility] as a reinforcement to PAT education video. Patient instructed to return PAT pass provided today as well as completed skin preparation sheet (if applicable) on the day of procedure.     Additionally if patient had not viewed video yet but intended to view it at home or in our waiting area, then referred them to the handout with QR code/link provided during PAT visit.  Encouraged patient/family to read [facility] general education handouts thoroughly and notify PAT staff with any questions or concerns. Patient verbalized understanding of all information and priority content.

## 2024-12-05 LAB
QT INTERVAL: 374 MS
QTC INTERVAL: 409 MS

## 2024-12-11 ENCOUNTER — TRANSITIONAL CARE MANAGEMENT TELEPHONE ENCOUNTER (OUTPATIENT)
Dept: ORTHOPEDIC SURGERY | Facility: CLINIC | Age: 79
End: 2024-12-11
Payer: MEDICARE

## 2024-12-11 DIAGNOSIS — M17.11 PRIMARY OSTEOARTHRITIS OF RIGHT KNEE: Primary | ICD-10-CM

## 2024-12-11 NOTE — OUTREACH NOTE
TOTAL JOINT REPLACEMENT CARE NAVIGATION INITIAL ASSESSMENT    PATIENT INFORMATION:     Patient: Coco Steele       YOB: 1945         Age/Gender: 79 y.o. female     Medical Record Number: [id number]     Surgery:   LT TKA                         Surgery Date: 12/18/24    Surgeon: DR. GIOVANNA OAKLEY    Physical Therapy Location: [facility]    PT Date & Time: 12/20 @ 1:00PM    DVT PPX: DR. OAKLEY TO DISCUSS      LIVING SITUATION:     [x]Private Residence      Who lives in the home?                          []Nursing Home:                                     []Assisted Living  []Rehabilitation Facility:                          [x]Live Alone  []Homeless    HOUSING STYLE:     [x]Ranch Style   []Multi-level   []Split level   []Townhouse   []Apartment    Do you have steps to navigate in the home? NO  Do you have steps to navigate outside the home? 1 STEP FROM GARAGE INTO THE HOUSE      ADL:     [x]Independent   []Independent with difficulty   []Partially Dependent   []Dependent    Do you require bathing/showering? NO  Do you require assistance with grooming (brushing hair, shaving, etc)? NO  Do you require assistance dressing? NO  Do you require assistance with walking? NO  Do you require assistive devices while walking (cane, walker, wheelchair)? NO  Do you require assistance with transfers (moving from bed to chair, wheelchair, etc)? NO  Do you require assistance preparing meals? NO  Do you require assistance when eating meals? NO  Do you require assistance to use the toilet? NO  Do you have any issues with bowel or bladder incontinence? NO  Do you require assistance with household chores (cleaning, laundry, etc)? NO  Have you had any recent falls?    CURRENT SERVICES:    []Home Health (PT, OT, Skilled Nursing)  Agency:    County:  []Palliative Care  []Meals on Wheels  []Daily Caregiver  [x]None    CURRENT DME: NEEDS WALKER, ORDER PLACED. PATIENT WAS EDUCATED ON ALL OTHER RECOMMENDED  DME.    []Walker   []Cane   []Wheelchair   []Crutches   []Bedside Commode   []Shower Chair  []Hospital Bed   []Nebulizer   []Oxygen  []Other:    MEDICATIONS:    Are you currently on any blood thinners? ASPIRIN  Are you currently on any anti-inflammatory medications? NO  Are you currently on any medications for rheumatoid arthritis? NO  Are you currently taking any herbal supplements? YES - STOPPED ALREADY      Post-op Pharmacy Name:  [facility]ED[facility]                     Phone Number: [phone]    Does anyone assist you filling medication boxes or remind you that medication is due? NO  Are you currently on a mail order prescription plan? NO  What pharmacy do you use to fill your short-term prescriptions? [facility]  Do you have prescription insurance coverage? YES  Can you afford your medications/co-pays? YES    CURRENT TRANSPORTATION:    Which services do you rely on? []Taxi   []Public Transportation   [x]Private Vehicle     []Ambulance   []Tack (Transportation Assistance [facility])    Do you have a way to get home when you are discharged? YES    POTENTIAL NEEDS:    []Rehabilitation   []Home Health PT   []SNF  []Meals on Wheels   []Department of    []Palliative Care  []Nursing Home   []Hospice   [x]Durable Medical Equipment  []Caregiver Services   []Financial Services   []Pre-op In Home PT Evaluation    CLEARANCES NEEDED FOR SURGERY: PATIENT ADVISED THIS WAS GIVEN TO PAT    [x]Dental   []Cardiology   []Pulmonology   []Medical (PCP)   []Rheumatology  []Endocrinology   []Hematology/Oncology   []Neurology   []Neurosurgery   []CT Vascular      INITIAL DISCHARGE PLAN: OUTPATIENT, D/C HOME WITH ASSISTANCE FROM FRIEND TOOTIE. THE PATIENT HAS NO DME, AND WAS EDUCATED ON ALL RECOMMENDED PRODUCTS. AN ORDER FOR A WALKER WAS PLACED, WHICH SHE WOULD LIKE TO GET DAY OF SURGERY. PHYSICAL THERAPY WILL BEGIN WITH [facility] IN [place] ON 12/20 @ 1:00. DR. OAKLEY WILL DISCUSS DVT PROPHYLAXIS WITH  THE PATIENT.

## 2024-12-17 ENCOUNTER — TELEPHONE (OUTPATIENT)
Dept: ORTHOPEDIC SURGERY | Facility: CLINIC | Age: 79
End: 2024-12-17
Payer: MEDICARE

## 2024-12-17 ENCOUNTER — ANESTHESIA EVENT (OUTPATIENT)
Dept: PERIOP | Facility: HOSPITAL | Age: 79
End: 2024-12-17
Payer: MEDICARE

## 2024-12-17 RX ORDER — FAMOTIDINE 10 MG/ML
20 INJECTION, SOLUTION INTRAVENOUS ONCE
Status: CANCELLED | OUTPATIENT
Start: 2024-12-17 | End: 2024-12-17

## 2024-12-17 NOTE — TELEPHONE ENCOUNTER
I called patient and left a voicemail. Their arrival time for surgery. Arrival time for their surgery is 5:30 AM. Surgery location is Main registration first floor of [address] [facility]. NPO after midnight, may have up to 20 oz of Gatorade any color but RED, up until 1 hour before arrival time for surgery.

## 2024-12-18 ENCOUNTER — ANESTHESIA (OUTPATIENT)
Dept: PERIOP | Facility: HOSPITAL | Age: 79
End: 2024-12-18
Payer: MEDICARE

## 2024-12-18 ENCOUNTER — APPOINTMENT (OUTPATIENT)
Dept: GENERAL RADIOLOGY | Facility: HOSPITAL | Age: 79
End: 2024-12-18
Payer: MEDICARE

## 2024-12-18 ENCOUNTER — ANESTHESIA EVENT CONVERTED (OUTPATIENT)
Dept: ANESTHESIOLOGY | Facility: HOSPITAL | Age: 79
End: 2024-12-18
Payer: MEDICARE

## 2024-12-18 ENCOUNTER — HOSPITAL ENCOUNTER (OUTPATIENT)
Facility: HOSPITAL | Age: 79
Discharge: HOME | End: 2024-12-20
Attending: ORTHOPAEDIC SURGERY | Admitting: ORTHOPAEDIC SURGERY
Payer: MEDICARE

## 2024-12-18 DIAGNOSIS — M17.0 PRIMARY OSTEOARTHRITIS OF BOTH KNEES: ICD-10-CM

## 2024-12-18 DIAGNOSIS — Z96.651 STATUS POST TOTAL RIGHT KNEE REPLACEMENT: Primary | ICD-10-CM

## 2024-12-18 PROBLEM — M17.11 ARTHRITIS OF KNEE, RIGHT: Status: ACTIVE | Noted: 2024-12-18

## 2024-12-18 LAB — GLUCOSE BLDC GLUCOMTR-MCNC: 96 MG/DL (ref 70–130)

## 2024-12-18 PROCEDURE — C1713 ANCHOR/SCREW BN/BN,TIS/BN: HCPCS | Performed by: ORTHOPAEDIC SURGERY

## 2024-12-18 PROCEDURE — 97116 GAIT TRAINING THERAPY: CPT

## 2024-12-18 PROCEDURE — A9270 NON-COVERED ITEM OR SERVICE: HCPCS | Performed by: ORTHOPAEDIC SURGERY

## 2024-12-18 PROCEDURE — 63710000001 LISINOPRIL 10 MG TABLET: Performed by: ORTHOPAEDIC SURGERY

## 2024-12-18 PROCEDURE — 25010000002 ROPIVACAINE HCL-NACL 0.2-0.9 % SOLUTION: Performed by: NURSE ANESTHETIST, CERTIFIED REGISTERED

## 2024-12-18 PROCEDURE — 27447 TOTAL KNEE ARTHROPLASTY: CPT | Performed by: ORTHOPAEDIC SURGERY

## 2024-12-18 PROCEDURE — 25010000002 ONDANSETRON PER 1 MG: Performed by: NURSE ANESTHETIST, CERTIFIED REGISTERED

## 2024-12-18 PROCEDURE — 63710000001 DOCUSATE SODIUM 100 MG CAPSULE: Performed by: ORTHOPAEDIC SURGERY

## 2024-12-18 PROCEDURE — 73560 X-RAY EXAM OF KNEE 1 OR 2: CPT

## 2024-12-18 PROCEDURE — 25810000003 LACTATED RINGERS PER 1000 ML: Performed by: STUDENT IN AN ORGANIZED HEALTH CARE EDUCATION/TRAINING PROGRAM

## 2024-12-18 PROCEDURE — 82948 REAGENT STRIP/BLOOD GLUCOSE: CPT

## 2024-12-18 PROCEDURE — 25010000002 DEXAMETHASONE SODIUM PHOSPHATE 10 MG/ML SOLUTION: Performed by: NURSE ANESTHETIST, CERTIFIED REGISTERED

## 2024-12-18 PROCEDURE — 63710000001 ALLOPURINOL 100 MG TABLET: Performed by: ORTHOPAEDIC SURGERY

## 2024-12-18 PROCEDURE — 63710000001 FLUTICASONE 50 MCG/ACT SUSPENSION 16 G BOTTLE: Performed by: ORTHOPAEDIC SURGERY

## 2024-12-18 PROCEDURE — 63710000001 ACETAMINOPHEN 500 MG TABLET: Performed by: ORTHOPAEDIC SURGERY

## 2024-12-18 PROCEDURE — 63710000001 ACETAMINOPHEN EXTRA STRENGTH 500 MG TABLET: Performed by: ORTHOPAEDIC SURGERY

## 2024-12-18 PROCEDURE — C1776 JOINT DEVICE (IMPLANTABLE): HCPCS | Performed by: ORTHOPAEDIC SURGERY

## 2024-12-18 PROCEDURE — 97110 THERAPEUTIC EXERCISES: CPT

## 2024-12-18 PROCEDURE — 25010000002 BUPIVACAINE (PF) 0.25 % SOLUTION: Performed by: NURSE ANESTHETIST, CERTIFIED REGISTERED

## 2024-12-18 PROCEDURE — 63710000001 ATORVASTATIN 10 MG TABLET: Performed by: ORTHOPAEDIC SURGERY

## 2024-12-18 PROCEDURE — 63710000001 MESALAMINE 1.2 G TABLET DELAYED-RELEASE: Performed by: ORTHOPAEDIC SURGERY

## 2024-12-18 PROCEDURE — C1755 CATHETER, INTRASPINAL: HCPCS | Performed by: ORTHOPAEDIC SURGERY

## 2024-12-18 PROCEDURE — 63710000001 MULTIVITAMIN WITH MINERALS TABLET: Performed by: ORTHOPAEDIC SURGERY

## 2024-12-18 PROCEDURE — 63710000001 PANTOPRAZOLE 40 MG TABLET DELAYED-RELEASE: Performed by: ORTHOPAEDIC SURGERY

## 2024-12-18 PROCEDURE — 25010000002 CEFAZOLIN PER 500 MG: Performed by: ORTHOPAEDIC SURGERY

## 2024-12-18 PROCEDURE — 97162 PT EVAL MOD COMPLEX 30 MIN: CPT

## 2024-12-18 PROCEDURE — 25010000002 PROPOFOL 10 MG/ML EMULSION: Performed by: NURSE ANESTHETIST, CERTIFIED REGISTERED

## 2024-12-18 PROCEDURE — 25010000002 LIDOCAINE PF 1% 1 % SOLUTION: Performed by: STUDENT IN AN ORGANIZED HEALTH CARE EDUCATION/TRAINING PROGRAM

## 2024-12-18 PROCEDURE — 25010000002 LIDOCAINE PF 1% 1 % SOLUTION: Performed by: NURSE ANESTHETIST, CERTIFIED REGISTERED

## 2024-12-18 PROCEDURE — 25010000002 CEFAZOLIN PER 500 MG

## 2024-12-18 PROCEDURE — 25010000002 MEPIVACAINE HCL (PF) 1.5 % SOLUTION: Performed by: STUDENT IN AN ORGANIZED HEALTH CARE EDUCATION/TRAINING PROGRAM

## 2024-12-18 PROCEDURE — 25010000002 DEXAMETHASONE PER 1 MG: Performed by: NURSE ANESTHETIST, CERTIFIED REGISTERED

## 2024-12-18 PROCEDURE — S0260 H&P FOR SURGERY: HCPCS | Performed by: ORTHOPAEDIC SURGERY

## 2024-12-18 PROCEDURE — 25010000002 SODIUM CHLORIDE 0.9 % WITH KCL 20 MEQ 20-0.9 MEQ/L-% SOLUTION: Performed by: ORTHOPAEDIC SURGERY

## 2024-12-18 DEVICE — IMPLANTABLE DEVICE
Type: IMPLANTABLE DEVICE | Site: PATELLA | Status: FUNCTIONAL
Brand: PERSONA®

## 2024-12-18 DEVICE — IMPLANTABLE DEVICE
Type: IMPLANTABLE DEVICE | Site: KNEE | Status: FUNCTIONAL
Brand: PERSONA® VIVACIT-E®

## 2024-12-18 DEVICE — DEV CONTRL TISS STRATAFIX SPIRAL MNCRYL UD 3/0 PLS 60CM: Type: IMPLANTABLE DEVICE | Site: KNEE | Status: FUNCTIONAL

## 2024-12-18 DEVICE — IMPLANTABLE DEVICE
Type: IMPLANTABLE DEVICE | Site: KNEE | Status: FUNCTIONAL
Brand: BIOMET® BONE CEMENT R

## 2024-12-18 DEVICE — CAP TOTL KN CMT PRIMARY: Type: IMPLANTABLE DEVICE | Site: KNEE | Status: FUNCTIONAL

## 2024-12-18 DEVICE — IMPLANTABLE DEVICE
Type: IMPLANTABLE DEVICE | Site: KNEE | Status: FUNCTIONAL
Brand: PERSONA® NATURAL TIBIA®

## 2024-12-18 DEVICE — IMPLANTABLE DEVICE
Type: IMPLANTABLE DEVICE | Site: KNEE | Status: FUNCTIONAL
Brand: PERSONA®

## 2024-12-18 DEVICE — DEV CONTRL TISS STRATAFIX SYMM PDS PLUS VIL CT-1 45CM: Type: IMPLANTABLE DEVICE | Site: KNEE | Status: FUNCTIONAL

## 2024-12-18 RX ORDER — SODIUM CHLORIDE 9 MG/ML
40 INJECTION, SOLUTION INTRAVENOUS AS NEEDED
Status: DISCONTINUED | OUTPATIENT
Start: 2024-12-18 | End: 2024-12-20 | Stop reason: HOSPADM

## 2024-12-18 RX ORDER — SODIUM CHLORIDE 9 MG/ML
INJECTION, SOLUTION INTRAVENOUS
Status: DISPENSED
Start: 2024-12-18 | End: 2024-12-18

## 2024-12-18 RX ORDER — FAMOTIDINE 20 MG/1
20 TABLET, FILM COATED ORAL ONCE
Status: COMPLETED | OUTPATIENT
Start: 2024-12-18 | End: 2024-12-18

## 2024-12-18 RX ORDER — MIDAZOLAM HYDROCHLORIDE 1 MG/ML
0.5 INJECTION, SOLUTION INTRAMUSCULAR; INTRAVENOUS
Status: DISCONTINUED | OUTPATIENT
Start: 2024-12-18 | End: 2024-12-18 | Stop reason: HOSPADM

## 2024-12-18 RX ORDER — SODIUM CHLORIDE 0.9 % (FLUSH) 0.9 %
10 SYRINGE (ML) INJECTION AS NEEDED
Status: DISCONTINUED | OUTPATIENT
Start: 2024-12-18 | End: 2024-12-18 | Stop reason: HOSPADM

## 2024-12-18 RX ORDER — HYDROMORPHONE HYDROCHLORIDE 1 MG/ML
0.5 INJECTION, SOLUTION INTRAMUSCULAR; INTRAVENOUS; SUBCUTANEOUS
Status: DISCONTINUED | OUTPATIENT
Start: 2024-12-18 | End: 2024-12-18 | Stop reason: HOSPADM

## 2024-12-18 RX ORDER — ONDANSETRON 2 MG/ML
INJECTION INTRAMUSCULAR; INTRAVENOUS AS NEEDED
Status: DISCONTINUED | OUTPATIENT
Start: 2024-12-18 | End: 2024-12-18 | Stop reason: SURG

## 2024-12-18 RX ORDER — ONDANSETRON 4 MG/1
4 TABLET, ORALLY DISINTEGRATING ORAL EVERY 6 HOURS PRN
Status: DISCONTINUED | OUTPATIENT
Start: 2024-12-18 | End: 2024-12-20 | Stop reason: HOSPADM

## 2024-12-18 RX ORDER — BUPIVACAINE HYDROCHLORIDE 2.5 MG/ML
INJECTION, SOLUTION EPIDURAL; INFILTRATION; INTRACAUDAL
Status: DISCONTINUED | OUTPATIENT
Start: 2024-12-18 | End: 2024-12-18 | Stop reason: SURG

## 2024-12-18 RX ORDER — DEXAMETHASONE SODIUM PHOSPHATE 4 MG/ML
INJECTION, SOLUTION INTRA-ARTICULAR; INTRALESIONAL; INTRAMUSCULAR; INTRAVENOUS; SOFT TISSUE AS NEEDED
Status: DISCONTINUED | OUTPATIENT
Start: 2024-12-18 | End: 2024-12-18 | Stop reason: SURG

## 2024-12-18 RX ORDER — PROPOFOL 10 MG/ML
VIAL (ML) INTRAVENOUS AS NEEDED
Status: DISCONTINUED | OUTPATIENT
Start: 2024-12-18 | End: 2024-12-18 | Stop reason: SURG

## 2024-12-18 RX ORDER — ROPIVACAINE HYDROCHLORIDE 2 MG/ML
INJECTION, SOLUTION EPIDURAL; INFILTRATION; PERINEURAL CONTINUOUS
Status: DISCONTINUED | OUTPATIENT
Start: 2024-12-18 | End: 2024-12-20 | Stop reason: HOSPADM

## 2024-12-18 RX ORDER — CHLORHEXIDINE GLUCONATE 500 MG/1
CLOTH TOPICAL ONCE
Status: DISCONTINUED | OUTPATIENT
Start: 2024-12-18 | End: 2024-12-18 | Stop reason: HOSPADM

## 2024-12-18 RX ORDER — DOCUSATE SODIUM 100 MG/1
100 CAPSULE, LIQUID FILLED ORAL NIGHTLY
Status: DISCONTINUED | OUTPATIENT
Start: 2024-12-18 | End: 2024-12-20 | Stop reason: HOSPADM

## 2024-12-18 RX ORDER — ASPIRIN 325 MG
325 TABLET, DELAYED RELEASE (ENTERIC COATED) ORAL DAILY
Status: DISCONTINUED | OUTPATIENT
Start: 2024-12-19 | End: 2024-12-20 | Stop reason: HOSPADM

## 2024-12-18 RX ORDER — MULTIPLE VITAMINS W/ MINERALS TAB 9MG-400MCG
1 TAB ORAL DAILY
Status: DISCONTINUED | OUTPATIENT
Start: 2024-12-18 | End: 2024-12-20 | Stop reason: HOSPADM

## 2024-12-18 RX ORDER — ACETAMINOPHEN 500 MG
1000 TABLET ORAL EVERY 6 HOURS SCHEDULED
Status: DISCONTINUED | OUTPATIENT
Start: 2024-12-18 | End: 2024-12-20 | Stop reason: HOSPADM

## 2024-12-18 RX ORDER — DOCUSATE SODIUM 100 MG/1
100 CAPSULE, LIQUID FILLED ORAL 2 TIMES DAILY PRN
Status: DISCONTINUED | OUTPATIENT
Start: 2024-12-18 | End: 2024-12-20 | Stop reason: HOSPADM

## 2024-12-18 RX ORDER — LIDOCAINE HYDROCHLORIDE 10 MG/ML
INJECTION, SOLUTION EPIDURAL; INFILTRATION; INTRACAUDAL; PERINEURAL AS NEEDED
Status: DISCONTINUED | OUTPATIENT
Start: 2024-12-18 | End: 2024-12-18 | Stop reason: SURG

## 2024-12-18 RX ORDER — MELOXICAM 15 MG/1
15 TABLET ORAL DAILY
Status: DISCONTINUED | OUTPATIENT
Start: 2024-12-19 | End: 2024-12-20 | Stop reason: HOSPADM

## 2024-12-18 RX ORDER — LIDOCAINE HYDROCHLORIDE 10 MG/ML
0.5 INJECTION, SOLUTION EPIDURAL; INFILTRATION; INTRACAUDAL; PERINEURAL ONCE AS NEEDED
Status: COMPLETED | OUTPATIENT
Start: 2024-12-18 | End: 2024-12-18

## 2024-12-18 RX ORDER — FLUTICASONE PROPIONATE 50 MCG
1 SPRAY, SUSPENSION (ML) NASAL NIGHTLY
Status: DISCONTINUED | OUTPATIENT
Start: 2024-12-18 | End: 2024-12-20 | Stop reason: HOSPADM

## 2024-12-18 RX ORDER — DEXAMETHASONE SODIUM PHOSPHATE 10 MG/ML
INJECTION, SOLUTION INTRAMUSCULAR; INTRAVENOUS
Status: COMPLETED | OUTPATIENT
Start: 2024-12-18 | End: 2024-12-18

## 2024-12-18 RX ORDER — MORPHINE SULFATE 4 MG/ML
4 INJECTION, SOLUTION INTRAMUSCULAR; INTRAVENOUS
Status: DISCONTINUED | OUTPATIENT
Start: 2024-12-18 | End: 2024-12-20 | Stop reason: HOSPADM

## 2024-12-18 RX ORDER — SODIUM CHLORIDE AND POTASSIUM CHLORIDE 150; 900 MG/100ML; MG/100ML
50 INJECTION, SOLUTION INTRAVENOUS CONTINUOUS
Status: DISPENSED | OUTPATIENT
Start: 2024-12-18 | End: 2024-12-19

## 2024-12-18 RX ORDER — SODIUM CHLORIDE 0.9 % (FLUSH) 0.9 %
3-10 SYRINGE (ML) INJECTION AS NEEDED
Status: DISCONTINUED | OUTPATIENT
Start: 2024-12-18 | End: 2024-12-20 | Stop reason: HOSPADM

## 2024-12-18 RX ORDER — TRANEXAMIC ACID 10 MG/ML
1000 INJECTION, SOLUTION INTRAVENOUS ONCE
Status: COMPLETED | OUTPATIENT
Start: 2024-12-18 | End: 2024-12-18

## 2024-12-18 RX ORDER — LISINOPRIL 10 MG/1
10 TABLET ORAL NIGHTLY
Status: DISCONTINUED | OUTPATIENT
Start: 2024-12-18 | End: 2024-12-19

## 2024-12-18 RX ORDER — MAGNESIUM HYDROXIDE 1200 MG/15ML
LIQUID ORAL AS NEEDED
Status: DISCONTINUED | OUTPATIENT
Start: 2024-12-18 | End: 2024-12-18 | Stop reason: HOSPADM

## 2024-12-18 RX ORDER — SODIUM CHLORIDE 0.9 % (FLUSH) 0.9 %
10 SYRINGE (ML) INJECTION EVERY 12 HOURS SCHEDULED
Status: DISCONTINUED | OUTPATIENT
Start: 2024-12-18 | End: 2024-12-18 | Stop reason: HOSPADM

## 2024-12-18 RX ORDER — ALLOPURINOL 100 MG/1
100 TABLET ORAL NIGHTLY
Status: DISCONTINUED | OUTPATIENT
Start: 2024-12-18 | End: 2024-12-20 | Stop reason: HOSPADM

## 2024-12-18 RX ORDER — FENTANYL CITRATE 50 UG/ML
50 INJECTION, SOLUTION INTRAMUSCULAR; INTRAVENOUS
Status: DISCONTINUED | OUTPATIENT
Start: 2024-12-18 | End: 2024-12-18 | Stop reason: HOSPADM

## 2024-12-18 RX ORDER — ATORVASTATIN CALCIUM 10 MG/1
10 TABLET, FILM COATED ORAL NIGHTLY
Status: DISCONTINUED | OUTPATIENT
Start: 2024-12-18 | End: 2024-12-20 | Stop reason: HOSPADM

## 2024-12-18 RX ORDER — ONDANSETRON 2 MG/ML
4 INJECTION INTRAMUSCULAR; INTRAVENOUS EVERY 6 HOURS PRN
Status: DISCONTINUED | OUTPATIENT
Start: 2024-12-18 | End: 2024-12-20 | Stop reason: HOSPADM

## 2024-12-18 RX ORDER — SODIUM CHLORIDE 0.9 % (FLUSH) 0.9 %
3 SYRINGE (ML) INJECTION EVERY 12 HOURS SCHEDULED
Status: DISCONTINUED | OUTPATIENT
Start: 2024-12-18 | End: 2024-12-20 | Stop reason: HOSPADM

## 2024-12-18 RX ORDER — ACETAMINOPHEN 500 MG
1000 TABLET ORAL ONCE
Status: COMPLETED | OUTPATIENT
Start: 2024-12-18 | End: 2024-12-18

## 2024-12-18 RX ORDER — MELOXICAM 15 MG/1
15 TABLET ORAL ONCE
Status: COMPLETED | OUTPATIENT
Start: 2024-12-18 | End: 2024-12-18

## 2024-12-18 RX ORDER — LABETALOL HYDROCHLORIDE 5 MG/ML
10 INJECTION, SOLUTION INTRAVENOUS EVERY 4 HOURS PRN
Status: DISCONTINUED | OUTPATIENT
Start: 2024-12-18 | End: 2024-12-20 | Stop reason: HOSPADM

## 2024-12-18 RX ORDER — CHLORHEXIDINE GLUCONATE 500 MG/1
CLOTH TOPICAL ONCE
Status: DISCONTINUED | OUTPATIENT
Start: 2024-12-18 | End: 2024-12-20 | Stop reason: HOSPADM

## 2024-12-18 RX ORDER — NALOXONE HCL 0.4 MG/ML
0.4 VIAL (ML) INJECTION
Status: DISCONTINUED | OUTPATIENT
Start: 2024-12-18 | End: 2024-12-20 | Stop reason: HOSPADM

## 2024-12-18 RX ORDER — PANTOPRAZOLE SODIUM 40 MG/1
40 TABLET, DELAYED RELEASE ORAL
Status: DISCONTINUED | OUTPATIENT
Start: 2024-12-18 | End: 2024-12-18

## 2024-12-18 RX ORDER — BUPIVACAINE HYDROCHLORIDE 2.5 MG/ML
INJECTION, SOLUTION EPIDURAL; INFILTRATION; INTRACAUDAL
Status: COMPLETED | OUTPATIENT
Start: 2024-12-18 | End: 2024-12-18

## 2024-12-18 RX ORDER — SODIUM CHLORIDE, SODIUM LACTATE, POTASSIUM CHLORIDE, CALCIUM CHLORIDE 600; 310; 30; 20 MG/100ML; MG/100ML; MG/100ML; MG/100ML
9 INJECTION, SOLUTION INTRAVENOUS CONTINUOUS
Status: ACTIVE | OUTPATIENT
Start: 2024-12-19 | End: 2024-12-19

## 2024-12-18 RX ORDER — PREGABALIN 150 MG/1
150 CAPSULE ORAL ONCE
Status: COMPLETED | OUTPATIENT
Start: 2024-12-18 | End: 2024-12-18

## 2024-12-18 RX ORDER — MESALAMINE 1.2 G/1
2.4 TABLET, DELAYED RELEASE ORAL 2 TIMES DAILY WITH MEALS
Status: DISCONTINUED | OUTPATIENT
Start: 2024-12-18 | End: 2024-12-20 | Stop reason: HOSPADM

## 2024-12-18 RX ORDER — BUPIVACAINE HCL/0.9 % NACL/PF 0.125 %
PLASTIC BAG, INJECTION (ML) EPIDURAL AS NEEDED
Status: DISCONTINUED | OUTPATIENT
Start: 2024-12-18 | End: 2024-12-18 | Stop reason: SURG

## 2024-12-18 RX ORDER — OXYCODONE HYDROCHLORIDE 5 MG/1
5 TABLET ORAL EVERY 4 HOURS PRN
Status: DISCONTINUED | OUTPATIENT
Start: 2024-12-18 | End: 2024-12-20 | Stop reason: HOSPADM

## 2024-12-18 RX ORDER — PANTOPRAZOLE SODIUM 40 MG/1
40 TABLET, DELAYED RELEASE ORAL
Status: DISCONTINUED | OUTPATIENT
Start: 2024-12-18 | End: 2024-12-20 | Stop reason: HOSPADM

## 2024-12-18 RX ADMIN — Medication 100 MCG: at 08:10

## 2024-12-18 RX ADMIN — BUPIVACAINE HYDROCHLORIDE 20 ML: 2.5 INJECTION, SOLUTION EPIDURAL; INFILTRATION; INTRACAUDAL at 09:19

## 2024-12-18 RX ADMIN — MEPIVACAINE HYDROCHLORIDE 3.5 ML: 15 INJECTION, SOLUTION EPIDURAL; INFILTRATION at 07:35

## 2024-12-18 RX ADMIN — DEXAMETHASONE SODIUM PHOSPHATE 2 MG: 10 INJECTION INTRAMUSCULAR; INTRAVENOUS at 07:40

## 2024-12-18 RX ADMIN — SODIUM CHLORIDE 2 G: 900 INJECTION INTRAVENOUS at 18:06

## 2024-12-18 RX ADMIN — PREGABALIN 150 MG: 150 CAPSULE ORAL at 07:07

## 2024-12-18 RX ADMIN — ACETAMINOPHEN 1000 MG: 500 TABLET, FILM COATED ORAL at 07:07

## 2024-12-18 RX ADMIN — Medication 1000 MG: at 09:25

## 2024-12-18 RX ADMIN — SODIUM CHLORIDE 2 G: 900 INJECTION INTRAVENOUS at 10:02

## 2024-12-18 RX ADMIN — DEXAMETHASONE SODIUM PHOSPHATE 4 MG: 4 INJECTION INTRA-ARTICULAR; INTRALESIONAL; INTRAMUSCULAR; INTRAVENOUS; SOFT TISSUE at 07:45

## 2024-12-18 RX ADMIN — LIDOCAINE HYDROCHLORIDE 50 MG: 10 INJECTION, SOLUTION EPIDURAL; INFILTRATION; INTRACAUDAL; PERINEURAL at 07:35

## 2024-12-18 RX ADMIN — ACETAMINOPHEN 1000 MG: 500 TABLET, FILM COATED ORAL at 18:05

## 2024-12-18 RX ADMIN — ACETAMINOPHEN 1000 MG: 500 TABLET, FILM COATED ORAL at 12:32

## 2024-12-18 RX ADMIN — POTASSIUM CHLORIDE AND SODIUM CHLORIDE 50 ML/HR: 900; 150 INJECTION, SOLUTION INTRAVENOUS at 11:44

## 2024-12-18 RX ADMIN — Medication 100 MCG: at 08:13

## 2024-12-18 RX ADMIN — ATORVASTATIN CALCIUM 10 MG: 10 TABLET, FILM COATED ORAL at 20:52

## 2024-12-18 RX ADMIN — Medication 100 MCG: at 08:26

## 2024-12-18 RX ADMIN — Medication 1 TABLET: at 12:32

## 2024-12-18 RX ADMIN — ONDANSETRON 4 MG: 2 INJECTION INTRAMUSCULAR; INTRAVENOUS at 08:07

## 2024-12-18 RX ADMIN — PROPOFOL 80 MCG/KG/MIN: 10 INJECTION, EMULSION INTRAVENOUS at 07:36

## 2024-12-18 RX ADMIN — TRANEXAMIC ACID 1000 MG: 10 INJECTION, SOLUTION INTRAVENOUS at 07:43

## 2024-12-18 RX ADMIN — PROPOFOL 30 MG: 10 INJECTION, EMULSION INTRAVENOUS at 07:35

## 2024-12-18 RX ADMIN — Medication 3 ML: at 20:53

## 2024-12-18 RX ADMIN — TRANEXAMIC ACID 1000 MG: 10 INJECTION, SOLUTION INTRAVENOUS at 08:33

## 2024-12-18 RX ADMIN — ALLOPURINOL 100 MG: 100 TABLET ORAL at 20:52

## 2024-12-18 RX ADMIN — PANTOPRAZOLE SODIUM 40 MG: 40 TABLET, DELAYED RELEASE ORAL at 12:32

## 2024-12-18 RX ADMIN — FAMOTIDINE 20 MG: 20 TABLET, FILM COATED ORAL at 07:07

## 2024-12-18 RX ADMIN — DOCUSATE SODIUM 100 MG: 100 CAPSULE, LIQUID FILLED ORAL at 20:52

## 2024-12-18 RX ADMIN — CEFAZOLIN 2000 MG: 2 INJECTION, POWDER, LYOPHILIZED, FOR SOLUTION INTRAVENOUS at 09:40

## 2024-12-18 RX ADMIN — LIDOCAINE HYDROCHLORIDE 0.5 ML: 10 INJECTION, SOLUTION EPIDURAL; INFILTRATION; INTRACAUDAL; PERINEURAL at 07:08

## 2024-12-18 RX ADMIN — MELOXICAM 15 MG: 15 TABLET ORAL at 07:07

## 2024-12-18 RX ADMIN — BUPIVACAINE HYDROCHLORIDE 20 ML: 2.5 INJECTION, SOLUTION EPIDURAL; INFILTRATION; INTRACAUDAL; PERINEURAL at 07:40

## 2024-12-18 RX ADMIN — FLUTICASONE PROPIONATE 1 SPRAY: 50 SPRAY, METERED NASAL at 20:53

## 2024-12-18 RX ADMIN — SODIUM CHLORIDE 2 G: 900 INJECTION INTRAVENOUS at 07:34

## 2024-12-18 RX ADMIN — LISINOPRIL 10 MG: 10 TABLET ORAL at 20:53

## 2024-12-18 RX ADMIN — MESALAMINE 2.4 G: 1.2 TABLET, DELAYED RELEASE ORAL at 18:05

## 2024-12-18 RX ADMIN — SODIUM CHLORIDE, POTASSIUM CHLORIDE, SODIUM LACTATE AND CALCIUM CHLORIDE 9 ML/HR: 600; 310; 30; 20 INJECTION, SOLUTION INTRAVENOUS at 07:08

## 2024-12-18 NOTE — H&P
Patient Name: Coco Steele  MRN: [id number]  : 1945  DOS: 2024    Attending: Paul Whitaker MD    Primary Care Provider: Radha Berger MD      Chief complaint: Right knee pain    Subjective   Patient is a pleasant 79 y.o. female presented for scheduled surgery by Dr. Whitaker.  She underwent right total knee arthroplasty under spinal anesthesia.  She tolerated surgery well and was admitted for further medical management.  Her knee has been painful for many years.  She denies use of assistive device for ambulation or recent falls.    When seen postop she is doing well.  Her pain is well-controlled.  She denies nausea, shortness of breath or chest pain.  No history of DVT or PE.    Allergies:  No Known Allergies    Meds:  Medications Prior to Admission   Medication Sig Dispense Refill Last Dose/Taking    acetaminophen (TYLENOL) 650 MG 8 hr tablet Take 1 tablet by mouth Every 8 (Eight) Hours As Needed for Mild Pain.   2024 Evening    allopurinol (ZYLOPRIM) 100 MG tablet Take 1 tablet by mouth Daily. 90 tablet 1 2024 Bedtime    Calcium Carbonate 1500 (600 Ca) MG tablet Take 1 tablet by mouth Daily.   2024 Morning    Chlorhexidine Gluconate 4 % solution Apply 1 Application topically to the appropriate area as directed Daily. Shower w/solution for 5 days before surgery. Bring to [facility] to be filled. 236 mL 0 2024    docusate sodium (COLACE) 100 MG capsule Take 1 capsule by mouth Daily.   2024 Bedtime    esomeprazole (NexIUM) 40 MG capsule Take 1 capsule by mouth Daily.   2024 Morning    fluticasone (FLONASE) 50 MCG/ACT nasal spray Administer 1 spray into the nostril(s) as directed by provider Daily.   2024 Bedtime    Melatonin 10 MG tablet Take 1 tablet by mouth Every Night.   2024 Bedtime    mesalamine (LIALDA) 1.2 g EC tablet Take 2 tablets by mouth 2 (Two) Times a Day. 120 tablet 11 2024 Evening    Misc Natural Products (OSTEO BI-FLEX ADV  TRIPLE ST PO) Take 1 tablet by mouth Daily.   Past Week    Multiple Vitamins-Minerals (CENTRUM SILVER PO) Take 1 tablet by mouth Daily.   12/17/2024 Morning    ramipril (ALTACE) 10 MG capsule Take 1 capsule by mouth Daily. 90 capsule 1 12/17/2024 Evening    simvastatin (ZOCOR) 10 MG tablet Take 1 tablet by mouth Daily. 90 tablet 1 12/17/2024 Evening    SUPER B COMPLEX/C PO Take 1 tablet by mouth Daily.   12/17/2024 Morning    aspirin 81 MG EC tablet Take 1 tablet by mouth Daily.   12/11/2024    sodium-potassium-magnesium sulfates (Suprep Bowel Prep Kit) 17.5-3.13-1.6 GM/177ML solution oral solution Take 1 bottle by mouth Take As Directed. Follow instructions that were mailed to your home. If you didn't receive these call [phone]. 856 mL 0          History:   Past Medical History:   Diagnosis Date    Abdominal pain, epigastric     Abdominal pain, left lower quadrant     Abnormal findings on diagnostic imaging of abdomen     ABFND, RADIOLOGICAL, ABDOMINAL AREA     Adenomatous colon polyp 10/02/2023    Dr Gill    Allergic     Allergic rhinitis     Arthritis     Cancer 10/2014 removed    Renal Cell carcinoma left    Chronic gout involving toe without tophus 03/01/2024    Chronic ulcerative colitis without complication     Colon polyp     Constipation     Diarrhea     Diverticulosis     Encounter for Hemoccult screening     HEMOCCULT POSITIVE STOOLS     Fracture of wrist 2009    Wrist-Hairline fracture    Gastroesophageal reflux disease     esophagitis presence not specified    GERD (gastroesophageal reflux disease)     H/O mammogram 03/08/2024    Headache As long as I can remember    Herpes zoster     Hyperlipidemia     Hypertension     Irritable bowel syndrome     Knee swelling `    Nausea     Obesity     Rectal bleeding     Rectal bleeding     Regurgitation     Rotator cuff syndrome     Scoliosis     Ulcerative colitis     Wears glasses      Past Surgical History:   Procedure Laterality Date     CHOLECYSTECTOMY      COLONOSCOPY  09/23/2014    left side bx-minimal active inflammation - 2 yr    COLONOSCOPY  07/09/2014    active inflammation to extent of limited lower colonoscopy/45 cm    COLONOSCOPY  02/21/2013    mild inflammation in sigmoid, left-sided diverticulosis    COLONOSCOPY  03/05/2010    very tortuous colon not allowing visual of cecal base - 5 yrs    COLONOSCOPY  11/14/2003    FAIRLY TORTUOUS AND SPASMODIC COLON - 5 YR    COLONOSCOPY Left 09/28/2016    Procedure: COLONOSCOPY WITH ANESTHESIA;  Surgeon: David Alonzo DO;  Location: [facility] ENDOSCOPY;  Service:     COLONOSCOPY N/A 06/11/2020    Procedure: COLONOSCOPY WITH ANESTHESIA;  Surgeon: David Alonzo DO;  Location: [facility] ENDOSCOPY;  Service: Gastroenterology;  Laterality: N/A;  pre hx ulcerative colitis  post hx ulcerative colitis, diverticulosis  dr benedicto kaur    COLONOSCOPY W/ BIOPSIES  08/29/2022    COLONOSCOPY W/ BIOPSIES  08/09/2021    Dr Gill    COLONOSCOPY W/ POLYPECTOMY  10/02/2023    adenomatous polyp Dr Gill 1 yr f/u    ENDOSCOPY  03/14/2014    Normal    ENDOSCOPY  02/09/2006    MILD GASTRITIS    NEPHRECTOMY PARTIAL Left 2014    OTHER SURGICAL HISTORY      Bilateral Eyelid Surgery     TONSILLECTOMY      UPPER GASTROINTESTINAL ENDOSCOPY  03/14/2014    Dr David Alonzo    WRIST SURGERY Left      Family History   Problem Relation Age of Onset    Colon polyps Mother     Arthritis Mother     Alzheimer's disease Mother     Heart disease Father         enlarged heart, used NTG    COPD Father     Alzheimer's disease Paternal Aunt     Crohn's disease Cousin     Breast cancer Neg Hx     Colon cancer Neg Hx      Social History     Tobacco Use    Smoking status: Never     Passive exposure: Past    Smokeless tobacco: Never   Vaping Use    Vaping status: Never Used   Substance Use Topics    Alcohol use: No    Drug use: No   She lives alone and has no children.  She is a retired .    Review of Systems  Pertinent  "items are noted in HPI, all other systems reviewed and negative    Vital Signs  /97 (BP Location: Right arm, Patient Position: Lying)   Pulse 74   Temp 96.5 °F (35.8 °C) (Axillary)   Resp 16   Ht 167.6 cm (66\")   Wt 86.2 kg (190 lb)   SpO2 95%   BMI 30.67 kg/m²     Physical Exam:    General Appearance:    Alert, cooperative, in no acute distress   Head:    Normocephalic, without obvious abnormality, atraumatic   Eyes:            Lids and lashes normal, conjunctivae and sclerae normal, no   icterus, no pallor, corneas clear,    Ears:    Ears appear intact with no abnormalities noted   Throat:   No oral lesions, no thrush, oral mucosa moist   Neck:   No adenopathy, supple, trachea midline, no thyromegaly    Lungs:     Clear to auscultation,respirations regular, even and unlabored    Heart:    Regular rhythm and normal rate, normal S1 and S2   Abdomen:     Normal bowel sounds, no masses, no organomegaly, soft nontender, nondistended, no guarding, no rebound  tenderness   Genitalia:    Deferred   Extremities: Right knee Ace wrap CDI.  Nerve block present.   Pulses:   Pulses palpable and equal bilaterally   Skin:   No bleeding, bruising or rash   Neurologic:   Cranial nerves 2 - 12 grossly intact. Flexion and dorsiflexion intact bilateral feet.        I reviewed the patient's new clinical results.     Lab Results   Component Value Date    HGBA1C 5.20 12/04/2024      Latest Reference Range & Units 12/04/24 09:41   Sodium 136 - 145 mmol/L 138   Potassium 3.5 - 5.2 mmol/L 4.1   Chloride 98 - 107 mmol/L 102   CO2 22.0 - 29.0 mmol/L 24.0   Anion Gap 5.0 - 15.0 mmol/L 12.0   BUN 8 - 23 mg/dL 14   Creatinine 0.57 - 1.00 mg/dL 0.90   BUN/Creatinine Ratio 7.0 - 25.0  15.6   eGFR >60.0 mL/min/1.73 65.6   Glucose 65 - 99 mg/dL 104 (H)   Calcium 8.6 - 10.5 mg/dL 9.2   Hemoglobin A1C 4.80 - 5.60 % 5.20   C-Reactive Protein 0.00 - 0.50 mg/dL <0.30   Protime 12.2 - 14.5 Seconds 12.4   INR 0.89 - 1.12  0.91   PTT 22.0 - " 39.0 seconds 28.9   WBC 3.40 - 10.80 10*3/mm3 3.86   RBC 3.77 - 5.28 10*6/mm3 4.01   Hemoglobin 12.0 - 15.9 g/dL 12.0   Hematocrit 34.0 - 46.6 % 37.5   Platelets 140 - 450 10*3/mm3 201   RDW 12.3 - 15.4 % 13.3   MCV 79.0 - 97.0 fL 93.5   MCH 26.6 - 33.0 pg 29.9   MCHC 31.5 - 35.7 g/dL 32.0   MPV 6.0 - 12.0 fL 9.4   (H): Data is abnormally high    Assessment and Plan:     Status post total right knee replacement    Primary osteoarthritis of both knees    Essential hypertension    Hyperlipidemia    Arthritis of knee, right      Plan  1. PT/OT- WBAT RLE  2. Pain control-prns, AC nerve block   3. IS-encourage  4. DVT proph- Mechs/ASA  5. Bowel regimen  6. Resume home medications as appropriate  7. Monitor post-op labs  8. DC planning for home    HTN, Hyperlipidemia  - Continue home lisinopril and statin  - Monitor BP   - Holding parameters for BP meds  - Labetalol PRN for SBP>170      RAMÍREZ Schulz  12/18/24  13:22 EST

## 2024-12-18 NOTE — ANESTHESIA PROCEDURE NOTES
Spinal Block    Pre-sedation assessment completed: 12/18/2024 7:33 AM    Patient reassessed immediately prior to procedure    Patient location during procedure: OR  Start Time: 12/18/2024 7:35 AM  Stop Time: 12/18/2024 7:38 AM  Indication:at surgeon's request  Performed By  CRNA/CAA: Rober Johnson CRNA  Preanesthetic Checklist  Completed: patient identified, IV checked, site marked, risks and benefits discussed, surgical consent, monitors and equipment checked, pre-op evaluation and timeout performed  Spinal Block Prep:  Patient Position:sitting  Sterile Tech:cap, gloves, sterile barriers and mask  Prep:Chloraprep  Patient Monitoring:blood pressure monitoring, continuous pulse oximetry and EKG    Spinal Block Procedure  Approach:midline  Guidance:landmark technique and palpation technique  Location:L4-L5  Needle Type:Ricardo  Needle Gauge:25 G  Placement of Spinal needle event:cerebrospinal fluid aspirated  Paresthesia: no  Fluid Appearance:clear  Medications: Mepivacaine HCl (PF) (CARBOCAINE) 1.5 % injection - Injection   3.5 mL - 12/18/2024 7:35:00 AM   Post Assessment  Patient Tolerance:patient tolerated the procedure well with no apparent complications  Complications no  Additional Notes  Procedure:  Pt assisted to sitting position, with legs in position of comfort over side of bed.  Pt. instructed in optimal spine presentation, the spine was prepped/ Draped and the skin at insertion site was anesthetized with 1% Lidocaine 2 ml.  The spinal needle was then advanced until CSF flow was obtained and LA was injected:         Kit: [id number]; exp 2026-12-31  Med: [id number]; exp 2027/04

## 2024-12-18 NOTE — OP NOTE
OPERATIVE REPORT     DATE OF PROCEDURE: 12/18/2024     SURGEON: Paul Whitaker M.D.     STAFF: Circulator: Mitchell Granda RN; Pooja Lawton RN  Scrub Person: Coy Valdes  Vendor Representative: Rob Nunes (Carlito)  Nursing Assistant: Triny Finnegan  Assistant: Alex Alas PA-C     PREOPERATIVE DIAGNOSIS: Primary osteoarthritis of both knees [M17.0]  Advanced degenerative joint disease of the right knee secondary to arthritis    POSTOPERATIVE DIAGNOSIS: * No post-op diagnosis entered *       Post-Op Diagnosis Codes:     * Primary osteoarthritis of both knees [M17.0]    Procedure(s):  TOTAL KNEE ARTHROPLASTY    Surgeon(s):  Paul Whitaker MD    Circulator: Mitchell Granda RN; Pooja Lawton RN  Scrub Person: Coy Valdes  Vendor Representative: Rbo Nunes (Carlito)  Nursing Assistant: Triny Finnegan  Assistant: Alex Alas PA-C     Assistant: Alex Alas PA-C  was responsible for performing the following activities: Retraction, Suction, Irrigation, Suturing, Closing, and Placing Dressing and their skilled assistance was necessary for the success of this case.    APPROACH: Medial parapatellar    ANESTHESIA: Spinal    PREOPERATIVE ANTIBIOTICS: Ancef     TRANEXAMIC ACID: IV    TOURNIQUET TIME: 60 min @ 300 mmHg    ESTIMATED BLOOD LOSS: none    SPECIMENS: * No orders in the log *     IMPLANTS:   Implant Name Type Inv. Item Serial No.  Lot No. LRB No. Used Action   DEV CONTRL TISS STRATAFIX SPIRAL MNCRYL UD 3/0 PLS 60CM - [id number] Implant DEV CONTRL TISS STRATAFIX SPIRAL MNCRYL UD 3/0 PLS 60CM  [employer]  Right 1 Implanted   DEV CONTRL TISS STRATAFIX SYMM PDS PLUS CRISTY CT-1 45CM - [id number] Implant DEV CONTRL TISS STRATAFIX SYMM PDS PLUS CRISTY CT-1 45CM  [employer]  [employer]  Right 1 Implanted   CMT BONE R 1X40 - [id number] Implant CMT BONE R 1X40  [employer] [id number] Right 2 Implanted   SCRW HEX PERSONA FML 2.5X25MM PK/2 - [id number]  Implant SCRW HEX PERSONA FML 2.5X25MM PK/2  [employer] [id number] Right 1 Implanted   STEM TIB/KN PERSONA CMT 5D SZC RT - [id number] Implant STEM TIB/KN PERSONA CMT 5D SZC RT  [employer] [id number] Right 1 Implanted   PAT KN PERSONA ALLPOLY CMT 8X29MM - [id number] Implant PAT KN PERSONA ALLPOLY CMT 8X29MM  [employer] [id number] Right 1 Implanted   COMP FEM/KN PERSONA CR CMT NRW SZ7 RT - [id number] Implant COMP FEM/KN PERSONA CR CMT NRW SZ7 RT  [employer] [id number] Right 1 Implanted   ART/SRF KN PERSONA/VE MC EF 6TO7 12MM RT - [id number] Implant ART/SRF KN PERSONA/VE MC EF 6TO7 12MM RT  [employer] [id number] Right 1 Implanted      : Gely   DRAINS: None    COMPLICATIONS: None apparent    INDICATIONS FOR PROCEDURE: The patient has a long history of progressive knee pain, arthritis, and degeneration resulting in deformity in the right knee from predominantly medial wear and bone loss. Non-operative treatment and conservative therapeutic measures have been attempted, but have not improved or controlled the symptoms and pain that occurs during normal daily activities. Knee motion has also become limited and is restricting the patient. Total knee arthroplasty was recommended. The risks, benefits, alternatives, and potential complications of the arthroplasty surgery were discussed with the patient in detail to include but not be limited to infection, bleeding, anesthesia risks, damage to neurovascular structures, osteolysis, aseptic loosening, instability, anterior knee pain, continued pain, iatrogenic fracture, dislocation, need for future surgery including the potential for amputation, blood clots, myocardial infarction, stroke, and death. Specific details of the surgical procedure, hospitalization, recovery, rehabilitation, and long-term precautions were also presented. Pre-operative teaching was provided. Implant/prosthesis selection was outlined, and the many options available were explained; the  final choice will be made at the time of the procedure to match the anatomy and condition of the bone, ligaments, tendons, and muscles. The patient completed preoperative medical optimization and risk assessment, joint arthroplasty education, and MRSA decolonization with Mupirocin if indicated based on the preoperative nasal screen. Perioperative blood management and the potential for blood transfusion were discussed with risks and options clearly outlined.     INTRAOPERATIVE FINDINGS: Severe arthritis     PROCEDURE: The patient was identified in the preoperative holding area. The operative site was confirmed and marked. Sequential compression device was placed on the nonoperative leg. The risks, benefits, and alternatives to surgery were again confirmed with the patient and the patient wished to proceed. The patient was brought to the operating room and placed on the operating room table in the supine position. All bony prominences were padded. A huddle was performed with the patient and all vital surgical team members to confirm the correct operative site, procedure, anesthesia type, and operative plan with the patient. After anesthesia was performed, a tourniquet was applied to the upper thigh of the operative leg. A full knee exam was performed once anesthesia was in full effect. Intravenous antibiotic prophylaxis was given and confirmed with the anesthesia team.     The operative leg was prepped and draped in the usual sterile fashion. A surgical time out was performed immediately preceding the incision with all personnel in the operating room to confirm patient identity, the correct operative site and extremity, correct radiographic studies, availability of appropriate surgical equipment and agreement on the planned procedure. The operative knee was elevated and exsanguinated using an esmarch and the tourniquet was inflated. The knee was exposed using a limited anterior-midline skin incision. Dissection was  carried down through skin and subcutaneous tissue to the extensor mechanism with a scalpel. A medial parapatellar arthrotomy was made to enter the knee space sharply. A large amount of normal appearing joint fluid was encountered and suctioned. The synovium was thickened, hypertrophic, and inflamed. A partial synovectomy was performed for exposure, and the medial and lateral gutters were cleared of scar and synovial reflections. The superficial medial collateral ligament was carefully elevated off osteophytes which were then removed with a rongeur and osteotome. Degenerative meniscal remnants were excised medially and laterally. The patellar synovial reflections were released and the patella exposed to reveal complete wear through the articular surface. The trochlea demonstrated similar severe wear. The patella was then subluxed laterally. The knee was then flexed up to 90 degrees.     Assessment of the knee joint revealed severe end-stage articular damage with no remaining medial weight bearing cartilage. The medial compartment was severely eburnated with bone loss on the medial tibia and medial femoral condyle, resulting in the varus deformity. The anterior cruciate ligament was found to be functionally compromised and it was excised. Osteophytes were removed from the notch. Osteophytes were then further debrided from the femur and the tibia.     Attention was then turned to the femur. The medullary cavity of the femur was entered anterior-medial to the intra-condylar notch above the PCL with a step drill. The femoral canal was over-reamed, irrigated, and suctioned to prevent fat embolization. An intramedullary alignment system was then placed, fixed to the femur with pins, and the distal femoral osteotomy was made in 6 degrees of valgus with an 9 mm resection. Attention was then turned to the tibia. Retractors were placed medially and laterally to protect the collateral ligaments and a Feliz retractor was  placed around the PCL in the intra-condylar notch. The tibia was then subluxed anteriorly for wide exposure. An extramedullary alignment system was then placed and the proximal tibial osteotomy was made measuring 1-2 mm off the most affected side and 9-10 mm off the unaffected side with approximately 3 degrees posterior slope. The guide was also confirmed to be perpendicular to the tibial axis prior to the osteotomy. A sizing guide was then used to select the tibial component size. The knee was then brought to extension and the appropriate sized spacer block was placed. This brought the knee to full extension with excellent medial and lateral balance.     The flexion gap was then inspected and measured both visually and with a tensioner device to assess the medial and lateral flexion balance. A femur posterior reference guide was placed in 3 degrees of external rotation in accordance with the soft tissue balance. This resulted in symmetric flexion and extension gaps and was verified against the epicondylar axis and Sparta's line. The femur was sized using a posterior referencing guide and, using the previously determined degrees of rotation, drill holes were made for the cutting block. The 4 in 1 cutting block was impacted into place and secured. Cuts were completed using a saw with the collaterals protected by Z-retractors.  A lamina  was placed with the knee in 90 degrees of flexion and a large curved osteotome, rongeur, and curettes were utilized to clear posterior osteophytes, loose bodies and meniscal remnants.     A femoral trial implant was placed; excellent fit was confirmed. The medial-lateral and anterior-posterior dimensions were checked; anatomic fit and coverage were achieved.The proximal tibia baseplate trial was placed with its mid-point at the junction of medial one-third and lateral two-thirds of the tibial tubercle and pinned to this fixed position. A trial reduction was then performed.  Trial reduction demonstrated the knee achieved full extension with excellent stability and range of motion, and no tendency toward instability with varus-valgus stress at full extension, mid-flexion, or 90 degrees of flexion.    Next, attention was turned to the patella. It was measured and the posterior 9-10 mm was resected leaving a healthy remnant with greater than 11mm thickness. The patella was sized with the asymmetric guide, and drill holes were made. A trial button was placed and tracking of the patella and the entire knee trial was tested. The patella tracking was excellent throughout range of motion with no instability. Punches and drills were then placed through the trials to accommodate the final implants. All trials were removed.     The wound was copiously lavaged with a pulse irrigation/suction system. The posterior recess of the knee and areas of known bleeding were treated with the electrocautery to reduce post-operative bleeding. A pain cocktail was injected into the flavia-articular tissues. The cut bone surfaces were then irrigated again, suctioned, and dried. A double batch of Polymethylmethacrylate (PMMA) bone cement was mixed, and the final implants were cemented into place, tibia followed by femur followed by patella. The cement was compressed using a non-constrained poly trial which was removed so the excess cement could be curetted free. The final polyethylene was impacted into place, and the knee was held in extension until the cement cured.    The wound was again irrigated with saline. The extensor mechanism and capsule was then anatomically closed with interrupted #1 Vicryl suture and a running #2 Stratafix stitch. Knee stability and range of motion with the capsule closed was excellent, and range of motion was 0 to 135 without excessive stress on the repair. Instrument and sponge count was completed and confirmed correct. Deep and superficial subcutaneous tissue was closed with  interrupted 2-0 Vicryl suture. A running 3-0 Monocryl subcuticular stitch was used to re-approximate the skin edges followed by skin glue adhesive to seal the wound. A dressing was then placed, and a sequential compression device to the operative limb. The patient was sufficiently recovered from anesthesia, transferred to a hospital bed and taken to the PACU in stable condition.     One weight-based dose (10 mg/kg) of intravenous tranexamic acid was administered prior to incision. A second 10mg/kg intravenous dose was given prior to wound closure.     No apparent complications occurred during the procedure. Instrument, sponge and needle counts were correct x 2.     The patient underwent risk stratification preoperatively and Enteric coated aspirin was chosen for DVT prophylaxis. Delay in starting chemical prophylaxis for 23 hours from surgical incision was over concerns for hematoma formation and wound related issues.     POST OPERATIVE PLAN:   Weight bearing as tolerated with knee range of motion as tolerated   Pain control with PO/IV meds   Adductor canal catheter placement by Anesthesia Pain Management Team.   23 hours perioperative antibiotic prophylaxis   PT/OT for mobilization and medical equipment needs Follow up in 3 weeks for post operative wound check with XR AP and lateral of operative knee.

## 2024-12-18 NOTE — PLAN OF CARE
Goal Outcome Evaluation:  Plan of Care Reviewed With: patient        Progress: improving  Outcome Evaluation: Patient required a knee immobilizer on for ambulation post surgery due to quad weakness. I recommend continued skilled PT tomorrow . She will be panakj home with her friends help and recieve outpatient PT. Her walker has been issued to bedside    Anticipated Discharge Disposition (PT): home with assist, home with outpatient therapy services

## 2024-12-18 NOTE — ANESTHESIA POSTPROCEDURE EVALUATION
Patient: Coco Steele    Procedure Summary       Date: 12/18/24 Room / Location:  [facility] OR 09 /  [facility] OR    Anesthesia Start: 0733 Anesthesia Stop: 0916    Procedure: TOTAL KNEE ARTHROPLASTY (Right: Knee) Diagnosis:       Primary osteoarthritis of both knees      (Primary osteoarthritis of both knees [M17.0])    Surgeons: Paul Whitaker MD Provider: Joel Araujo MD    Anesthesia Type: spinal, regional ASA Status: 3            Anesthesia Type: spinal, regional    Vitals  Vitals Value Taken Time   /78 12/18/24 0945   Temp 97.2 °F (36.2 °C) 12/18/24 0945   Pulse 71 12/18/24 0958   Resp 16 12/18/24 0945   SpO2 95 % 12/18/24 0958   Vitals shown include unfiled device data.        Post Anesthesia Care and Evaluation    Patient location during evaluation: PACU  Patient participation: complete - patient participated  Level of consciousness: awake and alert  Pain management: adequate    Airway patency: patent  Anesthetic complications: No anesthetic complications  PONV Status: none  Cardiovascular status: hemodynamically stable and acceptable  Respiratory status: nonlabored ventilation, acceptable and nasal cannula  Hydration status: acceptable

## 2024-12-18 NOTE — ANESTHESIA PROCEDURE NOTES
Adductor canal cath      Patient reassessed immediately prior to procedure    Patient location during procedure: post-op  Start time: 12/18/2024 9:17 AM  Stop time: 12/18/2024 9:19 AM  Reason for block: at surgeon's request and post-op pain management  Performed by  CRNA/CAA: Hao Motnano, CRNA  Assisted by: Roma Wilson RN  Preanesthetic Checklist  Completed: patient identified, IV checked, site marked, risks and benefits discussed, surgical consent, monitors and equipment checked, pre-op evaluation and timeout performed  Prep:  Pt Position: supine  Sterile barriers:cap, gloves, mask, sterile barriers and washed/disinfected hands  Prep: ChloraPrep  Patient monitoring: blood pressure monitoring, continuous pulse oximetry and EKG  Procedure  Performed under: spinal  Guidance:ultrasound guided    ULTRASOUND INTERPRETATION.  Using ultrasound guidance a 20 G gauge needle was placed in close proximity to the nerve, at which point, under ultrasound guidance anesthetic was injected in the area of the nerve and spread of the anesthesia was seen on ultrasound in close proximity thereto.  There were no abnormalities seen on ultrasound; a digital image was taken; and the patient tolerated the procedure with no complications. Images:still images obtained, printed/placed on chart    Laterality:right  Block Type:adductor canal block  Injection Technique:catheter  Needle Type:Tuohy and echogenic  Needle Gauge:18 G  Resistance on Injection: none  Catheter Size:20 G (20g)  Cath Depth at skin: 9 cm    Medications Used: bupivacaine PF (MARCAINE) 0.25 % injection - Injection   20 mL - 12/18/2024 9:19:00 AM      Post Assessment  Injection Assessment: negative aspiration for heme, incremental injection and no paresthesia on injection  Patient Tolerance:comfortable throughout block  Complications:no  Additional Notes  CATHETER   A high-frequency linear transducer, with sterile cover, was placed on the anterior mid-thigh (between the  "anterior superior iliac spine and patella). The transducer was then moved medially to identify the Sartorius muscle (Trung), Vastus Medialis muscle (VMM), Superficial Femoral Artery (SFA) and Vein. The transducer was then moved cephalad or caudad to position the SFA in the middle of the Trung. The insertion site was prepped and draped in sterile fashion. Skin and cutaneous tissue was infiltrated with 2-5 ml of 1% Lidocaine. Using ultrasound-guidance, an 18-gauge Contiplex Ultra 360 Touhy needle was advanced in plane from lateral to medial. Preservative-free normal saline was utilized for hydro-dissection of tissue, advancement of Touhy, and to confirm needle placement below the fascial plane of the Trung where the Nerve to the VMM is located. Local anesthetic (LA) 5 ml deposited here. The Touhy needle continues its path lateral to the SFA at the level of the Saphenous Nerve. The remainder of the LA was deposited at the 10-11 o'clock position of the SFA. This injection created a space between the Trung and the SFA. Aspiration every 5 ml to prevent intravascular injection. Injection was completed with negative aspiration of blood and negative intravascular injection. Injection pressures were normal with minimal resistance. A 20-gauge Contiplex Echo catheter was placed through the needle and advance out the tip of the Touhy 3-5 cm anterior to the SFA. The Touhy needle was then removed, and final catheter position verified at the 12 o'clock position to the SFA. The catheter was secured in the usual fashion with skin glue, benzoin, steri-strips, CHG tegaderm and label noting \"Nerve Block Catheter\". Jerk tape applied at yellow connector and catheter connection.   Performed by: Rober Johnson, CRNA            "

## 2024-12-18 NOTE — ANESTHESIA PROCEDURE NOTES
Popliteal plexus      Patient reassessed immediately prior to procedure    Patient location during procedure: OR  Start time: 12/18/2024 7:38 AM  Stop time: 12/18/2024 7:40 AM  Reason for block: at surgeon's request and post-op pain management  Performed by  CRNA/CAA: Hao Montano CRNA  Preanesthetic Checklist  Completed: patient identified, IV checked, site marked, risks and benefits discussed, surgical consent, monitors and equipment checked, pre-op evaluation and timeout performed  Prep:  Pt Position: supine  Sterile barriers:mask, cap, washed/disinfected hands and gloves  Prep: ChloraPrep  Patient monitoring: blood pressure monitoring, continuous pulse oximetry and EKG  Procedure  Performed under: spinal  Guidance:ultrasound guided    ULTRASOUND INTERPRETATION.  Using ultrasound guidance a 20 G gauge needle was placed in close proximity to the nerve, at which point, under ultrasound guidance anesthetic was injected in the area of the nerve and spread of the anesthesia was seen on ultrasound in close proximity thereto.  There were no abnormalities seen on ultrasound; a digital image was taken; and the patient tolerated the procedure with no complications. Images:still images obtained, printed/placed on chart    Laterality:right  Anesthesia block type: Popliteal Plexus.  Injection Technique:single-shot  Needle Type:echogenic and short-bevel  Needle Gauge:20 G  Resistance on Injection: none    Medications Used: dexamethasone sodium phosphate injection - Injection   2 mg - 12/18/2024 7:40:00 AM  bupivacaine PF (MARCAINE) 0.25 % injection - Injection   20 mL - 12/18/2024 7:40:00 AM      Post Assessment  Injection Assessment: negative aspiration for heme, no paresthesia on injection and incremental injection  Patient Tolerance:comfortable throughout block  Complications:no  Additional Notes  A high-frequency linear transducer, with sterile cover, was placed on the anterior mid-thigh (between the anterior superior  "iliac spine and patella). The transducer was then moved medially to identify the Sartorius muscle (Trung), Vastus Medialis muscle (VMM), Superficial Femoral Artery (SFA) and Vein. The transducer was then moved caudad to position where the SFA is distal and posterior to the Trung (Adductor Hiatus). The insertion site was prepped and draped in sterile fashion. Skin and cutaneous tissue was infiltrated with 2-5 ml of 1% Lidocaine. Using ultrasound-guidance, a 20-gauge B-Hayes 4\" Ultraplex 360 non-stimulating echogenic needle was advanced in plane from lateral to medial. Preservative-free normal saline was utilized for hydro-dissection of tissue, and to confirm needle placement at the 12 o'clock position to the artery. Local anesthetic in incremental 3-5 ml injections. Aspiration every 5 ml to prevent intravascular injection. Injection was completed with negative aspiration of blood and negative intravascular injection. Injection pressures were normal with minimal resistance.   Performed by: Rober Johnson, CRNA            "

## 2024-12-18 NOTE — ANESTHESIA POSTPROCEDURE EVALUATION
Patient: Coco Steele    Procedure Summary       Date: 12/18/24 Room / Location:  [facility] OR 09 /  DOMINIC OR    Anesthesia Start: 0733 Anesthesia Stop: 0916    Procedure: TOTAL KNEE ARTHROPLASTY (Right: Knee) Diagnosis:       Primary osteoarthritis of both knees      (Primary osteoarthritis of both knees [M17.0])    Surgeons: Paul Whitaker MD Provider: Joel Araujo MD    Anesthesia Type: spinal, regional ASA Status: 3            Anesthesia Type: spinal, regional    Vitals  No vitals data found for the desired time range.          Post Anesthesia Care and Evaluation    Patient location during evaluation: PACU  Patient participation: complete - patient participated  Level of consciousness: awake  Pain management: adequate    Airway patency: patent  Anesthetic complications: No anesthetic complications  PONV Status: none  Cardiovascular status: hemodynamically stable and acceptable  Respiratory status: nonlabored ventilation and acceptable  Hydration status: acceptable

## 2024-12-18 NOTE — THERAPY EVALUATION
Patient Name: Coco Steele  : 1945    MRN: [id number]                              Today's Date: 2024       Admit Date: 2024    Visit Dx:     ICD-10-CM ICD-9-CM   1. Primary osteoarthritis of both knees  M17.0 715.16     Patient Active Problem List   Diagnosis    Ulcerative pancolitis without complication    Osteopenia    Essential hypertension    Hyperlipidemia    Chronic ulcerative colitis without complication    Primary osteoarthritis of both knees    Arthritis of knee, right    Status post total right knee replacement     Past Medical History:   Diagnosis Date    Abdominal pain, epigastric     Abdominal pain, left lower quadrant     Abnormal findings on diagnostic imaging of abdomen     ABFND, RADIOLOGICAL, ABDOMINAL AREA     Adenomatous colon polyp 10/02/2023    Dr Gill    Allergic     Allergic rhinitis     Arthritis     Cancer 10/2014 removed    Renal Cell carcinoma left    Chronic gout involving toe without tophus 2024    Chronic ulcerative colitis without complication     Colon polyp     Constipation     Diarrhea     Diverticulosis     Encounter for Hemoccult screening     HEMOCCULT POSITIVE STOOLS     Fracture of wrist     Wrist-Hairline fracture    Gastroesophageal reflux disease     esophagitis presence not specified    GERD (gastroesophageal reflux disease)     H/O mammogram 2024    Headache As long as I can remember    Herpes zoster     Hyperlipidemia     Hypertension     Irritable bowel syndrome     Knee swelling `    Nausea     Obesity     Rectal bleeding     Rectal bleeding     Regurgitation     Rotator cuff syndrome     Scoliosis     Ulcerative colitis     Wears glasses      Past Surgical History:   Procedure Laterality Date    CHOLECYSTECTOMY      COLONOSCOPY  2014    left side bx-minimal active inflammation - 2 yr    COLONOSCOPY  2014    active inflammation to extent of limited lower colonoscopy/45 cm    COLONOSCOPY  2013    mild  inflammation in sigmoid, left-sided diverticulosis    COLONOSCOPY  03/05/2010    very tortuous colon not allowing visual of cecal base - 5 yrs    COLONOSCOPY  11/14/2003    FAIRLY TORTUOUS AND SPASMODIC COLON - 5 YR    COLONOSCOPY Left 09/28/2016    Procedure: COLONOSCOPY WITH ANESTHESIA;  Surgeon: David Alonzo DO;  Location: [facility] ENDOSCOPY;  Service:     COLONOSCOPY N/A 06/11/2020    Procedure: COLONOSCOPY WITH ANESTHESIA;  Surgeon: David Alonzo DO;  Location: [facility] ENDOSCOPY;  Service: Gastroenterology;  Laterality: N/A;  pre hx ulcerative colitis  post hx ulcerative colitis, diverticulosis  dr benedicto kuar    COLONOSCOPY W/ BIOPSIES  08/29/2022    COLONOSCOPY W/ BIOPSIES  08/09/2021    Dr Gill    COLONOSCOPY W/ POLYPECTOMY  10/02/2023    adenomatous polyp Dr Gill 1 yr f/u    ENDOSCOPY  03/14/2014    Normal    ENDOSCOPY  02/09/2006    MILD GASTRITIS    NEPHRECTOMY PARTIAL Left 2014    OTHER SURGICAL HISTORY      Bilateral Eyelid Surgery     TONSILLECTOMY      UPPER GASTROINTESTINAL ENDOSCOPY  03/14/2014    Dr David Alonzo    WRIST SURGERY Left       General Information       Row Name 12/18/24 1553          Physical Therapy Time and Intention    Document Type evaluation  -SC     Mode of Treatment physical therapy  -SC       Row Name 12/18/24 1554          General Information    Patient Profile Reviewed yes  -SC     Prior Level of Function independent:;gait;transfer  did not use a walker  -SC     Existing Precautions/Restrictions other (see comments);brace worn when out of bed  nerve cath  -SC     Barriers to Rehab none identified  -SC       Row Name 12/18/24 1559          Living Environment    People in Home alone;other (see comments)  friend to stay with her  -SC       Row Name 12/18/24 1554          Home Main Entrance    Number of Stairs, Main Entrance one  -SC     Stair Railings, Main Entrance none  -SC       Row Name 12/18/24 1551          Stairs Within Home, Primary    Number of  Stairs, Within Home, Primary none  -SC       Row Name 12/18/24 1555          Cognition    Orientation Status (Cognition) oriented x 4  -SC       Row Name 12/18/24 1555          Safety Issues/Impairments Affecting Functional Mobility    Impairments Affecting Function (Mobility) range of motion (ROM);strength;pain;balance  -SC     Comment, Safety Issues/Impairments (Mobility) alert, following commands  -SC               User Key  (r) = Recorded By, (t) = Taken By, (c) = Cosigned By      Initials Name Provider Type    SC Obinna Gu PT Physical Therapist                   Mobility       Row Name 12/18/24 1602          Bed Mobility    Bed Mobility scooting/bridging;supine-sit  -SC     Scooting/Bridging Vivian (Bed Mobility) independent  -SC     Supine-Sit Vivian (Bed Mobility) 1 person assist;minimum assist (75% patient effort)  -SC     Assistive Device (Bed Mobility) bed rails;head of bed elevated  -SC     Comment, (Bed Mobility) cues for hand placement. Needed assist with R knee  -[facility] Name 12/18/24 1602          Transfers    Comment, (Transfers) STS from EOB and commode with cues for hand placement and to slide R leg out.  -[facility] Name 12/18/24 1602          Sit-Stand Transfer    Sit-Stand Vivian (Transfers) 1 person assist;1 person to manage equipment;verbal cues;contact guard  -SC     Assistive Device (Sit-Stand Transfers) walker, front-wheeled  [facility] Name 12/18/24 1602          Gait/Stairs (Locomotion)    Vivian Level (Gait) 1 person assist;1 person to manage equipment;contact guard;verbal cues  -SC     Assistive Device (Gait) walker, front-wheeled  -SC     Patient was able to Ambulate yes  -SC     Distance in Feet (Gait) 80  -SC     Deviations/Abnormal Patterns (Gait) right sided deviations;antalgic;stride length decreased;weight shifting decreased  -SC     Bilateral Gait Deviations forward flexed posture  -SC     Comment, (Gait/Stairs) Gt training focused on  controling walker with upright posture. Encouraged equal wt shiftig and longer step length on L. ( Brace on )  -[facility] Name 12/18/24 1602          Mobility    Extremity Weight-bearing Status right lower extremity  -SC     Right Lower Extremity (Weight-bearing Status) weight-bearing as tolerated (WBAT)  -SC               User Key  (r) = Recorded By, (t) = Taken By, (c) = Cosigned By      Initials Name Provider Type    SC Obinna Gu, PT Physical Therapist                   Obj/Interventions       [facility] Name 12/18/24 1606          Range of Motion Comprehensive    General Range of Motion lower extremity range of motion deficits identified  -SC     Comment, General Range of Motion R Knee limited 30%  -[facility] Name 12/18/24 1606          Strength Comprehensive (MMT)    General Manual Muscle Testing (MMT) Assessment lower extremity strength deficits identified  -SC     Comment, General Manual Muscle Testing (MMT) Assessment R quads -4/5 , Tib ant 4/5  Unable to do SLR on R  -[facility] Name 12/18/24 1606          Motor Skills    Therapeutic Exercise knee;ankle  -SC       Row Name 12/18/24 1606          Knee (Therapeutic Exercise)    Knee (Therapeutic Exercise) AROM (active range of motion)  -SC     Knee AROM (Therapeutic Exercise) bilateral;flexion;extension;heel slides;SLR (straight leg raise);10 repetitions  -[facility] Name 12/18/24 1606          Ankle (Therapeutic Exercise)    Ankle (Therapeutic Exercise) AROM (active range of motion)  -SC     Ankle AROM (Therapeutic Exercise) bilateral;dorsiflexion;plantarflexion  -SC       Row Name 12/18/24 1606          Balance    Balance Assessment standing dynamic balance  -SC     Dynamic Standing Balance 1-person assist;1 person to manage equipment;contact guard  -SC     Position/Device Used, Standing Balance supported;walker, rolling  -SC     Comment, Balance brace on for ambuation  -[facility] Name 12/18/24 1606          Sensory Assessment (Somatosensory)     Sensory Assessment (Somatosensory) sensation intact  -SC               User Key  (r) = Recorded By, (t) = Taken By, (c) = Cosigned By      Initials Name Provider Type    SC Obinna Gu A, PT Physical Therapist                   Goals/Plan       [facility] Name 12/18/24 1611          Bed Mobility Goal 1 (PT)    Activity/Assistive Device (Bed Mobility Goal 1, PT) bed mobility activities, all  -SC     Crum Level/Cues Needed (Bed Mobility Goal 1, PT) modified independence  -SC     Time Frame (Bed Mobility Goal 1, PT) long term goal (LTG);10 days  -[facility] Name 12/18/24 1611          Transfer Goal 1 (PT)    Activity/Assistive Device (Transfer Goal 1, PT) sit-to-stand/stand-to-sit;bed-to-chair/chair-to-bed;walker, rolling  -SC     Crum Level/Cues Needed (Transfer Goal 1, PT) contact guard required  -SC     Time Frame (Transfer Goal 1, PT) long term goal (LTG);10 days  -SC       Row Name 12/18/24 1611          Gait Training Goal 1 (PT)    Activity/Assistive Device (Gait Training Goal 1, PT) gait (walking locomotion)  -SC     Crum Level (Gait Training Goal 1, PT) contact guard required  -SC     Distance (Gait Training Goal 1, PT) 150  -SC     Time Frame (Gait Training Goal 1, PT) long term goal (LTG);10 days  -[facility] Name 12/18/24 1611          ROM Goal 1 (PT)    ROM Goal 1 (PT) R knee 0-90  -SC     Time Frame (ROM Goal 1, PT) long-term goal (LTG);10 days  -SC       Row Name 12/18/24 1611          Stairs Goal 1 (PT)    Activity/Assistive Device (Stairs Goal 1, PT) stairs, all skills  -SC     Crum Level/Cues Needed (Stairs Goal 1, PT) contact guard required  -SC     Number of Stairs (Stairs Goal 1, PT) 1  -SC     Time Frame (Stairs Goal 1, PT) long term goal (LTG);10 days  -[facility] Name 12/18/24 1611          Therapy Assessment/Plan (PT)    Planned Therapy Interventions (PT) bed mobility training;gait training;home exercise program;stair training;strengthening;transfer training;ROM  (range of motion)  -SC               User Key  (r) = Recorded By, (t) = Taken By, (c) = Cosigned By      Initials Name Provider Type    SC Obinna Gu, PT Physical Therapist                   Clinical Impression       Row Name 12/18/24 1609          Pain    Pretreatment Pain Rating 3/10  -SC     Posttreatment Pain Rating 3/10  -SC     Pain Location knee  -SC     Pain Side/Orientation right  -SC     Pain Management Interventions cold applied  -SC       Row Name 12/18/24 1609          Plan of Care Review    Plan of Care Reviewed With patient  -SC     Progress improving  -SC     Outcome Evaluation Patient required a knee immobilizer on for ambulation post surgery due to quad weakness. I recommend continued skilled PT tomorrow . She will be pankaj home with her friends help and recieve outpatient PT. Her walk[facility] been [facility] to [facility]  -SC       Row Name 12/18/24 1605          Therapy Assessment/Plan (PT)    Rehab Potential (PT) good  -SC     Criteria for Skilled Interventions Met (PT) meets criteria;yes  -SC     Therapy Frequency (PT) 2 times/day  -SC     Predicted Duration of Therapy Intervention (PT) 1  -SC       Row Name 12/18/24 1609          Positioning and Restraints    Pre-Treatment Position in bed  -SC     Post Treatment Position chair  -SC     In Chair notified nsg;reclined;sitting;call light within reach;encouraged to call for assist;exit alarm on;with family/caregiver  -SC               User Key  (r) = Recorded By, (t) = Taken By, (c) = Cosigned By      Initials Name Provider Type    SC Obinna Gu, PT Physical Therapist                   Outcome Measures       Row Name 12/18/24 1612          How much help from another person do you currently need...    Turning from your back to your side while in flat bed without using bedrails? 3  -SC     Moving from lying on back to sitting on the side of a flat bed without bedrails? 3  -SC     Moving to and from a bed to a chair (including a wheelchair)? 3  -SC      Standing up from a chair using your arms (e.g., wheelchair, bedside chair)? 3  -SC     Climbing 3-5 steps with a railing? 2  -SC     To walk in hospital room? 3  -SC     AM-PAC 6 Clicks Score (PT) 17  -SC     Highest Level of Mobility Goal 5 --> Static standing  -SC       Row Name 12/18/24 1615          PADD    Diagnosis 1  -SC     Gender 1  -SC     Age Group 1  -SC     Gait Distance 0  -SC     Assist Level 1  -SC     Home Support 3  -SC     PADD Score 6  -SC     Prediction by PADD Score extended rehabilitation  -SC       Row Name 12/18/24 1615          Functional Assessment    Outcome Measure Options AM-PAC 6 Clicks Basic Mobility (PT);PADD  -SC               User Key  (r) = Recorded By, (t) = Taken By, (c) = Cosigned By      Initials Name Provider Type    SC Obinna Gu, PT Physical Therapist                                 Physical Therapy Education       Title: PT OT SLP Therapies (Done)       Topic: Physical Therapy (Done)       Point: Mobility training (Done)       Learning Progress Summary            Patient Eager, E,TB,D,H, VU,DU by SC at 12/18/2024 1615    Comment: Reviewed HEP                      Point: Home exercise program (Done)       Learning Progress Summary            Patient Eager, E,TB,D,H, VU,DU by SC at 12/18/2024 1615    Comment: Reviewed HEP                      Point: Body mechanics (Done)       Learning Progress Summary            Patient Eager, E,TB,D,H, VU,DU by SC at 12/18/2024 1615    Comment: Reviewed HEP                      Point: Precautions (Done)       Learning Progress Summary            Patient Eager, E,TB,D,H, VU,DU by SC at 12/18/2024 1615    Comment: Reviewed HEP                                      User Key       Initials Effective Dates Name Provider Type [facility] 02/03/23 -  Obinna Gu, PT Physical Therapist PT                  PT Recommendation and Plan  Planned Therapy Interventions (PT): bed mobility training, gait training, home exercise program,  stair training, strengthening, transfer training, ROM (range of motion)  Progress: improving  Outcome Evaluation: Patient required a knee immobilizer on for ambulation post surgery due to quad weakness. I recommend continued skilled PT tomorrow . She will be pankaj home with her friends help and recieve outpatient PT. Her walker has been issued to bedside     Time Calculation:   PT Evaluation Complexity  History, PT Evaluation Complexity: 3 or more personal factors and/or comorbidities  Examination of Body Systems (PT Eval Complexity): total of 4 or more elements  Clinical Presentation (PT Evaluation Complexity): evolving  Clinical Decision Making (PT Evaluation Complexity): moderate complexity  Overall Complexity (PT Evaluation Complexity): moderate complexity     PT Charges       Row Name 12/18/24 1509             Time Calculation    Start Time 1509  -SC      PT Received On 12/18/24  -SC      PT Goal Re-Cert Due Date 12/28/24  -SC         Timed Charges    67643 - PT Therapeutic Exercise Minutes 15  -SC      71895 - Gait Training Minutes  15  -SC         Untimed Charges    PT Eval/Re-eval Minutes 30  -SC         Total Minutes    Timed Charges Total Minutes 30  -SC      Untimed Charges Total Minutes 30  -SC       Total Minutes 60  -SC                User Key  (r) = Recorded By, (t) = Taken By, (c) = Cosigned By      Initials Name Provider Type    SC Brittanie, Shearon A, PT Physical Therapist                  Therapy Charges for Today       Code Description Service Date Service Provider Modifiers Qty    02407348626 HC PT THER PROC EA 15 MIN 12/18/2024 Brittanie, Shearon A, PT GP 1    42950549163 HC GAIT TRAINING EA 15 MIN 12/18/2024 Brittanie, Shearon A, PT GP 1    17920330378 HC PT EVAL MOD COMPLEXITY 2 12/18/2024 Brittanie, Shearon A, PT GP 1    81882225385 HC PT THER SUPP EA 15 MIN 12/18/2024 Brittanie, Shearon A, PT GP 2            PT G-Codes  Outcome Measure Options: AM-PAC 6 Clicks Basic Mobility (PT), PADD  AM-PAC 6 Clicks Score (PT):  17  PT Discharge Summary  Anticipated Discharge Disposition (PT): home with assist, home with outpatient therapy services    Obinna Gu, PT  12/18/2024

## 2024-12-18 NOTE — ANESTHESIA PREPROCEDURE EVALUATION
Anesthesia Evaluation     Patient summary reviewed and Nursing notes reviewed   no history of anesthetic complications:   NPO Solid Status: > 8 hours  NPO Liquid Status: > 2 hours           Airway   Mallampati: II  TM distance: >3 FB  Neck ROM: full  Possible difficult intubation and Anterior  Comment: Prominent incisors  Dental - normal exam     Pulmonary - negative pulmonary ROS and normal exam    breath sounds clear to auscultation  Cardiovascular - normal exam    ECG reviewed  Rhythm: regular  Rate: normal    (+) hypertension, hyperlipidemia      Neuro/Psych  (+) headaches  GI/Hepatic/Renal/Endo    (+) obesity, GERD, GI bleeding (Hx of) , renal disease (Left RCC s/p partial left nephrectomy)-    ROS Comment: Ulcerative Colitis    Musculoskeletal     Abdominal    Substance History - negative use     OB/GYN          Other   arthritis,   history of cancer (Renal cell carcinoma (left))                Anesthesia Plan    ASA 3     spinal and regional     intravenous induction     Anesthetic plan, risks, benefits, and alternatives have been provided, discussed and informed consent has been obtained with: patient.    Plan discussed with CRNA.    CODE STATUS:

## 2024-12-18 NOTE — CASE MANAGEMENT/SOCIAL WORK
Continued Stay Note  [facility]     Patient Name: Coco Steele  MRN: [id number]  Today's Date: 12/18/2024    Admit Date: 12/18/2024    Plan: home with outpt PT   Discharge Plan       Row Name 12/18/24 6829       Plan    Plan home with outpt PT    Patient/Family in Agreement with Plan yes    Plan Comments Pt lives in [facility]. She reports she was independent with ADLs and mobility. Pt is followed by her PCP and has drug coverage. Her plan for discharge is to return home with outpt PT with Sylwia Randolph. Her first visit is 12/20 at 1300. A rolling walker will be brought to her room via [employer]. No other dc needs at this time    Final Discharge Disposition Code 01 - home or self-care                   Discharge Codes    No documentation.                       Elle Bustos RN

## 2024-12-19 ENCOUNTER — PRIOR AUTHORIZATION (OUTPATIENT)
Dept: GASTROENTEROLOGY | Facility: CLINIC | Age: 79
End: 2024-12-19
Payer: MEDICARE

## 2024-12-19 PROBLEM — D62 ACUTE BLOOD LOSS ANEMIA: Status: ACTIVE | Noted: 2024-12-19

## 2024-12-19 PROBLEM — G89.18 ACUTE POSTOPERATIVE PAIN: Status: ACTIVE | Noted: 2024-12-19

## 2024-12-19 LAB
ANION GAP SERPL CALCULATED.3IONS-SCNC: 8 MMOL/L (ref 5–15)
BASOPHILS # BLD AUTO: 0.01 10*3/MM3 (ref 0–0.2)
BASOPHILS NFR BLD AUTO: 0.2 % (ref 0–1.5)
BUN SERPL-MCNC: 12 MG/DL (ref 8–23)
BUN/CREAT SERPL: 16.9 (ref 7–25)
CALCIUM SPEC-SCNC: 8.5 MG/DL (ref 8.6–10.5)
CHLORIDE SERPL-SCNC: 107 MMOL/L (ref 98–107)
CO2 SERPL-SCNC: 24 MMOL/L (ref 22–29)
CREAT SERPL-MCNC: 0.71 MG/DL (ref 0.57–1)
DEPRECATED RDW RBC AUTO: 44.5 FL (ref 37–54)
EGFRCR SERPLBLD CKD-EPI 2021: 87.2 ML/MIN/1.73
EOSINOPHIL # BLD AUTO: 0.02 10*3/MM3 (ref 0–0.4)
EOSINOPHIL NFR BLD AUTO: 0.4 % (ref 0.3–6.2)
ERYTHROCYTE [DISTWIDTH] IN BLOOD BY AUTOMATED COUNT: 13.2 % (ref 12.3–15.4)
GLUCOSE SERPL-MCNC: 97 MG/DL (ref 65–99)
HCT VFR BLD AUTO: 29.7 % (ref 34–46.6)
HGB BLD-MCNC: 9.6 G/DL (ref 12–15.9)
IMM GRANULOCYTES # BLD AUTO: 0.02 10*3/MM3 (ref 0–0.05)
IMM GRANULOCYTES NFR BLD AUTO: 0.4 % (ref 0–0.5)
LYMPHOCYTES # BLD AUTO: 0.99 10*3/MM3 (ref 0.7–3.1)
LYMPHOCYTES NFR BLD AUTO: 19.1 % (ref 19.6–45.3)
MCH RBC QN AUTO: 29.8 PG (ref 26.6–33)
MCHC RBC AUTO-ENTMCNC: 32.3 G/DL (ref 31.5–35.7)
MCV RBC AUTO: 92.2 FL (ref 79–97)
MONOCYTES # BLD AUTO: 0.43 10*3/MM3 (ref 0.1–0.9)
MONOCYTES NFR BLD AUTO: 8.3 % (ref 5–12)
NEUTROPHILS NFR BLD AUTO: 3.7 10*3/MM3 (ref 1.7–7)
NEUTROPHILS NFR BLD AUTO: 71.6 % (ref 42.7–76)
NRBC BLD AUTO-RTO: 0 /100 WBC (ref 0–0.2)
PLATELET # BLD AUTO: 171 10*3/MM3 (ref 140–450)
PMV BLD AUTO: 10.2 FL (ref 6–12)
POTASSIUM SERPL-SCNC: 4 MMOL/L (ref 3.5–5.2)
RBC # BLD AUTO: 3.22 10*6/MM3 (ref 3.77–5.28)
SODIUM SERPL-SCNC: 139 MMOL/L (ref 136–145)
WBC NRBC COR # BLD AUTO: 5.17 10*3/MM3 (ref 3.4–10.8)

## 2024-12-19 PROCEDURE — 63710000001 MELOXICAM 15 MG TABLET: Performed by: ORTHOPAEDIC SURGERY

## 2024-12-19 PROCEDURE — 97535 SELF CARE MNGMENT TRAINING: CPT

## 2024-12-19 PROCEDURE — A9270 NON-COVERED ITEM OR SERVICE: HCPCS | Performed by: ORTHOPAEDIC SURGERY

## 2024-12-19 PROCEDURE — 97110 THERAPEUTIC EXERCISES: CPT

## 2024-12-19 PROCEDURE — 63710000001 PANTOPRAZOLE 40 MG TABLET DELAYED-RELEASE: Performed by: ORTHOPAEDIC SURGERY

## 2024-12-19 PROCEDURE — 63710000001 ACETAMINOPHEN 500 MG TABLET: Performed by: ORTHOPAEDIC SURGERY

## 2024-12-19 PROCEDURE — 63710000001 MESALAMINE 1.2 G TABLET DELAYED-RELEASE: Performed by: ORTHOPAEDIC SURGERY

## 2024-12-19 PROCEDURE — 97530 THERAPEUTIC ACTIVITIES: CPT

## 2024-12-19 PROCEDURE — 63710000001 ACETAMINOPHEN EXTRA STRENGTH 500 MG TABLET: Performed by: ORTHOPAEDIC SURGERY

## 2024-12-19 PROCEDURE — 25010000002 SODIUM CHLORIDE 0.9 % WITH KCL 20 MEQ 20-0.9 MEQ/L-% SOLUTION: Performed by: ORTHOPAEDIC SURGERY

## 2024-12-19 PROCEDURE — 97165 OT EVAL LOW COMPLEX 30 MIN: CPT

## 2024-12-19 PROCEDURE — 63710000001 ATORVASTATIN 10 MG TABLET: Performed by: ORTHOPAEDIC SURGERY

## 2024-12-19 PROCEDURE — 63710000001 ASPIRIN 325 MG TABLET DELAYED-RELEASE: Performed by: ORTHOPAEDIC SURGERY

## 2024-12-19 PROCEDURE — 27447 TOTAL KNEE ARTHROPLASTY: CPT | Performed by: PHYSICIAN ASSISTANT

## 2024-12-19 PROCEDURE — 85025 COMPLETE CBC W/AUTO DIFF WBC: CPT | Performed by: ORTHOPAEDIC SURGERY

## 2024-12-19 PROCEDURE — 63710000001 ALLOPURINOL 100 MG TABLET: Performed by: ORTHOPAEDIC SURGERY

## 2024-12-19 PROCEDURE — 80048 BASIC METABOLIC PNL TOTAL CA: CPT | Performed by: ORTHOPAEDIC SURGERY

## 2024-12-19 PROCEDURE — 25810000003 SODIUM CHLORIDE 0.9 % SOLUTION: Performed by: INTERNAL MEDICINE

## 2024-12-19 PROCEDURE — 63710000001 DOCUSATE SODIUM 100 MG CAPSULE: Performed by: ORTHOPAEDIC SURGERY

## 2024-12-19 PROCEDURE — 99024 POSTOP FOLLOW-UP VISIT: CPT | Performed by: ORTHOPAEDIC SURGERY

## 2024-12-19 PROCEDURE — 63710000001 MULTIVITAMIN WITH MINERALS TABLET: Performed by: ORTHOPAEDIC SURGERY

## 2024-12-19 PROCEDURE — 25810000003 SODIUM CHLORIDE 0.9 % SOLUTION: Performed by: NURSE PRACTITIONER

## 2024-12-19 RX ADMIN — ATORVASTATIN CALCIUM 10 MG: 10 TABLET, FILM COATED ORAL at 20:47

## 2024-12-19 RX ADMIN — ACETAMINOPHEN 1000 MG: 500 TABLET, FILM COATED ORAL at 00:03

## 2024-12-19 RX ADMIN — Medication 1 TABLET: at 09:06

## 2024-12-19 RX ADMIN — ACETAMINOPHEN 1000 MG: 500 TABLET, FILM COATED ORAL at 18:37

## 2024-12-19 RX ADMIN — FLUTICASONE PROPIONATE 1 SPRAY: 50 SPRAY, METERED NASAL at 20:47

## 2024-12-19 RX ADMIN — ALLOPURINOL 100 MG: 100 TABLET ORAL at 20:47

## 2024-12-19 RX ADMIN — SODIUM CHLORIDE 1000 ML: 9 INJECTION, SOLUTION INTRAVENOUS at 09:57

## 2024-12-19 RX ADMIN — MELOXICAM 15 MG: 15 TABLET ORAL at 09:07

## 2024-12-19 RX ADMIN — ACETAMINOPHEN 1000 MG: 500 TABLET, FILM COATED ORAL at 05:29

## 2024-12-19 RX ADMIN — POTASSIUM CHLORIDE AND SODIUM CHLORIDE 50 ML/HR: 900; 150 INJECTION, SOLUTION INTRAVENOUS at 04:38

## 2024-12-19 RX ADMIN — Medication 3 ML: at 20:47

## 2024-12-19 RX ADMIN — ACETAMINOPHEN 1000 MG: 500 TABLET, FILM COATED ORAL at 12:42

## 2024-12-19 RX ADMIN — SODIUM CHLORIDE 1000 ML: 9 INJECTION, SOLUTION INTRAVENOUS at 12:42

## 2024-12-19 RX ADMIN — DOCUSATE SODIUM 100 MG: 100 CAPSULE, LIQUID FILLED ORAL at 20:47

## 2024-12-19 RX ADMIN — PANTOPRAZOLE SODIUM 40 MG: 40 TABLET, DELAYED RELEASE ORAL at 05:29

## 2024-12-19 RX ADMIN — MESALAMINE 2.4 G: 1.2 TABLET, DELAYED RELEASE ORAL at 09:06

## 2024-12-19 RX ADMIN — MESALAMINE 2.4 G: 1.2 TABLET, DELAYED RELEASE ORAL at 18:37

## 2024-12-19 RX ADMIN — ACETAMINOPHEN 1000 MG: 500 TABLET, FILM COATED ORAL at 23:01

## 2024-12-19 RX ADMIN — ASPIRIN 325 MG: 325 TABLET, COATED ORAL at 09:06

## 2024-12-19 NOTE — THERAPY TREATMENT NOTE
Patient Name: Coco Steele  : 1945    MRN: [id number]                              Today's Date: 2024       Admit Date: 2024    Visit Dx:     ICD-10-CM ICD-9-CM   1. Primary osteoarthritis of both knees  M17.0 715.16     Patient Active Problem List   Diagnosis    Ulcerative pancolitis without complication    Osteopenia    Essential hypertension    Hyperlipidemia    Chronic ulcerative colitis without complication    Primary osteoarthritis of both knees    Arthritis of knee, right    Status post total right knee replacement    Acute postoperative pain    Acute blood loss anemia     Past Medical History:   Diagnosis Date    Abdominal pain, epigastric     Abdominal pain, left lower quadrant     Abnormal findings on diagnostic imaging of abdomen     ABFND, RADIOLOGICAL, ABDOMINAL AREA     Adenomatous colon polyp 10/02/2023    Dr Gill    Allergic     Allergic rhinitis     Arthritis     Cancer 10/2014 removed    Renal Cell carcinoma left    Chronic gout involving toe without tophus 2024    Chronic ulcerative colitis without complication     Colon polyp     Constipation     Diarrhea     Diverticulosis     Encounter for Hemoccult screening     HEMOCCULT POSITIVE STOOLS     Fracture of wrist 2009    Wrist-Hairline fracture    Gastroesophageal reflux disease     esophagitis presence not specified    GERD (gastroesophageal reflux disease)     H/O mammogram 2024    Headache As long as I can remember    Herpes zoster     Hyperlipidemia     Hypertension     Irritable bowel syndrome     Knee swelling `    Nausea     Obesity     Rectal bleeding     Rectal bleeding     Regurgitation     Rotator cuff syndrome     Scoliosis     Ulcerative colitis     Wears glasses      Past Surgical History:   Procedure Laterality Date    CHOLECYSTECTOMY      COLONOSCOPY  2014    left side bx-minimal active inflammation - 2 yr    COLONOSCOPY  2014    active inflammation to extent of limited lower  colonoscopy/45 cm    COLONOSCOPY  02/21/2013    mild inflammation in sigmoid, left-sided diverticulosis    COLONOSCOPY  03/05/2010    very tortuous colon not allowing visual of cecal base - 5 yrs    COLONOSCOPY  11/14/2003    FAIRLY TORTUOUS AND SPASMODIC COLON - 5 YR    COLONOSCOPY Left 09/28/2016    Procedure: COLONOSCOPY WITH ANESTHESIA;  Surgeon: David Alonzo DO;  Location:  [facility];  Service:     COLONOSCOPY N/A 06/11/2020    Procedure: COLONOSCOPY WITH ANESTHESIA;  Surgeon: David Alonzo DO;  Location:  [facility];  Service: Gastroenterology;  Laterality: N/A;  pre hx ulcerative colitis  post hx ulcerative colitis, diverticulosis  dr benedicto kaur    COLONOSCOPY W/ BIOPSIES  08/29/2022    COLONOSCOPY W/ BIOPSIES  08/09/2021    Dr Gill    COLONOSCOPY W/ POLYPECTOMY  10/02/2023    adenomatous polyp Dr Gill 1 yr f/u    ENDOSCOPY  03/14/2014    Normal    ENDOSCOPY  02/09/2006    MILD GASTRITIS    NEPHRECTOMY PARTIAL Left 2014    OTHER SURGICAL HISTORY      Bilateral Eyelid Surgery     TONSILLECTOMY      TOTAL KNEE ARTHROPLASTY Right 12/18/2024    Procedure: TOTAL KNEE ARTHROPLASTY RIGHT;  Surgeon: Paul Whitaker MD;  Location: [facility];  Service: Orthopedics;  Laterality: Right;    UPPER GASTROINTESTINAL ENDOSCOPY  03/14/2014    Dr David Alonzo    WRIST SURGERY Left       General Information       Row Name 12/19/24 1546 12/19/24 1309       Physical Therapy Time and Intention    Document Type therapy note (daily note)  -AC therapy note (daily note)  -AC    Mode of Treatment physical therapy  -AC physical therapy  -AC      Row Name 12/19/24 1546 12/19/24 1309       General Information    Patient Profile Reviewed yes  -AC yes  -AC    Existing Precautions/Restrictions other (see comments);fall  orthostatic with standing, monitor BP. Adductor canal nerve cath. Has KI in room d/t quad weakness  -AC other (see comments);fall  Orthostatic with standing, monitor BP. Adductor canal  nerve cath. Has KI in room d/t quad weakness.  -AC    Barriers to Rehab none identified  -AC none identified  -AC      Row Name 12/19/24 1546 12/19/24 1309       Cognition    Orientation Status (Cognition) oriented x 4  -AC oriented x 4  -AC      Row Name 12/19/24 1546 12/19/24 1309       Safety Issues/Impairments Affecting Functional Mobility    Safety Issues Affecting Function (Mobility) awareness of need for assistance;safety precautions follow-through/compliance;sequencing abilities  -AC awareness of need for assistance;safety precaution awareness;safety precautions follow-through/compliance;sequencing abilities  -AC    Impairments Affecting Function (Mobility) balance;endurance/activity tolerance;pain;range of motion (ROM);strength  -AC balance;endurance/activity tolerance;pain;range of motion (ROM);strength  -AC              User Key  (r) = Recorded By, (t) = Taken By, (c) = Cosigned By      Initials Name Provider Type    AC Nara Villagomez, PT Physical Therapist                   Mobility       Row Name 12/19/24 1546 12/19/24 1328       Bed Mobility    Supine-Sit Luce (Bed Mobility) -- minimum assist (75% patient effort);1 person assist;verbal cues  -    Assistive Device (Bed Mobility) -- bed rails;head of bed elevated  -    Comment, (Bed Mobility) Received [facility] upon arrival  -AC Pt required extended time as well as physical assistance w/ RLE to transition to sitting EOB  -      Row Name 12/19/24 1546 12/19/24 1328       Transfers    Comment, (Transfers) VC for hand placement and sequencing. 2 STS  - VC for hand placement and sequencing  -      Row Name 12/19/24 1328          Bed-Chair Transfer    Bed-Chair Luce (Transfers) moderate assist (50% patient effort);1 person assist  -     Assistive Device (Bed-Chair Transfers) walker, front-wheeled  -AC     Comment, (Bed-Chair Transfer) Pt took side steps to bedside chair w. FWW, modA and VC for sequencing. BP 81/57 in chair however no c/o  dizziness  -AC       Row Name 12/19/24 1546 12/19/24 1328       Sit-Stand Transfer    Sit-Stand [place] (Transfers) minimum assist (75% patient effort);2 person assist  -AC moderate assist (50% patient effort);1 person assist;verbal cues  -AC    Assistive Device (Sit-Stand Transfers) walker, front-wheeled  -AC walker, front-wheeled  -AC    Comment, (Sit-Stand Transfer) Pt completed STS from bedside chair as well as commode however required assistx2 due to weakness. BP remained stable throughout. Pt was able to bend R knee more in order to use RLE to transition to standing this date  -AC STS 2x from EOB. BP 74/46 upon initial stand. Pt returned to seated position and performed therex. BP increased to 96/59. No sx throughout  -AC      Row Name 12/19/24 1546 12/19/24 1328       Gait/Stairs (Locomotion)    [place] Level (Gait) minimum assist (75% patient effort);1 person assist  -AC moderate assist (50% patient effort);1 person assist  -AC    Assistive Device (Gait) walker, front-wheeled  -AC walker, front-wheeled  -AC    Patient was able to Ambulate yes  -AC yes  -AC    Distance in Feet (Gait) 30  -AC 3  -AC    Deviations/Abnormal Patterns (Gait) right sided deviations;antalgic;stride length decreased;weight shifting decreased  -AC right sided deviations;antalgic;stride length decreased;weight shifting decreased  -AC    Bilateral Gait Deviations forward flexed posture  -AC forward flexed posture  -AC    [place] Level (Stairs) moderate assist (50% patient effort);2 person assist;verbal cues;nonverbal cues (demo/gesture)  -AC --    Assistive Device (Stairs) walker, front-wheeled  -AC --    Number of Steps (Stairs) 1  -AC --    Ascending Technique (Stairs) step-to-step  -AC --    Descending Technique (Stairs) step-to-step  -AC --    Comment, (Gait/Stairs) Pt ambulated throughout room w/ minAx1 and FWW. Pt demonstrated a step through gait pattern, decreased foot clearance bilaterally, and flexed trunk  posture however BP remained stable and pt did not demonstrate any LOB or buckling throughout. In addition pt demonstrated 1 step w/ FWW, modAx2 and VC for step sequencing. After completing 1 step pt reported fatigue and requested to return to chair  - Pt took side steps to bedside chair w/ modAx1 and FWW. Pt gait distance limited by hypotension.  -      Row Name 12/19/24 1546 12/19/24 1328       Mobility    Extremity Weight-bearing Status right lower extremity  - right lower extremity  -    Right Lower Extremity (Weight-bearing Status) weight-bearing as tolerated (WBAT)  -AC weight-bearing as tolerated (WBAT)  -              User Key  (r) = Recorded By, (t) = Taken By, (c) = Cosigned By      Initials Name Provider Type    AC Nara Villagomez, PT Physical Therapist                   Obj/Interventions       Row Name 12/19/24 1555          Range of Motion Comprehensive    General Range of Motion lower extremity range of motion deficits identified  -     Comment, General Range of Motion Surgical knee ROM:12-64  -       Row Name 12/19/24 1555 12/19/24 1340       Motor Skills    Therapeutic Exercise hip;knee  - hip;knee  -      Row Name 12/19/24 1555 12/19/24 1340       Hip (Therapeutic Exercise)    Hip (Therapeutic Exercise) isometric exercises;strengthening exercise  - isometric exercises;strengthening exercise;AAROM (active assistive range of motion)  -    Hip Isometrics (Therapeutic Exercise) flexion;extension;gluteal sets;10 repetitions  - gluteal sets;10 repetitions  -    Hip Strengthening (Therapeutic Exercise) right;flexion;extension;10 repetitions  - right;flexion;extension;10 repetitions  -      Row Name 12/19/24 1555 12/19/24 1340       Knee (Therapeutic Exercise)    Knee (Therapeutic Exercise) isometric exercises;strengthening exercise  - isometric exercises;AAROM (active assistive range of motion)  -    Knee AROM (Therapeutic Exercise) -- bilateral;flexion;extension;heel  slides;LAQ (long arc quad)  -AC    Knee Isometrics (Therapeutic Exercise) quad sets;10 repetitions  -AC quad sets;10 repetitions;right  -AC    Knee Strengthening (Therapeutic Exercise) SAQ (short arc quad);LAQ (long arc quad);heel slides;10 repetitions;right  -AC --      Row Name 12/19/24 1555 12/19/24 1340       Ankle (Therapeutic Exercise)    Ankle (Therapeutic Exercise) AROM (active range of motion)  -AC AROM (active range of motion)  -AC    Ankle AROM (Therapeutic Exercise) bilateral;dorsiflexion;plantarflexion;10 repetitions  -AC bilateral;dorsiflexion;plantarflexion;10 repetitions  -AC      Row Name 12/19/24 1555 12/19/24 1340       Balance    Balance Assessment sitting static balance;sitting dynamic balance;standing static balance;standing dynamic balance  -AC sitting static balance;sitting dynamic balance;standing static balance;standing dynamic balance  -AC    Static Sitting Balance standby assist  -AC standby assist  -AC    Dynamic Sitting Balance contact guard  -AC contact guard  -AC    Position, Sitting Balance unsupported;sitting in chair  -AC unsupported;sitting edge of bed  -AC    Static Standing Balance minimal assist  -AC minimal assist  -AC    Dynamic Standing Balance moderate assist  -AC moderate assist  -AC    Position/Device Used, Standing Balance supported;walker, front-wheeled  -AC supported;walker, front-wheeled  -AC    Balance Interventions sitting;standing;sit to stand  -AC sitting;standing;sit to stand  -AC              User Key  (r) = Recorded By, (t) = Taken By, (c) = Cosigned By      Initials Name Provider Type    AC Nara Villagomez PT Physical Therapist                   Goals/Plan    No documentation.                  Clinical Impression       Row Name 12/19/24 1556 12/19/24 1345       Pain    Pretreatment Pain Rating 5/10  -AC 5/10  -AC    Posttreatment Pain Rating 5/10  -AC 5/10  -AC    Pain Location knee  -AC knee  -AC    Pain Side/Orientation right  -AC right  -AC    Pain Management  Interventions activity modification encouraged;exercise or physical activity utilized;positioning techniques utilized  -AC activity modification encouraged;exercise or physical activity utilized;nursing notified  -AC    Response to Pain Interventions activity participation with tolerable pain  -AC activity participation with tolerable pain  -AC      Row Name 12/19/24 1556 12/19/24 1345       Plan of Care Review    Plan of Care Reviewed With patient;friend  -AC patient  -AC    Progress improving  -AC no change  -AC    Outcome Evaluation Pt was able to perform HEP w/ improved R knee ROM and tolerance this date. In addition pt's BP remained stable throughout session and pt was able to ambulate 25ft w/ FWW and minAX1. Pt attempted 1 step w/ modAx2, FWW, and VC for step sequencing this date. No LOB or buckling throughout session. Mobility limited by fatigue. Continue to progress POC as able.  -AC Pt completed HEP w/ modA due to RLE pain and stiffness this date. In addition pt was limited by hypotension and was only able to take side steps to the bedside chair w/ modA and FWW however did not have any c/o dizziness throughout session. RN notified. Continue to progress POC as able.  -AC      Row Name 12/19/24 1556 12/19/24 1345       Therapy Assessment/Plan (PT)    Rehab Potential (PT) good  -AC good  -AC    Criteria for Skilled Interventions Met (PT) meets criteria;yes  -AC meets criteria;yes  -AC    Therapy Frequency (PT) 2 times/day  -AC 2 times/day  -AC    Predicted Duration of Therapy Intervention (PT) 1  -AC 1  -AC      Row Name 12/19/24 1556 12/19/24 1345       Vital Signs    Pre Systolic BP Rehab -- 116  -AC    Pre Treatment Diastolic BP -- 63  -AC    Intra Systolic BP Rehab -- 74  -AC    Intra Treatment Diastolic BP -- 46  -AC    Post Systolic BP Rehab -- 111  -AC    Post Treatment Diastolic BP -- 76  -AC    O2 Delivery Pre Treatment room air  -AC room air  -AC    O2 Delivery Intra Treatment room air  -AC room air   -AC    O2 Delivery Post Treatment room air  -AC room air  -AC    Pre Patient Position Sitting  -AC Supine  -AC    Intra Patient Position Standing  -AC Standing  -AC    Post Patient Position Sitting  -AC Sitting  -AC      Row Name 12/19/24 1556 12/19/24 1345       Positioning and Restraints    Pre-Treatment Position sitting in chair/recliner  -AC in bed  -AC    Post Treatment Position chair  -AC chair  -AC    In Chair notified nsg;reclined;sitting;call light within reach;encouraged to call for assist;exit alarm on;waffle cushion;legs elevated;LLE elevated  ice applied  -AC notified nsg;reclined;call light within reach;sitting;encouraged to call for assist;exit alarm on;waffle cushion;legs elevated;with family/caregiver  ice applied  -AC              User Key  (r) = Recorded By, (t) = Taken By, (c) = Cosigned By      Initials Name Provider Type    Nara Fall, PT Physical Therapist                   Outcome Measures       Row Name 12/19/24 1553 12/19/24 1347       How much help from another person do you currently need...    Turning from your back to your side while in flat bed without using bedrails? 3  -AC 3  -AC    Moving from lying on back to sitting on the side of a flat bed without bedrails? 3  -AC 3  -AC    Moving to and from a bed to a chair (including a wheelchair)? 3  -AC 2  -AC    Standing up from a chair using your arms (e.g., wheelchair, bedside chair)? 2  -AC 2  -AC    Climbing 3-5 steps with a railing? 2  -AC 2  -AC    To walk in hospital room? 3  -AC 2  -AC    AM-PAC 6 Clicks Score (PT) 16  -AC 14  -AC    Highest Level of Mobility Goal 5 --> Static standing  -AC 4 --> Transfer to chair/commode  -AC      Row Name 12/19/24 0800          How much help from another person do you currently need...    Turning from your back to your side while in flat bed without using bedrails? 3  -PT     Moving from lying on back to sitting on the side of a flat bed without bedrails? 3  -PT     Moving to and from a bed  to a chair (including a wheelchair)? 3  -PT     Standing up from a chair using your arms (e.g., wheelchair, bedside chair)? 3  -PT     Climbing 3-5 steps with a railing? 2  -PT     To walk in hospital room? 3  -PT     AM-PAC 6 Clicks Score (PT) 17  -PT     Highest Level of Mobility Goal 5 --> Static standing  -PT       Row Name 12/19/24 1559 12/19/24 1347       Functional Assessment    Outcome Measure Options AM-PAC 6 Clicks Basic Mobility (PT)  - AM-PAC 6 Clicks Basic Mobility (PT)  -      Row Name 12/19/24 0904          Functional Assessment    Outcome Measure Options AM-PAC 6 Clicks Daily Activity (OT)  -               User Key  (r) = Recorded By, (t) = Taken By, (c) = Cosigned By      Initials Name Provider Type    PT Ronni Hogue, RN Registered Nurse    Nara Fall, PT Physical Therapist    Ruby Serna, OT Occupational Therapist                                 Physical Therapy Education       Title: PT OT SLP Therapies (In Progress)       Topic: Physical Therapy (Done)       Point: Mobility training (Done)       Learning Progress Summary            Patient Acceptance, E, VU by  at 12/19/2024 1559    Acceptance, E, VU by  at 12/19/2024 1407    Eager, HARJIT,TB,D,H, VU,DU by SC at 12/18/2024 1615    Comment: Reviewed HEP                      Point: Home exercise program (Done)       Learning Progress Summary            Patient Acceptance, E, VU by  at 12/19/2024 1559    Acceptance, E, VU by  at 12/19/2024 1407    Eager, HARJIT,TB,D,H, VU,DU by SC at 12/18/2024 1615    Comment: Reviewed HEP                      Point: Body mechanics (Done)       Learning Progress Summary            Patient Acceptance, E, VU by  at 12/19/2024 1559    Acceptance, E, VU by  at 12/19/2024 1407    Eager, HARJIT,TB,D,H, VU,DU by SC at 12/18/2024 1615    Comment: Reviewed HEP                      Point: Precautions (Done)       Learning Progress Summary            Patient Acceptance, E, VU by  at 12/19/2024 1559     Shaila, E, VU by  at 12/19/2024 1407    Nafisa, HARJIT,TB,D,H, VU,DU by SC at 12/18/2024 1615    Comment: Reviewed HEP                                      User Key       Initials Effective Dates Name Provider Type Discipline    SC 02/03/23 -  Obinna Gu PT Physical Therapist PT     07/11/24 -  Nara Villagomez PT Physical Therapist PT                  PT Recommendation and Plan     Progress: improving  Outcome Evaluation: Pt was able to perform HEP w/ improved R knee ROM and tolerance this date. In addition pt's BP remained stable throughout session and pt was able to ambulate 25ft w/ FWW and minAX1. Pt attempted 1 step w/ modAx2, FWW, and VC for step sequencing this date. No LOB or buckling throughout session. Mobility limited by fatigue. Continue to progress POC as able.     Time Calculation:         PT Charges       Row Name 12/19/24 1600 12/19/24 1408          Time Calculation    Start Time 1454  -AC 1132  -AC     PT Received On 12/19/24  - 12/19/24  -     PT Goal Re-Cert Due Date 12/28/24  - 12/28/24  -        Time Calculation- PT    Total Timed Code Minutes- PT 28 minute(s)  -AC 38 minute(s)  -AC        Timed Charges    77242 - PT Therapeutic Exercise Minutes 12  -AC 14  -AC     49176 - PT Therapeutic Activity Minutes 16  -AC 24  -AC        Total Minutes    Timed Charges Total Minutes 28  -AC 38  -AC      Total Minutes 28  -AC 38  -AC               User Key  (r) = Recorded By, (t) = Taken By, (c) = Cosigned By      Initials Name Provider Type     Nara Villagomez, PT Physical Therapist                  Therapy Charges for Today       Code Description Service Date Service Provider Modifiers Qty    84409785416 HC PT THER PROC EA 15 MIN 12/19/2024 Nara Villagomez, PT GP 1    22500099908 HC PT THERAPEUTIC ACT EA 15 MIN 12/19/2024 Nara Villagomez, PT GP 2    65053267394 HC PT THER SUPP EA 15 MIN 12/19/2024 Nara Villagomez, PT GP 2    93886213159 HC PT THER PROC EA 15 MIN 12/19/2024 Nara Villagomez, PT GP 1    39860797139  HC PT THERAPEUTIC ACT EA 15 MIN 12/19/2024 Nara Villagomez, PT GP 1    15758342686 HC PT THER SUPP EA 15 MIN 12/19/2024 Nara Villagomez, PT GP 2            PT G-Codes  Outcome Measure Options: AM-PAC 6 Clicks Basic Mobility (PT)  AM-PAC 6 Clicks Score (PT): 16  AM-PAC 6 Clicks Score (OT): 18  PT Discharge Summary  Anticipated Discharge Disposition (PT): home with assist, home with outpatient therapy services    Nara Villagomez, PT  12/19/2024

## 2024-12-19 NOTE — PLAN OF CARE
Goal Outcome Evaluation:   Patient is alert and oriented.On RA.Had urinary retention and straight catheterised once.voided spontaneously after that. Walked with PT.Had hypotensive episode today and 2L Bolus given.

## 2024-12-19 NOTE — PROGRESS NOTES
"IM progress note      Coco Steele  [id number]  1945     LOS: 0 days     Attending: Paul hWitaker MD    Primary Care Provider: Radha Berger MD      Chief Complaint/Reason for visit:  Right knee pain    Subjective   Doing ok. Adequate pain control. Dizziness and hypotension when working with therapy this morning. Denies f/c/n/v/sob/cp.  ( Seen by me, dizziness with BP drop earlier in the day)wy    Objective        Visit Vitals  /50   Pulse 79   Temp 97.6 °F (36.4 °C) (Oral)   Resp 18   Ht 167.6 cm (66\")   Wt 86.2 kg (190 lb)   SpO2 95%   BMI 30.67 kg/m²     Temp (24hrs), Av.9 °F (36.6 °C), Min:97.6 °F (36.4 °C), Max:98.2 °F (36.8 °C)      Nutrition: PO    Respiratory: RA    Physical Therapy: (24) Patient required a knee immobilizer on for ambulation post surgery due to quad weakness. I recommend continued skilled PT tomorrow . She will be pankaj home with her friends help and recieve outpatient PT. Her walker has been issued to bedside     Physical Exam:     General Appearance:    Alert, cooperative, in no acute distress   Head:    Normocephalic, without obvious abnormality, atraumatic    Lungs:     Normal effort, symmetric chest rise, no crepitus, clear to      auscultation bilaterally             Heart:    Regular rhythm and normal rate, normal S1 and S2   Abdomen:     Normal bowel sounds, no masses, no organomegaly, soft        non-tender, non-distended, no guarding, no rebound                tenderness   Extremities:   Right knee ACE wrap CDI. Nerve block present   Pulses:   Pulses palpable and equal bilaterally   Skin:   No bleeding, bruising or rash   Neurologic:   Moves all extremities with no obvious focal motor deficit.  Cranial nerves 2 - 12 grossly intact. Flexion and dorsiflexion intact bilateral feet.       Results Review:     I reviewed the patient's new clinical results.   Results from last 7 days   Lab Units 24  0546   WBC 10*3/mm3 5.17   HEMOGLOBIN g/dL 9.6* "   HEMATOCRIT % 29.7*   PLATELETS 10*3/mm3 171      Latest Reference Range & Units 12/04/24 09:41   Hemoglobin 12.0 - 15.9 g/dL 12.0   Hematocrit 34.0 - 46.6 % 37.5     Results from last 7 days   Lab Units 12/19/24  0546   SODIUM mmol/L 139   POTASSIUM mmol/L 4.0   CHLORIDE mmol/L 107   CO2 mmol/L 24.0   BUN mg/dL 12   CREATININE mg/dL 0.71   CALCIUM mg/dL 8.5*   GLUCOSE mg/dL 97     I reviewed the patient's new imaging including images and reports.    All medications reviewed.   acetaminophen, 1,000 mg, Oral, Q6H  allopurinol, 100 mg, Oral, Nightly  aspirin, 325 mg, Oral, Daily  atorvastatin, 10 mg, Oral, Nightly  Chlorhexidine Gluconate Cloth, , Apply externally, Once  docusate sodium, 100 mg, Oral, Nightly  fluticasone, 1 spray, Nasal, Nightly  lisinopril, 10 mg, Oral, Nightly  meloxicam, 15 mg, Oral, Daily  mesalamine, 2.4 g, Oral, BID With Meals  multivitamin with minerals, 1 tablet, Oral, Daily  pantoprazole, 40 mg, Oral, Q AM  sodium chloride, 1,000 mL, Intravenous, Once  sodium chloride, 3 mL, Intravenous, Q12H        Assessment & Plan     Status post total right knee replacement    Primary osteoarthritis of both knees    Essential hypertension    Hyperlipidemia    Arthritis of knee, right    Acute postoperative pain    Acute blood loss anemia      Plan  1. PT/OT- WBAT RLE  2. Pain control-prns, AC nerve block  3. IS-encouraged  4. DVT proph- mechs/ASA  5. Bowel regimen  6. Monitor post-op labs  7. DC planning for home    HTN, Hyperlipidemia  - Continue home lisinopril and statin  - Monitor BP   - Holding parameters for BP meds  - Labetalol PRN for SBP>170      RAMÍREZ Schulz  12/19/24  12:51 EST    I have personally performed the evaluation on this patient. My history is consistent  with HPI obtained. My exam findings are listed above. I have personally reviewed and formulated the above treatment plan with APRN.   Stopped lisinopril advised pt on delaying resumption after discharge.

## 2024-12-19 NOTE — PROGRESS NOTES
"Orthopedic Daily Progress Note      CC: s/p right TKA    Pain  controlled  General: no fevers, chills  Abdomen: no nausea, vomiting, or diarrhea    No other complaints    Physical Exam:  I have reviewed the vital signs.  Temp:  [97.6 °F (36.4 °C)-98.2 °F (36.8 °C)] 97.8 °F (36.6 °C)  Heart Rate:  [70-78] 70  Resp:  [16-18] 18  BP: (102-128)/(50-68) 104/50    Objective:  Vital signs: (most recent): Blood pressure 104/50, pulse 70, temperature 97.8 °F (36.6 °C), temperature source Oral, resp. rate 18, height 167.6 cm (66\"), weight 86.2 kg (190 lb), SpO2 96%, not currently breastfeeding.              General Appearance:    Alert, cooperative, no distress  Extremities: No clubbing, cyanosis, or edema to lower extremities  Pulses:  2+ in distal surgical extremity  Skin: Dressing Clean/dry/intact      Results Review:    I have reviewed the labs, radiology results and diagnostic studies:    Results from last 7 days   Lab Units 12/19/24  0546   WBC 10*3/mm3 5.17   HEMOGLOBIN g/dL 9.6*   PLATELETS 10*3/mm3 171     Results from last 7 days   Lab Units 12/19/24  0546   SODIUM mmol/L 139   POTASSIUM mmol/L 4.0   CO2 mmol/L 24.0   CREATININE mg/dL 0.71   GLUCOSE mg/dL 97       I have reviewed the medications.    Assessment/Problem List  1 Day Post-Op S/p right TKA    Plan  PT WBAT  ASA  Discharge Planning: to home    Paul Whitaker MD  12/19/24  11:34 EST            "

## 2024-12-19 NOTE — PLAN OF CARE
Goal Outcome Evaluation:  Plan of Care Reviewed With: patient        Progress: no change  Outcome Evaluation: Pt completed HEP w/ modA due to RLE pain and stiffness this date. In addition pt was limited by hypotension and was only able to take side steps to the bedside chair w/ modA and FWW however did not have any c/o dizziness throughout session. RN notified. Continue to progress POC as able.    Anticipated Discharge Disposition (PT): home with assist, home with outpatient therapy services

## 2024-12-19 NOTE — PLAN OF CARE
Goal Outcome Evaluation:  Plan of Care Reviewed With: patient, friend        Progress: improving  Outcome Evaluation: Pt was able to perform HEP w/ improved R knee ROM and tolerance this date. In addition pt's BP remained stable throughout session and pt was able to ambulate 25ft w/ FWW and minAX1. Pt attempted 1 step w/ modAx2, FWW, and VC for step sequencing this date. No LOB or buckling throughout session. Mobility limited by fatigue. Continue to progress POC as able.    Anticipated Discharge Disposition (PT): home with assist, home with outpatient therapy services

## 2024-12-19 NOTE — PLAN OF CARE
Goal Outcome Evaluation:  Plan of Care Reviewed With: patient, friend        Progress: no change  Outcome Evaluation: OT eval complete. Pt stood EOB for LB dressing with c/o dizziness. BP dropped to 62/37 and pt was returned to supine and nursing notified. Pt stated she was feeling better and BP increased to 97/77. Pt presents below fxl baseline with ADLs and fxl mobility, limited by pain, weakness, and decreased ROM in RLE. Pt would benefit from further OT services to progress to PLOF. Recommend d/c to home with assist once medically ready.    Anticipated Discharge Disposition (OT): home with assist

## 2024-12-19 NOTE — THERAPY EVALUATION
Patient Name: Coco Steele  : 1945    MRN: [id number]                              Today's Date: 2024       Admit Date: 2024    Visit Dx:     ICD-10-CM ICD-9-CM   1. Primary osteoarthritis of both knees  M17.0 715.16     Patient Active Problem List   Diagnosis    Ulcerative pancolitis without complication    Osteopenia    Essential hypertension    Hyperlipidemia    Chronic ulcerative colitis without complication    Primary osteoarthritis of both knees    Arthritis of knee, right    Status post total right knee replacement     Past Medical History:   Diagnosis Date    Abdominal pain, epigastric     Abdominal pain, left lower quadrant     Abnormal findings on diagnostic imaging of abdomen     ABFND, RADIOLOGICAL, ABDOMINAL AREA     Adenomatous colon polyp 10/02/2023    Dr Gill    Allergic     Allergic rhinitis     Arthritis     Cancer 10/2014 removed    Renal Cell carcinoma left    Chronic gout involving toe without tophus 2024    Chronic ulcerative colitis without complication     Colon polyp     Constipation     Diarrhea     Diverticulosis     Encounter for Hemoccult screening     HEMOCCULT POSITIVE STOOLS     Fracture of wrist     Wrist-Hairline fracture    Gastroesophageal reflux disease     esophagitis presence not specified    GERD (gastroesophageal reflux disease)     H/O mammogram 2024    Headache As long as I can remember    Herpes zoster     Hyperlipidemia     Hypertension     Irritable bowel syndrome     Knee swelling `    Nausea     Obesity     Rectal bleeding     Rectal bleeding     Regurgitation     Rotator cuff syndrome     Scoliosis     Ulcerative colitis     Wears glasses      Past Surgical History:   Procedure Laterality Date    CHOLECYSTECTOMY      COLONOSCOPY  2014    left side bx-minimal active inflammation - 2 yr    COLONOSCOPY  2014    active inflammation to extent of limited lower colonoscopy/45 cm    COLONOSCOPY  2013    mild  inflammation in sigmoid, left-sided diverticulosis    COLONOSCOPY  03/05/2010    very tortuous colon not allowing visual of cecal base - 5 yrs    COLONOSCOPY  11/14/2003    FAIRLY TORTUOUS AND SPASMODIC COLON - 5 YR    COLONOSCOPY Left 09/28/2016    Procedure: COLONOSCOPY WITH ANESTHESIA;  Surgeon: David Alonzo DO;  Location:  [facility];  Service:     COLONOSCOPY N/A 06/11/2020    Procedure: COLONOSCOPY WITH ANESTHESIA;  Surgeon: David Alonzo DO;  Location:  [facility];  Service: Gastroenterology;  Laterality: N/A;  pre hx ulcerative colitis  post hx ulcerative colitis, diverticulosis  dr benedicto kaur    COLONOSCOPY W/ BIOPSIES  08/29/2022    COLONOSCOPY W/ BIOPSIES  08/09/2021    Dr Gill    COLONOSCOPY W/ POLYPECTOMY  10/02/2023    adenomatous polyp Dr Gill 1 yr f/u    ENDOSCOPY  03/14/2014    Normal    ENDOSCOPY  02/09/2006    MILD GASTRITIS    NEPHRECTOMY PARTIAL Left 2014    OTHER SURGICAL HISTORY      Bilateral Eyelid Surgery     TONSILLECTOMY      TOTAL KNEE ARTHROPLASTY Right 12/18/2024    Procedure: TOTAL KNEE ARTHROPLASTY RIGHT;  Surgeon: Paul Whitaker MD;  Location: [facility] OR;  Service: Orthopedics;  Laterality: Right;    UPPER GASTROINTESTINAL ENDOSCOPY  03/14/2014    Dr David Alonzo    WRIST SURGERY Left       General Information       Row Name 12/19/24 1755          OT Time and Intention    Document Type evaluation  -AJ     Mode of Treatment occupational therapy  -AJ     Patient Effort good  -AJ       Row Name 12/19/24 3346          General Information    Patient Profile Reviewed yes  -AJ     Prior Level of Function independent:;all household mobility;community mobility;gait;bed mobility;ADL's  -AJ     Existing Precautions/Restrictions other (see comments);fall  Orthostatic with standing, monitor BP. Adductor canal nerve cath. Has KI in room d/t quad weakness.  -AJ     Barriers to Rehab none identified  -AJ       Row Name 12/19/24 5372          Living Environment     People in Home alone;other (see comments)  Staying with friends at discharge  -       Row Name 12/19/24 0847          Home Main Entrance    Number of Stairs, Main Entrance none  -AJ     Stair Railings, Main Entrance none  -AJ       Row Name 12/19/24 0847          Stairs Within Home, Primary    Number of Stairs, Within Home, Primary none  -AJ       Row Name 12/19/24 0847          Cognition    Orientation Status (Cognition) oriented x 4  -       Row Name 12/19/24 0847          Safety Issues/Impairments Affecting Functional Mobility    Safety Issues Affecting Function (Mobility) awareness of need for assistance;safety precaution awareness;safety precautions follow-through/compliance;sequencing abilities  -     Impairments Affecting Function (Mobility) balance;endurance/activity tolerance;pain;range of motion (ROM);strength  -     Comment, Safety Issues/Impairments (Mobility) BP drop to 62/37 with stand  -AJ               User Key  (r) = Recorded By, (t) = Taken By, (c) = Cosigned By      Initials Name Provider Type    AJ Ruby Hansen OT Occupational Therapist                     Mobility/ADL's       Row Name 12/19/24 0850          Bed Mobility    Bed Mobility scooting/bridging;supine-sit;sit-supine  -AJ     Scooting/Bridging [place] (Bed Mobility) standby assist  -AJ     Supine-Sit [place] (Bed Mobility) minimum assist (75% patient effort);1 person assist;verbal cues  -     Sit-Supine [place] (Bed Mobility) maximum assist (25% patient effort);1 person assist;other (see comments)  Required extra assist d/t low BP  -     Assistive Device (Bed Mobility) bed rails;head of bed elevated  -     Comment, (Bed Mobility) Required physical assist with RLE, extra time required to get into sitting position.  -AJ       Row Name 12/19/24 0850          Transfers    Transfers sit-stand transfer;stand-sit transfer  -       Row Name 12/19/24 0850          Sit-Stand Transfer    Sit-Stand [place]  (Transfers) moderate assist (50% patient effort);1 person assist;verbal cues  -     Assistive Device (Sit-Stand Transfers) walker, front-wheeled  -ILANA     Comment, (Sit-Stand Transfer) x2 attempts to stand from EOB, unable to clear hips on 1st attempt. Educated on body mechanics with second attempts and was able to stand with modAx1.  -       Row Name 12/19/24 0850          Stand-Sit Transfer    Stand-Sit [place] (Transfers) minimum assist (75% patient effort);1 person assist;verbal cues  -ILANA     Assistive Device (Stand-Sit Transfers) walker, front-wheeled  -ILANA     Comment, (Stand-Sit Transfer) VCs for eccentric lowering and alignment  -       Row Name 12/19/24 0850          Functional Mobility    Patient was able to Ambulate no, other medical factors prevent ambulation  -     Reason Patient was unable to Ambulate Hypotension  -       Row Name 12/19/24 0850          Activities of Daily Living    BADL Assessment/Intervention bathing;lower body dressing;toileting  -[facility] Name 12/19/24 0850          Mobility    Extremity Weight-bearing Status right lower extremity  -     Right Lower Extremity (Weight-bearing Status) weight-bearing as tolerated (WBAT)  -       Row Name 12/19/24 0850          Bathing Assessment/Intervention    [place] Level (Bathing) bathing skills  -ILANA     Comment, (Bathing) Pt educated on bathing restrictions with nerve cath, instructed to follow surgeon's recommendations once nerve cath is removed.  -       Row Name 12/19/24 0850          Lower Body Dressing Assessment/Training    [place] Level (Lower Body Dressing) don;pants/bottoms;minimum assist (75% patient effort)  -     Position (Lower Body Dressing) unsupported sitting;edge of bed sitting  -ILANA     Comment, (Lower Body Dressing) Pt performed LB dressing with education on techniques to prevent nerve cath dislodgement.  -       Row Name 12/19/24 0850          Toileting Assessment/Training    [place]  Level (Toileting) toileting skills  -     Comment, (Toileting) Pt educated on toileting and clothing management with emphasis on preventing nerve cath dislodgement.  -               User Key  (r) = Recorded By, (t) = Taken By, (c) = Cosigned By      Initials Name Provider Type    Ruby Serna OT Occupational Therapist                   Obj/Interventions       Row Name 12/19/24 0857          Vision Assessment/Intervention    Visual Impairment/Limitations WFL  -       Row Name 12/19/24 0857          Range of Motion Comprehensive    General Range of Motion bilateral upper extremity ROM WFL  -       Row Name 12/19/24 0857          Strength Comprehensive (MMT)    Comment, General Manual Muscle Testing (MMT) Assessment BUE grossly 4/5 based on fxl transfers  -       Row Name 12/19/24 0857          Balance    Balance Assessment sitting static balance;sitting dynamic balance;sit to stand dynamic balance;standing static balance;standing dynamic balance  -     Static Sitting Balance independent  -     Dynamic Sitting Balance standby assist  -     Position, Sitting Balance unsupported;sitting edge of bed  -     Sit to Stand Dynamic Balance moderate assist;1-person assist;verbal cues  -     Static Standing Balance supervision  -     Dynamic Standing Balance contact guard  -     Position/Device Used, Standing Balance supported;walker, front-wheeled  -     Balance Interventions sitting;standing;sit to stand;supported;static;dynamic;occupation based/functional task  -               User Key  (r) = Recorded By, (t) = Taken By, (c) = Cosigned By      Initials Name Provider Type    Ruby Serna OT Occupational Therapist                   Goals/Plan       Row Name 12/19/24 0904          Bed Mobility Goal 1 (OT)    Activity/Assistive Device (Bed Mobility Goal 1, OT) sit to supine;supine to sit  -     [place] Level/Cues Needed (Bed Mobility Goal 1, OT) supervision required  -     Time  Frame (Bed Mobility Goal 1, OT) long term goal (LTG);10 days  -AJ     Progress/Outcomes (Bed Mobility Goal 1, OT) new goal  -       Row Name 12/19/24 0904          Transfer Goal 1 (OT)    Activity/Assistive Device (Transfer Goal 1, OT) bed-to-chair/chair-to-bed;sit-to-stand/stand-to-sit;toilet  -AJ     [place] Level/Cues Needed (Transfer Goal 1, OT) contact guard required  -AJ     Time Frame (Transfer Goal 1, OT) long term goal (LTG);10 days  -AJ     Progress/Outcome (Transfer Goal 1, OT) new goal  -AJ       Row Name 12/19/24 0904          Toileting Goal 1 (OT)    Activity/Device (Toileting Goal 1, OT) adjust/manage clothing;perform perineal hygiene  -AJ     [place] Level/Cues Needed (Toileting Goal 1, OT) minimum assist (75% or more patient effort)  -AJ     Time Frame (Toileting Goal 1, OT) short term goal (STG);5 days  -AJ     Progress/Outcome (Toileting Goal 1, OT) new goal  -       Row Name 12/19/24 0904          Therapy Assessment/Plan (OT)    Planned Therapy Interventions (OT) activity tolerance training;adaptive equipment training;BADL retraining;functional balance retraining;IADL retraining;occupation/activity based interventions;patient/caregiver education/training;ROM/therapeutic exercise;strengthening exercise;transfer/mobility retraining  -AJ               User Key  (r) = Recorded By, (t) = Taken By, (c) = Cosigned By      Initials Name Provider Type    AJ Ruby Hansen OT Occupational Therapist                   Clinical Impression       Row Name 12/19/24 0858          Pain Assessment    Pretreatment Pain Rating 5/10  -AJ     Posttreatment Pain Rating 5/10  -AJ     Pain Location knee  -AJ     Pain Side/Orientation right  -AJ     Pain Management Interventions activity modification encouraged;exercise or physical activity utilized;positioning techniques utilized;cold applied  -AJ     Response to Pain Interventions activity participation with tolerable pain  -       Row Name 12/19/24  0858          Plan of Care Review    Plan of Care Reviewed With patient;friend  -     Progress no change  -     Outcome Evaluation OT eval complete. Pt stood EOB for LB dressing with c/o dizziness. BP dropped to 62/37 and pt was returned to supine and nursing notified. Pt stated she was feeling better and BP increased to 97/77. Pt presents below fxl baseline with ADLs and fxl mobility, limited by pain, weakness, and decreased ROM in RLE. Pt would benefit from further OT services to progress to PLOF. Recommend d/c to home with assist once medically ready.  -       Row Name 12/19/24 0858          Therapy Assessment/Plan (OT)    Patient/Family Therapy Goal Statement (OT) return to PLOF  -     Rehab Potential (OT) good  -     Criteria for Skilled Therapeutic Interventions Met (OT) yes;meets criteria;skilled treatment is necessary  -     Therapy Frequency (OT) daily  -     Predicted Duration of Therapy Intervention (OT) 10 days  -       Row Name 12/19/24 0858          Therapy Plan Review/Discharge Plan (OT)    Anticipated Discharge Disposition (OT) home with assist  -       Row Name 12/19/24 0858          Vital Signs    Pre Systolic BP Rehab 112  -AJ     Pre Treatment Diastolic BP 58  -AJ     Intra Systolic BP Rehab 62   -AJ     Intra Treatment Diastolic BP 37   -AJ     Post Systolic BP Rehab 97  -AJ     Post Treatment Diastolic BP 77  -AJ     Pre SpO2 (%) 94  -AJ     O2 Delivery Pre Treatment room air  -AJ     O2 Delivery Intra Treatment room air  -AJ     Post SpO2 (%) 95  -AJ     O2 Delivery Post Treatment room air  -AJ     Pre Patient Position Supine  -AJ     Intra Patient Position Sitting   after standing for LB dressing  -AJ     Post Patient Position Supine  -       Row Name 12/19/24 0858          Positioning and Restraints    Pre-Treatment Position in bed  -AJ     Post Treatment Position bed  -AJ     In Bed notified nsg;supine;call light within reach;encouraged to call for assist;exit alarm  on;side rails up x2;SCD pump applied  -               User Key  (r) = Recorded By, (t) = Taken By, (c) = Cosigned By      Initials Name Provider Type    Ruby Serna OT Occupational Therapist                   Outcome Measures       Row Name 12/19/24 0904          How much help from another is currently needed...    Putting on and taking off regular lower body clothing? 3  -AJ     Bathing (including washing, rinsing, and drying) 2  -AJ     Toileting (which includes using toilet bed pan or urinal) 3  -AJ     Putting on and taking off regular upper body clothing 3  -AJ     Taking care of personal grooming (such as brushing teeth) 3  -AJ     Eating meals 4  -AJ     AM-PAC 6 Clicks Score (OT) 18  -       Row Name 12/19/24 0904          Functional Assessment    Outcome Measure Options AM-PAC 6 Clicks Daily Activity (OT)  -               User Key  (r) = Recorded By, (t) = Taken By, (c) = Cosigned By      Initials Name Provider Type    Ruby Serna OT Occupational Therapist                    Occupational Therapy Education       Title: PT OT SLP Therapies (In Progress)       Topic: Occupational Therapy (In Progress)       Point: ADL training (Done)       Description:   Instruct learner(s) on proper safety adaptation and remediation techniques during self care or transfers.   Instruct in proper use of assistive devices.                  Learning Progress Summary            Patient Acceptance, E,D,TB, VU,DU by ILANA at 12/19/2024 0905   Other Acceptance, E,D,TB, VU,DU by ILANA at 12/19/2024 0905                      Point: Home exercise program (Not Started)       Description:   Instruct learner(s) on appropriate technique for monitoring, assisting and/or progressing therapeutic exercises/activities.                  Learner Progress:  Not documented in this visit.              Point: Precautions (Done)       Description:   Instruct learner(s) on prescribed precautions during self-care and functional transfers.                   Learning Progress Summary            Patient Acceptance, E,D,TB, VU,DU by ILANA at 12/19/2024 0905   Other Acceptance, E,D,TB, VU,DU by ILANA at 12/19/2024 0905                      Point: Body mechanics (Done)       Description:   Instruct learner(s) on proper positioning and spine alignment during self-care, functional mobility activities and/or exercises.                  Learning Progress Summary            Patient Acceptance, E,D,TB, VU,DU by ILANA at 12/19/2024 0905   Other Acceptance, E,D,TB, VU,DU by ILANA at 12/19/2024 0905                                      User Key       Initials Effective Dates Name Provider Type Discipline    ILANA 08/26/24 -  Ruby Hansen, OT Occupational Therapist OT                  OT Recommendation and Plan  Planned Therapy Interventions (OT): activity tolerance training, adaptive equipment training, BADL retraining, functional balance retraining, IADL retraining, occupation/activity based interventions, patient/caregiver education/training, ROM/therapeutic exercise, strengthening exercise, transfer/mobility retraining  Therapy Frequency (OT): daily  Plan of Care Review  Plan of Care Reviewed With: patient, friend  Progress: no change  Outcome Evaluation: OT eval complete. Pt stood EOB for LB dressing with c/o dizziness. BP dropped to 62/37 and pt was returned to supine and nursing notified. Pt stated she was feeling better and BP increased to 97/77. Pt presents below fxl baseline with ADLs and fxl mobility, limited by pain, weakness, and decreased ROM in RLE. Pt would benefit from further OT services to progress to PLOF. Recommend d/c to home with assist once medically ready.     Time Calculation:   Evaluation Complexity (OT)  Review Occupational Profile/Medical/Therapy History Complexity: brief/low complexity  Assessment, Occupational Performance/Identification of Deficit Complexity: 1-3 performance deficits  Clinical Decision Making Complexity (OT): problem focused  assessment/low complexity  Overall Complexity of Evaluation (OT): low complexity     Time Calculation- OT       Row Name 12/19/24 0908             Time Calculation- OT    OT Start Time 0755  -AJ      OT Received On 12/19/24  -AJ      OT Goal Re-Cert Due Date 12/29/24  -AJ         Timed Charges    90650 - OT Therapeutic Activity Minutes 13  -AJ      34455 - OT Self Care/Mgmt Minutes 10  -AJ         Untimed Charges    OT Eval/Re-eval Minutes 46  -AJ         Total Minutes    Timed Charges Total Minutes 23  -AJ      Untimed Charges Total Minutes 46  -AJ       Total Minutes 69  -AJ                User Key  (r) = Recorded By, (t) = Taken By, (c) = Cosigned By      Initials Name Provider Type    AJ Ruby Hansen OT Occupational Therapist                  Therapy Charges for Today       Code Description Service Date Service Provider Modifiers Qty    49311396286 HC OT THERAPEUTIC ACT EA 15 MIN 12/19/2024 Ruby Hansen OT GO 1    31466822085 HC OT SELF CARE/MGMT/TRAIN EA 15 MIN 12/19/2024 Ruby Hansen OT GO 1    87158997171 HC OT EVAL LOW COMPLEXITY 4 12/19/2024 Ruby Hansen OT GO 1                 Ruby Hansen OT  12/19/2024

## 2024-12-19 NOTE — TELEPHONE ENCOUNTER
(Hernandez: [id number])  PA Case ID #: [id number]  Rx #: [id number]  Need Help? Call us at [phone]  Outcome  Approved today by [employer] 2017  PA Case: [id number], Status: Approved, Coverage Starts on: 9/19/2024 12:00:00 AM, Coverage Ends on: 12/19/2025 12:00:00 AM.  Authorization Expiration Date: 12/18/2025  Drug  Lialda 1.2GM dr ramirez

## 2024-12-19 NOTE — THERAPY TREATMENT NOTE
Patient Name: Coco Steele  : 1945    MRN: [id number]                              Today's Date: 2024       Admit Date: 2024    Visit Dx:     ICD-10-CM ICD-9-CM   1. Primary osteoarthritis of both knees  M17.0 715.16     Patient Active Problem List   Diagnosis    Ulcerative pancolitis without complication    Osteopenia    Essential hypertension    Hyperlipidemia    Chronic ulcerative colitis without complication    Primary osteoarthritis of both knees    Arthritis of knee, right    Status post total right knee replacement    Acute postoperative pain    Acute blood loss anemia     Past Medical History:   Diagnosis Date    Abdominal pain, epigastric     Abdominal pain, left lower quadrant     Abnormal findings on diagnostic imaging of abdomen     ABFND, RADIOLOGICAL, ABDOMINAL AREA     Adenomatous colon polyp 10/02/2023    Dr Gill    Allergic     Allergic rhinitis     Arthritis     Cancer 10/2014 removed    Renal Cell carcinoma left    Chronic gout involving toe without tophus 2024    Chronic ulcerative colitis without complication     Colon polyp     Constipation     Diarrhea     Diverticulosis     Encounter for Hemoccult screening     HEMOCCULT POSITIVE STOOLS     Fracture of wrist 2009    Wrist-Hairline fracture    Gastroesophageal reflux disease     esophagitis presence not specified    GERD (gastroesophageal reflux disease)     H/O mammogram 2024    Headache As long as I can remember    Herpes zoster     Hyperlipidemia     Hypertension     Irritable bowel syndrome     Knee swelling `    Nausea     Obesity     Rectal bleeding     Rectal bleeding     Regurgitation     Rotator cuff syndrome     Scoliosis     Ulcerative colitis     Wears glasses      Past Surgical History:   Procedure Laterality Date    CHOLECYSTECTOMY      COLONOSCOPY  2014    left side bx-minimal active inflammation - 2 yr    COLONOSCOPY  2014    active inflammation to extent of limited lower  colonoscopy/45 cm    COLONOSCOPY  02/21/2013    mild inflammation in sigmoid, left-sided diverticulosis    COLONOSCOPY  03/05/2010    very tortuous colon not allowing visual of cecal base - 5 yrs    COLONOSCOPY  11/14/2003    FAIRLY TORTUOUS AND SPASMODIC COLON - 5 YR    COLONOSCOPY Left 09/28/2016    Procedure: COLONOSCOPY WITH ANESTHESIA;  Surgeon: David Alonzo DO;  Location:  [facility];  Service:     COLONOSCOPY N/A 06/11/2020    Procedure: COLONOSCOPY WITH ANESTHESIA;  Surgeon: David Alonzo DO;  Location:  [facility];  Service: Gastroenterology;  Laterality: N/A;  pre hx ulcerative colitis  post hx ulcerative colitis, diverticulosis  dr benedicto kaur    COLONOSCOPY W/ BIOPSIES  08/29/2022    COLONOSCOPY W/ BIOPSIES  08/09/2021    Dr Gill    COLONOSCOPY W/ POLYPECTOMY  10/02/2023    adenomatous polyp Dr Gill 1 yr f/u    ENDOSCOPY  03/14/2014    Normal    ENDOSCOPY  02/09/2006    MILD GASTRITIS    NEPHRECTOMY PARTIAL Left 2014    OTHER SURGICAL HISTORY      Bilateral Eyelid Surgery     TONSILLECTOMY      TOTAL KNEE ARTHROPLASTY Right 12/18/2024    Procedure: TOTAL KNEE ARTHROPLASTY RIGHT;  Surgeon: Paul Whitaker MD;  Location: [facility];  Service: Orthopedics;  Laterality: Right;    UPPER GASTROINTESTINAL ENDOSCOPY  03/14/2014    Dr David Alonzo    WRIST SURGERY Left       General Information       Row Name 12/19/24 1305          Physical Therapy Time and Intention    Document Type therapy note (daily note)  -     Mode of Treatment physical therapy  -AC       Row Name 12/19/24 1300          General Information    Patient Profile Reviewed yes  -AC     Existing Precautions/Restrictions other (see comments);fall  Orthostatic with standing, monitor BP. Adductor canal nerve cath. Has KI in room d/t quad weakness.  -AC     Barriers to Rehab none identified  -AC       Row Name 12/19/24 1305          Cognition    Orientation Status (Cognition) oriented x 4  -AC       Row Name  12/19/24 1309          Safety Issues/Impairments Affecting Functional Mobility    Safety Issues Affecting Function (Mobility) awareness of need for assistance;safety precaution awareness;safety precautions follow-through/compliance;sequencing abilities  -AC     Impairments Affecting Function (Mobility) balance;endurance/activity tolerance;pain;range of motion (ROM);strength  -AC               User Key  (r) = Recorded By, (t) = Taken By, (c) = Cosigned By      Initials Name Provider Type    AC Nara Villagomez, PT Physical Therapist                   Mobility       Row Name 12/19/24 1328          Bed Mobility    Supine-Sit Wells (Bed Mobility) minimum assist (75% patient effort);1 person assist;verbal cues  -AC     Assistive Device (Bed Mobility) bed rails;head of bed elevated  -AC     Comment, (Bed Mobility) Pt required extended time as well as physical assistance w/ RLE to transition to sitting EOB  -AC       Row Name 12/19/24 1328          Transfers    Comment, (Transfers) VC for hand placement and sequencing  -AC       Row Name 12/19/24 1328          Bed-Chair Transfer    Bed-Chair Wells (Transfers) moderate assist (50% patient effort);1 person assist  -     Assistive Device (Bed-Chair Transfers) walker, front-wheeled  -AC     Comment, (Bed-Chair Transfer) Pt took side steps to bedside chair w. FWW, modA and VC for sequencing. BP 81/57 in chair however no c/o dizziness  -       Row Name 12/19/24 1328          Sit-Stand Transfer    Sit-Stand Wells (Transfers) moderate assist (50% patient effort);1 person assist;verbal cues  -AC     Assistive Device (Sit-Stand Transfers) walker, front-wheeled  -AC     Comment, (Sit-Stand Transfer) STS 2x from EOB. BP 74/46 upon initial stand. Pt returned to seated position and performed therex. BP increased to 96/59. No sx throughout  -AC       Row Name 12/19/24 1328          Gait/Stairs (Locomotion)    Wells Level (Gait) moderate assist (50% patient  effort);1 person assist  -     Assistive Device (Gait) walker, front-wheeled  -     Patient was able to Ambulate yes  -     Distance in Feet (Gait) 3  -     Deviations/Abnormal Patterns (Gait) right sided deviations;antalgic;stride length decreased;weight shifting decreased  -     Bilateral Gait Deviations forward flexed posture  -     Comment, (Gait/Stairs) Pt took side steps to bedside chair w/ modAx1 and FWW. Pt gait distance limited by hypotension.  -       Row Name 12/19/24 1329          Mobility    Extremity Weight-bearing Status right lower extremity  -     Right Lower Extremity (Weight-bearing Status) weight-bearing as tolerated (WBAT)  -               User Key  (r) = Recorded By, (t) = Taken By, (c) = Cosigned By      Initials Name Provider Type     Nara Villagomez, PT Physical Therapist                   Obj/Interventions       Row Name 12/19/24 1340          Motor Skills    Therapeutic Exercise hip;knee  -       Row Name 12/19/24 1340          Hip (Therapeutic Exercise)    Hip (Therapeutic Exercise) isometric exercises;strengthening exercise;AAROM (active assistive range of motion)  -     Hip Isometrics (Therapeutic Exercise) gluteal sets;10 repetitions  -     Hip Strengthening (Therapeutic Exercise) right;flexion;extension;10 repetitions  -       Row Name 12/19/24 1340          Knee (Therapeutic Exercise)    Knee (Therapeutic Exercise) isometric exercises;AAROM (active assistive range of motion)  -     Knee AROM (Therapeutic Exercise) bilateral;flexion;extension;heel slides;LAQ (long arc quad)  -     Knee Isometrics (Therapeutic Exercise) quad sets;10 repetitions;right  -       Row Name 12/19/24 1340          Ankle (Therapeutic Exercise)    Ankle (Therapeutic Exercise) AROM (active range of motion)  -     Ankle AROM (Therapeutic Exercise) bilateral;dorsiflexion;plantarflexion;10 repetitions  -       Row Name 12/19/24 1340          Balance    Balance Assessment sitting  static balance;sitting dynamic balance;standing static balance;standing dynamic balance  -AC     Static Sitting Balance standby assist  -AC     Dynamic Sitting Balance contact guard  -AC     Position, Sitting Balance unsupported;sitting edge of bed  -AC     Static Standing Balance minimal assist  -AC     Dynamic Standing Balance moderate assist  -AC     Position/Device Used, Standing Balance supported;walker, front-wheeled  -AC     Balance Interventions sitting;standing;sit to stand  -AC               User Key  (r) = Recorded By, (t) = Taken By, (c) = Cosigned By      Initials Name Provider Type    AC Nara Villagomez, PT Physical Therapist                   Goals/Plan    No documentation.                  Clinical Impression       Row Name 12/19/24 1348          Pain    Pretreatment Pain Rating 5/10  -AC     Posttreatment Pain Rating 5/10  -AC     Pain Location knee  -AC     Pain Side/Orientation right  -AC     Pain Management Interventions activity modification encouraged;exercise or physical activity utilized;nursing notified  -     Response to Pain Interventions activity participation with tolerable pain  -AC       Row Name 12/19/24 1343          Plan of Care Review    Plan of Care Reviewed With patient  -AC     Progress no change  -     Outcome Evaluation Pt completed HEP w/ modA due to RLE pain and stiffness this date. In addition pt was limited by hypotension and was only able to take side steps to the bedside chair w/ modA and FWW however did not have any c/o dizziness throughout session. RN notified. Continue to progress POC as able.  -       Row Name 12/19/24 3206          Therapy Assessment/Plan (PT)    Rehab Potential (PT) good  -AC     Criteria for Skilled Interventions Met (PT) meets criteria;yes  -AC     Therapy Frequency (PT) 2 times/day  -     Predicted Duration of Therapy Intervention (PT) 1  -       Row Name 12/19/24 1341          Vital Signs    Pre Systolic BP Rehab 116  -AC     Pre  Treatment Diastolic BP 63  -AC     Intra Systolic BP Rehab 74  -AC     Intra Treatment Diastolic BP 46  -AC     Post Systolic BP Rehab 111  -AC     Post Treatment Diastolic BP 76  -AC     O2 Delivery Pre Treatment room air  -AC     O2 Delivery Intra Treatment room air  -AC     O2 Delivery Post Treatment room air  -AC     Pre Patient Position Supine  -AC     Intra Patient Position Standing  -AC     Post Patient Position Sitting  -AC       Row Name 12/19/24 1345          Positioning and Restraints    Pre-Treatment Position in bed  -AC     Post Treatment Position chair  -AC     In Chair notified nsg;reclined;call light within reach;sitting;encouraged to call for assist;exit alarm on;waffle cushion;legs elevated;with family/caregiver  ice applied  -AC               User Key  (r) = Recorded By, (t) = Taken By, (c) = Cosigned By      Initials Name Provider Type    AC Nara Villagomez, PT Physical Therapist                   Outcome Measures       Row Name 12/19/24 1347 12/19/24 0800       How much help from another person do you currently need...    Turning from your back to your side while in flat bed without using bedrails? 3  -AC 3  -PT    Moving from lying on back to sitting on the side of a flat bed without bedrails? 3  -AC 3  -PT    Moving to and from a bed to a chair (including a wheelchair)? 2  -AC 3  -PT    Standing up from a chair using your arms (e.g., wheelchair, bedside chair)? 2  -AC 3  -PT    Climbing 3-5 steps with a railing? 2  -AC 2  -PT    To walk in hospital room? 2  -AC 3  -PT    AM-PAC 6 Clicks Score (PT) 14  -AC 17  -PT    Highest Level of Mobility Goal 4 --> Transfer to chair/commode  -AC 5 --> Static standing  -PT      Row Name 12/19/24 1347 12/19/24 0904       Functional Assessment    Outcome Measure Options AM-PAC 6 Clicks Basic Mobility (PT)  -AC AM-PAC 6 Clicks Daily Activity (OT)  -AJ              User Key  (r) = Recorded By, (t) = Taken By, (c) = Cosigned By      Initials Name Provider Type    PT  Ronni Hogue, RN Registered Nurse     Nara Villagomez, PT Physical Therapist    Ruby Serna, OT Occupational Therapist                                 Physical Therapy Education       Title: PT OT SLP Therapies (In Progress)       Topic: Physical Therapy (Done)       Point: Mobility training (Done)       Learning Progress Summary            Patient Acceptance, E, VU by  at 12/19/2024 1407    Eager, E,TB,D,H, VU,DU by SC at 12/18/2024 1615    Comment: Reviewed HEP                      Point: Home exercise program (Done)       Learning Progress Summary            Patient Acceptance, E, VU by  at 12/19/2024 1407    Eager, E,TB,D,H, VU,DU by SC at 12/18/2024 1615    Comment: Reviewed HEP                      Point: Body mechanics (Done)       Learning Progress Summary            Patient Acceptance, E, VU by  at 12/19/2024 1407    Eager, E,TB,D,H, VU,DU by SC at 12/18/2024 1615    Comment: Reviewed HEP                      Point: Precautions (Done)       Learning Progress Summary            Patient Acceptance, E, VU by  at 12/19/2024 1407    Eager, E,TB,D,H, VU,DU by SC at 12/18/2024 1615    Comment: Reviewed HEP                                      User Key       Initials Effective Dates Name Provider Type Discipline    SC 02/03/23 -  Obinna Gu, PT Physical Therapist PT     07/11/24 -  Nara Villagomez, PT Physical Therapist PT                  PT Recommendation and Plan     Progress: no change  Outcome Evaluation: Pt completed HEP w/ modA due to RLE pain and stiffness this date. In addition pt was limited by hypotension and was only able to take side steps to the bedside chair w/ modA and FWW however did not have any c/o dizziness throughout session. RN notified. Continue to progress POC as able.     Time Calculation:         PT Charges       Row Name 12/19/24 1408             Time Calculation    Start Time 1132  -      PT Received On 12/19/24  -      PT Goal Re-Cert Due Date 12/28/24  -          Time Calculation- PT    Total Timed Code Minutes- PT 38 minute(s)  -AC         Timed Charges    95977 - PT Therapeutic Exercise Minutes 14  -AC      02065 - PT Therapeutic Activity Minutes 24  -AC         Total Minutes    Timed Charges Total Minutes 38  -AC       Total Minutes 38  -AC                User Key  (r) = Recorded By, (t) = Taken By, (c) = Cosigned By      Initials Name Provider Type    AC Nara Villagomez, PT Physical Therapist                  Therapy Charges for Today       Code Description Service Date Service Provider Modifiers Qty    78059379113  PT THER PROC EA 15 MIN 12/19/2024 Nara Villagomez, PT GP 1    [id number]  PT THERAPEUTIC ACT EA 15 MIN 12/19/2024 Nara Villagomez, PT GP 2    [id number]  PT THER SUPP EA 15 MIN 12/19/2024 Nara Villagomez, PT GP 2            PT G-Codes  Outcome Measure Options: AM-PAC 6 Clicks Basic Mobility (PT)  AM-PAC 6 Clicks Score (PT): 14  AM-PAC 6 Clicks Score (OT): 18  PT Discharge Summary  Anticipated Discharge Disposition (PT): home with assist, home with outpatient therapy services    Nara Villagomez PT  12/19/2024

## 2024-12-19 NOTE — PROGRESS NOTES
[facility]    Acute pain service Inpatient Progress Note    Patient Name: Coco Steele  :  1945  MRN:  [id number]        Acute Pain  Service Inpatient Progress Note:    Analgesia:Good  Pain Score:5/10  LOC: alert and awake  Resp Status: room air  Cardiac: VS stable  Side Effects:None  Catheter Site:clean, dressing intact and dry  Cath type: peripheral nerve cath(InfuSystem)  Volume: 1mL,8ml, 8ml InfuSystem Pump.  Dosing/Volume: ropivacaine 0.2%  Catheter Plan:Catheter to remain Insitu and Continue catheter infusion rate unchanged  Comments:    Having some quad weakness. May slow rate of pump if weakness continues.

## 2024-12-20 VITALS
RESPIRATION RATE: 18 BRPM | HEART RATE: 81 BPM | SYSTOLIC BLOOD PRESSURE: 126 MMHG | TEMPERATURE: 97.8 F | DIASTOLIC BLOOD PRESSURE: 67 MMHG | WEIGHT: 190 LBS | BODY MASS INDEX: 30.53 KG/M2 | OXYGEN SATURATION: 95 % | HEIGHT: 66 IN

## 2024-12-20 PROCEDURE — 97535 SELF CARE MNGMENT TRAINING: CPT

## 2024-12-20 PROCEDURE — 63710000001 MULTIVITAMIN WITH MINERALS TABLET: Performed by: ORTHOPAEDIC SURGERY

## 2024-12-20 PROCEDURE — 63710000001 PANTOPRAZOLE 40 MG TABLET DELAYED-RELEASE: Performed by: ORTHOPAEDIC SURGERY

## 2024-12-20 PROCEDURE — A9270 NON-COVERED ITEM OR SERVICE: HCPCS | Performed by: ORTHOPAEDIC SURGERY

## 2024-12-20 PROCEDURE — 63710000001 MELOXICAM 15 MG TABLET: Performed by: ORTHOPAEDIC SURGERY

## 2024-12-20 PROCEDURE — 97110 THERAPEUTIC EXERCISES: CPT

## 2024-12-20 PROCEDURE — 63710000001 MESALAMINE 1.2 G TABLET DELAYED-RELEASE: Performed by: ORTHOPAEDIC SURGERY

## 2024-12-20 PROCEDURE — 97116 GAIT TRAINING THERAPY: CPT

## 2024-12-20 PROCEDURE — 63710000001 ACETAMINOPHEN 500 MG TABLET: Performed by: ORTHOPAEDIC SURGERY

## 2024-12-20 PROCEDURE — 63710000001 ASPIRIN 325 MG TABLET DELAYED-RELEASE: Performed by: ORTHOPAEDIC SURGERY

## 2024-12-20 RX ORDER — ASPIRIN 325 MG
325 TABLET, DELAYED RELEASE (ENTERIC COATED) ORAL DAILY
Qty: 30 TABLET | Refills: 0 | Status: SHIPPED | OUTPATIENT
Start: 2024-12-21

## 2024-12-20 RX ORDER — ROPIVACAINE HYDROCHLORIDE 2 MG/ML
1 INJECTION, SOLUTION EPIDURAL; INFILTRATION; PERINEURAL CONTINUOUS
Start: 2024-12-20

## 2024-12-20 RX ORDER — ASPIRIN 81 MG/1
81 TABLET ORAL DAILY
Start: 2024-12-20

## 2024-12-20 RX ORDER — ACETAMINOPHEN 500 MG
1000 TABLET ORAL EVERY 8 HOURS
Qty: 42 TABLET | Refills: 0 | Status: SHIPPED | OUTPATIENT
Start: 2024-12-20

## 2024-12-20 RX ORDER — MELOXICAM 15 MG/1
15 TABLET ORAL DAILY
Qty: 10 TABLET | Refills: 0 | Status: SHIPPED | OUTPATIENT
Start: 2024-12-20

## 2024-12-20 RX ORDER — OXYCODONE HYDROCHLORIDE 5 MG/1
5 TABLET ORAL EVERY 4 HOURS PRN
Qty: 30 TABLET | Refills: 0 | Status: SHIPPED | OUTPATIENT
Start: 2024-12-20

## 2024-12-20 RX ADMIN — Medication 1 TABLET: at 08:57

## 2024-12-20 RX ADMIN — MELOXICAM 15 MG: 15 TABLET ORAL at 11:00

## 2024-12-20 RX ADMIN — ACETAMINOPHEN 1000 MG: 500 TABLET, FILM COATED ORAL at 05:32

## 2024-12-20 RX ADMIN — ASPIRIN 325 MG: 325 TABLET, COATED ORAL at 08:57

## 2024-12-20 RX ADMIN — MESALAMINE 2.4 G: 1.2 TABLET, DELAYED RELEASE ORAL at 08:57

## 2024-12-20 RX ADMIN — ACETAMINOPHEN 1000 MG: 500 TABLET, FILM COATED ORAL at 12:27

## 2024-12-20 RX ADMIN — Medication 3 ML: at 08:58

## 2024-12-20 RX ADMIN — PANTOPRAZOLE SODIUM 40 MG: 40 TABLET, DELAYED RELEASE ORAL at 05:33

## 2024-12-20 NOTE — THERAPY TREATMENT NOTE
Patient Name: Coco Steele  : 1945    MRN: [id number]                              Today's Date: 2024       Admit Date: 2024    Visit Dx:     ICD-10-CM ICD-9-CM   1. Status post total right knee replacement  Z96.651 V43.65   2. Primary osteoarthritis of both knees  M17.0 715.16     Patient Active Problem List   Diagnosis    Ulcerative pancolitis without complication    Osteopenia    Essential hypertension    Hyperlipidemia    Chronic ulcerative colitis without complication    Primary osteoarthritis of both knees    Arthritis of knee, right    Status post total right knee replacement    Acute postoperative pain    Acute blood loss anemia     Past Medical History:   Diagnosis Date    Abdominal pain, epigastric     Abdominal pain, left lower quadrant     Abnormal findings on diagnostic imaging of abdomen     ABFND, RADIOLOGICAL, ABDOMINAL AREA     Adenomatous colon polyp 10/02/2023    Dr Gill    Allergic     Allergic rhinitis     Arthritis     Cancer 10/2014 removed    Renal Cell carcinoma left    Chronic gout involving toe without tophus 2024    Chronic ulcerative colitis without complication     Colon polyp     Constipation     Diarrhea     Diverticulosis     Encounter for Hemoccult screening     HEMOCCULT POSITIVE STOOLS     Fracture of wrist     Wrist-Hairline fracture    Gastroesophageal reflux disease     esophagitis presence not specified    GERD (gastroesophageal reflux disease)     H/O mammogram 2024    Headache As long as I can remember    Herpes zoster     Hyperlipidemia     Hypertension     Irritable bowel syndrome     Knee swelling `    Nausea     Obesity     Rectal bleeding     Rectal bleeding     Regurgitation     Rotator cuff syndrome     Scoliosis     Ulcerative colitis     Wears glasses      Past Surgical History:   Procedure Laterality Date    CHOLECYSTECTOMY      COLONOSCOPY  2014    left side bx-minimal active inflammation - 2 yr    COLONOSCOPY   07/09/2014    active inflammation to extent of limited lower colonoscopy/45 cm    COLONOSCOPY  02/21/2013    mild inflammation in sigmoid, left-sided diverticulosis    COLONOSCOPY  03/05/2010    very tortuous colon not allowing visual of cecal base - 5 yrs    COLONOSCOPY  11/14/2003    FAIRLY TORTUOUS AND SPASMODIC COLON - 5 YR    COLONOSCOPY Left 09/28/2016    Procedure: COLONOSCOPY WITH ANESTHESIA;  Surgeon: David Alonzo DO;  Location:  [facility];  Service:     COLONOSCOPY N/A 06/11/2020    Procedure: COLONOSCOPY WITH ANESTHESIA;  Surgeon: David Alonzo DO;  Location:  [facility];  Service: Gastroenterology;  Laterality: N/A;  pre hx ulcerative colitis  post hx ulcerative colitis, diverticulosis  dr benedicto kaur    COLONOSCOPY W/ BIOPSIES  08/29/2022    COLONOSCOPY W/ BIOPSIES  08/09/2021    Dr Gill    COLONOSCOPY W/ POLYPECTOMY  10/02/2023    adenomatous polyp Dr Gill 1 yr f/u    ENDOSCOPY  03/14/2014    Normal    ENDOSCOPY  02/09/2006    MILD GASTRITIS    NEPHRECTOMY PARTIAL Left 2014    OTHER SURGICAL HISTORY      Bilateral Eyelid Surgery     TONSILLECTOMY      TOTAL KNEE ARTHROPLASTY Right 12/18/2024    Procedure: TOTAL KNEE ARTHROPLASTY RIGHT;  Surgeon: Paul Whitaker MD;  Location: [facility];  Service: Orthopedics;  Laterality: Right;    UPPER GASTROINTESTINAL ENDOSCOPY  03/14/2014    Dr David Alonzo    WRIST SURGERY Left       General Information       Row Name 12/20/24 1312          OT Time and Intention    Document Type therapy note (daily note)  -LC     Mode of Treatment occupational therapy  -LC     Patient Effort good  -LC       Row Name 12/20/24 1312          General Information    Patient Profile Reviewed yes  -LC     Prior Level of Function independent:;all household mobility;transfer;ADL's  -LC     Existing Precautions/Restrictions other (see comments);fall  RLE WBAT, adductor nerve catheter  -LC     Barriers to Rehab none identified  -LC       Row Name 12/20/24  1312          Home Main Entrance    Number of Stairs, Main Entrance none  -       Row Name 12/20/24 1312          Stairs Within Home, Primary    Number of Stairs, Within Home, Primary none  -[facility] Name 12/20/24 1312          Cognition    Orientation Status (Cognition) oriented x 4  -       Row Name 12/20/24 1312          Safety Issues/Impairments Affecting Functional Mobility    Safety Issues Affecting Function (Mobility) awareness of need for assistance;insight into deficits/self-awareness;safety precaution awareness;safety precautions follow-through/compliance;sequencing abilities  -     Impairments Affecting Function (Mobility) balance;endurance/activity tolerance;pain;strength  -               User Key  (r) = Recorded By, (t) = Taken By, (c) = Cosigned By      Initials Name Provider Type     Carmina Alonzo OT Occupational Therapist                     Mobility/ADL's       Row Name 12/20/24 1317          Bed Mobility    Comment, (Bed Mobility) UIC  -[facility] Name 12/20/24 1317          Transfers    Transfers sit-stand transfer;toilet transfer  -     Comment, (Transfers) Educated pt. on adductor nerve catheter mgmt during T/Fs to prevent dislodgement. Verbalized understanding.  -       Row Name 12/20/24 1317          Sit-Stand Transfer    Sit-Stand [place] (Transfers) contact guard;verbal cues  -     Assistive Device (Sit-Stand Transfers) walker, front-wheeled  -[facility] Name 12/20/24 1317          Toilet Transfer    Type (Toilet Transfer) sit-stand;stand-sit  -     [place] Level (Toilet Transfer) contact guard;verbal cues  -     Assistive Device (Toilet Transfer) commode;grab bars/safety frame;walker, front-wheeled  -[facility] Name 12/20/24 1317          Activities of Daily Living    BADL Assessment/Intervention bathing;lower body dressing;toileting;grooming;upper body dressing  -       Row Name 12/20/24 1317          Mobility    Extremity Weight-bearing Status  right lower extremity  -     Right Lower Extremity (Weight-bearing Status) weight-bearing as tolerated (WBAT)  -       Row Name 12/20/24 1317          Bathing Assessment/Intervention    [place] Level (Bathing) lower body  -     Assistive Devices (Bathing) long-handled sponge  -     Comment, (Bathing) Educated pt. to not shower until nerve cath is discontinued and cleared with MD.  -       Row Name 12/20/24 1317          Lower Body Dressing Assessment/Training    [place] Level (Lower Body Dressing) don;pants/bottoms;doff;socks;moderate assist (50% patient effort)  -     Position (Lower Body Dressing) unsupported sitting  -     Comment, (Lower Body Dressing) Educated pt. on ottoniel dressing technique and adductor nerve cath mgmt during LBD. Issued and trialed AE to increased independence with task.  -       Row Name 12/20/24 1317          Toileting Assessment/Training    [place] Level (Toileting) adjust/manage clothing;perform perineal hygiene;contact guard assist  -     Assistive Devices (Toileting) commode;grab bar/safety frame  -     Position (Toileting) unsupported sitting  -[facility] Name 12/20/24 1317          Grooming Assessment/Training    [place] Level (Grooming) wash face, hands;set up  -     Position (Grooming) supported standing;sink side  -[facility] Name 12/20/24 1317          Upper Body Dressing Assessment/Training    [place] Level (Upper Body Dressing) don;doff;pull-over garment;contact guard assist  -     Position (Upper Body Dressing) unsupported sitting  -               User Key  (r) = Recorded By, (t) = Taken By, (c) = Cosigned By      Initials Name Provider Type     Carmina Alonzo OT Occupational Therapist                   Obj/Interventions       Row Name 12/20/24 1323          Balance    Balance Assessment sitting static balance;sitting dynamic balance;standing static balance;standing dynamic balance  -     Static Sitting Balance standby  assist  -     Dynamic Sitting Balance contact guard  -     Position, Sitting Balance unsupported;sitting in chair  -     Static Standing Balance contact guard;verbal cues  -     Dynamic Standing Balance contact guard;verbal cues  -     Position/Device Used, Standing Balance walker, front-wheeled  -     Balance Interventions sitting;standing;sit to stand;supported;occupation based/functional task;weight shifting activity  -               User Key  (r) = Recorded By, (t) = Taken By, (c) = Cosigned By      Initials Name Provider Type    Carmina Ken OT Occupational Therapist                   Goals/Plan    No documentation.                  Clinical Impression       Row Name 12/20/24 1323          Pain Assessment    Pretreatment Pain Rating 7/10  -     Posttreatment Pain Rating 6/10  -     Pain Location knee  -     Pain Side/Orientation right;posterior  -LC     Response to Pain Interventions activity participation with decreased pain  -       Row Name 12/20/24 1323          Plan of Care Review    Plan of Care Reviewed With patient  -LC     Progress improving  -     Outcome Evaluation Reviewed education on ottoniel dressing technique for LBD and adductor nerve catheter mgmt during ADLs and functional mobility to prevent dislodgement. Completed T/Fs with CGA and LBD with Mod A. Will continue to progress as able.  -       Row Name 12/20/24 1323          Positioning and Restraints    Pre-Treatment Position sitting in chair/recliner  -     Post Treatment Position chair  -LC     In Chair notified nsg;reclined;call light within reach;encouraged to call for assist;waffle cushion;legs elevated;exit alarm on  -               User Key  (r) = Recorded By, (t) = Taken By, (c) = Cosigned By      Initials Name Provider Type    Carmina Ken OT Occupational Therapist                   Outcome Measures       Row Name 12/20/24 1320          How much help from another is currently needed...    Putting  on and taking off regular lower body clothing? 2  -LC     Bathing (including washing, rinsing, and drying) 2  -LC     Toileting (which includes using toilet bed pan or urinal) 3  -LC     Putting on and taking off regular upper body clothing 4  -LC     Taking care of personal grooming (such as brushing teeth) 3  -LC     Eating meals 4  -LC     AM-PAC 6 Clicks Score (OT) 18  -LC       Row Name 12/20/24 1057          How much help from another person do you currently need...    Turning from your back to your side while in flat bed without using bedrails? 3  -CK     Moving from lying on back to sitting on the side of a flat bed without bedrails? 3  -CK     Moving to and from a bed to a chair (including a wheelchair)? 3  -CK     Standing up from a chair using your arms (e.g., wheelchair, bedside chair)? 3  -CK     Climbing 3-5 steps with a railing? 3  -CK     To walk in hospital room? 3  -CK     AM-PAC 6 Clicks Score (PT) 18  -CK     Highest Level of Mobility Goal 6 --> Walk 10 steps or more  -CK       Row Name 12/20/24 1057          Functional Assessment    Outcome Measure Options AM-PAC 6 Clicks Basic Mobility (PT)  -CK               User Key  (r) = Recorded By, (t) = Taken By, (c) = Cosigned By      Initials Name Provider Type    Carmina Ken OT Occupational Therapist    CK Grace Mclean, WIN Physical Therapist                    Occupational Therapy Education       Title: PT OT SLP Therapies (In Progress)       Topic: Occupational Therapy (In Progress)       Point: ADL training (In Progress)       Description:   Instruct learner(s) on proper safety adaptation and remediation techniques during self care or transfers.   Instruct in proper use of assistive devices.                  Learning Progress Summary            Patient Acceptance, E, NR by CHELSEA at 12/20/2024 1100    Acceptance, E,D,TB, VU,DU by ILANA at 12/19/2024 0905   Other Acceptance, E,D,TB, VU,DU by ILANA at 12/19/2024 0905                      Point:  Home exercise program (Not Started)       Description:   Instruct learner(s) on appropriate technique for monitoring, assisting and/or progressing therapeutic exercises/activities.                  Learner Progress:  Not documented in this visit.              Point: Precautions (In Progress)       Description:   Instruct learner(s) on prescribed precautions during self-care and functional transfers.                  Learning Progress Summary            Patient Acceptance, E, NR by  at 12/20/2024 1100    Acceptance, E,D,TB, VU,DU by  at 12/19/2024 0905   Other Acceptance, E,D,TB, VU,DU by  at 12/19/2024 0905                      Point: Body mechanics (In Progress)       Description:   Instruct learner(s) on proper positioning and spine alignment during self-care, functional mobility activities and/or exercises.                  Learning Progress Summary            Patient Acceptance, E, NR by  at 12/20/2024 1100    Acceptance, E,D,TB, VU,DU by  at 12/19/2024 0905   Other Acceptance, E,D,TB, VU,DU by  at 12/19/2024 0905                                      User Key       Initials Effective Dates Name Provider Type Discipline     06/16/21 -  Carmina Alonzo, OT Occupational Therapist OT     08/26/24 -  Ruby Hansen OT Occupational Therapist OT                  OT Recommendation and Plan     Plan of Care Review  Plan of Care Reviewed With: patient  Progress: improving  Outcome Evaluation: Reviewed education on ottoniel dressing technique for LBD and adductor nerve catheter mgmt during ADLs and functional mobility to prevent dislodgement. Completed T/Fs with CGA and LBD with Mod A. Will continue to progress as able.     Time Calculation:         Time Calculation- OT       Row Name 12/20/24 1327 12/20/24 1103          Time Calculation- OT    OT Start Time 1100  - --     OT Received On 12/20/24 - --     OT Goal Re-Cert Due Date 12/29/24 - --        Timed Charges    42063 - Gait Training Minutes  --  30  -CK     86189 - OT Self Care/Mgmt Minutes 28  -LC --        Total Minutes    Timed Charges Total Minutes 28  -LC 30  -CK      Total Minutes 28  -LC 30  -CK               User Key  (r) = Recorded By, (t) = Taken By, (c) = Cosigned By      Initials Name Provider Type    Carmina Ken, OT Occupational Therapist    CK Grace Mclean, PT Physical Therapist                  Therapy Charges for Today       Code Description Service Date Service Provider Modifiers Qty    [id number] HC OT SELF CARE/MGMT/TRAIN EA 15 MIN 12/20/2024 Carmina Alonzo OT GO 2                 Carmina Alonzo OT  12/20/2024   Impaired Impaired

## 2024-12-20 NOTE — PLAN OF CARE
Goal Outcome Evaluation:  Plan of Care Reviewed With: patient        Progress: improving  Outcome Evaluation: Reviewed education on ottoniel dressing technique for LBD and adductor nerve catheter mgmt during ADLs and functional mobility to prevent dislodgement. Completed T/Fs with CGA and LBD with Mod A. Will continue to progress as able.

## 2024-12-20 NOTE — PLAN OF CARE
Problem: Adult Inpatient Plan of Care  Goal: Plan of Care Review  Outcome: Progressing  Flowsheets (Taken 12/20/2024 0412)  Progress: improving  Plan of Care Reviewed With:   patient   friend  Goal: Optimal Comfort and Wellbeing  Outcome: Progressing  Intervention: Provide Person-Centered Care  Recent Flowsheet Documentation  Taken 12/20/2024 0400 by Twan Bhandari RN  Trust Relationship/Rapport: care explained  Taken 12/20/2024 0200 by Twan Bhandari, MATTHIAS  Trust Relationship/Rapport: care explained  Taken 12/20/2024 0000 by Twan Bhandari, MATTHIAS  Trust Relationship/Rapport: care explained  Taken 12/19/2024 2200 by Twan Bhandari RN  Trust Relationship/Rapport: care explained  Taken 12/19/2024 2000 by Twan Bhandari RN  Trust Relationship/Rapport:   care explained   choices provided   empathic listening provided   questions answered   questions encouraged   reassurance provided   thoughts/feelings acknowledged   emotional support provided     Problem: Skin Injury Risk Increased  Goal: Skin Health and Integrity  Outcome: Progressing  Intervention: Optimize Skin Protection  Recent Flowsheet Documentation  Taken 12/20/2024 0400 by Twan Bhandari RN  Activity Management: activity encouraged  Pressure Reduction Techniques: frequent weight shift encouraged  Head of Bed (HOB) Positioning: HOB elevated  Pressure Reduction Devices: pressure-redistributing mattress utilized  Skin Protection: incontinence pads utilized  Taken 12/20/2024 0200 by Twan Bhandari RN  Activity Management: activity encouraged  Pressure Reduction Techniques: frequent weight shift encouraged  Head of Bed (HOB) Positioning: HOB elevated  Pressure Reduction Devices: pressure-redistributing mattress utilized  Skin Protection: incontinence pads utilized  Taken 12/20/2024 0000 by Twan Bhandari, RN  Activity Management: activity encouraged  Pressure Reduction Techniques: frequent weight shift encouraged  Head of Bed (HOB)  Positioning: HOB elevated  Pressure Reduction Devices: pressure-redistributing mattress utilized  Skin Protection: incontinence pads utilized  Taken 12/19/2024 2200 by Twan Bhandari RN  Activity Management: activity encouraged  Pressure Reduction Techniques: frequent weight shift encouraged  Head of Bed (HOB) Positioning: HOB elevated  Pressure Reduction Devices: pressure-redistributing mattress utilized  Skin Protection: incontinence pads utilized  Taken 12/19/2024 2000 by Twan Bhandari RN  Activity Management: activity encouraged  Pressure Reduction Techniques: frequent weight shift encouraged  Head of Bed (HOB) Positioning: [facility] elevated  Pressure Reduction Devices: pressure-redistributing mattress utilized  Skin Protection: incontinence pads utilized     Problem: Fall Injury Risk  Goal: Absence of Fall and Fall-Related Injury  Outcome: Progressing  Intervention: Identify and Manage Contributors  Recent Flowsheet Documentation  Taken 12/20/2024 0400 by Twan Bhandari RN  Medication Review/Management: medications reviewed  Taken 12/20/2024 0200 by Twan Bhandari RN  Medication Review/Management: medications reviewed  Taken 12/20/2024 0000 by Twan Bhandari RN  Medication Review/Management: medications reviewed  Taken 12/19/2024 2200 by Twan Bhandari RN  Medication Review/Management: medications reviewed  Taken 12/19/2024 2000 by Twan Bhandari RN  Medication Review/Management: medications reviewed  Intervention: Promote Injury-Free Environment  Recent Flowsheet Documentation  Taken 12/20/2024 0400 by Twan Bhandari, RN  Safety Promotion/Fall Prevention:   activity supervised   assistive device/personal items within reach   clutter free environment maintained   fall prevention program maintained   gait belt   nonskid shoes/slippers when out of bed   room organization consistent   safety round/check completed  Taken 12/20/2024 0200 by Twan Bhandari, RN  Safety  Promotion/Fall Prevention:   activity supervised   assistive device/personal items within reach   clutter free environment maintained   fall prevention program maintained   gait belt   nonskid shoes/slippers when out of bed   room organization consistent   safety round/check completed  Taken 12/20/2024 0000 by Twan Bhandari, RN  Safety Promotion/Fall Prevention:   activity supervised   assistive device/personal items within reach   clutter free environment maintained   fall prevention program maintained   gait belt   nonskid shoes/slippers when out of bed   room organization consistent   safety round/check completed  Taken 12/19/2024 2200 by Twan Bhandari, RN  Safety Promotion/Fall Prevention:   activity supervised   assistive device/personal items within reach   clutter free environment maintained   fall prevention program maintained   gait belt   nonskid shoes/slippers when out of bed   room organization consistent   safety round/check completed  Taken 12/19/2024 2000 by Twan Bhandari, RN  Safety Promotion/Fall Prevention:   activity supervised   assistive device/personal items within reach   clutter free environment maintained   fall prevention program maintained   gait belt   nonskid shoes/slippers when out of bed   room organization consistent   safety round/check completed     Problem: Adult Inpatient Plan of Care  Goal: Absence of Hospital-Acquired Illness or Injury  Intervention: Identify and Manage Fall Risk  Recent Flowsheet Documentation  Taken 12/20/2024 0400 by Twan Bhanadri, RN  Safety Promotion/Fall Prevention:   activity supervised   assistive device/personal items within reach   clutter free environment maintained   fall prevention program maintained   gait belt   nonskid shoes/slippers when out of bed   room organization consistent   safety round/check completed  Taken 12/20/2024 0200 by Twan Bhandari, RN  Safety Promotion/Fall Prevention:   activity supervised   assistive  device/personal items within reach   clutter free environment maintained   fall prevention program maintained   gait belt   nonskid shoes/slippers when out of bed   room organization consistent   safety round/check completed  Taken 12/20/2024 0000 by Twan Bhandari RN  Safety Promotion/Fall Prevention:   activity supervised   assistive device/personal items within reach   clutter free environment maintained   fall prevention program maintained   gait belt   nonskid shoes/slippers when out of bed   room organization consistent   safety round/check completed  Taken 12/19/2024 2200 by Twan Bhandari RN  Safety Promotion/Fall Prevention:   activity supervised   assistive device/personal items within reach   clutter free environment maintained   fall prevention program maintained   gait belt   nonskid shoes/slippers when out of bed   room organization consistent   safety round/check completed  Taken 12/19/2024 2000 by Twan Bhandari RN  Safety Promotion/Fall Prevention:   activity supervised   assistive device/personal items within reach   clutter free environment maintained   fall prevention program maintained   gait belt   nonskid shoes/slippers when out of bed   room organization consistent   safety round/check completed  Intervention: Prevent Skin Injury  Recent Flowsheet Documentation  Taken 12/20/2024 0400 by Twan Bhandari RN  Body Position: position changed independently  Skin Protection: incontinence pads utilized  Taken 12/20/2024 0200 by Twan Bhandari RN  Body Position: position changed independently  Skin Protection: incontinence pads utilized  Taken 12/20/2024 0000 by Twan Bhandari RN  Body Position: position changed independently  Skin Protection: incontinence pads utilized  Taken 12/19/2024 2200 by Twan Bhandari RN  Body Position: position changed independently  Skin Protection: incontinence pads utilized  Taken 12/19/2024 2000 by Twan Bhandari RN  Body Position:  position changed independently  Skin Protection: incontinence pads utilized  Intervention: Prevent and Manage VTE (Venous Thromboembolism) Risk  Recent Flowsheet Documentation  Taken 12/20/2024 0400 by Twan Bhandari RN  VTE Prevention/Management:   left   SCDs (sequential compression devices) on  Taken 12/20/2024 0000 by Twan Bhandari RN  VTE Prevention/Management:   left   SCDs (sequential compression devices) on  Taken 12/19/2024 2000 by Twan Bhandari RN  VTE Prevention/Management:   left   SCDs (sequential compression devices) on  Intervention: Prevent Infection  Recent Flowsheet Documentation  Taken 12/20/2024 0400 by Twan Bhandari RN  Infection Prevention:   environmental surveillance performed   hand hygiene promoted   rest/sleep promoted   single patient room provided  Taken 12/20/2024 0200 by Twan Bhandari RN  Infection Prevention:   environmental surveillance performed   hand hygiene promoted   rest/sleep promoted   single patient room provided  Taken 12/20/2024 0000 by Twan Bhandari RN  Infection Prevention:   environmental surveillance performed   hand hygiene promoted   rest/sleep promoted   single patient room provided  Taken 12/19/2024 2200 by Twan Bhandari RN  Infection Prevention:   environmental surveillance performed   hand hygiene promoted   rest/sleep promoted   single patient room provided  Taken 12/19/2024 2000 by Twan Bhandari RN  Infection Prevention:   environmental surveillance performed   hand hygiene promoted   rest/sleep promoted   single patient room provided   Goal Outcome Evaluation:  Plan of Care Reviewed With: patient, friend        Progress: improving        Pt a/o x4. VSS. Maintaining SpO2 on RA. RLE dressing CDI. Infu block dressing intact. Infusing. Pt reports pain well controlled. Resting well at this time. Friend at bedside overnight.

## 2024-12-20 NOTE — DISCHARGE SUMMARY
Patient Name: Coco Steele  MRN: [id number]  : 1945  DOS: 2024    Attending: Paul Whitaker MD    Primary Care Provider: Radha Berger MD    Date of Admission:.2024  5:47 AM    Date of Discharge:  2024    Discharge Diagnosis:   Status post total right knee replacement    Primary osteoarthritis of both knees    Essential hypertension    Hyperlipidemia    Arthritis of knee, right    Acute postoperative pain    Acute blood loss anemia      Hospital Course    At admit:    Patient is a pleasant 79 y.o. female presented for scheduled surgery by Dr. Whitaker.  She underwent right total knee arthroplasty under spinal anesthesia.  She tolerated surgery well and was admitted for further medical management.  Her knee has been painful for many years.  She denies use of assistive device for ambulation or recent falls.     When seen postop she is doing well.  Her pain is well-controlled.  She denies nausea, shortness of breath or chest pain.  No history of DVT or PE.     After admit:    Patient was provided pain medications as needed for pain control, along with adductor canal nerve block infusion of Ropivacaine.    Adjustments were made to pain medications to optimize postop pain management.   Risks and benefits of opiate medications discussed with patient.  Arben report in chart was reviewed prior to discharge.    She was seen by PT and OT and has progressed well over her stay.    She used an IS for atelectasis prophylaxis and aspirin along with mechanicals for DVT prophylaxis.  Home medications were resumed as appropriate, and labs were monitored and remained fairly stable.     With the progress she has made, she is ready for DC home today.      She will have an adductor canal nerve block (instructed on it during this admit).    Discussed with patient regarding plan and she shows understanding and agreement.        Procedures Performed    DATE OF PROCEDURE: 2024      SURGEON: Paul  "MER Whitaker M.D.      STAFF: Circulator: Mitchell Granda RN; Pooja Lawton RN  Scrub Person: Coy Valdes  Vendor Representative: Rob Nunes (Netta)  Nursing Assistant: Triny Finnegan  Assistant: Alex Alas PA-C      PREOPERATIVE DIAGNOSIS: Primary osteoarthritis of both knees [M17.0]  Advanced degenerative joint disease of the right knee secondary to arthritis     POSTOPERATIVE DIAGNOSIS: * No post-op diagnosis entered *       Post-Op Diagnosis Codes:     * Primary osteoarthritis of both knees [M17.0]     Procedure(s):  TOTAL KNEE ARTHROPLASTY     Surgeon(s):  Paul Whitaker MD    Pertinent Test Results:    I reviewed the patient's new clinical results.   Results from last 7 days   Lab Units 24  0546   WBC 10*3/mm3 5.17   HEMOGLOBIN g/dL 9.6*   HEMATOCRIT % 29.7*   PLATELETS 10*3/mm3 171     Results from last 7 days   Lab Units 24  0546   SODIUM mmol/L 139   POTASSIUM mmol/L 4.0   CHLORIDE mmol/L 107   CO2 mmol/L 24.0   BUN mg/dL 12   CREATININE mg/dL 0.71   CALCIUM mg/dL 8.5*   GLUCOSE mg/dL 97     I reviewed the patient's new imaging including images and reports.      Physical therapy: Patient showing much improvement today with no knee buckling and stable BP. She ambulated 350' CGA with FWW and navigated a single step with her FWW CGA. Reviewed HEP and discussed activity recommendations at D/C with her and her friend who will be staying with her at D/C. IPPT remains indicated to address current deficits while admitted. She is cleared to D/C home with assist from her friend and OPPT today if medically appropriate.       Discharge Assessment:    Vital Signs  Visit Vitals  /56 (BP Location: Right arm, Patient Position: Lying)   Pulse 82   Temp 97.9 °F (36.6 °C) (Oral)   Resp 18   Ht 167.6 cm (66\")   Wt 86.2 kg (190 lb)   SpO2 95%   BMI 30.67 kg/m²     Temp (24hrs), Av °F (36.7 °C), Min:97.6 °F (36.4 °C), Max:98.5 °F (36.9 °C)      General Appearance:    Alert, cooperative, " in no acute distress   Lungs:     Clear to auscultation,respirations regular, even and unlabored    Heart:    Regular rhythm and normal rate, normal S1 and S2   Abdomen:     Normal bowel sounds, no masses, no organomegaly, soft non-tender, non-distended, no guarding, no rebound tenderness   Extremities:   CDI dressing surgical knee, right. ACB cath present, arrow pump. Moves all extremities well, no edema, no cyanosis, no redness   Pulses:   Pulses palpable and equal bilaterally   Skin:   No bleeding, bruising or rash   Neurologic:   Cranial nerves 2 - 12 grossly intact, sensation intact, Flexion and dorsiflexion intact bilateral feet.       Discharge Disposition: Home    Discharge Medications:     Discharge Medications        New Medications        Instructions Start Date   acetaminophen 500 MG tablet  Commonly known as: TYLENOL  Replaces: acetaminophen 650 MG 8 hr tablet   1,000 mg, Oral, Every 8 Hours      meloxicam 15 MG tablet  Commonly known as: Mobic   15 mg, Oral, Daily      oxyCODONE 5 MG immediate release tablet  Commonly known as: Roxicodone   5 mg, Oral, Every 4 Hours PRN      ropivacaine 0.2 % infusion (INFUSYSTEM)  Commonly known as: NAROPIN   1 mL/hr (2 mg/hr), Peripheral Nerve, Continuous             Changes to Medications        Instructions Start Date   aspirin 81 MG EC tablet  What changed: additional instructions   81 mg, Oral, Daily, Resume in 1 month      aspirin 325 MG EC tablet  What changed: You were already taking a medication with the same name, and this prescription was added. Make sure you understand how and when to take each.   325 mg, Oral, Daily, For 1 month   Start Date: December 21, 2024            Continue These Medications        Instructions Start Date   allopurinol 100 MG tablet  Commonly known as: ZYLOPRIM   100 mg, Oral, Daily      Calcium Carbonate 1500 (600 Ca) MG tablet   1 tablet, Daily      docusate sodium 100 MG capsule  Commonly known as: COLACE   100 mg, Daily       esomeprazole 40 MG capsule  Commonly known as: nexIUM   40 mg, Daily      fluticasone 50 MCG/ACT nasal spray  Commonly known as: FLONASE   1 spray, Daily      Melatonin 10 MG tablet   1 tablet, Nightly      mesalamine 1.2 g EC tablet  Commonly known as: LIALDA   2.4 g, Oral, 2 Times Daily      multivitamin with minerals tablet tablet   1 tablet, Daily      OSTEO BI-FLEX ADV TRIPLE ST PO   1 tablet, Daily      ramipril 10 MG capsule  Commonly known as: ALTACE   10 mg, Oral, Daily      simvastatin 10 MG tablet  Commonly known as: ZOCOR   10 mg, Oral, Daily      sodium-potassium-magnesium sulfates 17.5-3.13-1.6 GM/177ML solution oral solution  Commonly known as: Suprep Bowel Prep Kit   1 bottle, Oral, Take As Directed, Follow instructions that were mailed to your home. If you didn't receive these call [phone].      SUPER B COMPLEX/C PO   1 tablet, Daily             Stop These Medications      acetaminophen 650 MG 8 hr tablet  Commonly known as: TYLENOL  Replaced by: acetaminophen 500 MG tablet     Chlorhexidine Gluconate 4 % solution              Discharge Diet:   Diet Instructions    Regular Diet           Activity at Discharge:   Activity Instructions    Weight bearing as Tolerated           Follow-up Appointments:  Dr. Whitaker per his orders      RAMÍREZ Schulz  12/20/24  11:54 EST

## 2024-12-20 NOTE — PLAN OF CARE
Goal Outcome Evaluation:      Patient discharged home with friend. Ace wrap and soft roll removed. Exofin dressing C/D/I. Infu block in place and infusing

## 2024-12-20 NOTE — PROGRESS NOTES
[facility]    Acute pain service Inpatient Progress Note    Patient Name: Coco Steele  :  1945  MRN:  [id number]        Acute Pain  Service Inpatient Progress Note:    Analgesia:Good  Pain Score:2/10  LOC: alert and awake  Resp Status: room air  Cardiac: VS stable  Side Effects:None  Catheter Site:clean, dressing intact and dry  Cath type: peripheral nerve cath(InfuSystem)  Volume: 1mL,8ml, 8ml InfuSystem Pump.  Catheter Plan:Catheter to remain Insitu and Continue catheter infusion rate unchanged  Comments:

## 2024-12-20 NOTE — THERAPY TREATMENT NOTE
Patient Name: Coco Steele  : 1945    MRN: [id number]                              Today's Date: 2024       Admit Date: 2024    Visit Dx:     ICD-10-CM ICD-9-CM   1. Status post total right knee replacement  Z96.651 V43.65   2. Primary osteoarthritis of both knees  M17.0 715.16     Patient Active Problem List   Diagnosis    Ulcerative pancolitis without complication    Osteopenia    Essential hypertension    Hyperlipidemia    Chronic ulcerative colitis without complication    Primary osteoarthritis of both knees    Arthritis of knee, right    Status post total right knee replacement    Acute postoperative pain    Acute blood loss anemia     Past Medical History:   Diagnosis Date    Abdominal pain, epigastric     Abdominal pain, left lower quadrant     Abnormal findings on diagnostic imaging of abdomen     ABFND, RADIOLOGICAL, ABDOMINAL AREA     Adenomatous colon polyp 10/02/2023    Dr Gill    Allergic     Allergic rhinitis     Arthritis     Cancer 10/2014 removed    Renal Cell carcinoma left    Chronic gout involving toe without tophus 2024    Chronic ulcerative colitis without complication     Colon polyp     Constipation     Diarrhea     Diverticulosis     Encounter for Hemoccult screening     HEMOCCULT POSITIVE STOOLS     Fracture of wrist     Wrist-Hairline fracture    Gastroesophageal reflux disease     esophagitis presence not specified    GERD (gastroesophageal reflux disease)     H/O mammogram 2024    Headache As long as I can remember    Herpes zoster     Hyperlipidemia     Hypertension     Irritable bowel syndrome     Knee swelling `    Nausea     Obesity     Rectal bleeding     Rectal bleeding     Regurgitation     Rotator cuff syndrome     Scoliosis     Ulcerative colitis     Wears glasses      Past Surgical History:   Procedure Laterality Date    CHOLECYSTECTOMY      COLONOSCOPY  2014    left side bx-minimal active inflammation - 2 yr    COLONOSCOPY   07/09/2014    active inflammation to extent of limited lower colonoscopy/45 cm    COLONOSCOPY  02/21/2013    mild inflammation in sigmoid, left-sided diverticulosis    COLONOSCOPY  03/05/2010    very tortuous colon not allowing visual of cecal base - 5 yrs    COLONOSCOPY  11/14/2003    FAIRLY TORTUOUS AND SPASMODIC COLON - 5 YR    COLONOSCOPY Left 09/28/2016    Procedure: COLONOSCOPY WITH ANESTHESIA;  Surgeon: David Alonzo DO;  Location:  [facility];  Service:     COLONOSCOPY N/A 06/11/2020    Procedure: COLONOSCOPY WITH ANESTHESIA;  Surgeon: David Alonzo DO;  Location:  [facility];  Service: Gastroenterology;  Laterality: N/A;  pre hx ulcerative colitis  post hx ulcerative colitis, diverticulosis  dr benedicto kaur    COLONOSCOPY W/ BIOPSIES  08/29/2022    COLONOSCOPY W/ BIOPSIES  08/09/2021    Dr Gill    COLONOSCOPY W/ POLYPECTOMY  10/02/2023    adenomatous polyp Dr Gill 1 yr f/u    ENDOSCOPY  03/14/2014    Normal    ENDOSCOPY  02/09/2006    MILD GASTRITIS    NEPHRECTOMY PARTIAL Left 2014    OTHER SURGICAL HISTORY      Bilateral Eyelid Surgery     TONSILLECTOMY      TOTAL KNEE ARTHROPLASTY Right 12/18/2024    Procedure: TOTAL KNEE ARTHROPLASTY RIGHT;  Surgeon: Paul Whitaker MD;  Location: [facility];  Service: Orthopedics;  Laterality: Right;    UPPER GASTROINTESTINAL ENDOSCOPY  03/14/2014    Dr David Alonzo    WRIST SURGERY Left       General Information       Row Name 12/20/24 1050          Physical Therapy Time and Intention    Document Type therapy note (daily note)  -CK     Mode of Treatment physical therapy;individual therapy  -CK       Row Name 12/20/24 1050          General Information    Patient Profile Reviewed yes  -CK     Existing Precautions/Restrictions other (see comments);fall  RLE WBAT, adductor nerve catheter  -CK     Barriers to Rehab none identified  -CK       Row Name 12/20/24 1050          Cognition    Orientation Status (Cognition) oriented x 4  -CK        Row Name 12/20/24 1050          Safety Issues/Impairments Affecting Functional Mobility    Safety Issues Affecting Function (Mobility) awareness of need for assistance;insight into deficits/self-awareness;safety precaution awareness;safety precautions follow-through/compliance  -CK     Impairments Affecting Function (Mobility) balance;endurance/activity tolerance;pain;range of motion (ROM);strength  -CK               User Key  (r) = Recorded By, (t) = Taken By, (c) = Cosigned By      Initials Name Provider Type    CK Grace Mclean, WIN Physical Therapist                   Mobility       Row Name 12/20/24 1051          Bed Mobility    Comment, (Bed Mobility) [facility] pre/post treatment, did issue leg  to assist with bed mobility/therex  -CK       Row Name 12/20/24 1051          Sit-Stand Transfer    Sit-Stand [place] (Transfers) contact guard;2 person assist;verbal cues  -CK     Assistive Device (Sit-Stand Transfers) walker, front-wheeled  -CK     Comment, (Sit-Stand Transfer) cues to push up from chair, performed x2 with CGA  -CK       Row Name 12/20/24 1051          Gait/Stairs (Locomotion)    Oxford Level (Gait) contact guard;2 person assist;verbal cues  -CK     Assistive Device (Gait) walker, front-wheeled  -CK     Distance in Feet (Gait) 350  -CK     Deviations/Abnormal Patterns (Gait) bilateral deviations;servando decreased;gait speed decreased;stride length decreased;right sided deviations;antalgic;weight shifting decreased  -CK     Oxford Level (Stairs) contact guard;2 person assist;verbal cues  -CK     Assistive Device (Stairs) walker, front-wheeled  -CK     Number of Steps (Stairs) 1  -CK     Ascending Technique (Stairs) step-to-step  -CK     Descending Technique (Stairs) step-to-step  -CK     Comment, (Gait/Stairs) Patient ambulated in [place] with step through gait pattern, very good mechanics and sequencing with AD, just antalgic on RLE. She was able to navigate a single platform step  using posterior approach with FWW with cues for sequencing. She had no LOB or buckling with mobility.  -CK       Row Name 12/20/24 1051          Mobility    Extremity Weight-bearing Status right lower extremity  -CK     Right Lower Extremity (Weight-bearing Status) weight-bearing as tolerated (WBAT)  -CK               User Key  (r) = Recorded By, (t) = Taken By, (c) = Cosigned By      Initials Name Provider Type    CK Grace Mclean PT Physical Therapist                   Obj/Interventions       Row Name 12/20/24 1054          Range of Motion Comprehensive    Comment, General Range of Motion R knee ROM 15-80  -CK       Row Name 12/20/24 1054          Motor Skills    Therapeutic Exercise hip;knee;ankle  -CK       Row Name 12/20/24 1054          Hip (Therapeutic Exercise)    Hip (Therapeutic Exercise) strengthening exercise  -CK     Hip Strengthening (Therapeutic Exercise) heel slides;10 repetitions;right  -CK       Row Name 12/20/24 1054          Knee (Therapeutic Exercise)    Knee (Therapeutic Exercise) isometric exercises;strengthening exercise  -CK     Knee Isometrics (Therapeutic Exercise) quad sets;10 repetitions;3 second hold;right  -CK     Knee Strengthening (Therapeutic Exercise) SLR (straight leg raise);SAQ (short arc quad);LAQ (long arc quad);sitting;heel slides;10 repetitions;right;other (see comments)  modA for all using leg /overpressure with LLE  -CK       Row Name 12/20/24 1054          Ankle (Therapeutic Exercise)    Ankle (Therapeutic Exercise) AROM (active range of motion)  -CK     Ankle AROM (Therapeutic Exercise) bilateral;dorsiflexion;plantarflexion;10 repetitions  -CK       Row Name 12/20/24 1054          Balance    Balance Assessment sitting static balance;standing static balance;standing dynamic balance  -CK     Static Sitting Balance standby assist  -CK     Position, Sitting Balance unsupported;sitting in chair  -CK     Static Standing Balance contact guard  -CK     Dynamic  Standing Balance contact guard  -CK     Position/Device Used, Standing Balance supported;walker, front-wheeled  -CK     Comment, Balance no LOB or buckling  -CK               User Key  (r) = Recorded By, (t) = Taken By, (c) = Cosigned By      Initials Name Provider Type    CK Grace Mclean, PT Physical Therapist                   Goals/Plan    No documentation.                  Clinical Impression       Row Name 12/20/24 1051          Pain    Pain Location knee  -CK     Pain Side/Orientation right  -CK     Pain Management Interventions exercise or physical activity utilized  -CK     Response to Pain Interventions activity participation with increased pain  -CK     Additional Documentation Pain Scale: FACES Pre/Post-Treatment (Group)  -CK       Row Name 12/20/24 1052          Pain Scale: FACES Pre/Post-Treatment    Pain: FACES Scale, Pretreatment 2-->hurts little bit  -CK     Posttreatment Pain Rating 4-->hurts little more  -CK       Row Name 12/20/24 1058          Plan of Care Review    Plan of Care Reviewed With patient;friend  -CK     Progress improving  -CK     Outcome Evaluation Patient showing much improvement today with no knee buckling and stable BP. She ambulated 350' CGA with FWW and navigated a single step with her FWW CGA. Reviewed HEP and discussed activity recommendations at D/C with her and her friend who will be staying with her at D/C. IPPT remains indicated to address current deficits while admitted. She is cleared to D/C home with assist from her friend and OPPT today if medically appropriate.  -CK       Row Name 12/20/24 105          Vital Signs    Pre Systolic BP Rehab 116  -CK     Pre Treatment Diastolic BP 56  -CK     Post Systolic BP Rehab 158  -CK     Post Treatment Diastolic BP 82  -CK     Pretreatment Heart Rate (beats/min) 83  -CK     Posttreatment Heart Rate (beats/min) 87  -CK     Pre SpO2 (%) 97  -CK     O2 Delivery Pre Treatment room air  -CK     O2 Delivery Intra Treatment room  air  -CK     Post SpO2 (%) 93  -CK     O2 Delivery Post Treatment room air  -CK     Pre Patient Position Sitting  -CK     Post Patient Position Sitting  -CK       Row Name 12/20/24 1055          Positioning and Restraints    Pre-Treatment Position sitting in chair/recliner  -CK     Post Treatment Position chair  -CK     In Chair reclined;call light within reach;encouraged to call for assist;exit alarm on;with family/caregiver;waffle cushion;notified nsg  -CK               User Key  (r) = Recorded By, (t) = Taken By, (c) = Cosigned By      Initials Name Provider Type    Grace Tapia PT Physical Therapist                   Outcome Measures       Row Name 12/20/24 1057          How much help from another person do you currently need...    Turning from your back to your side while in flat bed without using bedrails? 3  -CK     Moving from lying on back to sitting on the side of a flat bed without bedrails? 3  -CK     Moving to and from a bed to a chair (including a wheelchair)? 3  -CK     Standing up from a chair using your arms (e.g., wheelchair, bedside chair)? 3  -CK     Climbing 3-5 steps with a railing? 3  -CK     To walk in hospital room? 3  -CK     AM-PAC 6 Clicks Score (PT) 18  -CK     Highest Level of Mobility Goal 6 --> Walk 10 steps or more  -CK       Row Name 12/20/24 1057          PADD    Diagnosis 1  -CK     Gender 1  -CK     Age Group 0  -CK     Gait Distance 1  -CK     Assist Level 1  -CK     Home Support 3  -CK     PADD Score 7  -CK     Patient Preference home with outpatient rehab  -CK     Prediction by PADD Score extended rehabilitation  -CK       Row Name 12/20/24 1057          Functional Assessment    Outcome Measure Options AM-PAC 6 Clicks Basic Mobility (PT)  -CK               User Key  (r) = Recorded By, (t) = Taken By, (c) = Cosigned By      Initials Name Provider Type    Grace Tapia PT Physical Therapist                                 Physical Therapy Education        Title: PT OT SLP Therapies (In Progress)       Topic: Physical Therapy (Done)       Point: Mobility training (Done)       Learning Progress Summary            Patient Acceptance, E, VU by  at 12/20/2024 1103    Acceptance, E, VU by  at 12/20/2024 1058    Acceptance, E, VU by  at 12/19/2024 1559    Acceptance, E, VU by  at 12/19/2024 1407    Eager, E,TB,D,H, VU,DU by SC at 12/18/2024 1615    Comment: Reviewed HEP                      Point: Home exercise program (Done)       Learning Progress Summary            Patient Acceptance, E, VU by  at 12/20/2024 1103    Acceptance, E, VU by  at 12/20/2024 1058    Acceptance, E, VU by  at 12/19/2024 1559    Acceptance, E, VU by  at 12/19/2024 1407    Eager, E,TB,D,H, VU,DU by SC at 12/18/2024 1615    Comment: Reviewed HEP                      Point: Body mechanics (Done)       Learning Progress Summary            Patient Acceptance, E, VU by  at 12/20/2024 1103    Acceptance, E, VU by  at 12/20/2024 1058    Acceptance, E, VU by  at 12/19/2024 1559    Acceptance, E, VU by  at 12/19/2024 1407    Eager, E,TB,D,H, VU,DU by SC at 12/18/2024 1615    Comment: Reviewed HEP                      Point: Precautions (Done)       Learning Progress Summary            Patient Acceptance, E, VU by  at 12/20/2024 1103    Acceptance, E, VU by  at 12/20/2024 1058    Acceptance, E, VU by  at 12/19/2024 1559    Acceptance, E, VU by  at 12/19/2024 1407    Eager, E,TB,D,H, VU,DU by SC at 12/18/2024 1615    Comment: Reviewed HEP                                      User Key       Initials Effective Dates Name Provider Type Discipline    SC 02/03/23 -  Obinna Gu, PT Physical Therapist PT     02/06/24 -  Grace Mclean, PT Physical Therapist PT     07/11/24 -  Nara Villagomez, PT Physical Therapist PT                  PT Recommendation and Plan     Progress: improving  Outcome Evaluation: Patient showing much improvement today with no knee buckling and stable  BP. She ambulated 350' CGA with FWW and navigated a single step with her FWW CGA. Reviewed HEP and discussed activity recommendations at D/C with her and her friend who will be staying with her at D/C. IPPT remains indicated to address current deficits while admitted. She is cleared to D/C home with assist from her friend and OPPT today if medically appropriate.     Time Calculation:         PT Charges       Row Name 12/20/24 1103             Time Calculation    Start Time 0933  -CK      PT Received On 12/20/24  -CK         Timed Charges    87503 - PT Therapeutic Exercise Minutes 16  -CK      31014 - Gait Training Minutes  30  -CK         Total Minutes    Timed Charges Total Minutes 46  -CK       Total Minutes 46  -CK                User Key  (r) = Recorded By, (t) = Taken By, (c) = Cosigned By      Initials Name Provider Type    Grace Tapia PT Physical Therapist                  Therapy Charges for Today       Code Description Service Date Service Provider Modifiers Qty    71860119627 HC PT THER PROC EA 15 MIN 12/20/2024 Grace Mclean, PT GP 1    10612868111 HC GAIT TRAINING EA 15 MIN 12/20/2024 Grace Mclean, PT GP 2    82120356803 HC PT THER SUPP EA 15 MIN 12/20/2024 Grace Mclean, PT GP 2            PT G-Codes  Outcome Measure Options: AM-PAC 6 Clicks Basic Mobility (PT)  AM-PAC 6 Clicks Score (PT): 18  AM-PAC 6 Clicks Score (OT): 18  PT Discharge Summary  Anticipated Discharge Disposition (PT): home with assist, home with outpatient therapy services    Grace Mclean PT  12/20/2024

## 2024-12-20 NOTE — PLAN OF CARE
Goal Outcome Evaluation:  Plan of Care Reviewed With: patient, friend        Progress: improving  Outcome Evaluation: Patient showing much improvement today with no knee buckling and stable BP. She ambulated 350' CGA with FWW and navigated a single step with her FWW CGA. Reviewed HEP and discussed activity recommendations at D/C with her and her friend who will be staying with her at D/C. IPPT remains indicated to address current deficits while admitted. She is cleared to D/C home with assist from her friend and OPPT today if medically appropriate.    Anticipated Discharge Disposition (PT): home with assist, home with outpatient therapy services

## 2024-12-20 NOTE — DISCHARGE INSTRUCTIONS
InfuBLOCK - Patient Information    What is a pain pump?  The InfuBLOCK pump delivers post-operative, non-narcotic, numbing medication to the nerve near the surgical site for pain relief.     Where can I find information about my pain pump?           For more information about your pain pump, scan the QR code.  For additional patient resources, visit [email or web address]-[email or web address].                                                                                               While your physician is your primary source for information about your treatment there may be times during your treatment that you need assistance with your infusion pump.     If you need assistance take the following steps:    The [employer] [facility] Hotline is Here for You 24/7.  Please call [phone] for the following concerns or complications:    Answers to questions about your infusion pump                 Tubing disconnect  Assistance with pump alarms                                                      Dislodged catheter  Excessive leakage noted from pump                                         Inadequate pain control    2.   [facility] Anesthesia Acute Pain Service: [phone] is available 24/7 for any further needs or concerns about medication or pain control.     -------------------------------------------------------------------------    Nerve Catheter Removal Instructions  When your device is empty:    Remove your catheter by pulling the dressing off slowly (like you would remove a regular bandage). The catheter should pull right out of the skin.  Check that the BLUE tip is intact.                                                                                     If the catheter is stuck, reposition your   extremity and pull slowly until removed.  *If catheter is HURTING and WON'T come out, stop and call [phone] for further assistance.    Remove medication bag from the black carrying case.  Cut the  tubing on right and left side of pump, and discard the medication bag and tubing into garbage.  Place the pump and black carrying case into the plastic bag and then place this into the return box.  Seal box with blue stickers and return to [employer] postal service. THIS IS PRE-PAID POSTAGE.        -------------------------------------------------------------------------    [facility] COLD THERAPY - PATIENT INSTRUCTION SHEET    Cold Compression Therapy for your comfort and rehabilitation  Your caregivers want you to be productive in your rehab and comfortable during your stay. In keeping with those goals, you will be receiving an SMI Cold Therapy Wrap to help ease post-operative pain and swelling that might keep you from getting back on track! Your SMI Cold Therapy Wrap is effective and simple-to-use, and you will be encouraged to apply it throughout your hospital stay and at home through the duration of your recovery.    When you are ready to go home  Be sure to take your SMI Cold Therapy Wrap and both sets of Gel Bags with you for continued comfort and use throughout your rehabilitation. If you don't already have them, ask your nurse or aide to retrieve your SMI Gel Bags from the patient freezer.    Home use precautions  Always follow your medical professional's application instructions upon discharge. Your SMI Cold Therapy Wrap and Gel Bags are designed to last for months following your surgery. Never heat the Gel Bags unless specified by your healthcare provider. Supervision is advised when using this product on children or geriatric patients. To avoid danger of suffocation, please keep the outer plastic packaging away from children & pets.    Cold Therapy Instructions  Place Gel Bags in a freezer set ¾ of the way to max temperature for at least (4) hours. For best results, lay the Gel Bags flat and [id number] in the freezer. Once frozen, slide Gel Bags into the gel pouch and secure your wrap to the affected area with the  straps.  Gel wraps that have been stored in a freezer for an extended period of time may require a (10) minute period of softening up in a room temperature environment before application.  The gel pouch acts as a protective barrier. NEVER place frozen bags directly onto skin, as this may cause frostbite injury.  The [facility] Cold Therapy Wrap is designed to be able to be worm while ambulating. The compression straps can be secured well enough so that the Wrap won't fall off while moving.  Wrap Application Videos can be viewed at [employer].  An additional protective barrier such as clothing, a washcloth, hand-towel or pillowcase may be used during prolonged treatment applications.  The Gel-Pouch and Wrap are both Latex-Free and the Gel Bag ingredients are non toxic.    [facility] Wrap care instructions  The [facility] Cold Therapy Wrap may be hand washed and hung to dry when needed.    [facility] re-order information  Additional [facility] body specific wraps and/or Gel Bags can be re-ordered from [employer] or call [email or web address] ([phone])

## 2024-12-23 ENCOUNTER — TREATMENT (OUTPATIENT)
Dept: PHYSICAL THERAPY | Facility: CLINIC | Age: 79
End: 2024-12-23
Payer: MEDICARE

## 2024-12-23 DIAGNOSIS — G89.29 CHRONIC PAIN OF RIGHT KNEE: Primary | ICD-10-CM

## 2024-12-23 DIAGNOSIS — M25.561 CHRONIC PAIN OF RIGHT KNEE: Primary | ICD-10-CM

## 2024-12-23 PROCEDURE — 97140 MANUAL THERAPY 1/> REGIONS: CPT | Performed by: PHYSICAL THERAPIST

## 2024-12-23 PROCEDURE — 97161 PT EVAL LOW COMPLEX 20 MIN: CPT | Performed by: PHYSICAL THERAPIST

## 2024-12-23 PROCEDURE — 97110 THERAPEUTIC EXERCISES: CPT | Performed by: PHYSICAL THERAPIST

## 2024-12-23 NOTE — PROGRESS NOTES
Physical Therapy Initial Evaluation and Plan of Care    [facility]ust[facility] PT    [address], [address] [place] [address]    Patient: Coco Steele   : 1945  Diagnosis/ICD-10 Code:  Chronic pain of right knee [M25.561, G89.29]  Referring practitioner: Jana Kahn, *  Date of Initial Visit: 2024  Today's Date: 2024  Patient seen for 1 session         Visit Diagnoses:    ICD-10-CM ICD-9-CM   1. Chronic pain of right knee  M25.561 719.46    G89.29 338.29         Subjective Evaluation    History of Present Illness  Mechanism of injury: Pt states that she first went to ortho for her knees in . She was diagnosed with OA of the knees at that time and she had an injection in the right knee. She feels like she probably about 3 injections in each knee from the initial visit until she had surgery. More recently her knee pain got much worse and she felt like her knee was grinding when she was walking and moving the knee. Prior to the surgery she was having more difficulty getting up and down out of chair and she had pain with standing for long periods of time. She was able to walk, but was not able to walk as long as she would like without increasing pain.     Quality of life: good    Pain  Current pain ratin  At best pain ratin  At worst pain ratin  Location: right knee  Quality: dull ache and sharp  Relieving factors: ice, change in position and medications (Tylenol 3x/day)  Aggravating factors: ambulation, movement and standing  Progression: improved    Social Support  Lives in: one-story house (one step to get into the house from the garage)    Treatments  No previous or current treatments  Patient Goals  Patient goals for therapy: improved balance, decreased pain, decreased edema, increased motion, increased strength and independence with ADLs/IADLs                         -   Patient Active Problem List    Diagnosis Date Noted    Acute postoperative pain 2024     Acute blood loss anemia 12/19/2024    Arthritis of knee, right 12/18/2024    Status post total right knee replacement 12/18/2024    Primary osteoarthritis of both knees 09/09/2024    Chronic ulcerative colitis without complication 03/01/2024    Osteopenia 11/23/2020    Essential hypertension 11/23/2020    Hyperlipidemia 11/23/2020    Ulcerative pancolitis without complication 06/02/2020     Note Last Updated: 6/2/2020     Added automatically from request for surgery [id number]         -   Past Medical History:   Diagnosis Date    Abdominal pain, epigastric     Abdominal pain, left lower quadrant     Abnormal findings on diagnostic imaging of abdomen     ABFND, RADIOLOGICAL, ABDOMINAL AREA     Adenomatous colon polyp 10/02/2023    Dr Gill    Allergic     Allergic rhinitis     Arthritis     Cancer 10/2014 removed    Renal Cell carcinoma left    Chronic gout involving toe without tophus 03/01/2024    Chronic ulcerative colitis without complication     Colon polyp     Constipation     Diarrhea     Diverticulosis     Encounter for Hemoccult screening     HEMOCCULT POSITIVE STOOLS     Fracture of wrist 2009    Wrist-Hairline fracture    Gastroesophageal reflux disease     esophagitis presence not specified    GERD (gastroesophageal reflux disease)     H/O mammogram 03/08/2024    Headache As long as I can remember    Herpes zoster     Hyperlipidemia     Hypertension     Irritable bowel syndrome     Knee swelling `    Nausea     Obesity     Rectal bleeding     Rectal bleeding     Regurgitation     Rotator cuff syndrome     Scoliosis     Ulcerative colitis     Wears glasses        -   Past Surgical History:   Procedure Laterality Date    CHOLECYSTECTOMY      COLONOSCOPY  09/23/2014    left side bx-minimal active inflammation - 2 yr    COLONOSCOPY  07/09/2014    active inflammation to extent of limited lower colonoscopy/45 cm    COLONOSCOPY  02/21/2013    mild inflammation in sigmoid, left-sided diverticulosis    COLONOSCOPY   03/05/2010    very tortuous colon not allowing visual of cecal base - 5 yrs    COLONOSCOPY  11/14/2003    FAIRLY TORTUOUS AND SPASMODIC COLON - 5 YR    COLONOSCOPY Left 09/28/2016    Procedure: COLONOSCOPY WITH ANESTHESIA;  Surgeon: David Alonzo DO;  Location:  [facility];  Service:     COLONOSCOPY N/A 06/11/2020    Procedure: COLONOSCOPY WITH ANESTHESIA;  Surgeon: David Alonzo DO;  Location:  [facility];  Service: Gastroenterology;  Laterality: N/A;  pre hx ulcerative colitis  post hx ulcerative colitis, diverticulosis  dr benedicto kaur    COLONOSCOPY W/ BIOPSIES  08/29/2022    COLONOSCOPY W/ BIOPSIES  08/09/2021    Dr Gill    COLONOSCOPY W/ POLYPECTOMY  10/02/2023    adenomatous polyp Dr Gill 1 yr f/u    ENDOSCOPY  03/14/2014    Normal    ENDOSCOPY  02/09/2006    MILD GASTRITIS    NEPHRECTOMY PARTIAL Left 2014    OTHER SURGICAL HISTORY      Bilateral Eyelid Surgery     TONSILLECTOMY      TOTAL KNEE ARTHROPLASTY Right 12/18/2024    Procedure: TOTAL KNEE ARTHROPLASTY RIGHT;  Surgeon: Paul Whitaker MD;  Location: [facility] OR;  Service: Orthopedics;  Laterality: Right;    UPPER GASTROINTESTINAL ENDOSCOPY  03/14/2014    Dr David Alonzo    WRIST SURGERY Left         Objective          Palpation     Additional Palpation Details  General TTP noted about the right knee     Active Range of Motion     Right Knee   Flexion: 85 degrees   Extension: 3 degrees     Strength/Myotome Testing     Left Hip   Planes of Motion   Flexion: 4  Extension: 3+  Abduction: 3+    Right Hip   Planes of Motion   Flexion: 4+  Extension: 4-  Abduction: 4-    Left Knee   Flexion: 5  Extension: 5    Right Knee   Flexion: 4  Extension: 4  Quadriceps contraction: fair    Ambulation     Ambulation: Level Surfaces     Additional Level Surfaces Ambulation Details  Pt ambulates with a rolling walker. She demonstrates a decreased weight acceptance on the right LE while walking.              Assessment & Plan        Assessment  Impairments: abnormal gait, abnormal muscle tone, abnormal or restricted ROM, activity intolerance, impaired balance, impaired physical strength, lacks appropriate home exercise program, pain with function and weight-bearing intolerance   Functional limitations: carrying objects, lifting, walking, uncomfortable because of pain, sitting and standing   Assessment details: Patient is a 79 year old female who comes to physical therapy s/p right TKA resulting in pain, decreased ROM, decreased strength, and inability to perform all essential functional activities. Pt will benefit from skilled PT services to address the above issues.     Prognosis details:   SHORT TERM GOALS:  2 weeks       1. Pt independent with HEP  2. Pt to demonstrate miki hip strength 4/5 or greater to improve stability with ambulation  3. Pt to report being able to walk for 10 minutes without increasing pain in the right knee    LONG TERM GOALS:   6 weeks  1. Pt to demonstrate ability to perform full functional squat with good form and control of the knees and without increasing pain  2. Pt to demonstrate miki hip strength to 4+/5 or greater to improve safety with ambulation on uneven surfaces  3. Pt to return to work full duty without increased pain in the right knee   4. Pt to demonstrate ability to perform step up/down 8 inch step x10 safely and without pain in the right knee       Plan  Therapy options: will be seen for skilled therapy services  Planned modality interventions: cryotherapy, electrical stimulation/Russian stimulation, high voltage pulsed current (pain management), iontophoresis, microcurrent electrical stimulation, TENS, thermotherapy (hydrocollator packs) and ultrasound  Planned therapy interventions: abdominal trunk stabilization, ADL retraining, balance/weight-bearing training, body mechanics training, flexibility, functional ROM exercises, gait training, home exercise program, IADL retraining, joint  mobilization, manual therapy, motor coordination training, neuromuscular re-education, soft tissue mobilization, strengthening, stretching and therapeutic activities  Frequency: 2x week  Duration in weeks: 12  Treatment plan discussed with: patient        History # of Personal Factors and/or Comorbidities: LOW (0)  Examination of Body System(s): # of elements: LOW (1-2)  Clinical Presentation: STABLE   Clinical Decision Making: LOW       Timed:         Manual Therapy:    10     mins  59876;     Therapeutic Exercise:    15     mins  76313;     Neuromuscular Ajay:        mins  67487;    Therapeutic Activity:          mins  51264;     Gait Training:           mins  88177;     Ultrasound:          mins  61701;    Ionto                                   mins   60652  Self Care                            mins   94354  Canalith Repos         mins 44779      Un-Timed:  Electrical Stimulation:         mins  14956 (MC );  Dry Needling          mins self-pay  Traction          mins 43089  Low Eval     30     Mins  44897  Mod Eval          Mins  68229  High Eval                            Mins  69163        Timed Treatment:   25   mins   Total Treatment:     55   mins          PT: Bobby Avila PT, DPT, OCS, Cert. DN   License Number: [id number]  Electronically signed by Bobby Avila PT, 12/23/24, 1:33 PM EST    Certification Period: 12/23/2024 thru 3/22/2025  I certify that the therapy services are furnished while this patient is under my care.  The services outlined above are required by this patient, and will be reviewed every 90 days.         Physician Signature:__________________________________________________    PHYSICIAN: Jana Kahn PA-C  NPI: [id number]                                      DATE:      Please sign and return via fax to [email or web address] . Thank you, [facility].

## 2024-12-26 ENCOUNTER — TREATMENT (OUTPATIENT)
Dept: PHYSICAL THERAPY | Facility: CLINIC | Age: 79
End: 2024-12-26
Payer: MEDICARE

## 2024-12-26 DIAGNOSIS — G89.29 CHRONIC PAIN OF RIGHT KNEE: Primary | ICD-10-CM

## 2024-12-26 DIAGNOSIS — M25.561 CHRONIC PAIN OF RIGHT KNEE: Primary | ICD-10-CM

## 2024-12-26 NOTE — PROGRESS NOTES
Physical Therapy Daily Treatment Note    [place] PT   [address], [address] [place] [address]      Patient: Coco Steele   : 1945  Referring practitioner: Jana Kahn, *  Date of Initial Visit: Type: THERAPY  Noted: 2024  Today's Date: 2024  Patient seen for 2 sessions    Certification Period 2024 thru 3/25/2025       Visit Diagnoses:  No diagnosis found.    Subjective     Patient reports she is doing well.  She is still sleeping in a recliner.  She no longer has help staying with her at her house.    Objective   See Exercise, Manual, and Modality Logs for complete treatment.       Assessment/Plan     Patient tolerated treatment well.  She still has quite a bit of swelling.  Discussed that she should try to elevate her legs when she can.  Initiated, walking to improve gait mechanics.  Patient would benefit from continued skilled PT.    Progress per plan of care          Timed:         Manual Therapy:    12     mins  56893;     Therapeutic Exercise:    11     mins  00112;     Neuromuscular Ajay:        mins  03676;    Therapeutic Activity:          mins  64638;     Gait Training:           mins  57210;     Ultrasound:          mins  29243;    Ionto                                   mins   79820  Self Care                            mins   84366  Canalith Repos         mins 57138  Electrical Stimulation:         mins  17804    Un-Timed:  Electrical Stimulation:         mins  70898 ( );  Dry Needling          mins self-pay  Traction          mins 03951      Timed Treatment:   23   mins   Total Treatment:     47   mins    Nereida Calloway, PT, DPT  Physical Therapist

## 2024-12-31 ENCOUNTER — TREATMENT (OUTPATIENT)
Dept: PHYSICAL THERAPY | Facility: CLINIC | Age: 79
End: 2024-12-31
Payer: MEDICARE

## 2024-12-31 DIAGNOSIS — G89.29 CHRONIC PAIN OF RIGHT KNEE: Primary | ICD-10-CM

## 2024-12-31 DIAGNOSIS — M25.561 CHRONIC PAIN OF RIGHT KNEE: Primary | ICD-10-CM

## 2024-12-31 PROCEDURE — 97112 NEUROMUSCULAR REEDUCATION: CPT

## 2024-12-31 PROCEDURE — 97140 MANUAL THERAPY 1/> REGIONS: CPT

## 2024-12-31 NOTE — PROGRESS NOTES
Physical Therapy Daily Treatment Note    [place] PT   [address], [address] [place] [address]      Patient: Coco Steele   : 1945  Referring practitioner: Jana Kahn, *  Date of Initial Visit: Type: THERAPY  Noted: 2024  Patient seen for 3 sessions    Certification Period 2024 thru 3/30/2025       Visit Diagnoses:    ICD-10-CM ICD-9-CM   1. Chronic pain of right knee  M25.561 719.46    G89.29 338.29       Subjective     Coco Steele presents to the physical therapy clinic today reporting stable symptom pattern following their previous therapy visit. They state good with compliance to home interventions including walking and standing. Mild pain today controlled with medication. Other reports from patient during visit today include: has now transitioned to sleeping in bed.    Objective   See Exercise, Manual, and Modality Logs for complete treatment.       Assessment/Plan     During physical therapy session, patient demonstrates good response to treatment, progress with functional activity, pain levels and progress towards therapy goals from previous visits. Progress specifically noted during today's session includes progressed to standing activity.     Remaining deficits still noted during session today are swelling, limited ROM/painful ROM, hip weakness compared to contralateral limb, antalgic gait, and limited standing balance.     Coco Steele will continue to benefit from skilled physical therapy services to address deficits and continue to work towards reaching functional goals. No changes to current PT POC, patient will continue AT.         Timed:         Manual Therapy:    10     mins  25559;     Therapeutic Exercise:         mins  27809;     Neuromuscular Ajay:   15     mins  79906;    Therapeutic Activity:          mins  13153;     Gait Training:           mins  76341;     Ultrasound:          mins  48436;    Ionto                                   mins    70927  Self Care                            mins   55983  Canalith Repos         mins 56772  Electrical Stimulation:         mins  16805    Un-Timed:  Electrical Stimulation:         mins  75994 ( );  Dry Needling          mins self-pay  Traction          mins 94304  Group Therapy  ___ mins 32102      Timed Treatment:   25   mins   Total Treatment:     55   mins    Visit and Documentation completed by:  Oh Pal, PT,  DPT, Cert. JOE PEDROZA License Number: [id number]

## 2025-01-03 ENCOUNTER — TREATMENT (OUTPATIENT)
Dept: PHYSICAL THERAPY | Facility: CLINIC | Age: 80
End: 2025-01-03
Payer: MEDICARE

## 2025-01-03 ENCOUNTER — OFFICE VISIT (OUTPATIENT)
Dept: ORTHOPEDIC SURGERY | Facility: CLINIC | Age: 80
End: 2025-01-03
Payer: MEDICARE

## 2025-01-03 VITALS — TEMPERATURE: 97.1 F

## 2025-01-03 DIAGNOSIS — G89.29 CHRONIC PAIN OF RIGHT KNEE: Primary | ICD-10-CM

## 2025-01-03 DIAGNOSIS — M25.561 CHRONIC PAIN OF RIGHT KNEE: Primary | ICD-10-CM

## 2025-01-03 DIAGNOSIS — Z96.651 S/P TOTAL KNEE ARTHROPLASTY, RIGHT: Primary | ICD-10-CM

## 2025-01-03 PROCEDURE — 97112 NEUROMUSCULAR REEDUCATION: CPT | Performed by: PHYSICAL THERAPIST

## 2025-01-03 PROCEDURE — 97110 THERAPEUTIC EXERCISES: CPT | Performed by: PHYSICAL THERAPIST

## 2025-01-03 PROCEDURE — 97140 MANUAL THERAPY 1/> REGIONS: CPT | Performed by: PHYSICAL THERAPIST

## 2025-01-03 NOTE — PROGRESS NOTES
[facility]opaedic Surgery Clinic Note        Subjective     Post-op (2.5 week follow up----TOTAL KNEE ARTHROPLASTY RIGHT DOS (12-18-24))       HPI    Coco Steele is a 79 y.o. female.  She is doing well without complaint.  She does physical therapy in [place].          Objective      Physical Exam  Temp 97.1 °F (36.2 °C)     There is no height or weight on file to calculate BMI.        Ortho Exam  Right knee incision looks good.  Skin glue intact on most of it.  The proximal distal ends of the glue tape have been removed.  Range of motion 0 to 95 degrees.  Negative Homans' sign    Imaging Reviewed and Interpreted:  Imaging Results (Last 24 Hours)       Procedure Component Value Units Date/Time    XR Knee 3+ View With [place] [facility] Right [[id number]] Resulted: 01/03/25 0917     Updated: 01/03/25 0917    Narrative:      TKA X-Ray  Indication: status-post TKA     AP, Lateral, and Sunrise views of Right knee     Findings:  No signs of fracture  No signs of loosening  No change compared to prior study  Components are well aligned                  Assessment    Assessment:  1. S/P total knee arthroplasty, right        Plan:  She will continue physical therapy and she will follow-up in 4 weeks.      Paul Whitaker MD  01/03/25  09:23 EST      Dictated Utilizing Dragon Dictation.

## 2025-01-08 NOTE — PROGRESS NOTES
Physical Therapy Daily Treatment Note    [place] PT   [address], [address] [place] [address]      Patient: Coco Steele   : 1945  Referring practitioner: Jana Kahn, *  Date of Initial Visit: Type: THERAPY  Noted: 2024  Today's Date: 1/3/2025  Patient seen for 4 sessions    Certification Period 1/3/2025 thru 2025       Visit Diagnoses:    ICD-10-CM ICD-9-CM   1. Chronic pain of right knee  M25.561 719.46    G89.29 338.29       Subjective     Pt states that she is doing better and her pain level has continued to improved over the past week. She has continued to work on her exercise program at home.     Objective   See Exercise, Manual, and Modality Logs for complete treatment.       Assessment/Plan     Improvement noted in patients flexion ROM of the knee and she has nearly full extension ROM. Pt had a lot of difficulty today with quad muscle activation and she required cueing for activation of the quad with quad set and SLR. She continues to be glut dominant when trying to maintain extension of the knee. Will cont to progress as indicated      Coco Steele will continue to benefit from skilled physical therapy services to address deficits and continue to work towards reaching functional goals.           Timed:         Manual Therapy:    14     mins  40774;     Therapeutic Exercise:    15     mins  74089;     Neuromuscular Ajay:    28    mins  56579;    Therapeutic Activity:          mins  98773;     Gait Training:           mins  25637;     Ultrasound:          mins  50688;    Ionto                                   mins   14456  Self Care                            mins   30698  Canalith Repos         mins 26215  Electrical Stimulation:         mins  52342    Un-Timed:  Electrical Stimulation:         mins  01393 ( );  Dry Needling          mins self-pay  Traction          mins 42923      Timed Treatment:   57   mins   Total Treatment:     57   mins    Bobby  [place] PT, DPT, Cert. DN  [place] License: [id number]

## 2025-01-16 ENCOUNTER — TREATMENT (OUTPATIENT)
Dept: PHYSICAL THERAPY | Facility: CLINIC | Age: 80
End: 2025-01-16
Payer: MEDICARE

## 2025-01-16 DIAGNOSIS — G89.29 CHRONIC PAIN OF RIGHT KNEE: Primary | ICD-10-CM

## 2025-01-16 DIAGNOSIS — M25.561 CHRONIC PAIN OF RIGHT KNEE: Primary | ICD-10-CM

## 2025-01-16 PROCEDURE — 97112 NEUROMUSCULAR REEDUCATION: CPT | Performed by: PHYSICAL THERAPIST

## 2025-01-16 PROCEDURE — 97110 THERAPEUTIC EXERCISES: CPT | Performed by: PHYSICAL THERAPIST

## 2025-01-16 PROCEDURE — 97140 MANUAL THERAPY 1/> REGIONS: CPT | Performed by: PHYSICAL THERAPIST

## 2025-01-20 ENCOUNTER — TREATMENT (OUTPATIENT)
Dept: PHYSICAL THERAPY | Facility: CLINIC | Age: 80
End: 2025-01-20
Payer: MEDICARE

## 2025-01-20 DIAGNOSIS — M25.561 CHRONIC PAIN OF RIGHT KNEE: Primary | ICD-10-CM

## 2025-01-20 DIAGNOSIS — G89.29 CHRONIC PAIN OF RIGHT KNEE: Primary | ICD-10-CM

## 2025-01-20 PROCEDURE — 97112 NEUROMUSCULAR REEDUCATION: CPT | Performed by: PHYSICAL THERAPIST

## 2025-01-20 PROCEDURE — 97110 THERAPEUTIC EXERCISES: CPT | Performed by: PHYSICAL THERAPIST

## 2025-01-20 PROCEDURE — 97140 MANUAL THERAPY 1/> REGIONS: CPT | Performed by: PHYSICAL THERAPIST

## 2025-01-22 NOTE — PROGRESS NOTES
Physical Therapy Daily Treatment Note    [place] PT   [address], [address] [place] [address]      Patient: Coco Steele   : 1945  Referring practitioner: Jana Kahn, *  Date of Initial Visit: Type: THERAPY  Noted: 2024  Today's Date: 2025  Patient seen for 5 sessions    Certification Period 2025 thru 2025       Visit Diagnoses:    ICD-10-CM ICD-9-CM   1. Chronic pain of right knee  M25.561 719.46    G89.29 338.29       Subjective     Pt states that she feels like she is making steady progress, although she she does feels that her progress as been slow. She denies any pain significant enough to limit activity at this time.     Objective   See Exercise, Manual, and Modality Logs for complete treatment.       Assessment/Plan     Pt demonstrates improving strength with activity in the clinic and she is able to tolerate more resisted activity. Will cont to progress as indicated.      Coco Steele will continue to benefit from skilled physical therapy services to address deficits and continue to work towards reaching functional goals.           Timed:         Manual Therapy:    14     mins  93545;     Therapeutic Exercise:    30     mins  14318;     Neuromuscular Ajay:    10    mins  08302;    Therapeutic Activity:          mins  23488;     Gait Training:           mins  92007;     Ultrasound:          mins  52747;    Ionto                                   mins   90111  Self Care                            mins   07407  Canalith Repos         mins 41681  Electrical Stimulation:         mins  23757    Un-Timed:  Electrical Stimulation:         mins  33912 (MC );  Dry Needling          mins self-pay  Traction          mins 76575      Timed Treatment:   54   mins   Total Treatment:     54   mins    Bobby Avila, PT, DPT, Cert. DN  [place] License: [id number]

## 2025-01-24 ENCOUNTER — TREATMENT (OUTPATIENT)
Dept: PHYSICAL THERAPY | Facility: CLINIC | Age: 80
End: 2025-01-24
Payer: MEDICARE

## 2025-01-24 DIAGNOSIS — G89.29 CHRONIC PAIN OF RIGHT KNEE: Primary | ICD-10-CM

## 2025-01-24 DIAGNOSIS — M25.561 CHRONIC PAIN OF RIGHT KNEE: Primary | ICD-10-CM

## 2025-01-24 PROCEDURE — 97110 THERAPEUTIC EXERCISES: CPT | Performed by: PHYSICAL THERAPIST

## 2025-01-24 PROCEDURE — 97140 MANUAL THERAPY 1/> REGIONS: CPT | Performed by: PHYSICAL THERAPIST

## 2025-01-24 PROCEDURE — 97112 NEUROMUSCULAR REEDUCATION: CPT | Performed by: PHYSICAL THERAPIST

## 2025-01-24 NOTE — PROGRESS NOTES
Physical Therapy Daily Treatment Note    [place] PT   [address], [address] [place] [address]      Patient: Coco Steele   : 1945  Referring practitioner: Jana Kahn, *  Date of Initial Visit: Type: THERAPY  Noted: 2024  Today's Date: 2025  Patient seen for 6 sessions    Certification Period 2025 thru 2025       Visit Diagnoses:    ICD-10-CM ICD-9-CM   1. Chronic pain of right knee  M25.561 719.46    G89.29 338.29       Subjective     Patient states that she still feels more stable using the walker at this time.  She has gone short distances in the house without using a walker and she does not feel unsafe but is not confident enough to stop using the walker.  Patient reports compliance with her home exercise program.    Objective   See Exercise, Manual, and Modality Logs for complete treatment.       Assessment/Plan     Patient is made steady progress with therapy and she demonstrates an improved ability to activate the right quad muscle, although she is still will include dominant when performing quad sets. patient was able to perform straight leg raises without weight today and without therapist assistance.  Patient demonstrated good stability when performing standing exercises with upper extremity support.    Coco Steele will continue to benefit from skilled physical therapy services to address deficits and continue to work towards reaching functional goals.           Timed:         Manual Therapy:    16     mins  16711;     Therapeutic Exercise:    10     mins  65865;     Neuromuscular Ajay:    30    mins  14727;    Therapeutic Activity:          mins  28627;     Gait Training:           mins  00661;     Ultrasound:          mins  76618;    Ionto                                   mins   47945  Self Care                            mins   78562  Canalith Repos         mins 72819  Electrical Stimulation:         mins  14813    Un-Timed:  Electrical  Stimulation:         mins  19660 ( );  Dry Needling          mins self-pay  Traction          mins 58524      Timed Treatment:   56   mins   Total Treatment:     56   mins    Bobby Avila PT, DPT, Cert. DN  [place] License: [id number]

## 2025-01-25 NOTE — PROGRESS NOTES
Physical Therapy Daily Treatment Note    [place] PT   [address], [address] [place] [address]      Patient: Coco Steele   : 1945  Referring practitioner: Jana Kahn, *  Date of Initial Visit: Type: THERAPY  Noted: 2024  Today's Date: 2025  Patient seen for 7 sessions    Certification Period 2025 thru 2025       Visit Diagnoses:    ICD-10-CM ICD-9-CM   1. Chronic pain of right knee  M25.561 719.46    G89.29 338.29       Subjective     Pt states that she feels like she is doing better and she is much more confident walking without the walker at this time. She continues to use the walker to the house because of ice in her driveway.  But otherwise she has stopped using the walker.    Objective   See Exercise, Manual, and Modality Logs for complete treatment.       Assessment/Plan     Patient demonstrates a significant improvement in her ability to activate the right quadriceps muscle.  She demonstrates very good range of motion of the right knee into flexion although she is still lacking some degree of terminal extension.  Patient was able to form straight leg raise in the clinic today without assistance, but still demonstrated a quad lag.  Will continue to progress as indicated.    Coco Steele will continue to benefit from skilled physical therapy services to address deficits and continue to work towards reaching functional goals.           Timed:         Manual Therapy:    14     mins  42596;     Therapeutic Exercise:    30     mins  77595;     Neuromuscular Ajay:    10    mins  01519;    Therapeutic Activity:          mins  00068;     Gait Training:           mins  78777;     Ultrasound:          mins  93874;    Ionto                                   mins   25190  Self Care                            mins   89363  Canalith Repos         mins 62973  Electrical Stimulation:         mins  34759    Un-Timed:  Electrical Stimulation:         mins  96526 (  );  Dry Needling          mins self-pay  Traction          mins 85475      Timed Treatment:   54   mins   Total Treatment:     54   mins    Bobby Avila PT, DPT, Cert. DN  [place] License: [id number]

## 2025-01-29 ENCOUNTER — TREATMENT (OUTPATIENT)
Dept: PHYSICAL THERAPY | Facility: CLINIC | Age: 80
End: 2025-01-29
Payer: MEDICARE

## 2025-01-29 DIAGNOSIS — G89.29 CHRONIC PAIN OF RIGHT KNEE: Primary | ICD-10-CM

## 2025-01-29 DIAGNOSIS — M25.561 CHRONIC PAIN OF RIGHT KNEE: Primary | ICD-10-CM

## 2025-01-29 PROCEDURE — 97530 THERAPEUTIC ACTIVITIES: CPT | Performed by: PHYSICAL THERAPIST

## 2025-01-29 PROCEDURE — 97112 NEUROMUSCULAR REEDUCATION: CPT | Performed by: PHYSICAL THERAPIST

## 2025-01-29 PROCEDURE — 97110 THERAPEUTIC EXERCISES: CPT | Performed by: PHYSICAL THERAPIST

## 2025-01-29 PROCEDURE — 97140 MANUAL THERAPY 1/> REGIONS: CPT | Performed by: PHYSICAL THERAPIST

## 2025-01-29 NOTE — PROGRESS NOTES
Physical Therapy Daily Treatment Note    [facility]   [address], [address] [place] [address]      Patient: Coco Steele   : 1945  Referring practitioner: Jana Kahn, *  Date of Initial Visit: Type: THERAPY  Noted: 2024  Today's Date: 2025  Patient seen for 8 sessions    Certification Period 2025 thru 2025       Visit Diagnoses:    ICD-10-CM ICD-9-CM   1. Chronic pain of right knee  M25.561 719.46    G89.29 338.29       Subjective     Pt states that she feels like she has been doing better, but on  she misjudged a step off a curb and eve the right LE. She feels some soreness on the lateral right thigh and aching in the knee, but her pain is minimal today.     Objective   See Exercise, Manual, and Modality Logs for complete treatment.     Right Knee PROM:  -2 - 117    Assessment/Plan     Pt responded well to manual therapy and she had a decrease in pain and an improvement in her knee ROM. Increased resistance on exercise in the clinic today and she was able to perform exercise without an increase in pain, although she had moderate fatigue. Will cont to progress as indicated.     Coco Steele will continue to benefit from skilled physical therapy services to address deficits and continue to work towards reaching functional goals.           Timed:         Manual Therapy:    16     mins  16703;     Therapeutic Exercise:    14     mins  11668;     Neuromuscular Ajay:    15    mins  41873;    Therapeutic Activity:     10     mins  64535;     Gait Training:           mins  77846;     Ultrasound:          mins  90867;    Ionto                                   mins   66665  Self Care                            mins   70795  Canalith Repos         mins 89978  Electrical Stimulation:         mins  59887    Un-Timed:  Electrical Stimulation:         mins  18594 ( );  Dry Needling          mins self-pay  Traction          mins 76344      Timed Treatment:    55   mins   Total Treatment:     55   mins    Bobby Avila PT, DPT, Cert. DN  [place] License: [id number]

## 2025-01-31 ENCOUNTER — TREATMENT (OUTPATIENT)
Dept: PHYSICAL THERAPY | Facility: CLINIC | Age: 80
End: 2025-01-31
Payer: MEDICARE

## 2025-01-31 DIAGNOSIS — M25.561 CHRONIC PAIN OF RIGHT KNEE: Primary | ICD-10-CM

## 2025-01-31 DIAGNOSIS — G89.29 CHRONIC PAIN OF RIGHT KNEE: Primary | ICD-10-CM

## 2025-01-31 PROCEDURE — 97530 THERAPEUTIC ACTIVITIES: CPT | Performed by: PHYSICAL THERAPIST

## 2025-01-31 PROCEDURE — 97112 NEUROMUSCULAR REEDUCATION: CPT | Performed by: PHYSICAL THERAPIST

## 2025-01-31 PROCEDURE — 97110 THERAPEUTIC EXERCISES: CPT | Performed by: PHYSICAL THERAPIST

## 2025-01-31 NOTE — PROGRESS NOTES
Physical Therapy Daily Treatment Note    [facility]   [address], [address] [place] [address]      Patient: Coco Steele   : 1945  Referring practitioner: Jana Kahn, *  Date of Initial Visit: Type: THERAPY  Noted: 2024  Today's Date: 2025  Patient seen for 9 sessions    Certification Period 2025 thru 2025       Visit Diagnoses:    ICD-10-CM ICD-9-CM   1. Chronic pain of right knee  M25.561 719.46    G89.29 338.29       Subjective     Pt states that she is feeling improvement overall and she continues to feel comfortable walking without the walker. She has thought about getting a cane to help with her stability when she is outside of the home at times.     Objective   See Exercise, Manual, and Modality Logs for complete treatment.       Assessment/Plan     Pt has progressed well and she demonstrates an improvement in her ability to activate the quad muscles. Pt tolerated moderate intensity standing exercise as well. Will cont to progress as indicated.      Coco Steele will continue to benefit from skilled physical therapy services to address deficits and continue to work towards reaching functional goals.           Timed:         Manual Therapy:         mins  03558;     Therapeutic Exercise:    28     mins  78327;     Neuromuscular Ajay:    15    mins  98426;    Therapeutic Activity:     15     mins  97194;     Gait Training:           mins  25618;     Ultrasound:          mins  96948;    Ionto                                   mins   26745  Self Care                            mins   59952  Canalith Repos         mins 07193  Electrical Stimulation:         mins  53386    Un-Timed:  Electrical Stimulation:         mins  81003 (MC );  Dry Needling          mins self-pay  Traction          mins 48319      Timed Treatment:   58   mins   Total Treatment:     58   mins    Bobby Avila, PT, DPT, Cert. DN  [place] License: [id number]

## 2025-02-03 ENCOUNTER — OFFICE VISIT (OUTPATIENT)
Dept: ORTHOPEDIC SURGERY | Facility: CLINIC | Age: 80
End: 2025-02-03
Payer: MEDICARE

## 2025-02-03 ENCOUNTER — TREATMENT (OUTPATIENT)
Dept: PHYSICAL THERAPY | Facility: CLINIC | Age: 80
End: 2025-02-03
Payer: MEDICARE

## 2025-02-03 DIAGNOSIS — M25.561 CHRONIC PAIN OF RIGHT KNEE: Primary | ICD-10-CM

## 2025-02-03 DIAGNOSIS — Z96.651 S/P TOTAL KNEE ARTHROPLASTY, RIGHT: Primary | ICD-10-CM

## 2025-02-03 DIAGNOSIS — G89.29 CHRONIC PAIN OF RIGHT KNEE: Primary | ICD-10-CM

## 2025-02-03 PROCEDURE — 97110 THERAPEUTIC EXERCISES: CPT | Performed by: PHYSICAL THERAPIST

## 2025-02-03 PROCEDURE — 97530 THERAPEUTIC ACTIVITIES: CPT | Performed by: PHYSICAL THERAPIST

## 2025-02-03 PROCEDURE — 97140 MANUAL THERAPY 1/> REGIONS: CPT | Performed by: PHYSICAL THERAPIST

## 2025-02-03 PROCEDURE — 97112 NEUROMUSCULAR REEDUCATION: CPT | Performed by: PHYSICAL THERAPIST

## 2025-02-03 NOTE — PROGRESS NOTES
[facility] [place] [facility] [place]        Subjective     Post-op (4.5 week follow up---1.5 month S/P TOTAL KNEE ARTHROPLASTY RIGHT DOS (12-18-24)))       HPI    Coco Steele is a 79 y.o. female.  She is doing well.  No new complaints.  She would like to start driving again.  For some reason she thought she had a sleep on her back.  She does not.          Objective      Physical Exam  There were no vitals taken for this visit.    There is no height or weight on file to calculate BMI.        Ortho Exam  Right knee range of motion 0 to 170 degrees as measured at PT.    Imaging Reviewed and Interpreted:  Imaging Results (Last 24 Hours)       ** No results found for the last 24 hours. **              Assessment    Assessment:  1. S/P total knee arthroplasty, right        Plan:  She is doing well.  She will continue physical therapy.  Follow-up in 6 weeks with x-rays.  She may sleep in any position that is comfortable.  She will practice driving      Paul Whitaker MD  02/03/25  09:55 EST      Dictated Utilizing Dragon Dictation.

## 2025-02-05 NOTE — PROGRESS NOTES
Physical Therapy Daily Treatment Note    [facility]   [address], [address] [place] [address]      Patient: Coco Steele   : 1945  Referring practitioner: Jana Kahn, *  Date of Initial Visit: Type: THERAPY  Noted: 2024  Today's Date: 2/3/2025  Patient seen for 10 sessions    Certification Period 2/3/2025 thru 2025       Visit Diagnoses:    ICD-10-CM ICD-9-CM   1. Chronic pain of right knee  M25.561 719.46    G89.29 338.29       Subjective     Pt states that she feels like she is continuing to make progress and she is confident in her ability to walk longer distances without the use of assistive device.  She has been working on maintaining her range of motion at home and reports compliance with her home exercise program.    Objective   See Exercise, Manual, and Modality Logs for complete treatment.       Assessment/Plan     Patient is making steady progress with therapy and she demonstrates good range of motion of the right knee into flexion and extension.  Patient has been able to progress strength with exercise in the clinic and we have transitioned to more standing and functional activity without difficulty.  Will continue to progress activity as indicated.    Coco Steele will continue to benefit from skilled physical therapy services to address deficits and continue to work towards reaching functional goals.           Timed:         Manual Therapy:    15     mins  57314;     Therapeutic Exercise:    10     mins  62605;     Neuromuscular Ajay:    13    mins  77259;    Therapeutic Activity:     15     mins  66013;     Gait Training:           mins  05370;     Ultrasound:          mins  59991;    Ionto                                   mins   99882  Self Care                            mins   89155  Canalith Repos         mins 27318  Electrical Stimulation:         mins  06974    Un-Timed:  Electrical Stimulation:         mins  47890 ( );  Dry Needling           mins self-pay  Traction          mins 41291      Timed Treatment:   53   mins   Total Treatment:     53   mins    Bobby Avila PT, DPT, Cert. DN  [place] License: [id number]

## 2025-02-07 ENCOUNTER — TREATMENT (OUTPATIENT)
Dept: PHYSICAL THERAPY | Facility: CLINIC | Age: 80
End: 2025-02-07
Payer: MEDICARE

## 2025-02-07 DIAGNOSIS — G89.29 CHRONIC PAIN OF RIGHT KNEE: Primary | ICD-10-CM

## 2025-02-07 DIAGNOSIS — M25.561 CHRONIC PAIN OF RIGHT KNEE: Primary | ICD-10-CM

## 2025-02-07 PROCEDURE — 97140 MANUAL THERAPY 1/> REGIONS: CPT | Performed by: PHYSICAL THERAPIST

## 2025-02-07 PROCEDURE — 97110 THERAPEUTIC EXERCISES: CPT | Performed by: PHYSICAL THERAPIST

## 2025-02-07 PROCEDURE — 97112 NEUROMUSCULAR REEDUCATION: CPT | Performed by: PHYSICAL THERAPIST

## 2025-02-11 ENCOUNTER — TELEPHONE (OUTPATIENT)
Dept: PHYSICAL THERAPY | Facility: CLINIC | Age: 80
End: 2025-02-11

## 2025-02-12 NOTE — PROGRESS NOTES
Physical Therapy Daily Treatment Note    [facility]   [address], [address] [place] [address]      Patient: Coco Steele   : 1945  Referring practitioner: Jana Kahn, *  Date of Initial Visit: Type: THERAPY  Noted: 2024  Today's Date: 2025  Patient seen for 11 sessions    Certification Period 2025 thru 2025       Visit Diagnoses:    ICD-10-CM ICD-9-CM   1. Chronic pain of right knee  M25.561 719.46    G89.29 338.29       Subjective     Patient reports that she feels like she has made good progress with therapy and she continues to feel more confident with being up and walking for longer periods of time.  She reports compliance with her home exercise program.    Objective   See Exercise, Manual, and Modality Logs for complete treatment.       Assessment/Plan     Patient has been able to make progress in the clinic in regards to her overall lower extremity strength.  She is able to form more resisted activity without having exacerbation of symptoms and demonstrates good stability with prolonged standing activity.  Will continue to progress as indicated.    Coco Steele will continue to benefit from skilled physical therapy services to address deficits and continue to work towards reaching functional goals.           Timed:         Manual Therapy:    10     mins  49409;     Therapeutic Exercise:    14     mins  77066;     Neuromuscular Ajay:    30    mins  71141;    Therapeutic Activity:          mins  53517;     Gait Training:           mins  78423;     Ultrasound:          mins  72453;    Ionto                                   mins   91050  Self Care                            mins   78496  Canalith Repos         mins 74231  Electrical Stimulation:         mins  72499    Un-Timed:  Electrical Stimulation:         mins  37904 ( );  Dry Needling          mins self-pay  Traction          mins 12788      Timed Treatment:   54   mins   Total Treatment:     54    mins    Bobby Avila, PT, DPT, Cert. DN  [place] License: [id number]

## 2025-02-13 ENCOUNTER — TREATMENT (OUTPATIENT)
Dept: PHYSICAL THERAPY | Facility: CLINIC | Age: 80
End: 2025-02-13
Payer: MEDICARE

## 2025-02-13 DIAGNOSIS — G89.29 CHRONIC PAIN OF RIGHT KNEE: Primary | ICD-10-CM

## 2025-02-13 DIAGNOSIS — M25.561 CHRONIC PAIN OF RIGHT KNEE: Primary | ICD-10-CM

## 2025-02-13 PROCEDURE — 97112 NEUROMUSCULAR REEDUCATION: CPT | Performed by: PHYSICAL THERAPIST

## 2025-02-13 PROCEDURE — 97530 THERAPEUTIC ACTIVITIES: CPT | Performed by: PHYSICAL THERAPIST

## 2025-02-13 PROCEDURE — 97110 THERAPEUTIC EXERCISES: CPT | Performed by: PHYSICAL THERAPIST

## 2025-02-13 PROCEDURE — 97140 MANUAL THERAPY 1/> REGIONS: CPT | Performed by: PHYSICAL THERAPIST

## 2025-02-17 DIAGNOSIS — K51.00 ULCERATIVE PANCOLITIS WITHOUT COMPLICATION: ICD-10-CM

## 2025-02-17 RX ORDER — MESALAMINE 1.2 G/1
2.4 TABLET, DELAYED RELEASE ORAL 2 TIMES DAILY
Qty: 120 TABLET | Refills: 11 | Status: SHIPPED | OUTPATIENT
Start: 2025-02-17

## 2025-02-18 ENCOUNTER — TREATMENT (OUTPATIENT)
Dept: PHYSICAL THERAPY | Facility: CLINIC | Age: 80
End: 2025-02-18
Payer: MEDICARE

## 2025-02-18 DIAGNOSIS — G89.29 CHRONIC PAIN OF RIGHT KNEE: Primary | ICD-10-CM

## 2025-02-18 DIAGNOSIS — M25.561 CHRONIC PAIN OF RIGHT KNEE: Primary | ICD-10-CM

## 2025-02-18 PROCEDURE — 97530 THERAPEUTIC ACTIVITIES: CPT | Performed by: PHYSICAL THERAPIST

## 2025-02-18 PROCEDURE — 97110 THERAPEUTIC EXERCISES: CPT | Performed by: PHYSICAL THERAPIST

## 2025-02-18 PROCEDURE — 97112 NEUROMUSCULAR REEDUCATION: CPT | Performed by: PHYSICAL THERAPIST

## 2025-02-18 PROCEDURE — 97140 MANUAL THERAPY 1/> REGIONS: CPT | Performed by: PHYSICAL THERAPIST

## 2025-02-19 NOTE — PROGRESS NOTES
Physical Therapy Daily Treatment Note    [facility]   [address], [address] [place] [address]      Patient: Coco Steele   : 1945  Referring practitioner: Jana Kahn, *  Date of Initial Visit: Type: THERAPY  Noted: 2024  Today's Date: 2025  Patient seen for 12 sessions    Certification Period 2025 thru 2025       Visit Diagnoses:    ICD-10-CM ICD-9-CM   1. Chronic pain of right knee  M25.561 719.46    G89.29 338.29       Subjective     Pt states that she is feeling improvement in her knee and she feels that she has made more progress with strength over the past couple of weeks. She has been trying to do more walking, but is limited because of the weather.     Objective   See Exercise, Manual, and Modality Logs for complete treatment.       Assessment/Plan     Pt has improved her right knee AROM and her overall strength, raj in the past 2 weeks. She continues to have some limitation in quad strength and is not able to perform standing exercise for an extended period of time, and we will focus on these areas in her coming visits.      Ccoo Steele will continue to benefit from skilled physical therapy services to address deficits and continue to work towards reaching functional goals.           Timed:         Manual Therapy:    12     mins  13178;     Therapeutic Exercise:    14     mins  31702;     Neuromuscular Ajay:    15    mins  48540;    Therapeutic Activity:     15     mins  27730;     Gait Training:           mins  78972;     Ultrasound:          mins  41459;    Ionto                                   mins   20601  Self Care                            mins   52825  Canalith Repos         mins 92342  Electrical Stimulation:         mins  76298    Un-Timed:  Electrical Stimulation:         mins  40399 ( );  Dry Needling          mins self-pay  Traction          mins 85865      Timed Treatment:   56   mins   Total Treatment:     56   mins    Bobby  [facility] PT, DPT, Cert. DN  [place] License: [id number]

## 2025-02-20 ENCOUNTER — TREATMENT (OUTPATIENT)
Dept: PHYSICAL THERAPY | Facility: CLINIC | Age: 80
End: 2025-02-20
Payer: MEDICARE

## 2025-02-20 DIAGNOSIS — G89.29 CHRONIC PAIN OF RIGHT KNEE: Primary | ICD-10-CM

## 2025-02-20 DIAGNOSIS — M25.561 CHRONIC PAIN OF RIGHT KNEE: Primary | ICD-10-CM

## 2025-02-20 PROCEDURE — 97110 THERAPEUTIC EXERCISES: CPT | Performed by: PHYSICAL THERAPIST

## 2025-02-20 PROCEDURE — 97140 MANUAL THERAPY 1/> REGIONS: CPT | Performed by: PHYSICAL THERAPIST

## 2025-02-20 PROCEDURE — 97112 NEUROMUSCULAR REEDUCATION: CPT | Performed by: PHYSICAL THERAPIST

## 2025-02-20 PROCEDURE — 97530 THERAPEUTIC ACTIVITIES: CPT | Performed by: PHYSICAL THERAPIST

## 2025-02-20 NOTE — PROGRESS NOTES
Physical Therapy Daily Treatment Note    [facility]   [address], [address] [place] [address]      Patient: Coco Steele   : 1945  Referring practitioner: Jana Kahn, *  Date of Initial Visit: Type: THERAPY  Noted: 2024  Today's Date: 2025  Patient seen for 13 sessions    Certification Period 2025 thru 2025       Visit Diagnoses:    ICD-10-CM ICD-9-CM   1. Chronic pain of right knee  M25.561 719.46    G89.29 338.29       Subjective     Pt states that she feels like she has done well recently and she has been feeling more confidence with driving. She has not had any significant increase in pain with increasing resistance in the clinic. Pt will be traveling down to [place] next week and will be working on her exercises while she is away.     Objective   See Exercise, Manual, and Modality Logs for complete treatment.       Assessment/Plan     Pt has progressed well and she demonstrates an improvement in her tolerance to resxisted activity and ability to perform standing exercise. Will cont to progress as indicated.      Coco Steele will continue to benefit from skilled physical therapy services to address deficits and continue to work towards reaching functional goals.           Timed:         Manual Therapy:    11     mins  54910;     Therapeutic Exercise:    14     mins  79807;     Neuromuscular Ajay:    15    mins  85119;    Therapeutic Activity:     15     mins  17109;     Gait Training:           mins  01858;     Ultrasound:          mins  76325;    Ionto                                   mins   21124  Self Care                            mins   98800  Canalith Repos         mins 63006  Electrical Stimulation:         mins  09206    Un-Timed:  Electrical Stimulation:         mins  08922 ( );  Dry Needling          mins self-pay  Traction          mins 02610      Timed Treatment:   55   mins   Total Treatment:     55   mins    Bobby Avila, PT, DPT,  Cert. DN  [place] License: [id number]

## 2025-03-06 ENCOUNTER — TREATMENT (OUTPATIENT)
Dept: PHYSICAL THERAPY | Facility: CLINIC | Age: 80
End: 2025-03-06
Payer: MEDICARE

## 2025-03-06 DIAGNOSIS — G89.29 CHRONIC PAIN OF RIGHT KNEE: Primary | ICD-10-CM

## 2025-03-06 DIAGNOSIS — M25.561 CHRONIC PAIN OF RIGHT KNEE: Primary | ICD-10-CM

## 2025-03-06 PROCEDURE — 97110 THERAPEUTIC EXERCISES: CPT | Performed by: PHYSICAL THERAPIST

## 2025-03-06 PROCEDURE — 97112 NEUROMUSCULAR REEDUCATION: CPT | Performed by: PHYSICAL THERAPIST

## 2025-03-06 PROCEDURE — 97530 THERAPEUTIC ACTIVITIES: CPT | Performed by: PHYSICAL THERAPIST

## 2025-03-06 PROCEDURE — 97140 MANUAL THERAPY 1/> REGIONS: CPT | Performed by: PHYSICAL THERAPIST

## 2025-03-10 ENCOUNTER — TREATMENT (OUTPATIENT)
Dept: PHYSICAL THERAPY | Facility: CLINIC | Age: 80
End: 2025-03-10
Payer: MEDICARE

## 2025-03-10 DIAGNOSIS — M25.561 CHRONIC PAIN OF RIGHT KNEE: Primary | ICD-10-CM

## 2025-03-10 DIAGNOSIS — G89.29 CHRONIC PAIN OF RIGHT KNEE: Primary | ICD-10-CM

## 2025-03-10 PROCEDURE — 97110 THERAPEUTIC EXERCISES: CPT | Performed by: PHYSICAL THERAPIST

## 2025-03-10 PROCEDURE — 97112 NEUROMUSCULAR REEDUCATION: CPT | Performed by: PHYSICAL THERAPIST

## 2025-03-10 PROCEDURE — 97530 THERAPEUTIC ACTIVITIES: CPT | Performed by: PHYSICAL THERAPIST

## 2025-03-10 PROCEDURE — 97140 MANUAL THERAPY 1/> REGIONS: CPT | Performed by: PHYSICAL THERAPIST

## 2025-03-12 ENCOUNTER — OFFICE VISIT (OUTPATIENT)
Dept: INTERNAL MEDICINE | Facility: CLINIC | Age: 80
End: 2025-03-12
Payer: MEDICARE

## 2025-03-12 VITALS
OXYGEN SATURATION: 98 % | HEART RATE: 97 BPM | TEMPERATURE: 97.7 F | BODY MASS INDEX: 28.61 KG/M2 | DIASTOLIC BLOOD PRESSURE: 80 MMHG | HEIGHT: 66 IN | WEIGHT: 178 LBS | SYSTOLIC BLOOD PRESSURE: 128 MMHG

## 2025-03-12 DIAGNOSIS — Z78.0 MENOPAUSE: ICD-10-CM

## 2025-03-12 DIAGNOSIS — M1A.9XX0 CHRONIC GOUT INVOLVING TOE OF RIGHT FOOT WITHOUT TOPHUS, UNSPECIFIED CAUSE: ICD-10-CM

## 2025-03-12 DIAGNOSIS — I10 ESSENTIAL HYPERTENSION: Primary | ICD-10-CM

## 2025-03-12 DIAGNOSIS — Z12.31 ENCOUNTER FOR SCREENING MAMMOGRAM FOR MALIGNANT NEOPLASM OF BREAST: ICD-10-CM

## 2025-03-12 DIAGNOSIS — E78.5 HYPERLIPIDEMIA, UNSPECIFIED HYPERLIPIDEMIA TYPE: ICD-10-CM

## 2025-03-12 RX ORDER — ALLOPURINOL 100 MG/1
100 TABLET ORAL DAILY
Qty: 90 TABLET | Refills: 1 | Status: SHIPPED | OUTPATIENT
Start: 2025-03-12

## 2025-03-12 RX ORDER — SIMVASTATIN 10 MG
10 TABLET ORAL DAILY
Qty: 90 TABLET | Refills: 1 | Status: SHIPPED | OUTPATIENT
Start: 2025-03-12

## 2025-03-12 RX ORDER — RAMIPRIL 10 MG/1
10 CAPSULE ORAL DAILY
Qty: 90 CAPSULE | Refills: 1 | Status: SHIPPED | OUTPATIENT
Start: 2025-03-12

## 2025-03-12 NOTE — PROGRESS NOTES
"Subjective   Coco Steele is a 79 y.o. female.         Chief Complaint   Patient presents with    Hypertension    Hyperlipidemia       Visit Vitals  /80 (BP Location: Left arm, Patient Position: Sitting, Cuff Size: Adult)   Pulse 97   Temp 97.7 °F (36.5 °C)   Ht 167.6 cm (66\")   Wt 80.7 kg (178 lb)   SpO2 98%   BMI 28.73 kg/m²         Hypertension  This is a chronic problem. The current episode started more than 1 year ago. The problem is unchanged. The problem is controlled. Pertinent negatives include no anxiety, blurred vision, chest pain, headaches, malaise/fatigue, neck pain, orthopnea, palpitations, peripheral edema, PND, shortness of breath or sweats. There are no associated agents to hypertension. Risk factors for coronary artery disease include dyslipidemia, family history and post-menopausal state. Current antihypertension treatment includes ACE inhibitors. The current treatment provides significant improvement. There are no compliance problems.  There is no history of angina, kidney disease, CAD/MI, CVA, heart failure, left ventricular hypertrophy, PVD or retinopathy. Identifiable causes of hypertension include chronic renal disease (hx renal cell CA). There is no history of sleep apnea or a thyroid problem.   Hyperlipidemia  This is a chronic problem. The current episode started more than 1 year ago. The problem is controlled. Recent lipid tests were reviewed and are normal. Exacerbating diseases include chronic renal disease (hx renal cell CA). She has no history of diabetes, hypothyroidism, liver disease, obesity or nephrotic syndrome. There are no known factors aggravating her hyperlipidemia. Pertinent negatives include no chest pain, focal sensory loss, focal weakness, leg pain, myalgias or shortness of breath. Current antihyperlipidemic treatment includes statins. The current treatment provides significant improvement of lipids. There are no compliance problems.  Risk factors for coronary " artery disease include dyslipidemia, family history, hypertension and post-menopausal.      Pt denies any problem with gout since on allopurinol.   Pt is due for DEXA and mammogram soon.     Pt had right knee replacement in December.   The following portions of the patient's history were reviewed and updated as appropriate: allergies, current medications, past family history, past medical history, past social history, past surgical history, and problem list.    Past Medical History:   Diagnosis Date    Abdominal pain, epigastric     Abdominal pain, left lower quadrant     Abnormal findings on diagnostic imaging of abdomen     ABFND, RADIOLOGICAL, ABDOMINAL AREA     Adenomatous colon polyp 10/02/2023    Dr Gill    Allergic     Allergic rhinitis     Arthritis     Cancer 10/2014 removed    Renal Cell carcinoma left    Chronic gout involving toe without tophus 03/01/2024    Chronic ulcerative colitis without complication     Colon polyp     Constipation     Diarrhea     Diverticulitis of colon     Diverticulosis     Encounter for Hemoccult screening     HEMOCCULT POSITIVE STOOLS     Fracture of wrist 2009    Wrist-Hairline fracture    Gastroesophageal reflux disease     esophagitis presence not specified    GERD (gastroesophageal reflux disease)     H/O mammogram 03/08/2024    Headache As long as I can remember    Herpes zoster     Hyperlipidemia     Hypertension     Irritable bowel syndrome     Knee swelling `    Nausea     Obesity     Rectal bleeding     Rectal bleeding     Regurgitation     Rotator cuff syndrome     Scoliosis     Ulcerative colitis     Wears glasses       Past Surgical History:   Procedure Laterality Date    CHOLECYSTECTOMY      COLONOSCOPY  09/23/2014    left side bx-minimal active inflammation - 2 yr    COLONOSCOPY  07/09/2014    active inflammation to extent of limited lower colonoscopy/45 cm    COLONOSCOPY  02/21/2013    mild inflammation in sigmoid, left-sided diverticulosis    COLONOSCOPY   03/05/2010    very tortuous colon not allowing visual of cecal base - 5 yrs    COLONOSCOPY  11/14/2003    FAIRLY TORTUOUS AND SPASMODIC COLON - 5 YR    COLONOSCOPY Left 09/28/2016    Procedure: COLONOSCOPY WITH ANESTHESIA;  Surgeon: David Alonzo DO;  Location:  [facility];  Service:     COLONOSCOPY N/A 06/11/2020    Procedure: COLONOSCOPY WITH ANESTHESIA;  Surgeon: David Alonzo DO;  Location:  [facility];  Service: Gastroenterology;  Laterality: N/A;  pre hx ulcerative colitis  post hx ulcerative colitis, diverticulosis  dr benedicto kaur    COLONOSCOPY W/ BIOPSIES  08/29/2022    COLONOSCOPY W/ BIOPSIES  08/09/2021    Dr Gill    COLONOSCOPY W/ POLYPECTOMY  10/02/2023    adenomatous polyp Dr Gill 1 yr f/u    ENDOSCOPY  03/14/2014    Normal    ENDOSCOPY  02/09/2006    MILD GASTRITIS    JOINT REPLACEMENT  12/18/2024    Right Knee replacement    KNEE SURGERY      NEPHRECTOMY PARTIAL Left 2014    OTHER SURGICAL HISTORY      Bilateral Eyelid Surgery     TONSILLECTOMY      TOTAL KNEE ARTHROPLASTY Right 12/18/2024    Procedure: TOTAL KNEE ARTHROPLASTY RIGHT;  Surgeon: Paul Whitaker MD;  Location: [facility];  Service: Orthopedics;  Laterality: Right;    UPPER GASTROINTESTINAL ENDOSCOPY  03/14/2014    Dr David Alonzo    WRIST SURGERY Left       Family History   Problem Relation Age of Onset    Colon polyps Mother     Arthritis Mother     Alzheimer's disease Mother     Heart disease Father         Enlarged heart    COPD Father     Alzheimer's disease Paternal Aunt     Crohn's disease Cousin     Breast cancer Neg Hx     Colon cancer Neg Hx       Social History     Socioeconomic History    Marital status:    Tobacco Use    Smoking status: Never     Passive exposure: Past    Smokeless tobacco: Never   Vaping Use    Vaping status: Never Used   Substance and Sexual Activity    Alcohol use: No    Drug use: No    Sexual activity: Not Currently     Comment: menopause      No Known  Allergies    Review of Systems   Constitutional:  Negative for malaise/fatigue.   Eyes:  Negative for blurred vision.   Respiratory:  Negative for shortness of breath.    Cardiovascular:  Negative for chest pain, palpitations, orthopnea and PND.   Musculoskeletal:  Negative for myalgias and neck pain.   Neurological:  Negative for focal weakness and headaches.       Objective   Physical Exam  Vitals and nursing note reviewed.   Constitutional:       Appearance: She is well-developed.   HENT:      Head: Normocephalic.      Right Ear: External ear normal.      Left Ear: External ear normal.      Nose: Nose normal.   Eyes:      General: Lids are normal.      Conjunctiva/sclera: Conjunctivae normal.      Pupils: Pupils are equal, round, and reactive to light.   Neck:      Thyroid: No thyroid mass or thyromegaly.      Vascular: No carotid bruit.      Trachea: Trachea normal.   Cardiovascular:      Rate and Rhythm: Normal rate and regular rhythm.      Heart sounds: No murmur heard.  Pulmonary:      Effort: Pulmonary effort is normal. No respiratory distress.      Breath sounds: Normal breath sounds. No decreased breath sounds, wheezing, rhonchi or rales.   Chest:      Chest wall: No tenderness.   Abdominal:      General: Bowel sounds are normal.      Palpations: Abdomen is soft.      Tenderness: There is no abdominal tenderness.   Musculoskeletal:         General: Normal range of motion.      Cervical back: Normal range of motion and neck supple.   Skin:     General: Skin is warm and dry.   Neurological:      Mental Status: She is alert and oriented to person, place, and time.   Psychiatric:         Behavior: Behavior normal.         Assessment & Plan   Problems Addressed this Visit          Cardiac and Vasculature    Essential hypertension - Primary    Relevant Medications    ramipril (ALTACE) 10 MG capsule    Other Relevant Orders    Comprehensive Metabolic Panel    Lipid Panel    CBC & Differential    Hyperlipidemia     Relevant Medications    simvastatin (ZOCOR) 10 MG tablet     Other Visit Diagnoses         Chronic gout involving toe 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, unspecified cause        Relevant Medications    allopurinol (ZYLOPRIM) 100 MG tablet    Other Relevant Orders    Uric Acid      Encounter for screening mammogram for malignant neoplasm of breast        Relevant Orders    Mammo Screening Digital Tomosynthesis Bilateral With CAD      Menopause        Relevant Orders    DEXA Bone Density Axial          Diagnoses         Codes Comments      Essential hypertension    -  Primary ICD-10-CM: I10  ICD-9-CM: 401.9       Chronic gout involving toe 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, unspecified cause     ICD-10-CM: M1A.9XX0  ICD-9-CM: 274.02       Hyperlipidemia, unspecified hyperlipidemia type     ICD-10-CM: E78.5  ICD-9-CM: 272.4       Encounter for screening mammogram for malignant neoplasm of breast     ICD-10-CM: Z12.31  ICD-9-CM: V76.12       Menopause     ICD-10-CM: Z78.0  ICD-9-CM: 627.2                        Current Outpatient Medications:     allopurinol (ZYLOPRIM) 100 MG tablet, Take 1 tablet by mouth Daily., Disp: 90 tablet, Rfl: 1    aspirin 81 MG EC tablet, Take 1 tablet by mouth Daily. Resume in 1 month, Disp: , Rfl:     Calcium Carbonate 1500 (600 Ca) MG tablet, Take 1 tablet by mouth Daily., Disp: , Rfl:     docusate sodium (COLACE) 100 MG capsule, Take 1 capsule by mouth Daily., Disp: , Rfl:     esomeprazole (NexIUM) 40 MG capsule, Take 1 capsule by mouth Daily., Disp: , Rfl:     fluticasone (FLONASE) 50 MCG/ACT nasal spray, Administer 1 spray into the nostril(s) as directed by provider Daily., Disp: , Rfl:     Melatonin 10 MG tablet, Take 1 tablet by mouth Every Night., Disp: , Rfl:     mesalamine (LIALDA) 1.2 g EC tablet, Take 2 tablets by mouth 2 (Two) Times a Day., Disp: 120 tablet, Rfl: 11    Misc Natural Products (OSTEO BI-FLEX ADV TRIPLE ST PO), Take 1 tablet by mouth Daily., Disp: , Rfl:     Multiple  Vitamins-Minerals (CENTRUM SILVER PO), Take 1 tablet by mouth Daily., Disp: , Rfl:     ramipril (ALTACE) 10 MG capsule, Take 1 capsule by mouth Daily., Disp: 90 capsule, Rfl: 1    simvastatin (ZOCOR) 10 MG tablet, Take 1 tablet by mouth Daily., Disp: 90 tablet, Rfl: 1    SUPER B COMPLEX/C PO, Take 1 tablet by mouth Daily., Disp: , Rfl:     Return in about 6 months (around 9/12/2025), or if symptoms worsen or fail to improve, for Recheck, Medicare Wellness.

## 2025-03-12 NOTE — PROGRESS NOTES
Physical Therapy Daily Treatment Note    [facility]   [address], [address] [place] [address]      Patient: Coco Steele   : 1945  Referring practitioner: Jana Kahn, *  Date of Initial Visit: Type: THERAPY  Noted: 2024  Today's Date: 3/6/2025  Patient seen for 15 sessions    Certification Period 3/6/2025 thru 2025       Visit Diagnoses:    ICD-10-CM ICD-9-CM   1. Chronic pain of right knee  M25.561 719.46    G89.29 338.29       Subjective     Patient states that she feels like she is still doing very well and she has minimal discomfort in the knee at this time.  Patient reports being able to perform most of her daily activities and walk for long periods of time without exacerbation of symptoms.    Objective   See Exercise, Manual, and Modality Logs for complete treatment.       Assessment/Plan     Patient is progressing well and she demonstrates improvement in her overall tolerance activity and lower extremity strength.  Patient demonstrates good quad control when performing stepdown activities.  Will continue to progress as indicated.    Coco Steele will continue to benefit from skilled physical therapy services to address deficits and continue to work towards reaching functional goals.           Timed:         Manual Therapy:    12     mins  21655;     Therapeutic Exercise:    14     mins  09563;     Neuromuscular Ajay:    15    mins  65719;    Therapeutic Activity:     15     mins  56621;     Gait Training:           mins  96223;     Ultrasound:          mins  98297;    Ionto                                   mins   37744  Self Care                            mins   45811  Canalith Repos         mins 67989  Electrical Stimulation:         mins  34435    Un-Timed:  Electrical Stimulation:         mins  45782 (MC );  Dry Needling          mins self-pay  Traction          mins 39904      Timed Treatment:   56   mins   Total Treatment:     56   mins    Bobby  Austin PT, DPT, Cert. DN  [place] License: [id number]

## 2025-03-13 ENCOUNTER — TREATMENT (OUTPATIENT)
Dept: PHYSICAL THERAPY | Facility: CLINIC | Age: 80
End: 2025-03-13
Payer: MEDICARE

## 2025-03-13 DIAGNOSIS — M25.561 CHRONIC PAIN OF RIGHT KNEE: Primary | ICD-10-CM

## 2025-03-13 DIAGNOSIS — G89.29 CHRONIC PAIN OF RIGHT KNEE: Primary | ICD-10-CM

## 2025-03-13 PROCEDURE — 97112 NEUROMUSCULAR REEDUCATION: CPT | Performed by: PHYSICAL THERAPIST

## 2025-03-13 PROCEDURE — 97530 THERAPEUTIC ACTIVITIES: CPT | Performed by: PHYSICAL THERAPIST

## 2025-03-13 PROCEDURE — 97110 THERAPEUTIC EXERCISES: CPT | Performed by: PHYSICAL THERAPIST

## 2025-03-13 PROCEDURE — 97140 MANUAL THERAPY 1/> REGIONS: CPT | Performed by: PHYSICAL THERAPIST

## 2025-03-13 NOTE — PROGRESS NOTES
----- Message from Zachariah Larios sent at 6/15/2020  2:18 PM CDT -----  Contact: pt  Type: Needs Medical Advice    Who Called:  pt    Best Call Back Number: [phone]  Additional Information: pt would like to schedule a new pt appointment. Pt is a medicaid pt. Pt has a referral for L72.11 (ICD-10-CM) - Pilar cysts       Physical Therapy Daily Treatment Note    [facility]   [address], [address] [place] [address]      Patient: Coco Steele   : 1945  Referring practitioner: Jana Kahn, *  Date of Initial Visit: Type: THERAPY  Noted: 2024  Today's Date: 3/13/2025  Patient seen for 16 sessions    Certification Period 3/13/2025 thru 6/10/2025       Visit Diagnoses:    ICD-10-CM ICD-9-CM   1. Chronic pain of right knee  M25.561 719.46    G89.29 338.29       Subjective     Pt reported 1-2/10 pain and was able to participate in activities while in [place] visiting family. Pt reported pain and discomfort in the L knee when performing heel taps when lowering the R leg. While performing SLR and SAQ pt reported spamming in her L hamstring      Objective   See Exercise, Manual, and Modality Logs for complete treatment.       Assessment/Plan     Pt demonstrated increased strength and endurance through the increase of weight and reps during tx today. Pt responded well the standing hip exercises, however due to pts increased pain in the L knee the R heel taps were skipped. Pt would benefit from STM and stretching of the L hamstring.    Coco Steele will continue to benefit from skilled physical therapy services to address deficits and continue to work towards reaching functional goals.           Timed:         Manual Therapy:    10     mins  64897;     Therapeutic Exercise:    20     mins  76621;     Neuromuscular Ajay:   20     mins  31800;    Therapeutic Activity:     10     mins  35400;     Gait Training:           mins  24959;     Ultrasound:          mins  06158;    Ionto                                   mins   73511  Self Care                            mins   58448  Canalith Repos         mins 63818  Electrical Stimulation:         mins  01220    Un-Timed:  Electrical Stimulation:         mins  51982 ( );  Dry Needling          mins self-pay  Traction          mins 18708      Timed  Treatment:   60   mins   Total Treatment:     60   mins    Ivania Montemayor, PTA Student       I have reviewed and approved all documentation provided in this note.  All treatment has been provided under the direct supervision of physical therapist.      Bobby Avila, PT  Physical Therapist

## 2025-03-14 ENCOUNTER — LAB (OUTPATIENT)
Dept: INTERNAL MEDICINE | Facility: CLINIC | Age: 80
End: 2025-03-14
Payer: MEDICARE

## 2025-03-14 ENCOUNTER — RESULTS FOLLOW-UP (OUTPATIENT)
Dept: INTERNAL MEDICINE | Facility: CLINIC | Age: 80
End: 2025-03-14

## 2025-03-14 DIAGNOSIS — M1A.9XX0 CHRONIC GOUT INVOLVING TOE OF RIGHT FOOT WITHOUT TOPHUS, UNSPECIFIED CAUSE: ICD-10-CM

## 2025-03-14 DIAGNOSIS — R94.4 DECREASED GFR: Primary | ICD-10-CM

## 2025-03-14 DIAGNOSIS — I10 ESSENTIAL HYPERTENSION: ICD-10-CM

## 2025-03-14 LAB
ALBUMIN SERPL-MCNC: 4.2 G/DL (ref 3.5–5.2)
ALBUMIN/GLOB SERPL: 1.4 G/DL
ALP SERPL-CCNC: 68 U/L (ref 39–117)
ALT SERPL W P-5'-P-CCNC: 17 U/L (ref 1–33)
ANION GAP SERPL CALCULATED.3IONS-SCNC: 13 MMOL/L (ref 5–15)
AST SERPL-CCNC: 25 U/L (ref 1–32)
BASOPHILS # BLD AUTO: 0.03 10*3/MM3 (ref 0–0.2)
BASOPHILS NFR BLD AUTO: 0.7 % (ref 0–1.5)
BILIRUB SERPL-MCNC: 0.3 MG/DL (ref 0–1.2)
BUN SERPL-MCNC: 11 MG/DL (ref 8–23)
BUN/CREAT SERPL: 10.8 (ref 7–25)
CALCIUM SPEC-SCNC: 9.4 MG/DL (ref 8.6–10.5)
CHLORIDE SERPL-SCNC: 103 MMOL/L (ref 98–107)
CHOLEST SERPL-MCNC: 181 MG/DL (ref 0–200)
CO2 SERPL-SCNC: 23 MMOL/L (ref 22–29)
CREAT SERPL-MCNC: 1.02 MG/DL (ref 0.57–1)
DEPRECATED RDW RBC AUTO: 43.9 FL (ref 37–54)
EGFRCR SERPLBLD CKD-EPI 2021: 56.4 ML/MIN/1.73
EOSINOPHIL # BLD AUTO: 0.07 10*3/MM3 (ref 0–0.4)
EOSINOPHIL NFR BLD AUTO: 1.6 % (ref 0.3–6.2)
ERYTHROCYTE [DISTWIDTH] IN BLOOD BY AUTOMATED COUNT: 13.1 % (ref 12.3–15.4)
GLOBULIN UR ELPH-MCNC: 3 GM/DL
GLUCOSE SERPL-MCNC: 105 MG/DL (ref 65–99)
HCT VFR BLD AUTO: 39 % (ref 34–46.6)
HDLC SERPL-MCNC: 81 MG/DL (ref 40–60)
HGB BLD-MCNC: 12.3 G/DL (ref 12–15.9)
IMM GRANULOCYTES # BLD AUTO: 0.01 10*3/MM3 (ref 0–0.05)
IMM GRANULOCYTES NFR BLD AUTO: 0.2 % (ref 0–0.5)
LDLC SERPL CALC-MCNC: 78 MG/DL (ref 0–100)
LDLC/HDLC SERPL: 0.92 {RATIO}
LYMPHOCYTES # BLD AUTO: 1.29 10*3/MM3 (ref 0.7–3.1)
LYMPHOCYTES NFR BLD AUTO: 30.3 % (ref 19.6–45.3)
MCH RBC QN AUTO: 28.4 PG (ref 26.6–33)
MCHC RBC AUTO-ENTMCNC: 31.5 G/DL (ref 31.5–35.7)
MCV RBC AUTO: 90.1 FL (ref 79–97)
MONOCYTES # BLD AUTO: 0.29 10*3/MM3 (ref 0.1–0.9)
MONOCYTES NFR BLD AUTO: 6.8 % (ref 5–12)
NEUTROPHILS NFR BLD AUTO: 2.57 10*3/MM3 (ref 1.7–7)
NEUTROPHILS NFR BLD AUTO: 60.4 % (ref 42.7–76)
NRBC BLD AUTO-RTO: 0 /100 WBC (ref 0–0.2)
PLATELET # BLD AUTO: 249 10*3/MM3 (ref 140–450)
PMV BLD AUTO: 10.1 FL (ref 6–12)
POTASSIUM SERPL-SCNC: 3.9 MMOL/L (ref 3.5–5.2)
PROT SERPL-MCNC: 7.2 G/DL (ref 6–8.5)
RBC # BLD AUTO: 4.33 10*6/MM3 (ref 3.77–5.28)
SODIUM SERPL-SCNC: 139 MMOL/L (ref 136–145)
TRIGL SERPL-MCNC: 126 MG/DL (ref 0–150)
URATE SERPL-MCNC: 5.1 MG/DL (ref 2.4–5.7)
VLDLC SERPL-MCNC: 22 MG/DL (ref 5–40)
WBC NRBC COR # BLD AUTO: 4.26 10*3/MM3 (ref 3.4–10.8)

## 2025-03-14 PROCEDURE — 85025 COMPLETE CBC W/AUTO DIFF WBC: CPT | Performed by: FAMILY MEDICINE

## 2025-03-14 PROCEDURE — 84550 ASSAY OF BLOOD/URIC ACID: CPT | Performed by: FAMILY MEDICINE

## 2025-03-14 PROCEDURE — 80053 COMPREHEN METABOLIC PANEL: CPT | Performed by: FAMILY MEDICINE

## 2025-03-14 PROCEDURE — 80061 LIPID PANEL: CPT | Performed by: FAMILY MEDICINE

## 2025-03-14 NOTE — PROGRESS NOTES
Physical Therapy Daily Treatment Note    [facility]   [address], [address] [place] [address]      Patient: Coco Steele   : 1945  Referring practitioner: Jana Kahn, *  Date of Initial Visit: Type: THERAPY  Noted: 2024  Today's Date: 3/10/2025  Patient seen for 16 sessions    Certification Period 3/10/2025 thru 6/10/2025       Visit Diagnoses:    ICD-10-CM ICD-9-CM   1. Chronic pain of right knee  M25.561 719.46    G89.29 338.29       Subjective     Pt states that she is feeling good overall and she did well on her trip to [place] and was able to travel and walk for longer periods of time without an increase in pain.     Objective   See Exercise, Manual, and Modality Logs for complete treatment.       Assessment/Plan     Pt has been able to maintain good ROM of the right knee over the past several weeks and she demonstrates good strength of the right knee as well. Pt has the most difficulty with going up and down stairs and we were able to work on step up/down in the clinic today. Will cont to progress as indicated.      Coco Steele will continue to benefit from skilled physical therapy services to address deficits and continue to work towards reaching functional goals.           Timed:         Manual Therapy:    10     mins  38287;     Therapeutic Exercise:    13     mins  82871;     Neuromuscular Ajay:    15    mins  56283;    Therapeutic Activity:     15     mins  38815;     Gait Training:           mins  42627;     Ultrasound:          mins  32707;    Ionto                                   mins   70730  Self Care                            mins   94246  Canalith Repos         mins 77421  Electrical Stimulation:         mins  28182    Un-Timed:  Electrical Stimulation:         mins  34240 ( );  Dry Needling          mins self-pay  Traction          mins 88196      Timed Treatment:   53   mins   Total Treatment:     53   mins    Bobby Avila, PT, DPT, Cert.  JOE PEDROZA License: [id number]

## 2025-03-14 NOTE — LETTER
March 14, 2025     Coco Steele  [address] [address]        Dear Coco:    Below are the results from your recent visit:    Resulted Orders   Uric Acid   Result Value Ref Range    Uric Acid 5.1 2.4 - 5.7 mg/dL   Comprehensive Metabolic Panel   Result Value Ref Range    Glucose 105 (H) 65 - 99 mg/dL    BUN 11 8 - 23 mg/dL    Creatinine 1.02 (H) 0.57 - 1.00 mg/dL    Sodium 139 136 - 145 mmol/L    Potassium 3.9 3.5 - 5.2 mmol/L    Chloride 103 98 - 107 mmol/L    CO2 23.0 22.0 - 29.0 mmol/L    Calcium 9.4 8.6 - 10.5 mg/dL    Total Protein 7.2 6.0 - 8.5 g/dL    Albumin 4.2 3.5 - 5.2 g/dL    ALT (SGPT) 17 1 - 33 U/L    AST (SGOT) 25 1 - 32 U/L    Alkaline Phosphatase 68 39 - 117 U/L    Total Bilirubin 0.3 0.0 - 1.2 mg/dL    Globulin 3.0 gm/dL    A/G Ratio 1.4 g/dL    BUN/Creatinine Ratio 10.8 7.0 - 25.0    Anion Gap 13.0 5.0 - 15.0 mmol/L    eGFR 56.4 (L) >60.0 mL/min/1.73   Lipid Panel   Result Value Ref Range    Total Cholesterol 181 0 - 200 mg/dL    Triglycerides 126 0 - 150 mg/dL    HDL Cholesterol 81 (H) 40 - 60 mg/dL    LDL Cholesterol  78 0 - 100 mg/dL    VLDL Cholesterol 22 5 - 40 mg/dL    LDL/HDL Ratio 0.92    CBC Auto Differential   Result Value Ref Range    WBC 4.26 3.40 - 10.80 10*3/mm3    RBC 4.33 3.77 - 5.28 10*6/mm3    Hemoglobin 12.3 12.0 - 15.9 g/dL    Hematocrit 39.0 34.0 - 46.6 %    MCV 90.1 79.0 - 97.0 fL    MCH 28.4 26.6 - 33.0 pg    MCHC 31.5 31.5 - 35.7 g/dL    RDW 13.1 12.3 - 15.4 %    RDW-SD 43.9 37.0 - 54.0 fl    MPV 10.1 6.0 - 12.0 fL    Platelets 249 140 - 450 10*3/mm3    Neutrophil % 60.4 42.7 - 76.0 %    Lymphocyte % 30.3 19.6 - 45.3 %    Monocyte % 6.8 5.0 - 12.0 %    Eosinophil % 1.6 0.3 - 6.2 %    Basophil % 0.7 0.0 - 1.5 %    Immature Grans % 0.2 0.0 - 0.5 %    Neutrophils, Absolute 2.57 1.70 - 7.00 10*3/mm3    Lymphocytes, Absolute 1.29 0.70 - 3.10 10*3/mm3    Monocytes, Absolute 0.29 0.10 - 0.90 10*3/mm3    Eosinophils, Absolute 0.07 0.00 - 0.40 10*3/mm3     Basophils, Absolute 0.03 0.00 - 0.20 10*3/mm3    Immature Grans, Absolute 0.01 0.00 - 0.05 10*3/mm3    nRBC 0.0 0.0 - 0.2 /100 WBC       Your glucose is elevated if you were fasting. Goal fasting glucose is less than 100.     Please follow a low animal fat diet that is also low in sugar, low in junk food, low in sweet drinks and low in alcohol.  Please increase the amount of fiber in your diet as well as increasing your daily exercise, such as walking.      Your glomerular filtration rate is decreased which indicates a decrease in kidney function.  Please avoid high-dose nonsteroidal anti-inflammatories such as Advil or Aleve which can further decrease the kidney function.  Please avoid dehydration.  Since this is a decrease since last lab, please repeat any function in about a week.  Orders will be in the computer.    Your electrolytes, liver enzymes, uric acid and blood count look good.  If you have any questions or concerns, please don't hesitate to call.    Sincerely,        Radha Berger MD

## 2025-03-17 ENCOUNTER — OFFICE VISIT (OUTPATIENT)
Dept: ORTHOPEDIC SURGERY | Facility: CLINIC | Age: 80
End: 2025-03-17
Payer: MEDICARE

## 2025-03-17 DIAGNOSIS — M17.12 PRIMARY OSTEOARTHRITIS OF LEFT KNEE: ICD-10-CM

## 2025-03-17 DIAGNOSIS — Z96.651 S/P TOTAL KNEE ARTHROPLASTY, RIGHT: Primary | ICD-10-CM

## 2025-03-17 PROCEDURE — 99024 POSTOP FOLLOW-UP VISIT: CPT | Performed by: ORTHOPAEDIC SURGERY

## 2025-03-17 RX ORDER — CHLORHEXIDINE GLUCONATE 40 MG/ML
1 SOLUTION TOPICAL DAILY
Qty: 236 ML | Refills: 0 | Status: SHIPPED | OUTPATIENT
Start: 2025-03-17

## 2025-03-17 RX ORDER — PREGABALIN 150 MG/1
150 CAPSULE ORAL ONCE
OUTPATIENT
Start: 2025-03-17 | End: 2025-03-17

## 2025-03-17 RX ORDER — ACETAMINOPHEN 325 MG/1
1000 TABLET ORAL ONCE
OUTPATIENT
Start: 2025-03-17 | End: 2025-03-17

## 2025-03-17 RX ORDER — CHLORHEXIDINE GLUCONATE 500 MG/1
CLOTH TOPICAL ONCE
OUTPATIENT
Start: 2025-03-17

## 2025-03-17 NOTE — PROGRESS NOTES
Chief Complaint   Patient presents with    Post-op     6 week follow up--3  month S/P TOTAL KNEE ARTHROPLASTY RIGHT DOS (12-18-24)           HPI    She is status post right knee replacement doing well.  She would like her left knee replaced in the near future.    There were no vitals filed for this visit.      Physical Exam:  Right knee is doing well.  Incision healed.  Range of motion 0-1 20.    Left knee exam unchanged.  Tender to medial joint.  X-RAY REPORT:  Imaging Results (Last 7 Days)       Procedure Component Value Units Date/Time    XR Knee 3+ View With [place] Right [[id number]] Resulted: 03/17/25 0952     Updated: 03/17/25 0952    Narrative:      TKA X-Ray  Indication: status-post TKA     AP, Lateral, and Sunrise views of Right knee     Findings:  No signs of fracture  No signs of loosening  No change compared to prior study  Components are well aligned                      ICD-10-CM ICD-9-CM   1. S/P total knee arthroplasty, right  Z96.651 V43.65       Orders Placed This Encounter   Procedures    XR Knee 3+ View With [place] Right     She is doing well with right total knee replacement.  She would like to schedule her left total knee arthroplasty.  She did well with the surgery at the hospital last time because of some postop hypotension        Paul Whitaker M.D., FAAOS  Orthopedic Surgeon  Fellowship Trained Sports Medicine  [facility]  Orthopedics and Sports Medicine  [address] [place] [address], [address]  [place] [address]      EMR Dragon/Transcription disclaimer:  Please note that portions of this document were completed with a voice recognition program.      At [facility], we believe that sharing information builds trust and better relationships. You are receiving this note because you are receiving care at [facility] or have recently visited. It is possible you will see health information before a provider has talked with you about it. This kind of information can be easy  to misunderstand as it is a medical document. It is intended as [id number] communication. It is written in medical language and may contain abbreviations or verbiage that are unfamiliar. It may appear blunt or direct. Medical documents are intended to carry relevant information, facts as evident, and the clinical opinion of the practitioner.  To help you fully understand what it means for your health, we urge you to discuss this note with your provider.

## 2025-03-25 ENCOUNTER — LAB (OUTPATIENT)
Dept: INTERNAL MEDICINE | Facility: CLINIC | Age: 80
End: 2025-03-25
Payer: MEDICARE

## 2025-03-25 DIAGNOSIS — R94.4 DECREASED GFR: ICD-10-CM

## 2025-03-25 PROCEDURE — 80048 BASIC METABOLIC PNL TOTAL CA: CPT | Performed by: FAMILY MEDICINE

## 2025-03-26 ENCOUNTER — RESULTS FOLLOW-UP (OUTPATIENT)
Dept: INTERNAL MEDICINE | Facility: CLINIC | Age: 80
End: 2025-03-26
Payer: MEDICARE

## 2025-03-26 LAB
ANION GAP SERPL CALCULATED.3IONS-SCNC: 13.8 MMOL/L (ref 5–15)
BUN SERPL-MCNC: 9 MG/DL (ref 8–23)
BUN/CREAT SERPL: 9.5 (ref 7–25)
CALCIUM SPEC-SCNC: 9.3 MG/DL (ref 8.6–10.5)
CHLORIDE SERPL-SCNC: 102 MMOL/L (ref 98–107)
CO2 SERPL-SCNC: 22.2 MMOL/L (ref 22–29)
CREAT SERPL-MCNC: 0.95 MG/DL (ref 0.57–1)
EGFRCR SERPLBLD CKD-EPI 2021: 61.5 ML/MIN/1.73
GLUCOSE SERPL-MCNC: 101 MG/DL (ref 65–99)
POTASSIUM SERPL-SCNC: 3.8 MMOL/L (ref 3.5–5.2)
SODIUM SERPL-SCNC: 138 MMOL/L (ref 136–145)

## 2025-04-17 ENCOUNTER — PRE-ADMISSION TESTING (OUTPATIENT)
Dept: PREADMISSION TESTING | Facility: HOSPITAL | Age: 80
End: 2025-04-17
Payer: MEDICARE

## 2025-04-17 VITALS — WEIGHT: 175.71 LBS | HEIGHT: 67 IN | BODY MASS INDEX: 27.58 KG/M2

## 2025-04-17 LAB
ANION GAP SERPL CALCULATED.3IONS-SCNC: 11 MMOL/L (ref 5–15)
APTT PPP: 30.5 SECONDS (ref 22–39)
BASOPHILS # BLD AUTO: 0.03 10*3/MM3 (ref 0–0.2)
BASOPHILS NFR BLD AUTO: 0.8 % (ref 0–1.5)
BUN SERPL-MCNC: 11 MG/DL (ref 8–23)
BUN/CREAT SERPL: 13.4 (ref 7–25)
CALCIUM SPEC-SCNC: 9.8 MG/DL (ref 8.6–10.5)
CHLORIDE SERPL-SCNC: 105 MMOL/L (ref 98–107)
CO2 SERPL-SCNC: 23 MMOL/L (ref 22–29)
CREAT SERPL-MCNC: 0.82 MG/DL (ref 0.57–1)
CRP SERPL-MCNC: <0.3 MG/DL (ref 0–0.5)
DEPRECATED RDW RBC AUTO: 46.6 FL (ref 37–54)
EGFRCR SERPLBLD CKD-EPI 2021: 73.3 ML/MIN/1.73
EOSINOPHIL # BLD AUTO: 0.07 10*3/MM3 (ref 0–0.4)
EOSINOPHIL NFR BLD AUTO: 1.9 % (ref 0.3–6.2)
ERYTHROCYTE [DISTWIDTH] IN BLOOD BY AUTOMATED COUNT: 14.5 % (ref 12.3–15.4)
ERYTHROCYTE [SEDIMENTATION RATE] IN BLOOD: 30 MM/HR (ref 0–30)
GLUCOSE SERPL-MCNC: 103 MG/DL (ref 65–99)
HBA1C MFR BLD: 5.5 % (ref 4.8–5.6)
HCT VFR BLD AUTO: 37.4 % (ref 34–46.6)
HGB BLD-MCNC: 12 G/DL (ref 12–15.9)
IMM GRANULOCYTES # BLD AUTO: 0.01 10*3/MM3 (ref 0–0.05)
IMM GRANULOCYTES NFR BLD AUTO: 0.3 % (ref 0–0.5)
INR PPP: 0.92 (ref 0.89–1.12)
LYMPHOCYTES # BLD AUTO: 0.72 10*3/MM3 (ref 0.7–3.1)
LYMPHOCYTES NFR BLD AUTO: 19.8 % (ref 19.6–45.3)
MCH RBC QN AUTO: 28.7 PG (ref 26.6–33)
MCHC RBC AUTO-ENTMCNC: 32.1 G/DL (ref 31.5–35.7)
MCV RBC AUTO: 89.5 FL (ref 79–97)
MONOCYTES # BLD AUTO: 0.22 10*3/MM3 (ref 0.1–0.9)
MONOCYTES NFR BLD AUTO: 6.1 % (ref 5–12)
NEUTROPHILS NFR BLD AUTO: 2.58 10*3/MM3 (ref 1.7–7)
NEUTROPHILS NFR BLD AUTO: 71.1 % (ref 42.7–76)
NRBC BLD AUTO-RTO: 0 /100 WBC (ref 0–0.2)
PLATELET # BLD AUTO: 199 10*3/MM3 (ref 140–450)
PMV BLD AUTO: 9.5 FL (ref 6–12)
POTASSIUM SERPL-SCNC: 4.3 MMOL/L (ref 3.5–5.2)
PROTHROMBIN TIME: 13 SECONDS (ref 12.2–15.3)
RBC # BLD AUTO: 4.18 10*6/MM3 (ref 3.77–5.28)
SODIUM SERPL-SCNC: 139 MMOL/L (ref 136–145)
WBC NRBC COR # BLD AUTO: 3.63 10*3/MM3 (ref 3.4–10.8)

## 2025-04-17 PROCEDURE — 85025 COMPLETE CBC W/AUTO DIFF WBC: CPT | Performed by: ORTHOPAEDIC SURGERY

## 2025-04-17 PROCEDURE — 85730 THROMBOPLASTIN TIME PARTIAL: CPT | Performed by: ORTHOPAEDIC SURGERY

## 2025-04-17 PROCEDURE — 85652 RBC SED RATE AUTOMATED: CPT | Performed by: ORTHOPAEDIC SURGERY

## 2025-04-17 PROCEDURE — 86140 C-REACTIVE PROTEIN: CPT | Performed by: ORTHOPAEDIC SURGERY

## 2025-04-17 PROCEDURE — 85610 PROTHROMBIN TIME: CPT | Performed by: ORTHOPAEDIC SURGERY

## 2025-04-17 PROCEDURE — 83036 HEMOGLOBIN GLYCOSYLATED A1C: CPT | Performed by: ORTHOPAEDIC SURGERY

## 2025-04-17 PROCEDURE — 80048 BASIC METABOLIC PNL TOTAL CA: CPT | Performed by: ORTHOPAEDIC SURGERY

## 2025-04-17 RX ORDER — SENNOSIDES 8.6 MG
650 CAPSULE ORAL EVERY 8 HOURS PRN
COMMUNITY
End: 2025-04-25 | Stop reason: HOSPADM

## 2025-04-17 NOTE — PAT
Patient viewed general PAT education video as instructed in their preoperative information received from their surgeon.  Patient stated the general PAT education video was viewed in its entirety and survey completed.  Copies of PAT general education handouts (Incentive Spirometry, Meds to Beds Program, Patient Belongings, Pre-op skin preparation instructions, Blood Glucose testing, Visitor policy, Surgery FAQ, Code H) distributed to patient if not printed. Education related to the PAT pass and skin preparation for surgery (if applicable) completed in PAT as a reinforcement to PAT education video. Patient instructed to return PAT pass provided today as well as completed skin preparation sheet (if applicable) on the day of procedure.     Additionally if patient had not viewed video yet but intended to view it at home or in our waiting area, then referred them to the handout with QR code/link provided during PAT visit.  Encouraged patient/family to read [facility] general education handouts thoroughly and notify PAT staff with any questions or concerns. Patient verbalized understanding of all information and priority content.    An arrival time for procedure was not provided during PAT visit. If patient had any questions or concerns about their arrival time, they were instructed to contact their surgeon/physician.  Additionally, if the patient referred to an arrival time that was acquired from their my chart account, patient was encouraged to verify that time with their surgeon/physician. Arrival times are NOT provided in Pre Admission Testing Department.    Per Anesthesia Request, patient instructed not to take their ACE/ARB medications on the AM of surgery.    Patient instructed to drink 20 ounces of Gatorade or Gatorlyte (if diabetic) and it needs to be completed 1 hour (for Main OR patients) or 2 hours (scheduled  section & [facility] patients) before given arrival time for procedure (NO RED Gatorade and NO Gatorade  Zero).    Patient verbalized understanding.    Discussed with patient options for receiving total joint replacement education and assessed patient's ability and preference. Joint Replacement Guide given to patient during PAT visit since not received a copy within the last year. Encouraged patient/family to read guide thoroughly and notify PAT staff with any questions or concerns. Handout provided directing patient to links to watch online videos related to joint replacement surgery on the [facility] website. The handout gives detailed instructions for joining an online joint replacement class through Microsoft Teams or phone conference offered on the 1st and 3rd Thursdays of the month. Patient  watched videos online for right TKA in 12/24.. Patient verbalized understanding of instructions. Encouraged to share information with family and/or . An overview of the joint replacement education was provided during the visit including general perioperative instructions that are routine for all surgical patients (PAT PASS, wipes, directions to pre-op, etc.).    In[employer] (by [employer]) pain pump patient informational handout given to patient.  Instructed patient to watch [employer] Patient Education Video regarding Peripheral Nerve Catheter that will be in place for upcoming surgery unless contraindicated. The video can be accessed using QR code noted on handout.  Patient agreed to watch video.  Stressed to patient to call [employer] Nursing Hotline 24/7 if patient has any questions or concerns after discharge.     Patient denies any current skin issues.     Patient to apply Chlorhexadine wipes  to surgical area (as instructed) the night before procedure and the AM of procedure. Wipes provided.     Chlorhexidine Prescription prescribed by physician before PAT visit. Patient had CHG solution at home from Right TKA and will use this. Written instructions given to patient during PAT visit.  Patient/family also  instructed to complete skin prep checklist and return the checklist on the day of surgery to preoperative staff.  Patient/family verbalized understanding.     Dental Clearance form received from patient and placed in chart. Will fax to [phone].

## 2025-04-18 ENCOUNTER — PRE-PROCEDURE SCREENING (OUTPATIENT)
Dept: ORTHOPEDIC SURGERY | Facility: CLINIC | Age: 80
End: 2025-04-18
Payer: MEDICARE

## 2025-04-18 NOTE — TELEPHONE ENCOUNTER
TOTAL JOINT REPLACEMENT CARE NAVIGATION INITIAL ASSESSMENT    PATIENT INFORMATION:     Patient: Coco Steele       YOB: 1945         Age/Gender: 79 y.o. female     Medical Record Number: [id number]     Surgery:     L TKA          Surgery Date: 4/24/25    Surgeon: Dr. Whitaker    Physical Therapy Location: [facility]    PT Date & Time: 4/28/25 @ 8am    DVT PPX: ASA 81mg BID    DISCHARGE PLAN: 23 hour stay      LIVING SITUATION:     [x]Private Residence      Who lives in the home?                          []Nursing Home:                                     []Assisted Living  []Rehabilitation Facility:                          [x]Live Alone  []Homeless    HOUSING STYLE:     [x]Ranch Style   []Multi-level   []Split level   []Townhouse   []Apartment    Do you have steps to navigate in the home? No  Do you have steps to navigate outside the home? 1 step      ADL:     [x]Independent   []Independent with difficulty   []Partially Dependent   []Dependent    Do you require bathing/showering?   Do you require assistance with grooming (brushing hair, shaving, etc)?  Do you require assistance dressing?  Do you require assistance with walking?  Do you require assistive devices while walking (cane, walker, wheelchair)?  Do you require assistance with transfers (moving from bed to chair, wheelchair, etc)?  Do you require assistance preparing meals?  Do you require assistance when eating meals?  Do you require assistance to use the toilet?  Do you have any issues with bowel or bladder incontinence?  Do you require assistance with household chores (cleaning, laundry, etc)?  Have you had any recent falls?    CURRENT SERVICES:    []Home Health (PT, OT, Skilled Nursing)  Agency:    County:  []Palliative Care  []Meals on Wheels  []Daily Caregiver  [x]None    CURRENT DME:    []Walker   []Cane   []Wheelchair   []Crutches   []Bedside Commode   []Shower Chair  []Hospital Bed   []Nebulizer   []Oxygen  []Other:    MEDICATIONS:  Ramipril-will hold AM of sx    Are you currently on any blood thinners? ASA 81mg-will hold starting today  Are you currently on any anti-inflammatory medications? No  Are you currently on any medications for rheumatoid arthritis? No  Are you currently taking any herbal supplements? Will hold starting today      Post-op Pharmacy Name:   [facility]              Phone Number:     Does anyone assist you filling medication boxes or remind you that medication is due?   Are you currently on a mail order prescription plan?  What pharmacy do you use to fill your short-term prescriptions? [facility]  Do you have prescription insurance coverage?   Can you afford your medications/co-pays?    CURRENT TRANSPORTATION:    Which services do you rely on? []Taxi   []Public Transportation   [x]Private Vehicle     []Ambulance   []Tack (Transportation Assistance [facility])    Do you have a way to get home when you are discharged? Yes-friend, Vonnie    POTENTIAL NEEDS:    []Rehabilitation   []Home Health PT   []SNF  []Meals on Wheels   []Department of    []Palliative Care  []Nursing Home   []Hospice   [x]Durable Medical Equipment-has all DMEs  []Caregiver Services   []Financial Services   []Pre-op In Home PT Evaluation    CLEARANCES NEEDED FOR SURGERY:    []Dental   []Cardiology   []Pulmonology   []Medical (PCP)   []Rheumatology  []Endocrinology   []Hematology/Oncology   []Neurology   []Neurosurgery   []CT Vascular      INITIAL DISCHARGE PLAN: Plan for 23 hour stay; Patient's friend, Vonnie, will be surgery , as well as, post-op care giver. Patient has all DMEs. Patient would like to use [facility] program. Post-op PT scheduled at [facility] on 4/28/25 @ 8am.

## 2025-04-23 ENCOUNTER — ANESTHESIA EVENT (OUTPATIENT)
Dept: PERIOP | Facility: HOSPITAL | Age: 80
End: 2025-04-23
Payer: MEDICARE

## 2025-04-23 RX ORDER — SODIUM CHLORIDE 0.9 % (FLUSH) 0.9 %
10 SYRINGE (ML) INJECTION EVERY 12 HOURS SCHEDULED
Status: CANCELLED | OUTPATIENT
Start: 2025-04-23

## 2025-04-23 RX ORDER — FAMOTIDINE 10 MG/ML
20 INJECTION, SOLUTION INTRAVENOUS ONCE
Status: CANCELLED | OUTPATIENT
Start: 2025-04-23 | End: 2025-04-23

## 2025-04-23 RX ORDER — SODIUM CHLORIDE 0.9 % (FLUSH) 0.9 %
10 SYRINGE (ML) INJECTION AS NEEDED
Status: CANCELLED | OUTPATIENT
Start: 2025-04-23

## 2025-04-24 ENCOUNTER — APPOINTMENT (OUTPATIENT)
Dept: GENERAL RADIOLOGY | Facility: HOSPITAL | Age: 80
End: 2025-04-24
Payer: MEDICARE

## 2025-04-24 ENCOUNTER — HOSPITAL ENCOUNTER (OUTPATIENT)
Facility: HOSPITAL | Age: 80
Discharge: HOME | End: 2025-04-25
Attending: ORTHOPAEDIC SURGERY | Admitting: ORTHOPAEDIC SURGERY
Payer: MEDICARE

## 2025-04-24 ENCOUNTER — ANESTHESIA EVENT CONVERTED (OUTPATIENT)
Dept: ANESTHESIOLOGY | Facility: HOSPITAL | Age: 80
End: 2025-04-24
Payer: MEDICARE

## 2025-04-24 ENCOUNTER — ANESTHESIA (OUTPATIENT)
Dept: PERIOP | Facility: HOSPITAL | Age: 80
End: 2025-04-24
Payer: MEDICARE

## 2025-04-24 DIAGNOSIS — Z96.652 S/P TKR (TOTAL KNEE REPLACEMENT), LEFT: Primary | ICD-10-CM

## 2025-04-24 DIAGNOSIS — M17.12 PRIMARY OSTEOARTHRITIS OF LEFT KNEE: ICD-10-CM

## 2025-04-24 PROBLEM — K21.9 GERD (GASTROESOPHAGEAL REFLUX DISEASE): Status: ACTIVE | Noted: 2025-04-24

## 2025-04-24 LAB — GLUCOSE BLDC GLUCOMTR-MCNC: 94 MG/DL (ref 70–130)

## 2025-04-24 PROCEDURE — C1713 ANCHOR/SCREW BN/BN,TIS/BN: HCPCS | Performed by: ORTHOPAEDIC SURGERY

## 2025-04-24 PROCEDURE — 25010000002 CEFAZOLIN PER 500 MG: Performed by: ORTHOPAEDIC SURGERY

## 2025-04-24 PROCEDURE — 82948 REAGENT STRIP/BLOOD GLUCOSE: CPT

## 2025-04-24 PROCEDURE — C1776 JOINT DEVICE (IMPLANTABLE): HCPCS | Performed by: ORTHOPAEDIC SURGERY

## 2025-04-24 PROCEDURE — 25810000003 LACTATED RINGERS PER 1000 ML: Performed by: ANESTHESIOLOGY

## 2025-04-24 PROCEDURE — 97116 GAIT TRAINING THERAPY: CPT

## 2025-04-24 PROCEDURE — 73560 X-RAY EXAM OF KNEE 1 OR 2: CPT

## 2025-04-24 PROCEDURE — 97110 THERAPEUTIC EXERCISES: CPT

## 2025-04-24 PROCEDURE — 25010000002 ONDANSETRON PER 1 MG: Performed by: NURSE ANESTHETIST, CERTIFIED REGISTERED

## 2025-04-24 PROCEDURE — 27447 TOTAL KNEE ARTHROPLASTY: CPT | Performed by: ORTHOPAEDIC SURGERY

## 2025-04-24 PROCEDURE — 97162 PT EVAL MOD COMPLEX 30 MIN: CPT

## 2025-04-24 PROCEDURE — 63710000001 ACETAMINOPHEN EXTRA STRENGTH 500 MG TABLET: Performed by: ORTHOPAEDIC SURGERY

## 2025-04-24 PROCEDURE — 25010000002 PROPOFOL 10 MG/ML EMULSION: Performed by: NURSE ANESTHETIST, CERTIFIED REGISTERED

## 2025-04-24 PROCEDURE — 25010000002 LIDOCAINE PF 1% 1 % SOLUTION: Performed by: ANESTHESIOLOGY

## 2025-04-24 PROCEDURE — S0260 H&P FOR SURGERY: HCPCS | Performed by: PHYSICIAN ASSISTANT

## 2025-04-24 PROCEDURE — 25010000002 SODIUM CHLORIDE 0.9 % WITH KCL 20 MEQ 20-0.9 MEQ/L-% SOLUTION: Performed by: ORTHOPAEDIC SURGERY

## 2025-04-24 PROCEDURE — 25010000002 LIDOCAINE PF 1% 1 % SOLUTION: Performed by: NURSE ANESTHETIST, CERTIFIED REGISTERED

## 2025-04-24 PROCEDURE — 25010000002 MEPIVACAINE HCL (PF) 1.5 % SOLUTION: Performed by: ANESTHESIOLOGY

## 2025-04-24 PROCEDURE — G0378 HOSPITAL OBSERVATION PER HR: HCPCS

## 2025-04-24 PROCEDURE — 25010000002 DEXAMETHASONE PER 1 MG: Performed by: NURSE ANESTHETIST, CERTIFIED REGISTERED

## 2025-04-24 PROCEDURE — 25010000002 DEXAMETHASONE SODIUM PHOSPHATE 10 MG/ML SOLUTION: Performed by: NURSE ANESTHETIST, CERTIFIED REGISTERED

## 2025-04-24 PROCEDURE — A9270 NON-COVERED ITEM OR SERVICE: HCPCS | Performed by: ORTHOPAEDIC SURGERY

## 2025-04-24 PROCEDURE — 25010000002 ROPIVACAINE HCL-NACL 0.2-0.9 % SOLUTION: Performed by: NURSE ANESTHETIST, CERTIFIED REGISTERED

## 2025-04-24 PROCEDURE — 27447 TOTAL KNEE ARTHROPLASTY: CPT | Performed by: PHYSICIAN ASSISTANT

## 2025-04-24 PROCEDURE — 25010000002 BUPIVACAINE (PF) 0.25 % SOLUTION: Performed by: NURSE ANESTHETIST, CERTIFIED REGISTERED

## 2025-04-24 DEVICE — IMPLANTABLE DEVICE
Type: IMPLANTABLE DEVICE | Site: KNEE | Status: FUNCTIONAL
Brand: PERSONA® NATURAL TIBIA®

## 2025-04-24 DEVICE — IMPLANTABLE DEVICE
Type: IMPLANTABLE DEVICE | Site: KNEE | Status: FUNCTIONAL
Brand: PERSONA® VIVACIT-E®

## 2025-04-24 DEVICE — PALACOS® R IS A FAST-CURING, RADIOPAQUE, POLY(METHYL METHACRYLATE)-BASED BONE CEMENT.PALACOS ® R CONTAINS THE X-RAY CONTRAST MEDIUM ZIRCONIUM DIOXIDE. TO IMPROVE VISIBILITY IN THE SURGICAL FIELD PALACOS ® R HAS BEEN COLOURED WITH CHLOROPHYLL (E141). THE BONE CEMENT IS PREPARED DIRECTLY BEFORE USE BY MIXING A POLYMER POWDER COMPONENT WITH A LIQUID MONOMER COMPONENT. A DUCTILE DOUGH FORMS WHICH CURES WITHIN A FEW MINUTES.
Type: IMPLANTABLE DEVICE | Site: KNEE | Status: FUNCTIONAL
Brand: PALACOS®

## 2025-04-24 DEVICE — CAP TOTL KN CMT PRIMARY: Type: IMPLANTABLE DEVICE | Site: KNEE | Status: FUNCTIONAL

## 2025-04-24 DEVICE — IMPLANTABLE DEVICE
Type: IMPLANTABLE DEVICE | Site: KNEE | Status: FUNCTIONAL
Brand: PERSONA®

## 2025-04-24 RX ORDER — LISINOPRIL 10 MG/1
10 TABLET ORAL
Status: DISCONTINUED | OUTPATIENT
Start: 2025-04-25 | End: 2025-04-25 | Stop reason: HOSPADM

## 2025-04-24 RX ORDER — TRANEXAMIC ACID 10 MG/ML
1000 INJECTION, SOLUTION INTRAVENOUS ONCE
Status: DISCONTINUED | OUTPATIENT
Start: 2025-04-24 | End: 2025-04-24 | Stop reason: HOSPADM

## 2025-04-24 RX ORDER — SODIUM CHLORIDE 9 MG/ML
INJECTION, SOLUTION INTRAVENOUS
Status: DISPENSED
Start: 2025-04-24 | End: 2025-04-24

## 2025-04-24 RX ORDER — PROPOFOL 10 MG/ML
VIAL (ML) INTRAVENOUS AS NEEDED
Status: DISCONTINUED | OUTPATIENT
Start: 2025-04-24 | End: 2025-04-24 | Stop reason: SURG

## 2025-04-24 RX ORDER — MULTIPLE VITAMINS W/ MINERALS TAB 9MG-400MCG
1 TAB ORAL DAILY
Status: DISCONTINUED | OUTPATIENT
Start: 2025-04-24 | End: 2025-04-25 | Stop reason: HOSPADM

## 2025-04-24 RX ORDER — BUPIVACAINE HYDROCHLORIDE 2.5 MG/ML
INJECTION, SOLUTION EPIDURAL; INFILTRATION; INTRACAUDAL; PERINEURAL
Status: DISCONTINUED | OUTPATIENT
Start: 2025-04-24 | End: 2025-04-24 | Stop reason: SURG

## 2025-04-24 RX ORDER — PANTOPRAZOLE SODIUM 40 MG/1
40 TABLET, DELAYED RELEASE ORAL
Status: DISCONTINUED | OUTPATIENT
Start: 2025-04-25 | End: 2025-04-25 | Stop reason: HOSPADM

## 2025-04-24 RX ORDER — ATORVASTATIN CALCIUM 10 MG/1
10 TABLET, FILM COATED ORAL DAILY
Status: DISCONTINUED | OUTPATIENT
Start: 2025-04-25 | End: 2025-04-25 | Stop reason: HOSPADM

## 2025-04-24 RX ORDER — FENTANYL CITRATE 50 UG/ML
50 INJECTION, SOLUTION INTRAMUSCULAR; INTRAVENOUS
Status: DISCONTINUED | OUTPATIENT
Start: 2025-04-24 | End: 2025-04-24 | Stop reason: HOSPADM

## 2025-04-24 RX ORDER — TRANEXAMIC ACID 10 MG/ML
1000 INJECTION, SOLUTION INTRAVENOUS ONCE
Status: COMPLETED | OUTPATIENT
Start: 2025-04-24 | End: 2025-04-24

## 2025-04-24 RX ORDER — LIDOCAINE HYDROCHLORIDE 10 MG/ML
INJECTION, SOLUTION EPIDURAL; INFILTRATION; INTRACAUDAL; PERINEURAL AS NEEDED
Status: DISCONTINUED | OUTPATIENT
Start: 2025-04-24 | End: 2025-04-24 | Stop reason: SURG

## 2025-04-24 RX ORDER — ONDANSETRON 2 MG/ML
4 INJECTION INTRAMUSCULAR; INTRAVENOUS ONCE AS NEEDED
Status: DISCONTINUED | OUTPATIENT
Start: 2025-04-24 | End: 2025-04-24 | Stop reason: HOSPADM

## 2025-04-24 RX ORDER — MAGNESIUM HYDROXIDE 1200 MG/15ML
LIQUID ORAL AS NEEDED
Status: DISCONTINUED | OUTPATIENT
Start: 2025-04-24 | End: 2025-04-24 | Stop reason: HOSPADM

## 2025-04-24 RX ORDER — SODIUM CHLORIDE AND POTASSIUM CHLORIDE 150; 900 MG/100ML; MG/100ML
100 INJECTION, SOLUTION INTRAVENOUS CONTINUOUS
Status: DISCONTINUED | OUTPATIENT
Start: 2025-04-24 | End: 2025-04-25 | Stop reason: HOSPADM

## 2025-04-24 RX ORDER — LIDOCAINE HYDROCHLORIDE 10 MG/ML
0.5 INJECTION, SOLUTION EPIDURAL; INFILTRATION; INTRACAUDAL; PERINEURAL ONCE AS NEEDED
Status: COMPLETED | OUTPATIENT
Start: 2025-04-24 | End: 2025-04-24

## 2025-04-24 RX ORDER — FAMOTIDINE 20 MG/1
20 TABLET, FILM COATED ORAL ONCE
Status: COMPLETED | OUTPATIENT
Start: 2025-04-24 | End: 2025-04-24

## 2025-04-24 RX ORDER — ALLOPURINOL 100 MG/1
100 TABLET ORAL DAILY
Status: DISCONTINUED | OUTPATIENT
Start: 2025-04-25 | End: 2025-04-25 | Stop reason: HOSPADM

## 2025-04-24 RX ORDER — MORPHINE SULFATE 4 MG/ML
4 INJECTION, SOLUTION INTRAMUSCULAR; INTRAVENOUS
Status: DISCONTINUED | OUTPATIENT
Start: 2025-04-24 | End: 2025-04-25 | Stop reason: HOSPADM

## 2025-04-24 RX ORDER — BUPIVACAINE HYDROCHLORIDE 2.5 MG/ML
INJECTION, SOLUTION EPIDURAL; INFILTRATION; INTRACAUDAL; PERINEURAL
Status: COMPLETED | OUTPATIENT
Start: 2025-04-24 | End: 2025-04-24

## 2025-04-24 RX ORDER — DEXAMETHASONE SODIUM PHOSPHATE 4 MG/ML
INJECTION, SOLUTION INTRA-ARTICULAR; INTRALESIONAL; INTRAMUSCULAR; INTRAVENOUS; SOFT TISSUE AS NEEDED
Status: DISCONTINUED | OUTPATIENT
Start: 2025-04-24 | End: 2025-04-24 | Stop reason: SURG

## 2025-04-24 RX ORDER — NALOXONE HCL 0.4 MG/ML
0.4 VIAL (ML) INJECTION
Status: DISCONTINUED | OUTPATIENT
Start: 2025-04-24 | End: 2025-04-25 | Stop reason: HOSPADM

## 2025-04-24 RX ORDER — SODIUM CHLORIDE, SODIUM LACTATE, POTASSIUM CHLORIDE, CALCIUM CHLORIDE 600; 310; 30; 20 MG/100ML; MG/100ML; MG/100ML; MG/100ML
9 INJECTION, SOLUTION INTRAVENOUS CONTINUOUS
Status: ACTIVE | OUTPATIENT
Start: 2025-04-25 | End: 2025-04-25

## 2025-04-24 RX ORDER — PREGABALIN 150 MG/1
150 CAPSULE ORAL ONCE
Status: COMPLETED | OUTPATIENT
Start: 2025-04-24 | End: 2025-04-24

## 2025-04-24 RX ORDER — ONDANSETRON 4 MG/1
4 TABLET, ORALLY DISINTEGRATING ORAL EVERY 6 HOURS PRN
Status: DISCONTINUED | OUTPATIENT
Start: 2025-04-24 | End: 2025-04-25 | Stop reason: HOSPADM

## 2025-04-24 RX ORDER — FLUTICASONE PROPIONATE 50 MCG
1 SPRAY, SUSPENSION (ML) NASAL DAILY
Status: DISCONTINUED | OUTPATIENT
Start: 2025-04-25 | End: 2025-04-25 | Stop reason: HOSPADM

## 2025-04-24 RX ORDER — MELOXICAM 15 MG/1
15 TABLET ORAL DAILY
Status: DISCONTINUED | OUTPATIENT
Start: 2025-04-25 | End: 2025-04-25 | Stop reason: HOSPADM

## 2025-04-24 RX ORDER — HYDROMORPHONE HYDROCHLORIDE 1 MG/ML
0.5 INJECTION, SOLUTION INTRAMUSCULAR; INTRAVENOUS; SUBCUTANEOUS
Status: DISCONTINUED | OUTPATIENT
Start: 2025-04-24 | End: 2025-04-24 | Stop reason: HOSPADM

## 2025-04-24 RX ORDER — CEFAZOLIN SODIUM 1 G/3ML
INJECTION, POWDER, FOR SOLUTION INTRAMUSCULAR; INTRAVENOUS
Status: DISPENSED
Start: 2025-04-24 | End: 2025-04-24

## 2025-04-24 RX ORDER — MIDAZOLAM HYDROCHLORIDE 1 MG/ML
0.5 INJECTION, SOLUTION INTRAMUSCULAR; INTRAVENOUS
Status: DISCONTINUED | OUTPATIENT
Start: 2025-04-24 | End: 2025-04-24 | Stop reason: HOSPADM

## 2025-04-24 RX ORDER — SODIUM CHLORIDE 9 MG/ML
40 INJECTION, SOLUTION INTRAVENOUS AS NEEDED
Status: DISCONTINUED | OUTPATIENT
Start: 2025-04-24 | End: 2025-04-25 | Stop reason: HOSPADM

## 2025-04-24 RX ORDER — ACETAMINOPHEN 500 MG
1000 TABLET ORAL ONCE
Status: COMPLETED | OUTPATIENT
Start: 2025-04-24 | End: 2025-04-24

## 2025-04-24 RX ORDER — ONDANSETRON 2 MG/ML
4 INJECTION INTRAMUSCULAR; INTRAVENOUS EVERY 6 HOURS PRN
Status: DISCONTINUED | OUTPATIENT
Start: 2025-04-24 | End: 2025-04-25 | Stop reason: HOSPADM

## 2025-04-24 RX ORDER — DOCUSATE SODIUM 100 MG/1
100 CAPSULE, LIQUID FILLED ORAL 2 TIMES DAILY PRN
Status: DISCONTINUED | OUTPATIENT
Start: 2025-04-24 | End: 2025-04-25 | Stop reason: HOSPADM

## 2025-04-24 RX ORDER — LABETALOL HYDROCHLORIDE 5 MG/ML
10 INJECTION, SOLUTION INTRAVENOUS EVERY 4 HOURS PRN
Status: DISCONTINUED | OUTPATIENT
Start: 2025-04-24 | End: 2025-04-25 | Stop reason: HOSPADM

## 2025-04-24 RX ORDER — ASPIRIN 325 MG
325 TABLET ORAL DAILY
Status: DISCONTINUED | OUTPATIENT
Start: 2025-04-25 | End: 2025-04-25 | Stop reason: HOSPADM

## 2025-04-24 RX ORDER — ACETAMINOPHEN 325 MG/1
650 TABLET ORAL EVERY 4 HOURS PRN
Status: DISCONTINUED | OUTPATIENT
Start: 2025-04-24 | End: 2025-04-25 | Stop reason: HOSPADM

## 2025-04-24 RX ORDER — DOCUSATE SODIUM 100 MG/1
100 CAPSULE, LIQUID FILLED ORAL DAILY
Status: DISCONTINUED | OUTPATIENT
Start: 2025-04-25 | End: 2025-04-25 | Stop reason: HOSPADM

## 2025-04-24 RX ORDER — ONDANSETRON 2 MG/ML
INJECTION INTRAMUSCULAR; INTRAVENOUS AS NEEDED
Status: DISCONTINUED | OUTPATIENT
Start: 2025-04-24 | End: 2025-04-24 | Stop reason: SURG

## 2025-04-24 RX ORDER — DEXAMETHASONE SODIUM PHOSPHATE 10 MG/ML
INJECTION, SOLUTION INTRAMUSCULAR; INTRAVENOUS
Status: COMPLETED | OUTPATIENT
Start: 2025-04-24 | End: 2025-04-24

## 2025-04-24 RX ORDER — SODIUM CHLORIDE 0.9 % (FLUSH) 0.9 %
3 SYRINGE (ML) INJECTION EVERY 12 HOURS SCHEDULED
Status: DISCONTINUED | OUTPATIENT
Start: 2025-04-24 | End: 2025-04-25 | Stop reason: HOSPADM

## 2025-04-24 RX ORDER — SODIUM CHLORIDE 0.9 % (FLUSH) 0.9 %
3-10 SYRINGE (ML) INJECTION AS NEEDED
Status: DISCONTINUED | OUTPATIENT
Start: 2025-04-24 | End: 2025-04-25 | Stop reason: HOSPADM

## 2025-04-24 RX ORDER — CHLORHEXIDINE GLUCONATE 500 MG/1
CLOTH TOPICAL ONCE
Status: DISCONTINUED | OUTPATIENT
Start: 2025-04-24 | End: 2025-04-24 | Stop reason: HOSPADM

## 2025-04-24 RX ORDER — ACETAMINOPHEN 500 MG
1000 TABLET ORAL EVERY 6 HOURS SCHEDULED
Status: DISCONTINUED | OUTPATIENT
Start: 2025-04-24 | End: 2025-04-25 | Stop reason: HOSPADM

## 2025-04-24 RX ORDER — OXYCODONE HYDROCHLORIDE 5 MG/1
5 TABLET ORAL EVERY 4 HOURS PRN
Refills: 0 | Status: DISCONTINUED | OUTPATIENT
Start: 2025-04-24 | End: 2025-04-25 | Stop reason: HOSPADM

## 2025-04-24 RX ORDER — ROPIVACAINE HYDROCHLORIDE 2 MG/ML
INJECTION, SOLUTION EPIDURAL; INFILTRATION; PERINEURAL CONTINUOUS
Status: DISCONTINUED | OUTPATIENT
Start: 2025-04-24 | End: 2025-04-25 | Stop reason: HOSPADM

## 2025-04-24 RX ADMIN — ACETAMINOPHEN 1000 MG: 500 TABLET ORAL at 14:13

## 2025-04-24 RX ADMIN — DEXAMETHASONE SODIUM PHOSPHATE 2 MG: 10 INJECTION INTRAMUSCULAR; INTRAVENOUS at 07:43

## 2025-04-24 RX ADMIN — FAMOTIDINE 20 MG: 20 TABLET, FILM COATED ORAL at 07:07

## 2025-04-24 RX ADMIN — TRANEXAMIC ACID 1000 MG: 10 INJECTION, SOLUTION INTRAVENOUS at 07:41

## 2025-04-24 RX ADMIN — SODIUM CHLORIDE, SODIUM LACTATE, POTASSIUM CHLORIDE, CALCIUM CHLORIDE 9 ML/HR: 20; 30; 600; 310 INJECTION, SOLUTION INTRAVENOUS at 07:08

## 2025-04-24 RX ADMIN — BUPIVACAINE HYDROCHLORIDE 20 ML: 2.5 INJECTION, SOLUTION EPIDURAL; INFILTRATION; INTRACAUDAL; PERINEURAL at 09:20

## 2025-04-24 RX ADMIN — LIDOCAINE HYDROCHLORIDE 0.5 ML: 10 INJECTION, SOLUTION EPIDURAL; INFILTRATION; INTRACAUDAL; PERINEURAL at 07:08

## 2025-04-24 RX ADMIN — TRANEXAMIC ACID 1000 MG: 10 INJECTION, SOLUTION INTRAVENOUS at 08:40

## 2025-04-24 RX ADMIN — SODIUM CHLORIDE 2 G: 900 INJECTION INTRAVENOUS at 17:20

## 2025-04-24 RX ADMIN — SODIUM CHLORIDE, SODIUM LACTATE, POTASSIUM CHLORIDE, CALCIUM CHLORIDE: 20; 30; 600; 310 INJECTION, SOLUTION INTRAVENOUS at 09:07

## 2025-04-24 RX ADMIN — MEPIVACAINE HYDROCHLORIDE 4 ML: 15 INJECTION, SOLUTION EPIDURAL; INFILTRATION at 07:42

## 2025-04-24 RX ADMIN — BUPIVACAINE HYDROCHLORIDE 30 ML: 2.5 INJECTION, SOLUTION EPIDURAL; INFILTRATION; INTRACAUDAL; PERINEURAL at 07:43

## 2025-04-24 RX ADMIN — DEXAMETHASONE SODIUM PHOSPHATE 8 MG: 4 INJECTION INTRA-ARTICULAR; INTRALESIONAL; INTRAMUSCULAR; INTRAVENOUS; SOFT TISSUE at 07:40

## 2025-04-24 RX ADMIN — PREGABALIN 150 MG: 150 CAPSULE ORAL at 07:07

## 2025-04-24 RX ADMIN — Medication 1000 MG: at 09:30

## 2025-04-24 RX ADMIN — PROPOFOL 100 MCG/KG/MIN: 10 INJECTION, EMULSION INTRAVENOUS at 07:38

## 2025-04-24 RX ADMIN — ONDANSETRON 4 MG: 2 INJECTION INTRAMUSCULAR; INTRAVENOUS at 08:40

## 2025-04-24 RX ADMIN — PROPOFOL 50 MG: 10 INJECTION, EMULSION INTRAVENOUS at 07:32

## 2025-04-24 RX ADMIN — CEFAZOLIN 1000 MG: 1 INJECTION, POWDER, FOR SOLUTION INTRAMUSCULAR; INTRAVENOUS at 09:31

## 2025-04-24 RX ADMIN — LIDOCAINE HYDROCHLORIDE 50 MG: 10 INJECTION, SOLUTION EPIDURAL; INFILTRATION; INTRACAUDAL; PERINEURAL at 07:32

## 2025-04-24 RX ADMIN — POTASSIUM CHLORIDE AND SODIUM CHLORIDE 100 ML/HR: 900; 150 INJECTION, SOLUTION INTRAVENOUS at 14:48

## 2025-04-24 RX ADMIN — ACETAMINOPHEN 1000 MG: 500 TABLET ORAL at 07:07

## 2025-04-24 RX ADMIN — SODIUM CHLORIDE 2 G: 900 INJECTION INTRAVENOUS at 07:36

## 2025-04-24 NOTE — THERAPY EVALUATION
Patient Name: Coco Steele  : 1945    MRN: [id number]                              Today's Date: 2025       Admit Date: 2025    Visit Dx:     ICD-10-CM ICD-9-CM   1. Primary osteoarthritis of left knee  M17.12 715.16     Patient Active Problem List   Diagnosis    Ulcerative pancolitis without complication    Osteopenia    Essential hypertension    Hyperlipidemia    Chronic ulcerative colitis without complication    Primary osteoarthritis of both knees    Arthritis of knee, right    Status post total right knee replacement    Acute postoperative pain    Acute blood loss anemia    Primary osteoarthritis of left knee    Arthritis of knee, left     Past Medical History:   Diagnosis Date    Abdominal pain, epigastric     Abdominal pain, left lower quadrant     Abnormal findings on diagnostic imaging of abdomen     ABFND, RADIOLOGICAL, ABDOMINAL AREA     Adenomatous colon polyp 10/02/2023    Dr Gill    Allergic     Allergic rhinitis     Arthritis     Cancer 10/2014 removed    Renal Cell carcinoma left    Chronic gout involving toe without tophus 2024    Chronic ulcerative colitis without complication     Colon polyp     Constipation     Diarrhea     Diverticulitis of colon     Diverticulosis     Encounter for Hemoccult screening     HEMOCCULT POSITIVE STOOLS     Fracture of wrist     Wrist-Hairline fracture    Gastroesophageal reflux disease     esophagitis presence not specified    GERD (gastroesophageal reflux disease)     H/O mammogram 2024    Headache As long as I can remember    Herpes zoster     Hyperlipidemia     Hypertension     Irritable bowel syndrome     Knee swelling `    Nausea     Obesity     Rectal bleeding     Rectal bleeding     Regurgitation     Rotator cuff syndrome     Scoliosis     Ulcerative colitis     Wears glasses      Past Surgical History:   Procedure Laterality Date    CHOLECYSTECTOMY      COLONOSCOPY  2014    left side bx-minimal active  inflammation - 2 yr    COLONOSCOPY  07/09/2014    active inflammation to extent of limited lower colonoscopy/45 cm    COLONOSCOPY  02/21/2013    mild inflammation in sigmoid, left-sided diverticulosis    COLONOSCOPY  03/05/2010    very tortuous colon not allowing visual of cecal base - 5 yrs    COLONOSCOPY  11/14/2003    FAIRLY TORTUOUS AND SPASMODIC COLON - 5 YR    COLONOSCOPY Left 09/28/2016    Procedure: COLONOSCOPY WITH ANESTHESIA;  Surgeon: David Alonzo DO;  Location: [facility] ENDOSCOPY;  Service:     COLONOSCOPY N/A 06/11/2020    Procedure: COLONOSCOPY WITH ANESTHESIA;  Surgeon: David Alonzo DO;  Location: [facility] ENDOSCOPY;  Service: Gastroenterology;  Laterality: N/A;  pre hx ulcerative colitis  post hx ulcerative colitis, diverticulosis  dr benedicto kaur    COLONOSCOPY W/ BIOPSIES  08/29/2022    COLONOSCOPY W/ BIOPSIES  08/09/2021    Dr Gill    COLONOSCOPY W/ POLYPECTOMY  10/02/2023    adenomatous polyp Dr Gill 1 yr f/u    ENDOSCOPY  03/14/2014    Normal    ENDOSCOPY  02/09/2006    MILD GASTRITIS    JOINT REPLACEMENT  12/18/2024    Right Knee replacement    KNEE SURGERY      NEPHRECTOMY PARTIAL Left 2014    OTHER SURGICAL HISTORY      Bilateral Eyelid Surgery     TONSILLECTOMY      TOTAL KNEE ARTHROPLASTY Right 12/18/2024    Procedure: TOTAL KNEE ARTHROPLASTY RIGHT;  Surgeon: Paul Whitaker MD;  Location: [facility];  Service: Orthopedics;  Laterality: Right;    UPPER GASTROINTESTINAL ENDOSCOPY  03/14/2014    Dr David Alonzo    WRIST SURGERY Left       General Information       Row Name 04/24/25 1146          Physical Therapy Time and Intention    Document Type evaluation  -SC     Mode of Treatment physical therapy  -SC       Row Name 04/24/25 1146          General Information    Patient Profile Reviewed yes  -SC     Prior Level of Function independent:;gait;transfer;ADL's  -SC     Existing Precautions/Restrictions other (see comments);fall  nerve cath  -SC     Barriers to Rehab none  identified  -SC       Row Name 04/24/25 1146          Living Environment    Current Living Arrangements home  -SC     People in Home alone;other (see comments)  has a friend to help  -SC       Row Name 04/24/25 1146          Home Main Entrance    Number of Stairs, Main Entrance one  -SC     Stair Railings, Main Entrance none  -SC       Row Name 04/24/25 1146          Stairs Within Home, Primary    Number of Stairs, Within Home, Primary none  -SC       Row Name 04/24/25 1146          Cognition    Orientation Status (Cognition) oriented x 4  -SC       Row Name 04/24/25 1146          Safety Issues/Impairments Affecting Functional Mobility    Impairments Affecting Function (Mobility) pain;strength;range of motion (ROM)  -SC     Comment, Safety Issues/Impairments (Mobility) alert, following commands  -SC               User Key  (r) = Recorded By, (t) = Taken By, (c) = Cosigned By      Initials Name Provider Type    SC Obinna Gu PT Physical Therapist                   Mobility       Row Name 04/24/25 1153          Bed Mobility    Bed Mobility supine-sit;scooting/bridging;sit-supine  -SC     Scooting/Bridging [place] (Bed Mobility) verbal cues;independent  -SC     Supine-Sit [place] (Bed Mobility) modified independence  -SC     Sit-Supine [place] (Bed Mobility) modified independence  -SC     Assistive Device (Bed Mobility) bed rails  -SC     Comment, (Bed Mobility) able to lift leg back into bed  -SC       Row Name 04/24/25 1153          Transfers    Comment, (Transfers) cues for hand placement. Required mini boost to get to standing from bed and commode  -SC       Row Name 04/24/25 1153          Sit-Stand Transfer    Sit-Stand [place] (Transfers) 1 person to manage equipment;1 person assist;minimum assist (75% patient effort);verbal cues  -SC     Assistive Device (Sit-Stand Transfers) walker, front-wheeled  -SC       Row Name 04/24/25 1153          Gait/Stairs (Locomotion)    [place] Level  (Gait) 1 person assist;1 person to manage equipment;verbal cues  -SC     Assistive Device (Gait) walker, front-wheeled  -SC     Patient was able to Ambulate yes  -SC     Distance in Feet (Gait) 140  -SC     Deviations/Abnormal Patterns (Gait) left sided deviations;antalgic;stride length decreased;weight shifting decreased;gait speed decreased  -SC     Bilateral Gait Deviations forward flexed posture  -SC     Comment, (Gait/Stairs) Gt training focused on controling walker with step through gait pattern. Encouraged heel strike and full wt shifting. Patient demonstrated good control. No LOB or buckling noted.  -SC       Row Name 04/24/25 1153          Mobility    Extremity Weight-bearing Status left lower extremity  -SC     Left Lower Extremity (Weight-bearing Status) weight-bearing as tolerated (WBAT)  -SC               User Key  (r) = Recorded By, (t) = Taken By, (c) = Cosigned By      Initials Name Provider Type    SC Obinna Gu, PT Physical Therapist                   Obj/Interventions       [facility] Name 04/24/25 1155          Range of Motion Comprehensive    Comment, General Range of Motion L knee 0-90deg  -SC       Row Name 04/24/25 1155          Strength Comprehensive (MMT)    Comment, General Manual Muscle Testing (MMT) Assessment L LE quads 4/5. tib ant 4/5  R LE: grossly 4+/5  -[facility] Name 04/24/25 1155          Motor Skills    Therapeutic Exercise knee;ankle  -SC       Row Name 04/24/25 1155          Knee (Therapeutic Exercise)    Knee (Therapeutic Exercise) isometric exercises;strengthening exercise  -SC     Knee Isometrics (Therapeutic Exercise) bilateral;quad sets;3 second hold;10 repetitions  -SC     Knee Strengthening (Therapeutic Exercise) SAQ (short arc quad);SLR (straight leg raise);heel slides;LAQ (long arc quad);sitting;supine;10 repetitions  -SC       Row Name 04/24/25 1155          Ankle (Therapeutic Exercise)    Ankle (Therapeutic Exercise) AROM (active range of motion)  -SC     Ankle  AROM (Therapeutic Exercise) bilateral;dorsiflexion;plantarflexion;10 repetitions  -[facility] Name 04/24/25 1155          Balance    Balance Assessment standing static balance  -SC     Static Standing Balance 1-person assist;1 person to manage equipment;contact guard  -SC     Position/Device Used, Standing Balance walker, rolling  -SC     Comment, Balance no buckling or LOB noted  -[facility] Name 04/24/25 1155          Sensory Assessment (Somatosensory)    Sensory Assessment (Somatosensory) sensation intact  -SC               User Key  (r) = Recorded By, (t) = Taken By, (c) = Cosigned By      Initials Name Provider Type    SC Obinna Gu A, PT Physical Therapist                   Goals/Plan       [facility] Name 04/24/25 1201          Bed Mobility Goal 1 (PT)    Activity/Assistive Device (Bed Mobility Goal 1, PT) sit to supine/supine to sit  -SC     [place] Level/Cues Needed (Bed Mobility Goal 1, PT) modified independence  -SC     Time Frame (Bed Mobility Goal 1, PT) long term goal (LTG);3 days  -[facility] Name 04/24/25 1201          Transfer Goal 1 (PT)    Activity/Assistive Device (Transfer Goal 1, PT) sit-to-stand/stand-to-sit  -SC     [place] Level/Cues Needed (Transfer Goal 1, PT) modified independence  -SC     Time Frame (Transfer Goal 1, PT) long term goal (LTG);3 days  -[facility] Name 04/24/25 1201          Gait Training Goal 1 (PT)    Activity/Assistive Device (Gait Training Goal 1, PT) gait (walking locomotion)  -SC     [place] Level (Gait Training Goal 1, PT) modified independence  -SC     Distance (Gait Training Goal 1, PT) 300  -SC     Time Frame (Gait Training Goal 1, PT) long term goal (LTG);3 days  -[facility] Name 04/24/25 1201          ROM Goal 1 (PT)    ROM Goal 1 (PT) Surgical Knee ROM:0-100  -SC     Time Frame (ROM Goal 1, PT) long-term goal (LTG);3 days  -[facility] Name 04/24/25 1201          Stairs Goal 1 (PT)    Activity/Assistive Device (Stairs Goal 1, PT) stairs,  all skills  -SC     [place] Level/Cues Needed (Stairs Goal 1, PT) contact guard required  -SC     Number of Stairs (Stairs Goal 1, PT) 1  -SC     Time Frame (Stairs Goal 1, PT) long term goal (LTG);10 days  -SC       Row Name 04/24/25 1201          Patient Education Goal (PT)    Activity (Patient Education Goal, PT) know HEP  -SC     [place]/Cues/Accuracy (Memory Goal 2, PT) demonstrates adequately  -SC     Time Frame (Patient Education Goal, PT) long term goal (LTG);3 days  -SC       Row Name 04/24/25 1201          Therapy Assessment/Plan (PT)    Planned Therapy Interventions (PT) balance training;bed mobility training;gait training;home exercise program;ROM (range of motion);patient/family education;stair training;strengthening;stretching;transfer training  -SC               User Key  (r) = Recorded By, (t) = Taken By, (c) = Cosigned By      Initials Name Provider Type    SC Obinna Gu, PT Physical Therapist                   Clinical Impression       Row Name 04/24/25 1156          Pain    Pain Location knee  -SC     Pain Side/Orientation left  -SC     Pain Management Interventions positioning techniques utilized  -SC     Response to Pain Interventions functional ability unchanged  -SC     Additional Documentation Pain Scale: FACES Pre/Post-Treatment (Group)  -[facility] Name 04/24/25 1156          Pain Scale: FACES Pre/Post-Treatment    Pain: FACES Scale, Pretreatment 2-->hurts little bit  -SC     Posttreatment Pain Rating 2-->hurts little bit  -SC       Row Name 04/24/25 1156          Plan of Care Review    Plan of Care Reviewed With patient  -SC     Progress improving  -SC     Outcome Evaluation Patient was able to ambulate in PACU post surgery . Recommend continued skilled PT tomorrow for stair training. She will recieve outpatient PT at discharge.  -SC       Row Name 04/24/25 1153          Therapy Assessment/Plan (PT)    Patient/Family Therapy Goals Statement (PT) walk  -SC     Rehab  Potential (PT) good  -SC     Criteria for Skilled Interventions Met (PT) yes;meets criteria  -SC     Predicted Duration of Therapy Intervention (PT) 1  -SC       Row Name 04/24/25 1156          Vital Signs    Pre Systolic BP Rehab 143  -SC     Pre Treatment Diastolic BP 83  -SC     Pretreatment Heart Rate (beats/min) 80  -SC     Pre SpO2 (%) 96  -[facility] Name 04/24/25 1156          Positioning and Restraints    Pre-Treatment Position in bed  -SC     Post Treatment Position bed  -SC     In Bed encouraged to call for assist;supine;call light within reach  -SC               User Key  (r) = Recorded By, (t) = Taken By, (c) = Cosigned By      Initials Name Provider Type    SC Obinna Gu, PT Physical Therapist                   Outcome Measures       [facility] Name 04/24/25 1202          How much help from another person do you currently need...    Turning from your back to your side while in flat bed without using bedrails? 4  -SC     Moving from lying on back to sitting on the side of a flat bed without bedrails? 3  -SC     Moving to and from a bed to a chair (including a wheelchair)? 3  -SC     Standing up from a chair using your arms (e.g., wheelchair, bedside chair)? 3  -SC     Climbing 3-5 steps with a railing? 3  -SC     To walk in hospital room? 3  -SC     AM-PAC 6 Clicks Score (PT) 19  -SC     Highest Level of Mobility Goal 6 --> Walk 10 steps or more  -SC       Row Name 04/24/25 1202          PADD    Diagnosis 1  -SC     Gender 1  -SC     Age Group 0  -SC     Gait Distance 1  -SC     Assist Level 1  -SC     Home Support 3  -SC     PADD Score 7  -SC     Prediction by PADD Score extended rehabilitation  -SC       Row Name 04/24/25 1202          Functional Assessment    Outcome Measure Options AM-PAC 6 Clicks Basic Mobility (PT);PADD  -SC               User Key  (r) = Recorded By, (t) = Taken By, (c) = Cosigned By      Initials Name Provider Type    Obinna Gupta PT Physical Therapist                                  Physical Therapy Education       Title: PT OT SLP Therapies (Done)       Topic: Physical Therapy (Done)       Point: Mobility training (Done)       Learning Progress Summary            Patient Eager, E, VU by SC at 4/24/2025 1202    Comment: mariama HEP                      Point: Home exercise program (Done)       Learning Progress Summary            Patient Eager, E, VU by SC at 4/24/2025 1202    Comment: reiewed HEP                      Point: Body mechanics (Done)       Learning Progress Summary            Patient Eager, E, VU by SC at 4/24/2025 1202    Comment: reiewed HEP                      Point: Precautions (Done)       Learning Progress Summary            Patient Eager, E, VU by SC at 4/24/2025 1202    Comment: reiewed HEP                                      User Key       Initials Effective Dates Name Provider Type Discipline    SC 02/03/23 -  Obinna Gu, PT Physical Therapist PT                  PT Recommendation and Plan  Planned Therapy Interventions (PT): balance training, bed mobility training, gait training, home exercise program, ROM (range of motion), patient/family education, stair training, strengthening, stretching, transfer training  Progress: improving  Outcome Evaluation: Patient was able to ambulate in PACU post surgery . Recommend continued skilled PT tomorrow for stair training. She will recieve outpatient PT at discharge.     Time Calculation:   PT Evaluation Complexity  History, PT Evaluation Complexity: 3 or more personal factors and/or comorbidities  Examination of Body Systems (PT Eval Complexity): total of 4 or more elements  Clinical Presentation (PT Evaluation Complexity): evolving  Clinical Decision Making (PT Evaluation Complexity): moderate complexity  Overall Complexity (PT Evaluation Complexity): moderate complexity     PT Charges       Row Name 04/24/25 1115             Time Calculation    Start Time 1115  -SC      PT Received On 04/24/25  -SC      PT Goal  Re-Cert Due Date 05/04/25  -SC         Timed Charges    34234 - PT Therapeutic Exercise Minutes 10  -SC      50724 - Gait Training Minutes  10  -SC      74497 - PT Therapeutic Activity Minutes 5  -SC         Untimed Charges    PT Eval/Re-eval Minutes 25  -SC         Total Minutes    Timed Charges Total Minutes 25  -SC      Untimed Charges Total Minutes 25  -SC       Total Minutes 50  -SC                User Key  (r) = Recorded By, (t) = Taken By, (c) = Cosigned By      Initials Name Provider Type    SC Obinna Gu PT Physical Therapist                  Therapy Charges for Today       Code Description Service Date Service Provider Modifiers Qty    19347283052 HC PT THER PROC EA 15 MIN 4/24/2025 Obinna Gu, PT GP 1    69070230577 HC GAIT TRAINING EA 15 MIN 4/24/2025 Obinna Gu, PT GP 1    01244346200 HC PT EVAL MOD COMPLEXITY 2 4/24/2025 Obinna Gu, PT GP 1    28175456821 HC PT THER SUPP EA 15 MIN 4/24/2025 Obinna Gu, PT GP 2            PT G-Codes  Outcome Measure Options: AM-PAC 6 Clicks Basic Mobility (PT), PADD  AM-PAC 6 Clicks Score (PT): 19  PT Discharge Summary  Anticipated Discharge Disposition (PT): home with assist, home with outpatient therapy services    Obinna Gu PT  4/24/2025

## 2025-04-24 NOTE — ANESTHESIA PROCEDURE NOTES
Peripheral Block      Patient reassessed immediately prior to procedure    Reason for block: at surgeon's request and post-op pain management  Performed by  CRNA/CAA: Hamilton Verma CRNA  Preanesthetic Checklist  Completed: patient identified, IV checked, site marked, risks and benefits discussed, surgical consent, monitors and equipment checked, pre-op evaluation and timeout performed  Prep:  Pt Position: supine  Sterile barriers:mask, cap, washed/disinfected hands and gloves  Prep: ChloraPrep  Patient monitoring: blood pressure monitoring, continuous pulse oximetry and EKG  Procedure    Guidance:ultrasound guided    ULTRASOUND INTERPRETATION.  Using ultrasound guidance a 20 G gauge needle was placed in close proximity to the nerve, at which point, under ultrasound guidance anesthetic was injected in the area of the nerve and spread of the anesthesia was seen on ultrasound in close proximity thereto.  There were no abnormalities seen on ultrasound; a digital image was taken; and the patient tolerated the procedure with no complications. Images:still images obtained, printed/placed on chart    Laterality:left  Anesthesia block type: Popliteal Plexus.  Injection Technique:single-shot  Needle Type:echogenic and short-bevel  Needle Gauge:20 G  Resistance on Injection: none    Medications Used: bupivacaine PF (MARCAINE) 0.25 % injection - Injection   30 mL - 4/24/2025 7:43:00 AM  dexamethasone sodium phosphate injection - Injection   2 mg - 4/24/2025 7:43:00 AM      Post Assessment  Injection Assessment: negative aspiration for heme, no paresthesia on injection and incremental injection  Patient Tolerance:comfortable throughout block  Complications:no  Additional Notes  A high-frequency linear transducer, with sterile cover, was placed on the anterior mid-thigh (between the anterior superior iliac spine and patella). The transducer was then moved medially to identify the Sartorius muscle (Trung), Vastus Medialis muscle  "(VMM), Superficial Femoral Artery (SFA) and Vein. The transducer was then moved caudad to position where the SFA is distal and posterior to the Trung (Adductor Hiatus). The insertion site was prepped and draped in sterile fashion. Skin and cutaneous tissue was infiltrated with 2-5 ml of 1% Lidocaine. Using ultrasound-guidance, a 20-gauge B-Hayes 4\" Ultraplex 360 non-stimulating echogenic needle was advanced in plane from lateral to medial. Preservative-free normal saline was utilized for hydro-dissection of tissue, and to confirm needle placement at the 12 o'clock position to the artery. Local anesthetic in incremental 3-5 ml injections. Aspiration every 5 ml to prevent intravascular injection. Injection was completed with negative aspiration of blood and negative intravascular injection. Injection pressures were normal with minimal resistance.   Performed by: Jarett Raymond CRNA            "

## 2025-04-24 NOTE — ANESTHESIA PROCEDURE NOTES
Spinal Block      Patient reassessed immediately prior to procedure    Patient location during procedure: OR  Indication:at surgeon's request  Performed By  CRNA/CAA: Jarett Raymond CRNA  Preanesthetic Checklist  Completed: patient identified, IV checked, site marked, risks and benefits discussed, surgical consent, monitors and equipment checked, pre-op evaluation and timeout performed  Spinal Block Prep:  Patient Position:sitting  Sterile Tech:cap, gloves, sterile barriers and mask  Prep:Chloraprep  Patient Monitoring:blood pressure monitoring, continuous pulse oximetry and EKG    Spinal Block Procedure  Approach:midline  Guidance:landmark technique and palpation technique  Location:L4-L5  Needle Type:Ricardo  Needle Gauge:25 G  Placement of Spinal needle event:cerebrospinal fluid aspirated  Paresthesia: no  Fluid Appearance:clear  Medications: Mepivacaine HCl (PF) (CARBOCAINE) 1.5 % injection - Injection   4 mL - 4/24/2025 7:42:00 AM   Post Assessment  Patient Tolerance:patient tolerated the procedure well with no apparent complications  Complications no  Additional Notes  Procedure:  Pt assisted to sitting position, with legs in position of comfort over side of bed.  Pt. instructed in optimal spine presentation, the spine was prepped/ Draped and the skin at insertion site was anesthetized with 1% Lidocaine 2 ml.  The spinal needle was then advanced until CSF flow was obtained and LA was injected:

## 2025-04-24 NOTE — ANESTHESIA PROCEDURE NOTES
Peripheral Block      Patient reassessed immediately prior to procedure    Start time: 4/24/2025 9:20 AM  Reason for block: at surgeon's request and post-op pain management  Performed by  Anesthesiologist: Joby Marcum MD  Assisted by: Jarett Raymond CRNA  Preanesthetic Checklist  Completed: patient identified, IV checked, site marked, risks and benefits discussed, surgical consent, monitors and equipment checked, pre-op evaluation and timeout performed  Prep:  Pt Position: supine  Sterile barriers:cap, gloves, mask, sterile barriers and washed/disinfected hands  Prep: ChloraPrep  Patient monitoring: blood pressure monitoring, continuous pulse oximetry and EKG  Procedure    Sedation: no  Performed under: spinal  Guidance:ultrasound guided    ULTRASOUND INTERPRETATION.  Using ultrasound guidance a 20 G gauge needle was placed in close proximity to the nerve, at which point, under ultrasound guidance anesthetic was injected in the area of the nerve and spread of the anesthesia was seen on ultrasound in close proximity thereto.  There were no abnormalities seen on ultrasound; a digital image was taken; and the patient tolerated the procedure with no complications. Images:still images obtained, printed/placed on chart    Laterality:left  Block Type:adductor canal block  Injection Technique:catheter  Needle Type:Tuohy and echogenic  Needle Gauge:18 G  Resistance on Injection: none  Catheter Size:20 G (20g)  Cath Depth at skin: 9 cm    Medications Used: bupivacaine PF (MARCAINE) 0.25 % injection - Injection   20 mL - 4/24/2025 9:20:00 AM      Medications  Preservative Free Saline:5ml    Post Assessment  Injection Assessment: negative aspiration for heme, incremental injection and no paresthesia on injection  Patient Tolerance:comfortable throughout block  Complications:no  Additional Notes  CATHETER   A high-frequency linear transducer, with sterile cover, was placed on the anterior mid-thigh (between the anterior  "superior iliac spine and patella). The transducer was then moved medially to identify the Sartorius muscle (Trung), Vastus Medialis muscle (VMM), Superficial Femoral Artery (SFA) and Vein. The transducer was then moved cephalad or caudad to position the SFA in the middle of the Trung. The insertion site was prepped and draped in sterile fashion. Skin and cutaneous tissue was infiltrated with 2-5 ml of 1% Lidocaine. Using ultrasound-guidance, an 18-gauge Contiplex Ultra 360 Touhy needle was advanced in plane from lateral to medial. Preservative-free normal saline was utilized for hydro-dissection of tissue, advancement of Touhy, and to confirm needle placement below the fascial plane of the Trung where the Nerve to the VMM is located. Local anesthetic (LA) 5 ml deposited here. The Touhy needle continues its path lateral to the SFA at the level of the Saphenous Nerve. The remainder of the LA was deposited at the 10-11 o'clock position of the SFA. This injection created a space between the Trung and the SFA. Aspiration every 5 ml to prevent intravascular injection. Injection was completed with negative aspiration of blood and negative intravascular injection. Injection pressures were normal with minimal resistance. A 20-gauge Contiplex Echo catheter was placed through the needle and advance out the tip of the Touhy 3-5 cm anterior to the SFA. The Touhy needle was then removed, and final catheter position verified at the 12 o'clock position to the SFA. The catheter was secured in the usual fashion with skin glue, benzoin, steri-strips, CHG tegaderm and label noting \"Nerve Block Catheter\". Jerk tape applied at yellow connector and catheter connection.   Performed by: Jarett Raymond, CRNA          "

## 2025-04-24 NOTE — H&P
Patient Name: Coco Steele  MRN: [id number]  : 1945  DOS: 2025    Attending: Paul Whitaker MD    Primary Care Provider: Radha Berger MD      Chief complaint: Left knee Pain.    Subjective   Patient is a pleasant 79 y.o. female presented for scheduled surgery by Dr. Whitaker    Per his note: (The patient has a long history of progressive knee pain, arthritis, and degeneration resulting in deformity in the left knee from predominantly medial wear and bone loss. Non-operative treatment and conservative therapeutic measures have been attempted, but have not improved or controlled the symptoms and pain that occurs during normal daily activities. Knee motion has also become limited and is restricting the patient. Total knee arthroplasty was recommended. )    Patient underwent left total knee arthroplasty under spinal anesthesia, tolerated surgery well.  Adductor canal nerve block catheter was placed by acute pain service, and patient was admitted for further management.    Seen postoperatively, doing well with good pain control, no complaints of nausea, vomiting, or shortness of breath.  Patient has no history of DVT or PE.    Reviewed with patient past medical history and home medications    Allergies:  No Known Allergies    Meds:  Medications Prior to Admission   Medication Sig Dispense Refill Last Dose/Taking    acetaminophen (TYLENOL) 650 MG 8 hr tablet Take 1 tablet by mouth Every 8 (Eight) Hours As Needed for Mild Pain.   Past Week    allopurinol (ZYLOPRIM) 100 MG tablet Take 1 tablet by mouth Daily. 90 tablet 1 2025    aspirin 81 MG EC tablet Take 1 tablet by mouth Daily. Resume in 1 month   2025    Calcium Carbonate 1500 (600 Ca) MG tablet Take 1 tablet by mouth Daily.   2025    docusate sodium (COLACE) 100 MG capsule Take 1 capsule by mouth Daily.   2025    esomeprazole (NexIUM) 40 MG capsule Take 1 capsule by mouth Daily.   2025    fluticasone (FLONASE) 50 MCG/ACT  nasal spray Administer 1 spray into the nostril(s) as directed by provider Daily.   4/23/2025    Melatonin 10 MG tablet Take 1 tablet by mouth Every Night.   4/23/2025    mesalamine (LIALDA) 1.2 g EC tablet Take 2 tablets by mouth 2 (Two) Times a Day. 120 tablet 11 4/23/2025    Misc Natural Products (OSTEO BI-FLEX ADV TRIPLE ST PO) Take 1 tablet by mouth Daily.   4/23/2025    Multiple Vitamins-Minerals (CENTRUM SILVER PO) Take 1 tablet by mouth Daily.   4/23/2025    ramipril (ALTACE) 10 MG capsule Take 1 capsule by mouth Daily. 90 capsule 1 4/23/2025    simvastatin (ZOCOR) 10 MG tablet Take 1 tablet by mouth Daily. 90 tablet 1 4/23/2025    SUPER B COMPLEX/C PO Take 1 tablet by mouth Daily.   4/23/2025         History:   Past Medical History:   Diagnosis Date    Abdominal pain, epigastric     Abdominal pain, left lower quadrant     Abnormal findings on diagnostic imaging of abdomen     ABFND, RADIOLOGICAL, ABDOMINAL AREA     Adenomatous colon polyp 10/02/2023    Dr Gill    Allergic     Allergic rhinitis     Arthritis     Cancer 10/2014 removed    Renal Cell carcinoma left    Chronic gout involving toe without tophus 03/01/2024    Chronic ulcerative colitis without complication     Colon polyp     Constipation     Diarrhea     Diverticulitis of colon     Diverticulosis     Encounter for Hemoccult screening     HEMOCCULT POSITIVE STOOLS     Fracture of wrist 2009    Wrist-Hairline fracture    Gastroesophageal reflux disease     esophagitis presence not specified    GERD (gastroesophageal reflux disease)     H/O mammogram 03/08/2024    Headache As long as I can remember    Herpes zoster     Hyperlipidemia     Hypertension     Irritable bowel syndrome     Knee swelling `    Nausea     Obesity     Rectal bleeding     Rectal bleeding     Regurgitation     Rotator cuff syndrome     Scoliosis     Ulcerative colitis     Wears glasses      Past Surgical History:   Procedure Laterality Date    CHOLECYSTECTOMY       COLONOSCOPY  09/23/2014    left side bx-minimal active inflammation - 2 yr    COLONOSCOPY  07/09/2014    active inflammation to extent of limited lower colonoscopy/45 cm    COLONOSCOPY  02/21/2013    mild inflammation in sigmoid, left-sided diverticulosis    COLONOSCOPY  03/05/2010    very tortuous colon not allowing visual of cecal base - 5 yrs    COLONOSCOPY  11/14/2003    FAIRLY TORTUOUS AND SPASMODIC COLON - 5 YR    COLONOSCOPY Left 09/28/2016    Procedure: COLONOSCOPY WITH ANESTHESIA;  Surgeon: David Alonzo DO;  Location:  [facility];  Service:     COLONOSCOPY N/A 06/11/2020    Procedure: COLONOSCOPY WITH ANESTHESIA;  Surgeon: David Alonzo DO;  Location:  [facility];  Service: Gastroenterology;  Laterality: N/A;  pre hx ulcerative colitis  post hx ulcerative colitis, diverticulosis  dr benedicto kaur    COLONOSCOPY W/ BIOPSIES  08/29/2022    COLONOSCOPY W/ BIOPSIES  08/09/2021    Dr Gill    COLONOSCOPY W/ POLYPECTOMY  10/02/2023    adenomatous polyp Dr Gill 1 yr f/u    ENDOSCOPY  03/14/2014    Normal    ENDOSCOPY  02/09/2006    MILD GASTRITIS    JOINT REPLACEMENT  12/18/2024    Right Knee replacement    KNEE SURGERY      NEPHRECTOMY PARTIAL Left 2014    OTHER SURGICAL HISTORY      Bilateral Eyelid Surgery     TONSILLECTOMY      TOTAL KNEE ARTHROPLASTY Right 12/18/2024    Procedure: TOTAL KNEE ARTHROPLASTY RIGHT;  Surgeon: Paul Whitaker MD;  Location: [facility];  Service: Orthopedics;  Laterality: Right;    UPPER GASTROINTESTINAL ENDOSCOPY  03/14/2014    Dr David Alonzo    WRIST SURGERY Left      Family History   Problem Relation Age of Onset    Colon polyps Mother     Arthritis Mother     Alzheimer's disease Mother     Heart disease Father         Enlarged heart    COPD Father     Alzheimer's disease Paternal Aunt     Crohn's disease Cousin     Breast cancer Neg Hx     Colon cancer Neg Hx      Social History     Tobacco Use    Smoking status: Never     Passive exposure: Past  "   Smokeless tobacco: Never   Vaping Use    Vaping status: Never Used   Substance Use Topics    Alcohol use: No    Drug use: No       Review of Systems  Pertinent items are noted in HPI    Vital Signs  /75 (BP Location: Left arm, Patient Position: Lying)   Pulse 76   Temp 97.7 °F (36.5 °C) (Oral)   Resp 16   SpO2 98%     Physical Exam:    General Appearance:    Alert, cooperative, in no acute distress   Head:    Normocephalic, without obvious abnormality, atraumatic   Eyes:            Lids and lashes normal, conjunctivae and sclerae normal, no   icterus, no pallor, corneas clear    Ears:    Ears appear intact with no abnormalities noted   Throat:   No oral lesions, no thrush, oral mucosa moist   Neck:   No adenopathy, supple, trachea midline, no thyromegaly         Lungs:     Clear to auscultation,respirations regular, even and                   unlabored    Heart:    Regular rhythm and normal rate, normal S1 and S2, no       murmur, no gallop   Abdomen:     Normal bowel sounds, no masses, no organomegaly, soft        nontender, nondistended, no guarding, no rebound                 tenderness   Genitalia:    Deferred   Extremities: Left LE, CDI dressing on knee, PNB cath present.    Pulses:   Pulses palpable and equal bilaterally   Skin:   No bleeding, bruising or rash   Neurologic:   Cranial nerves 2 - 12 grossly intact, intact flexion and dorsiflexion bilateral feet      I reviewed the patient's new clinical results.             Invalid input(s): \"NEUTOPHILPCT\"        Invalid input(s): \"LABALBU\", \"PROT\"  Lab Results   Component Value Date    HGBA1C 5.50 04/17/2025      Latest Reference Range & Units 04/17/25 10:46   Sodium 136 - 145 mmol/L 139   Potassium 3.5 - 5.2 mmol/L 4.3   Chloride 98 - 107 mmol/L 105   CO2 22.0 - 29.0 mmol/L 23.0   Anion Gap 5.0 - 15.0 mmol/L 11.0   BUN 8 - 23 mg/dL 11   Creatinine 0.57 - 1.00 mg/dL 0.82   BUN/Creatinine Ratio 7.0 - 25.0  13.4   eGFR >60.0 mL/min/1.73 73.3 "   Glucose 65 - 99 mg/dL 103 (H)   Calcium 8.6 - 10.5 mg/dL 9.8   Hemoglobin A1C 4.80 - 5.60 % 5.50   C-Reactive Protein 0.00 - 0.50 mg/dL <0.30   Protime 12.2 - 15.3 Seconds 13.0   INR 0.89 - 1.12  0.92   PTT 22.0 - 39.0 seconds 30.5   WBC 3.40 - 10.80 10*3/mm3 3.63   RBC 3.77 - 5.28 10*6/mm3 4.18   Hemoglobin 12.0 - 15.9 g/dL 12.0   Hematocrit 34.0 - 46.6 % 37.4   Platelets 140 - 450 10*3/mm3 199   RDW 12.3 - 15.4 % 14.5   MCV 79.0 - 97.0 fL 89.5   MCH 26.6 - 33.0 pg 28.7   MCHC 31.5 - 35.7 g/dL 32.1   MPV 6.0 - 12.0 fL 9.5   RDW-SD 37.0 - 54.0 fl 46.6   (H): Data is abnormally high      Assessment and Plan:       S/P TKR (total knee replacement), left    Essential hypertension    Hyperlipidemia    Primary osteoarthritis of left knee    Arthritis of knee, left    GERD (gastroesophageal reflux disease)      Plan  1. PT/OT,  Weight bearing as tolerated left LE  2. Pain control-prns, ACB cath with ropivacaine infusion.  3. IS-encourage  4. DVT proph- Mechanicals and aspirin   5. Bowel regimen  6. Resume home medications as appropriate  7. Monitor post-op labs  8. DC planning for home    HTN, Hyperlipidemia  - Continue home lisinopril and statin  - Monitor BP   - Holding parameters for BP meds  - Labetalol PRN for SBP>170    GERD:  -Resume PPI.  Formulary substitution when indicated.    Dragon disclaimer:  Part of this encounter note is an electronic transcription/translation of spoken language to printed text. The electronic translation of spoken language may permit erroneous, or at times, nonsensical words or phrases to be inadvertently transcribed; Although I have reviewed the note for such errors, some may still exist.    Christina Dickson MD  04/24/25  15:49 EDT

## 2025-04-24 NOTE — ANESTHESIA PREPROCEDURE EVALUATION
Anesthesia Evaluation     Patient summary reviewed and Nursing notes reviewed   NPO Solid Status: > 8 hours  NPO Liquid Status: > 8 hours           Airway   Mallampati: II  TM distance: >3 FB  Neck ROM: full  No difficulty expected  Dental - normal exam     Pulmonary - normal exam   Cardiovascular   Exercise tolerance: good (4-7 METS)    Rhythm: regular  Rate: normal        Neuro/Psych  GI/Hepatic/Renal/Endo      Musculoskeletal     Abdominal    Substance History      OB/GYN          Other                    Anesthesia Plan    ASA 2     spinal     (Adductor for post op pain)  intravenous induction     Anesthetic plan, risks, benefits, and alternatives have been provided, discussed and informed consent has been obtained with: patient.    CODE STATUS:

## 2025-04-24 NOTE — H&P
[facility] PREOPERATIVE HISTORY AND PHYSICAL       Chief complaint:  left knee pain    Subjective:    Patient is a 79 y.o.female presents with history of progressively worsening chronic left knee pain. She has clinical and radiographic evidence of end-stage left hip joint degeneration. Conservative measures have been tried, but have failed to adequately treat or improve the patient's symptoms. Pain is restricting the patient's daily activities as well as quality of life. She is status post right knee arthroplasty 12/18/2024 by Dr. Whitaker and has done well. She is here today for scheduled and consented LEFT TOTAL KNEE ARTHROPLASTY by Dr. Whitaker.      Review of Systems:  General ROS: negative for fever, chills, weakness, dizziness, headache, fatigue, weight changes  Cardiovascular ROS: no chest pain or dyspnea on exertion  Respiratory ROS: no cough, shortness of breath, or wheezing  GI ROS: no abdominal pain/discomfort, nausea, vomiting or diarrhea   ROS: no dysuria, hematuria or complaints  Skin ROS: no itching, rash or open wounds.      Allergies: No Known Allergies  Latex: no known allergy  Contrast Dye:  no known allergy      Home Meds    Medications Prior to Admission   Medication Sig Dispense Refill Last Dose/Taking    acetaminophen (TYLENOL) 650 MG 8 hr tablet Take 1 tablet by mouth Every 8 (Eight) Hours As Needed for Mild Pain.   Past Week    allopurinol (ZYLOPRIM) 100 MG tablet Take 1 tablet by mouth Daily. 90 tablet 1 4/23/2025    aspirin 81 MG EC tablet Take 1 tablet by mouth Daily. Resume in 1 month   4/23/2025    Calcium Carbonate 1500 (600 Ca) MG tablet Take 1 tablet by mouth Daily.   4/23/2025    docusate sodium (COLACE) 100 MG capsule Take 1 capsule by mouth Daily.   4/23/2025    esomeprazole (NexIUM) 40 MG capsule Take 1 capsule by mouth Daily.   4/23/2025    fluticasone (FLONASE) 50 MCG/ACT nasal spray Administer 1 spray into the nostril(s) as directed by provider Daily.   4/23/2025     Melatonin 10 MG tablet Take 1 tablet by mouth Every Night.   4/23/2025    mesalamine (LIALDA) 1.2 g EC tablet Take 2 tablets by mouth 2 (Two) Times a Day. 120 tablet 11 4/23/2025    Misc Natural Products (OSTEO BI-FLEX ADV TRIPLE ST PO) Take 1 tablet by mouth Daily.   4/23/2025    Multiple Vitamins-Minerals (CENTRUM SILVER PO) Take 1 tablet by mouth Daily.   4/23/2025    ramipril (ALTACE) 10 MG capsule Take 1 capsule by mouth Daily. 90 capsule 1 4/23/2025    simvastatin (ZOCOR) 10 MG tablet Take 1 tablet by mouth Daily. 90 tablet 1 4/23/2025    SUPER B COMPLEX/C PO Take 1 tablet by mouth Daily.   4/23/2025     PMH:   Past Medical History:   Diagnosis Date    Abdominal pain, epigastric     Abdominal pain, left lower quadrant     Abnormal findings on diagnostic imaging of abdomen     ABFND, RADIOLOGICAL, ABDOMINAL AREA     Adenomatous colon polyp 10/02/2023    Dr Gill    Allergic     Allergic rhinitis     Arthritis     Cancer 10/2014 removed    Renal Cell carcinoma left    Chronic gout involving toe without tophus 03/01/2024    Chronic ulcerative colitis without complication     Colon polyp     Constipation     Diarrhea     Diverticulitis of colon     Diverticulosis     Encounter for Hemoccult screening     HEMOCCULT POSITIVE STOOLS     Fracture of wrist 2009    Wrist-Hairline fracture    Gastroesophageal reflux disease     esophagitis presence not specified    GERD (gastroesophageal reflux disease)     H/O mammogram 03/08/2024    Headache As long as I can remember    Herpes zoster     Hyperlipidemia     Hypertension     Irritable bowel syndrome     Knee swelling `    Nausea     Obesity     Rectal bleeding     Rectal bleeding     Regurgitation     Rotator cuff syndrome     Scoliosis     Ulcerative colitis     Wears glasses      PSH:    Past Surgical History:   Procedure Laterality Date    CHOLECYSTECTOMY      COLONOSCOPY  09/23/2014    left side bx-minimal active inflammation - 2 yr    COLONOSCOPY  07/09/2014     active inflammation to extent of limited lower colonoscopy/45 cm    COLONOSCOPY  02/21/2013    mild inflammation in sigmoid, left-sided diverticulosis    COLONOSCOPY  03/05/2010    very tortuous colon not allowing visual of cecal base - 5 yrs    COLONOSCOPY  11/14/2003    FAIRLY TORTUOUS AND SPASMODIC COLON - 5 YR    COLONOSCOPY Left 09/28/2016    Procedure: COLONOSCOPY WITH ANESTHESIA;  Surgeon: David Alonzo DO;  Location:  [facility];  Service:     COLONOSCOPY N/A 06/11/2020    Procedure: COLONOSCOPY WITH ANESTHESIA;  Surgeon: David Alonzo DO;  Location:  [facility];  Service: Gastroenterology;  Laterality: N/A;  pre hx ulcerative colitis  post hx ulcerative colitis, diverticulosis  dr benedicto kaur    COLONOSCOPY W/ BIOPSIES  08/29/2022    COLONOSCOPY W/ BIOPSIES  08/09/2021    Dr Gill    COLONOSCOPY W/ POLYPECTOMY  10/02/2023    adenomatous polyp Dr Gill 1 yr f/u    ENDOSCOPY  03/14/2014    Normal    ENDOSCOPY  02/09/2006    MILD GASTRITIS    JOINT REPLACEMENT  12/18/2024    Right Knee replacement    KNEE SURGERY      NEPHRECTOMY PARTIAL Left 2014    OTHER SURGICAL HISTORY      Bilateral Eyelid Surgery     TONSILLECTOMY      TOTAL KNEE ARTHROPLASTY Right 12/18/2024    Procedure: TOTAL KNEE ARTHROPLASTY RIGHT;  Surgeon: Paul Whitaker MD;  Location: [facility] OR;  Service: Orthopedics;  Laterality: Right;    UPPER GASTROINTESTINAL ENDOSCOPY  03/14/2014    Dr David Alonzo    WRIST SURGERY Left      Immunization History:   Immunization History   Administered Date(s) Administered    Arexvy (RSV, Adults 60+ yrs) 10/10/2023    COVID-19 (MODERNA) 1st,2nd,3rd Dose Monovalent 02/23/2021, 03/23/2021    COVID-19 (PFIZER) 12YRS+ (COMIRNATY) 10/03/2023, 11/13/2024    COVID-19 (PFIZER) BIVALENT 12+YRS 10/12/2022    COVID-19 (PFIZER) Purple Cap Monovalent 08/30/2021    Covid-19 (Pfizer) Gray Cap Monovalent 04/07/2022    FLUAD TRI 65YR+ 09/15/2019, 11/13/2024    Fluad Quad 65+ 08/30/2021     Fluzone High-Dose 65+YRS 10/05/2017    Fluzone High-Dose 65+yrs 09/15/2020, 10/12/2022, 10/03/2023    Influenza, Unspecified 09/18/2016, 09/15/2020    Pneumococcal Conjugate 13-Valent (PCV13) 07/18/2018    Pneumococcal Polysaccharide (PPSV23) 02/25/2016, 11/30/2020    Shingrix 10/30/2020, 05/22/2021    Tdap 02/25/2016    Zostavax 07/18/2014       Social History:  Social History     Tobacco Use    Smoking status: Never     Passive exposure: Past    Smokeless tobacco: Never   Substance Use Topics    Alcohol use: No       Physical Exam:    Vitals:  MU=021/92        HR=81       SpO2=96% room air      Temp=98.1    General Appearance:    Alert, cooperative, no distress, appears stated age   Head:    Normocephalic, without obvious abnormality, atraumatic   Lungs:     Clear to auscultation bilaterally, respirations unlabored    Heart: Regular rate and rhythm, S1 and S2 normal, no murmur, rub    or gallop    Abdomen:    Soft without tenderness  +bowel sounds   Breast Exam:    deferred   Genitalia:    deferred   Extremities:   Extremities normal, atraumatic, no cyanosis or edema   Skin:   Skin color, texture, turgor normal, no rashes or lesions   Neurologic:   Grossly intact     Results Review:   LABS:  Lab Results   Component Value Date    WBC 3.63 04/17/2025    HGB 12.0 04/17/2025    HCT 37.4 04/17/2025    MCV 89.5 04/17/2025     04/17/2025    NEUTROABS 2.58 04/17/2025    GLUCOSE 103 (H) 04/17/2025    BUN 11 04/17/2025    CREATININE 0.82 04/17/2025    EGFRIFNONA 59 (L) 12/01/2021    EGFRIFAFRI >60 12/01/2021     04/17/2025    K 4.3 04/17/2025     04/17/2025    CO2 23.0 04/17/2025    CALCIUM 9.8 04/17/2025    ALBUMIN 4.2 03/14/2025    AST 25 03/14/2025    ALT 17 03/14/2025    BILITOT 0.3 03/14/2025       RADIOLOGY:  Imaging Results (Last 72 Hours)       ** No results found for the last 72 hours. **             Cancer Staging (if applicable):  Cancer Patient: __ yes __no __unknown; If yes, clinical stage  T:__ N:__M:__, stage group    Impression:  PRIMARY OSTEOARTHRITIS LEFT KNEE     Plan:  TOTAL KNEE ARTHROPLASTY LEFT       My NIDIA Lindsey 4/24/2025 07:03 EDT

## 2025-04-24 NOTE — OP NOTE
OPERATIVE REPORT     DATE OF PROCEDURE: 4/24/2025     SURGEON:Paul Whitaker MD    STAFF: Circulator: Jenn Mariscal RN  Physician Assistant: My Vicente PA  Scrub Person: Franchesca Winn  Vendor Representative: Rob Nunes (Carlito)  Nursing Assistant: Evelyn Bynum  Orientee: Stephanie Mesa RN     PREOPERATIVE DIAGNOSIS: Primary osteoarthritis of left knee [M17.12]  Advanced degenerative joint disease of the left knee secondary to arthritis    POSTOPERATIVE DIAGNOSIS: Primary osteoarthritis of left knee [M17.12]    Procedure(s):  TOTAL KNEE ARTHROPLASTY LEFT    Surgeon(s):  Paul Whitaker MD    Circulator: Jenn Mariscal RN  Physician Assistant: My Vicente PA    was responsible for performing the following activities: Retraction, Suction, Irrigation, Suturing, Closing, and Placing Dressing and their skilled assistance was necessary for the success of this case.   Scrub Person: Franchesca Winn  Vendor Representative: Rob Nunes (Carlito)  Nursing Assistant: Evelyn Bynum  Orientee: Stephanie Mesa RN    Surgical Approach: Knee Medial Parapatellar     ANESTHESIA: Spinal    PREOPERATIVE ANTIBIOTICS: Ancef     TRANEXAMIC ACID: IV    TOURNIQUET TIME: 60 min @ 300 mmHg    ESTIMATED BLOOD LOSS: minimal    SPECIMENS: * No orders in the log *     IMPLANTS:   Implant Name Type Inv. Item Serial No.  Lot No. LRB No. Used Action   CMT BONE PALACOS R HI/VISC 1X40 - [id number] Implant CMT BONE PALACOS R HI/VISC 1X40  [facility] [id number] Left 2 Implanted   ART/SRF KN PERSONA/VE PS CD/CR 6TO7 10MM LT - [id number] Implant ART/SRF KN PERSONA/VE PS CD/CR 6TO7 10MM LT  [employer] [id number] Left 1 Implanted   SCRW HEX PERSONA FML 2.5X25MM PK/2 - [id number] Implant SCRW HEX PERSONA FML 2.5X25MM PK/2  [employer] [id number] Left 1 Implanted   PAT KN PERSONA ALLPOLY CMT 8X29MM - [id number] Implant PAT KN PERSONA ALLPOLY CMT 8X29MM  [employer] [id number] Left 1 Implanted   STEM TIB/KN  JASSI CMT 5D SZD LT - [id number] Implant STEM TIB/KN JASSI CMT 5D SZD LT  [employer] [id number] Left 1 Implanted      : GRETCHEN Hong  Tibial component size: D  Femoral component size: 7  Tibial polyethylene insert: 10  Patellar component: 28     DRAINS: None    LOCAL INJECTION: 1 cc Toradol 30mg/ml, 4 cc duramorph 2mg/ml, 20 cc 0.5% ropivicaine, 20 cc 0.5% lidocaine with 1:200,000 epinephrine, 15 cc preservative free normal saline     MODIFIER(S): None     COMPLICATIONS: None apparent    INDICATIONS FOR PROCEDURE: The patient has a long history of progressive knee pain, arthritis, and degeneration resulting in deformity in the left knee from predominantly medial wear and bone loss. Non-operative treatment and conservative therapeutic measures have been attempted, but have not improved or controlled the symptoms and pain that occurs during normal daily activities. Knee motion has also become limited and is restricting the patient. Total knee arthroplasty was recommended. The risks, benefits, alternatives, and potential complications of the arthroplasty surgery were discussed with the patient in detail to include but not be limited to infection, bleeding, anesthesia risks, damage to neurovascular structures, osteolysis, aseptic loosening, instability, anterior knee pain, continued pain, iatrogenic fracture, dislocation, need for future surgery including the potential for amputation, blood clots, myocardial infarction, stroke, and death. Specific details of the surgical procedure, hospitalization, recovery, rehabilitation, and long-term precautions were also presented. Pre-operative teaching was provided. Implant/prosthesis selection was outlined, and the many options available were explained; the final choice will be made at the time of the procedure to match the anatomy and condition of the bone, ligaments, tendons, and muscles. The patient completed preoperative medical optimization and risk  assessment, joint arthroplasty education, and MRSA decolonization with Mupirocin if indicated based on the preoperative nasal screen. Perioperative blood management and the potential for blood transfusion were discussed with risks and options clearly outlined.     INTRAOPERATIVE FINDINGS: severe arthritis     PROCEDURE: The patient was identified in the preoperative holding area. The operative site was confirmed and marked. Sequential compression device were placed on the nonoperative leg. The risks, benefits, and alternatives to surgery were again confirmed with the patient and the patient wished to proceed. The patient was brought to the operating room and placed on the operating room table in the supine position. All bony prominences were padded. A huddle was performed with the patient and all vital surgical team members to confirm the correct operative site, procedure, anesthesia type, and operative plan with the patient. After anesthesia was performed, a tourniquet was applied to the upper thigh of the operative leg. A full knee exam was performed once anesthesia was in full effect. Intravenous antibiotic prophylaxis was given and confirmed with the anesthesia team.     The operative leg was prepped and draped in the usual sterile fashion. A surgical time out was performed immediately preceding the incision with all personnel in the operating room to confirm patient identity, the correct operative site and extremity, correct radiographic studies, availability of appropriate surgical equipment and agreement on the planned procedure. The operative knee was elevated and exsanguinated using an esmarch and the tourniquet was inflated. The knee was exposed using a limited anterior-midline skin incision. Dissection was carried down through skin and subcutaneous tissue to the extensor mechanism with a scalpel. A medial parapatellar arthrotomy was made to enter the knee space sharply. A large amount of normal appearing  joint fluid was encountered and suctioned. The synovium was thickened, hypertrophic, and inflamed. A partial synovectomy was performed for exposure, and the medial and lateral gutters were cleared of scar and synovial reflections. The superficial medial collateral ligament was carefully elevated off osteophytes which were then removed with a rongeur and osteotome. Degenerative meniscal remnants were excised medially and laterally. The patellar synovial reflections were released and the patella exposed to reveal complete wear through the articular surface. The trochlea demonstrated similar severe wear. The patella was then subluxed laterally. The knee was then flexed up to 90 degrees.     Assessment of the knee joint revealed severe end-stage articular damage with no remaining medial weight bearing cartilage. The medial compartment was severely eburnated with bone loss on the medial tibia and medial femoral condyle, resulting in the varus deformity. The anterior cruciate ligament was found to be functionally compromised and it was excised. Osteophytes were removed from the notch. Osteophytes were then further debrided from the femur and the tibia.     Attention was then turned to the femur. The medullary cavity of the femur was entered anterior-medial to the intra-condylar notch above the PCL with a 5/6th inch step drill. The femoral canal was over-reamed, irrigated, and suctioned to prevent fat embolization. An intramedullary alignment system was then placed, fixed to the femur with pins, and the distal femoral osteotomy was made in 5 degrees of valgus with an 8 mm resection. Attention was then turned to the tibia. Retractors were placed medially and laterally to protect the collateral ligaments and a Feliz retractor was placed around the PCL in the intra-condylar notch. The tibia was then subluxed anteriorly for wide exposure. An extramedullary alignment system was then placed and the proximal tibial osteotomy  was made measuring 1-2 mm off the most affected side and 9-10 mm off the unaffected side with approximately 3 degrees posterior slope. The guide was also confirmed to be perpendicular to the tibial axis prior to the osteotomy. A sizing guide was then used to select the tibial component size. The knee was then brought to extension and the appropriate sized spacer block was placed. This brought the knee to full extension with excellent medial and lateral balance.     The flexion gap was then inspected and measured both visually and with a tensioner device to assess the medial and lateral flexion balance. A femur posterior reference guide was placed in 3 degrees of external rotation in accordance with the soft tissue balance. This resulted in symmetric flexion and extension gaps and was verified against the epicondylar axis and Chao's line. The femur was sized using a posterior referencing guide and, using the previously determined degrees of rotation, drill holes were made for the cutting block. The 4 in 1 cutting block was impacted into place and secured. Cuts were completed using a saw with the collaterals protected by Z-retractors. Care was taken to preserve the PCL. A lamina  was placed with the knee in 90 degrees of flexion and a large curved osteotome, rongeur, and curettes were utilized to clear posterior osteophytes, loose bodies and meniscal remnants.     A femoral trial implant was placed; excellent fit was confirmed. The medial-lateral and anterior-posterior dimensions were checked; anatomic fit and coverage were achieved.The proximal tibia baseplate trial was placed with its mid-point at the junction of medial one-third and lateral two-thirds of the tibial tubercle and pinned to this fixed position. A trial reduction was then performed. Trial reduction demonstrated the knee achieved full extension with excellent stability and range of motion, and no tendency toward instability with  varus-valgus stress at full extension, mid-flexion, or 90 degrees of flexion. The PCL was also found to be appropriately tensioned with normal posterior tibial excursion.     Next, attention was turned to the patella. It was measured and the posterior 9-10 mm was resected leaving a healthy remnant with greater than 11mm thickness. The patella was sized with the asymmetric guide, and drill holes were made. A trial button was placed and tracking of the patella and the entire knee trial was tested. The patella tracking was excellent throughout range of motion with no instability. Punches and drills were then placed through the trials to accommodate the final implants. All trials were removed.     The wound was copiously lavaged with a pulse irrigation/suction system. The posterior recess of the knee and areas of known bleeding were treated with the electrocautery to reduce post-operative bleeding. A pain cocktail was injected into the flavia-articular tissues. The cut bone surfaces were then irrigated again, suctioned, and dried. A double batch of Polymethylmethacrylate (PMMA) bone cement was mixed, and the final implants were cemented into place, tibia followed by femur followed by patella. The cement was compressed using a non-constrained poly trial which was removed so the excess cement could be curetted free. The final polyethylene was impacted into place, and the knee was held in extension until the cement cured. The tourniquet was released and no excessive bleeding was encountered. Synovial bleeding was further treated with the electrocautery until adequate hemostasis was obtained.     The wound was again irrigated with dilute betadine solution followed by saline. The extensor mechanism and capsule was then anatomically closed with interrupted #1 Vicryl suture and a running #2 Stratafix stitch. Knee stability and range of motion with the capsule closed was excellent, and range of motion was 0 to 135 without  excessive stress on the repair. Instrument and sponge count was completed and confirmed correct. Deep and superficial subcutaneous tissue was closed with interrupted 2-0 Vicryl suture. A running 3-0 Monocryl subcuticular stitch was used to re-approximate the skin edges followed by skin glue adhesive to seal the wound.  The patient was sufficiently recovered from anesthesia, transferred to a hospital bed and taken to the PACU in stable condition.     One weight-based dose (10 mg/kg) of intravenous tranexamic acid was administered prior to incision. A second 10mg/kg intravenous dose was given prior to wound closure.     No apparent complications occurred during the procedure. Instrument, sponge and needle counts were correct x 2.     The patient underwent risk stratification preoperatively and Enteric coated aspirin was chosen for DVT prophylaxis. Delay in starting chemical prophylaxis for 23 hours from surgical incision was over concerns for hematoma formation and wound related issues.     POST OPERATIVE PLAN:   Weight bearing as tolerated with knee range of motion as tolerated   Pain control with PO/IV meds   Adductor canal catheter placement by Anesthesia Pain Management Team.   23 hours perioperative antibiotic prophylaxis   PT/OT for mobilization and medical equipment needs   Social work for discharge planning needs   Follow up in 3 weeks for post operative wound check with XR AP and lateral of operative knee.

## 2025-04-24 NOTE — ANESTHESIA POSTPROCEDURE EVALUATION
Patient: Coco Steele    Procedure Summary       Date: 04/24/25 Room / Location:  [facility] OR  /  [facility] OR    Anesthesia Start: 0730 Anesthesia Stop: 0912    Procedure: TOTAL KNEE ARTHROPLASTY LEFT (Left: Knee) Diagnosis:       Primary osteoarthritis of left knee      (Primary osteoarthritis of left knee [M17.12])    Surgeons: Paul Whitaker MD Provider: Joby Marcum MD    Anesthesia Type: spinal ASA Status: 2            Anesthesia Type: spinal    Vitals  Vitals Value Taken Time   BP     Temp     Pulse 71 04/24/25 09:12   Resp     SpO2 98 % 04/24/25 09:12   Vitals shown include unfiled device data.        Post Anesthesia Care and Evaluation    Patient location during evaluation: PACU  Patient participation: complete - patient participated  Level of consciousness: awake and alert  Pain management: adequate    Airway patency: patent  Anesthetic complications: No anesthetic complications  PONV Status: none  Cardiovascular status: hemodynamically stable and acceptable  Respiratory status: nonlabored ventilation, acceptable and nasal cannula  Hydration status: acceptable

## 2025-04-24 NOTE — PLAN OF CARE
Goal Outcome Evaluation:  Plan of Care Reviewed With: patient        Progress: improving  Outcome Evaluation: Patient was able to ambulate in PACU post surgery . Recommend continued skilled PT tomorrow for stair training. She will recieve outpatient PT at discharge.    Anticipated Discharge Disposition (PT): home with assist, home with outpatient therapy services

## 2025-04-25 VITALS
SYSTOLIC BLOOD PRESSURE: 104 MMHG | DIASTOLIC BLOOD PRESSURE: 53 MMHG | TEMPERATURE: 98.6 F | RESPIRATION RATE: 18 BRPM | OXYGEN SATURATION: 99 % | HEART RATE: 74 BPM

## 2025-04-25 LAB
ANION GAP SERPL CALCULATED.3IONS-SCNC: 10 MMOL/L (ref 5–15)
BASOPHILS # BLD AUTO: 0.01 10*3/MM3 (ref 0–0.2)
BASOPHILS NFR BLD AUTO: 0.2 % (ref 0–1.5)
BUN SERPL-MCNC: 10 MG/DL (ref 8–23)
BUN/CREAT SERPL: 11.8 (ref 7–25)
CALCIUM SPEC-SCNC: 8.5 MG/DL (ref 8.6–10.5)
CHLORIDE SERPL-SCNC: 109 MMOL/L (ref 98–107)
CO2 SERPL-SCNC: 22 MMOL/L (ref 22–29)
CREAT SERPL-MCNC: 0.85 MG/DL (ref 0.57–1)
DEPRECATED RDW RBC AUTO: 48.2 FL (ref 37–54)
EGFRCR SERPLBLD CKD-EPI 2021: 70.2 ML/MIN/1.73
EOSINOPHIL # BLD AUTO: 0.01 10*3/MM3 (ref 0–0.4)
EOSINOPHIL NFR BLD AUTO: 0.2 % (ref 0.3–6.2)
ERYTHROCYTE [DISTWIDTH] IN BLOOD BY AUTOMATED COUNT: 14.6 % (ref 12.3–15.4)
GLUCOSE SERPL-MCNC: 105 MG/DL (ref 65–99)
HCT VFR BLD AUTO: 29.3 % (ref 34–46.6)
HGB BLD-MCNC: 9.3 G/DL (ref 12–15.9)
IMM GRANULOCYTES # BLD AUTO: 0.01 10*3/MM3 (ref 0–0.05)
IMM GRANULOCYTES NFR BLD AUTO: 0.2 % (ref 0–0.5)
LYMPHOCYTES # BLD AUTO: 0.84 10*3/MM3 (ref 0.7–3.1)
LYMPHOCYTES NFR BLD AUTO: 16.7 % (ref 19.6–45.3)
MCH RBC QN AUTO: 28.5 PG (ref 26.6–33)
MCHC RBC AUTO-ENTMCNC: 31.7 G/DL (ref 31.5–35.7)
MCV RBC AUTO: 89.9 FL (ref 79–97)
MONOCYTES # BLD AUTO: 0.25 10*3/MM3 (ref 0.1–0.9)
MONOCYTES NFR BLD AUTO: 5 % (ref 5–12)
NEUTROPHILS NFR BLD AUTO: 3.91 10*3/MM3 (ref 1.7–7)
NEUTROPHILS NFR BLD AUTO: 77.7 % (ref 42.7–76)
NRBC BLD AUTO-RTO: 0 /100 WBC (ref 0–0.2)
PLATELET # BLD AUTO: 150 10*3/MM3 (ref 140–450)
PMV BLD AUTO: 9.5 FL (ref 6–12)
POTASSIUM SERPL-SCNC: 3.9 MMOL/L (ref 3.5–5.2)
RBC # BLD AUTO: 3.26 10*6/MM3 (ref 3.77–5.28)
SODIUM SERPL-SCNC: 141 MMOL/L (ref 136–145)
WBC NRBC COR # BLD AUTO: 5.03 10*3/MM3 (ref 3.4–10.8)

## 2025-04-25 PROCEDURE — 97535 SELF CARE MNGMENT TRAINING: CPT

## 2025-04-25 PROCEDURE — 63710000001 PANTOPRAZOLE 40 MG TABLET DELAYED-RELEASE: Performed by: ORTHOPAEDIC SURGERY

## 2025-04-25 PROCEDURE — A9270 NON-COVERED ITEM OR SERVICE: HCPCS | Performed by: ORTHOPAEDIC SURGERY

## 2025-04-25 PROCEDURE — 97166 OT EVAL MOD COMPLEX 45 MIN: CPT

## 2025-04-25 PROCEDURE — 63710000001 DOCUSATE SODIUM 100 MG CAPSULE: Performed by: ORTHOPAEDIC SURGERY

## 2025-04-25 PROCEDURE — 63710000001 ATORVASTATIN 10 MG TABLET: Performed by: ORTHOPAEDIC SURGERY

## 2025-04-25 PROCEDURE — 85025 COMPLETE CBC W/AUTO DIFF WBC: CPT | Performed by: ORTHOPAEDIC SURGERY

## 2025-04-25 PROCEDURE — 63710000001 FLUTICASONE 50 MCG/ACT SUSPENSION 16 G BOTTLE: Performed by: ORTHOPAEDIC SURGERY

## 2025-04-25 PROCEDURE — 80048 BASIC METABOLIC PNL TOTAL CA: CPT | Performed by: ORTHOPAEDIC SURGERY

## 2025-04-25 PROCEDURE — 97530 THERAPEUTIC ACTIVITIES: CPT

## 2025-04-25 PROCEDURE — 97110 THERAPEUTIC EXERCISES: CPT

## 2025-04-25 PROCEDURE — 63710000001 ASPIRIN 325 MG TABLET: Performed by: ORTHOPAEDIC SURGERY

## 2025-04-25 PROCEDURE — 63710000001 MELOXICAM 15 MG TABLET: Performed by: ORTHOPAEDIC SURGERY

## 2025-04-25 PROCEDURE — 63710000001 MULTIVITAMIN WITH MINERALS TABLET: Performed by: ORTHOPAEDIC SURGERY

## 2025-04-25 PROCEDURE — 63710000001 OXYCODONE 5 MG TABLET: Performed by: ORTHOPAEDIC SURGERY

## 2025-04-25 PROCEDURE — 63710000001 ALLOPURINOL 100 MG TABLET: Performed by: ORTHOPAEDIC SURGERY

## 2025-04-25 PROCEDURE — 63710000001 LISINOPRIL 10 MG TABLET: Performed by: ORTHOPAEDIC SURGERY

## 2025-04-25 PROCEDURE — 63710000001 ACETAMINOPHEN EXTRA STRENGTH 500 MG TABLET: Performed by: ORTHOPAEDIC SURGERY

## 2025-04-25 PROCEDURE — 25010000002 SODIUM CHLORIDE 0.9 % WITH KCL 20 MEQ 20-0.9 MEQ/L-% SOLUTION: Performed by: ORTHOPAEDIC SURGERY

## 2025-04-25 RX ORDER — ACETAMINOPHEN 500 MG
1000 TABLET ORAL EVERY 8 HOURS
Qty: 60 TABLET | Refills: 0 | Status: SHIPPED | OUTPATIENT
Start: 2025-04-25

## 2025-04-25 RX ORDER — DOCUSATE SODIUM 100 MG/1
100 CAPSULE, LIQUID FILLED ORAL 2 TIMES DAILY
Qty: 30 CAPSULE | Refills: 0 | Status: SHIPPED | OUTPATIENT
Start: 2025-04-25 | End: 2025-05-10

## 2025-04-25 RX ORDER — OXYCODONE HYDROCHLORIDE 5 MG/1
5 TABLET ORAL EVERY 4 HOURS PRN
Qty: 30 TABLET | Refills: 0 | Status: SHIPPED | OUTPATIENT
Start: 2025-04-25

## 2025-04-25 RX ORDER — OXYCODONE HYDROCHLORIDE 5 MG/1
5 TABLET ORAL EVERY 4 HOURS PRN
Qty: 30 TABLET | Refills: 0 | OUTPATIENT
Start: 2025-04-25

## 2025-04-25 RX ORDER — ACETAMINOPHEN 500 MG
1000 TABLET ORAL EVERY 8 HOURS
Qty: 60 TABLET | Refills: 0 | OUTPATIENT
Start: 2025-04-25

## 2025-04-25 RX ORDER — ASPIRIN 81 MG/1
81 TABLET ORAL 2 TIMES DAILY
Qty: 60 TABLET | Refills: 0 | Status: SHIPPED | OUTPATIENT
Start: 2025-04-26

## 2025-04-25 RX ORDER — ASPIRIN 81 MG/1
81 TABLET ORAL DAILY
Start: 2025-05-26

## 2025-04-25 RX ORDER — MELOXICAM 15 MG/1
15 TABLET ORAL DAILY
Qty: 15 TABLET | Refills: 0 | OUTPATIENT
Start: 2025-04-26 | End: 2025-05-11

## 2025-04-25 RX ORDER — ROPIVACAINE HYDROCHLORIDE 2 MG/ML
2 INJECTION, SOLUTION EPIDURAL; INFILTRATION; PERINEURAL CONTINUOUS
Start: 2025-04-25

## 2025-04-25 RX ORDER — MELOXICAM 7.5 MG/1
7.5 TABLET ORAL DAILY
Qty: 15 TABLET | Refills: 0 | Status: SHIPPED | OUTPATIENT
Start: 2025-04-26 | End: 2025-05-11

## 2025-04-25 RX ORDER — ASPIRIN 81 MG/1
81 TABLET ORAL 2 TIMES DAILY
Qty: 60 TABLET | Refills: 0 | OUTPATIENT
Start: 2025-04-26

## 2025-04-25 RX ORDER — DOCUSATE SODIUM 100 MG/1
100 CAPSULE, LIQUID FILLED ORAL 2 TIMES DAILY
Qty: 30 CAPSULE | Refills: 0 | OUTPATIENT
Start: 2025-04-25 | End: 2025-05-10

## 2025-04-25 RX ADMIN — ATORVASTATIN CALCIUM 10 MG: 10 TABLET, FILM COATED ORAL at 08:45

## 2025-04-25 RX ADMIN — ACETAMINOPHEN 1000 MG: 500 TABLET ORAL at 00:26

## 2025-04-25 RX ADMIN — ALLOPURINOL 100 MG: 100 TABLET ORAL at 08:45

## 2025-04-25 RX ADMIN — PANTOPRAZOLE SODIUM 40 MG: 40 TABLET, DELAYED RELEASE ORAL at 05:28

## 2025-04-25 RX ADMIN — POTASSIUM CHLORIDE AND SODIUM CHLORIDE 100 ML/HR: 900; 150 INJECTION, SOLUTION INTRAVENOUS at 00:12

## 2025-04-25 RX ADMIN — ACETAMINOPHEN 1000 MG: 500 TABLET ORAL at 05:28

## 2025-04-25 RX ADMIN — ACETAMINOPHEN 1000 MG: 500 TABLET ORAL at 12:32

## 2025-04-25 RX ADMIN — LISINOPRIL 10 MG: 10 TABLET ORAL at 08:45

## 2025-04-25 RX ADMIN — ASPIRIN 325 MG: 325 TABLET ORAL at 08:45

## 2025-04-25 RX ADMIN — DOCUSATE SODIUM 100 MG: 100 CAPSULE, LIQUID FILLED ORAL at 08:45

## 2025-04-25 RX ADMIN — FLUTICASONE PROPIONATE 1 SPRAY: 50 SPRAY, METERED NASAL at 12:33

## 2025-04-25 RX ADMIN — MELOXICAM 15 MG: 15 TABLET ORAL at 08:44

## 2025-04-25 RX ADMIN — OXYCODONE HYDROCHLORIDE 5 MG: 5 TABLET ORAL at 05:28

## 2025-04-25 RX ADMIN — Medication 1 TABLET: at 08:45

## 2025-04-25 NOTE — CASE MANAGEMENT/SOCIAL WORK
Continued Stay Note  [facility]     Patient Name: Coco Steele  MRN: [id number]  Today's Date: 4/25/2025    Admit Date: 4/24/2025    Plan: home with outpatient PT services   Discharge Plan       Row Name 04/25/25 1147       Plan    Plan home with outpatient PT services    Patient/Family in Agreement with Plan yes    Plan Comments Met with patient at the bedside to initiate discharge planning. Patient lives alone in a house in [facility]. At baseline, patient is independent with ADLs and does not require any DME for assistance. Patient has a rolling walker, cane, shower chair, and commode at home. Patient denies any home health services or home oxygen use. Patient has Medicare and [employer] insurane with prescription benefits and prefers to fill scripts at the [facility]. Patient's plan is home. Friend will transport. Patient has outpatient PT appointment with [facility] in [place] on 4/28 at 8am. CM will continue to follow patient and assist with discharge planning as recommendations become available.    Final Discharge Disposition Code 01 - home or self-care                   Discharge Codes    No documentation.                       Kendall Randall RN

## 2025-04-25 NOTE — THERAPY EVALUATION
Patient Name: Coco Steele  : 1945    MRN: [id number]                              Today's Date: 2025       Admit Date: 2025    Visit Dx:     ICD-10-CM ICD-9-CM   1. Primary osteoarthritis of left knee  M17.12 715.16     Patient Active Problem List   Diagnosis    Ulcerative pancolitis without complication    Osteopenia    Essential hypertension    Hyperlipidemia    Chronic ulcerative colitis without complication    Primary osteoarthritis of both knees    Arthritis of knee, right    Status post total right knee replacement    Acute postoperative pain    Acute blood loss anemia    Primary osteoarthritis of left knee    Arthritis of knee, left    S/P TKR (total knee replacement), left    GERD (gastroesophageal reflux disease)     Past Medical History:   Diagnosis Date    Abdominal pain, epigastric     Abdominal pain, left lower quadrant     Abnormal findings on diagnostic imaging of abdomen     ABFND, RADIOLOGICAL, ABDOMINAL AREA     Adenomatous colon polyp 10/02/2023    Dr Gill    Allergic     Allergic rhinitis     Arthritis     Cancer 10/2014 removed    Renal Cell carcinoma left    Chronic gout involving toe without tophus 2024    Chronic ulcerative colitis without complication     Colon polyp     Constipation     Diarrhea     Diverticulitis of colon     Diverticulosis     Encounter for Hemoccult screening     HEMOCCULT POSITIVE STOOLS     Fracture of wrist     Wrist-Hairline fracture    Gastroesophageal reflux disease     esophagitis presence not specified    GERD (gastroesophageal reflux disease)     H/O mammogram 2024    Headache As long as I can remember    Herpes zoster     Hyperlipidemia     Hypertension     Irritable bowel syndrome     Knee swelling `    Nausea     Obesity     Rectal bleeding     Rectal bleeding     Regurgitation     Rotator cuff syndrome     Scoliosis     Ulcerative colitis     Wears glasses      Past Surgical History:   Procedure Laterality Date     CHOLECYSTECTOMY      COLONOSCOPY  09/23/2014    left side bx-minimal active inflammation - 2 yr    COLONOSCOPY  07/09/2014    active inflammation to extent of limited lower colonoscopy/45 cm    COLONOSCOPY  02/21/2013    mild inflammation in sigmoid, left-sided diverticulosis    COLONOSCOPY  03/05/2010    very tortuous colon not allowing visual of cecal base - 5 yrs    COLONOSCOPY  11/14/2003    FAIRLY TORTUOUS AND SPASMODIC COLON - 5 YR    COLONOSCOPY Left 09/28/2016    Procedure: COLONOSCOPY WITH ANESTHESIA;  Surgeon: David Alonzo DO;  Location:  [facility];  Service:     COLONOSCOPY N/A 06/11/2020    Procedure: COLONOSCOPY WITH ANESTHESIA;  Surgeon: David Alonzo DO;  Location:  [facility];  Service: Gastroenterology;  Laterality: N/A;  pre hx ulcerative colitis  post hx ulcerative colitis, diverticulosis  dr benedicto kaur    COLONOSCOPY W/ BIOPSIES  08/29/2022    COLONOSCOPY W/ BIOPSIES  08/09/2021    Dr Gill    COLONOSCOPY W/ POLYPECTOMY  10/02/2023    adenomatous polyp Dr Gill 1 yr f/u    ENDOSCOPY  03/14/2014    Normal    ENDOSCOPY  02/09/2006    MILD GASTRITIS    JOINT REPLACEMENT  12/18/2024    Right Knee replacement    KNEE SURGERY      NEPHRECTOMY PARTIAL Left 2014    OTHER SURGICAL HISTORY      Bilateral Eyelid Surgery     TONSILLECTOMY      TOTAL KNEE ARTHROPLASTY Right 12/18/2024    Procedure: TOTAL KNEE ARTHROPLASTY RIGHT;  Surgeon: Paul Whitaker MD;  Location:  [facility];  Service: Orthopedics;  Laterality: Right;    TOTAL KNEE ARTHROPLASTY Left 4/24/2025    Procedure: TOTAL KNEE ARTHROPLASTY LEFT;  Surgeon: Paul Whitaker MD;  Location:  [facility];  Service: Orthopedics;  Laterality: Left;    UPPER GASTROINTESTINAL ENDOSCOPY  03/14/2014    Dr David Alonzo    WRIST SURGERY Left       General Information       Row Name 04/25/25 0831          OT Time and Intention    Document Type evaluation  -MR     Mode of Treatment occupational therapy  -MR       Row Name  04/25/25 0831          General Information    Patient Profile Reviewed yes  -MR     Prior Level of Function independent:;gait;transfer;ADL's  -MR     Existing Precautions/Restrictions other (see comments);fall  adductor canal nerve cath  -MR     Barriers to Rehab none identified  -MR       Row Name 04/25/25 0831          Living Environment    Current Living Arrangements home  -MR     People in Home alone;other (see comments)  has a friend to help  -MR       Row Name 04/25/25 0831          Home Main Entrance    Number of Stairs, Main Entrance one  -MR     Stair Railings, Main Entrance none  -MR       Row Name 04/25/25 0831          Stairs Within Home, Primary    Number of Stairs, Within Home, Primary none  -MR       Row Name 04/25/25 0831          Cognition    Orientation Status (Cognition) oriented x 4  -MR       Row Name 04/25/25 0831          Safety Issues/Impairments Affecting Functional Mobility    Impairments Affecting Function (Mobility) pain;strength;range of motion (ROM)  -MR               User Key  (r) = Recorded By, (t) = Taken By, (c) = Cosigned By      Initials Name Provider Type    MR Rashmi Lindsey, OT Occupational Therapist                     Mobility/ADL's       Row Name 04/25/25 1029 04/25/25 0833       Bed Mobility    Bed Mobility supine-sit;scooting/bridging  -MR supine-sit;scooting/bridging  -MR    Scooting/Bridging [place] (Bed Mobility) independent  -MR independent  -MR    Supine-Sit [place] (Bed Mobility) modified independence  -MR modified independence  -MR    Assistive Device (Bed Mobility) head of bed elevated;bed rails  -MR head of bed elevated;bed rails  -MR    Comment, (Bed Mobility) -- Pt advanced to the EOB w/o physical assist.  -MR      Row Name 04/25/25 1029 04/25/25 0833       Transfers    Transfers sit-stand transfer;toilet transfer  -MR sit-stand transfer;toilet transfer  -MR      Row Name 04/25/25 1029 04/25/25 0833       Sit-Stand Transfer    Sit-Stand [place]  (Transfers) contact guard;verbal cues;nonverbal cues (demo/gesture)  -MR contact guard;verbal cues;nonverbal cues (demo/gesture)  -MR    Assistive Device (Sit-Stand Transfers) walker, front-wheeled  -MR walker, front-wheeled  -MR      Row Name 04/25/25 1029 04/25/25 08       Toilet Transfer    Type (Toilet Transfer) sit-stand;stand-sit  -MR sit-stand;stand-sit  -MR    [place] Level (Toilet Transfer) contact guard;verbal cues;nonverbal cues (demo/gesture)  -MR contact guard;verbal cues;nonverbal cues (demo/gesture)  -MR    Assistive Device (Toilet Transfer) commode;walker, front-wheeled  -MR commode;walker, front-wheeled  -MR      Row Name 04/25/25 1029 04/25/25 [facility]       Functional Mobility    Functional Mobility- Ind. Level contact guard assist  -MR contact guard assist  -MR    Functional Mobility- Device walker, front-wheeled  -MR walker, front-wheeled  -MR    Functional Mobility-Distance (Feet) -- --  HH distances w/in pt's room  -MR      Row Name 04/25/25 1029 04/25/25 0833       Activities of Daily Living    BADL Assessment/Intervention upper body dressing;lower body dressing;grooming;toileting  -MR lower body dressing;bathing;upper body dressing;grooming;toileting  -MR      Row Name 04/25/25 1029 04/25/25 0833       Mobility    Extremity Weight-bearing Status left lower extremity  -MR left lower extremity  -MR    Left Lower Extremity (Weight-bearing Status) weight-bearing as tolerated (WBAT)  -MR weight-bearing as tolerated (WBAT)  -MR      Row Name 04/25/25 1029 04/25/25 0833       Lower Body Dressing Assessment/Training    [place] Level (Lower Body Dressing) don;socks;maximum assist (25% patient effort)  -MR don;socks;maximum assist (25% patient effort)  -MR    Position (Lower Body Dressing) edge of bed sitting  -MR edge of bed sitting  -MR    Comment, (Lower Body Dressing) -- adjusting socks  -MR      Row Name 04/25/25 1029          Bathing Assessment/Intervention    [place] Level  (Bathing) bathing skills  -MR     Comment, (Bathing) Pt educated on waiting to shower once nerve cath has been discontinued and cleared by surgeon.  -MR       Row Name 04/25/25 1029          Upper Body Dressing Assessment/Training    [place] Level (Upper Body Dressing) don;contact guard assist  -MR     Position (Upper Body Dressing) edge of bed sitting  -MR       Row Name 04/25/25 1029          Grooming Assessment/Training    [place] Level (Grooming) hair care, combing/brushing;wash face, hands;oral care regimen;standby assist  -MR     Position (Grooming) sink side  -MR       Row Name 04/25/25 1029          Toileting Assessment/Training    [place] Level (Toileting) perform perineal hygiene;adjust/manage clothing;contact guard assist;verbal cues  -MR     Position (Toileting) unsupported sitting;supported standing  -MR               User Key  (r) = Recorded By, (t) = Taken By, (c) = Cosigned By      Initials Name Provider Type    Rashmi Quinn OT Occupational Therapist                   Obj/Interventions       Row Name 04/25/25 1037          Sensory Assessment (Somatosensory)    Sensory Assessment (Somatosensory) UE sensation intact  -MR       Row Name 04/25/25 1037          Vision Assessment/Intervention    Visual Impairment/Limitations WFL  -MR       Row Name 04/25/25 1037          Range of Motion Comprehensive    General Range of Motion bilateral upper extremity ROM WFL  -MR       Row Name 04/25/25 1037          Strength Comprehensive (MMT)    General Manual Muscle Testing (MMT) Assessment no strength deficits identified  -MR       Row Name 04/25/25 1037          Balance    Balance Assessment sitting static balance;sitting dynamic balance;standing static balance;standing dynamic balance  -MR     Static Sitting Balance independent  -MR     Dynamic Sitting Balance independent  -MR     Position, Sitting Balance unsupported;sitting edge of bed;other (see comments)  commode  -MR     Static Standing  Balance standby assist  -MR     Dynamic Standing Balance contact guard  -MR     Position/Device Used, Standing Balance supported;walker, front-wheeled  -MR     Balance Interventions sitting;standing;sit to stand;supported;static;dynamic;minimal challenge;occupation based/functional task  -MR     Comment, Balance No overt LOB noted w/ transfers, ADLs in standing and mobility.  -MR               User Key  (r) = Recorded By, (t) = Taken By, (c) = Cosigned By      Initials Name Provider Type    Rashmi Quinn, OT Occupational Therapist                   Goals/Plan       Row Name 04/25/25 1043          Transfer Goal 1 (OT)    Activity/Assistive Device (Transfer Goal 1, OT) sit-to-stand/stand-to-sit;toilet  -MR     [place] Level/Cues Needed (Transfer Goal 1, OT) standby assist  -MR     Time Frame (Transfer Goal 1, OT) short term goal (STG);3 days  -MR     Progress/Outcome (Transfer Goal 1, OT) new goal  -MR       Row Name 04/25/25 1043          Dressing Goal 1 (OT)    Activity/Device (Dressing Goal 1, OT) dressing skills, all;lower body dressing  -MR     [place]/Cues Needed (Dressing Goal 1, OT) minimum assist (75% or more patient effort)  -MR     Time Frame (Dressing Goal 1, OT) long term goal (LTG);5 days  -MR     Progress/Outcome (Dressing Goal 1, OT) new goal  -MR       Row Name 04/25/25 1043          Grooming Goal 1 (OT)    Activity/Device (Grooming Goal 1, OT) grooming skills, all  -MR     [place] (Grooming Goal 1, OT) independent  -MR     Time Frame (Grooming Goal 1, OT) long term goal (LTG);5 days  -MR     Progress/Outcome (Grooming Goal 1, OT) new goal  -MR       Row Name 04/25/25 1043          Therapy Assessment/Plan (OT)    Planned Therapy Interventions (OT) activity tolerance training;adaptive equipment training;BADL retraining;functional balance retraining;transfer/mobility retraining;strengthening exercise;ROM/therapeutic exercise;patient/caregiver education/training;passive  ROM/stretching;IADL retraining;neuromuscular control/coordination retraining;occupation/activity based interventions  -MR               User Key  (r) = Recorded By, (t) = Taken By, (c) = Cosigned By      Initials Name Provider Type     Rashmi Lindsey, OT Occupational Therapist                   Clinical Impression       Row Name 04/25/25 1038          Pain Assessment    Pretreatment Pain Rating 5/10  -MR     Posttreatment Pain Rating 4/10  -MR     Pain Location knee  -MR     Pain Side/Orientation left;posterior  -MR       Row Name 04/25/25 1038          Plan of Care Review    Plan of Care Reviewed With patient  -MR     Progress no change  Initial Eval  -MR     Outcome Evaluation Patient presenting slightly below her baseline w/ mobility, transfers and balance, limited by L knee pain and ROM deficits. Good effort noted w/ ADL based tasks this date. Pt educated on nerve cath management to prevent accidental dislodgement and bathing. Deficits warrant skilled OT services. Recommend home w/ assist when medically appropriate for d/c.  -MR Irene Name 04/25/25 1038          Therapy Assessment/Plan (OT)    Patient/Family Therapy Goal Statement (OT) Return to OF  -MR     Rehab Potential (OT) good  -MR     Criteria for Skilled Therapeutic Interventions Met (OT) yes;meets criteria;skilled treatment is necessary  -MR     Therapy Frequency (OT) daily  -MR     Predicted Duration of Therapy Intervention (OT) 5 days  -MR Irene Name 04/25/25 1038          Therapy Plan Review/Discharge Plan (OT)    Anticipated Discharge Disposition (OT) home with assist  -MR Irene Name 04/25/25 1038          Vital Signs    Pre Systolic BP Rehab 142  -MR     Pre Treatment Diastolic BP 72  -MR     Pre SpO2 (%) 96  -MR     O2 Delivery Pre Treatment room air  -MR     O2 Delivery Intra Treatment room air  -MR     Post SpO2 (%) 98  -MR     O2 Delivery Post Treatment room air  -MR     Pre Patient Position Supine  -MR     Intra Patient  Position Standing  -MR     Post Patient Position Sitting  -MR       Row Name 04/25/25 1038          Positioning and Restraints    Pre-Treatment Position in bed  -MR     Post Treatment Position chair  -MR     In Chair notified nsg;reclined;sitting;call light within reach;encouraged to call for assist;exit alarm on;with family/caregiver;waffle cushion;legs elevated  -MR               User Key  (r) = Recorded By, (t) = Taken By, (c) = Cosigned By      Initials Name Provider Type    Rashmi Quinn, OT Occupational Therapist                   Outcome Measures       Row Name 04/25/25 1044          How much help from another is currently needed...    Putting on and taking off regular lower body clothing? 2  -MR     Bathing (including washing, rinsing, and drying) 3  -MR     Toileting (which includes using toilet bed pan or urinal) 3  -MR     Putting on and taking off regular upper body clothing 3  -MR     Taking care of personal grooming (such as brushing teeth) 4  -MR     Eating meals 4  -MR     AM-PAC 6 Clicks Score (OT) 19  -MR       Row Name 04/25/25 1007 04/25/25 0844       How much help from another person do you currently need...    Turning from your back to your side while in flat bed without using bedrails? 4  -LH 4  -MM    Moving from lying on back to sitting on the side of a flat bed without bedrails? 4  -LH 4  -MM    Moving to and from a bed to a chair (including a wheelchair)? 3  -LH 3  -MM    Standing up from a chair using your arms (e.g., wheelchair, bedside chair)? 3  -LH 3  -MM    Climbing 3-5 steps with a railing? 3  -LH 3  -MM    To walk in hospital room? 3  -LH 3  -MM    AM-PAC 6 Clicks Score (PT) 20  -LH 20  -MM    Highest Level of Mobility Goal 6 --> Walk 10 steps or more  -LH 6 --> Walk 10 steps or more  -MM      Row Name 04/25/25 1044          Functional Assessment    Outcome Measure Options AM-PAC 6 Clicks Daily Activity (OT)  -MR               User Key  (r) = Recorded By, (t) = Taken By, (c) =  Cosigned By      Initials Name Provider Type    MM Coco Ribeiro, RN Registered Nurse    Oh Bryant, PT Physical Therapist    MR Rashmi Lindsey, OT Occupational Therapist                    Occupational Therapy Education       Title: PT OT SLP Therapies (Done)       Topic: Occupational Therapy (Done)       Point: ADL training (Done)       Learning Progress Summary            Patient Acceptance, E, VU by MR at 4/25/2025 1044                      Point: Home exercise program (Done)       Learning Progress Summary            Patient Acceptance, E, VU by MR at 4/25/2025 1044                      Point: Precautions (Done)       Learning Progress Summary            Patient Acceptance, E, VU by MR at 4/25/2025 1044                      Point: Body mechanics (Done)       Learning Progress Summary            Patient Acceptance, E, VU by MR at 4/25/2025 1044                                      User Key       Initials Effective Dates Name Provider Type Discipline    MR 09/22/22 -  Rashmi Lindsey, OT Occupational Therapist OT                  OT Recommendation and Plan  Planned Therapy Interventions (OT): activity tolerance training, adaptive equipment training, BADL retraining, functional balance retraining, transfer/mobility retraining, strengthening exercise, ROM/therapeutic exercise, patient/caregiver education/training, passive ROM/stretching, IADL retraining, neuromuscular control/coordination retraining, occupation/activity based interventions  Therapy Frequency (OT): daily  Plan of Care Review  Plan of Care Reviewed With: patient  Progress: no change (Initial Eval)  Outcome Evaluation: Patient presenting slightly below her baseline w/ mobility, transfers and balance, limited by L knee pain and ROM deficits. Good effort noted w/ ADL based tasks this date. Pt educated on nerve cath management to prevent accidental dislodgement and bathing. Deficits warrant skilled OT services. Recommend home w/ assist when  medically appropriate for d/c.     Time Calculation:   Evaluation Complexity (OT)  Review Occupational Profile/Medical/Therapy History Complexity: expanded/moderate complexity  Assessment, Occupational Performance/Identification of Deficit Complexity: 3-5 performance deficits  Clinical Decision Making Complexity (OT): detailed assessment/moderate complexity  Overall Complexity of Evaluation (OT): moderate complexity     Time Calculation- OT       Row Name 04/25/25 1045 04/25/25 1008          Time Calculation- OT    OT Start Time 0739  -MR --     OT Received On 04/25/25  -MR --     OT Goal Re-Cert Due Date 05/05/25  -MR --        Timed Charges    18954 - Gait Training Minutes  -- 8  -LH     35306 - OT Therapeutic Activity Minutes 13  -MR --     99407 - OT Self Care/Mgmt Minutes 10  -MR --        Untimed Charges    OT Eval/Re-eval Minutes 46  -MR --        Total Minutes    Timed Charges Total Minutes 23  -MR 8  -LH     Untimed Charges Total Minutes 46  -MR --      Total Minutes 69  -MR 8  -LH               User Key  (r) = Recorded By, (t) = Taken By, (c) = Cosigned By      Initials Name Provider Type     Oh Gale, PT Physical Therapist    MR Rashmi Lindsey OT Occupational Therapist                  Therapy Charges for Today       Code Description Service Date Service Provider Modifiers Qty    72711277077 HC OT THERAPEUTIC ACT EA 15 MIN 4/25/2025 Rashmi Lindsey OT GO 1    62184866721 HC OT SELF CARE/MGMT/TRAIN EA 15 MIN 4/25/2025 Rashmi Lindsey OT GO 1    63448917359 HC OT EVAL MOD COMPLEXITY 4 4/25/2025 Rashmi Lindsey OT GO 1                 Rashmi Lindsey OT  4/25/2025

## 2025-04-25 NOTE — THERAPY TREATMENT NOTE
Patient Name: Coco Steele  : 1945    MRN: [id number]                              Today's Date: 2025       Admit Date: 2025    Visit Dx:     ICD-10-CM ICD-9-CM   1. Primary osteoarthritis of left knee  M17.12 715.16     Patient Active Problem List   Diagnosis    Ulcerative pancolitis without complication    Osteopenia    Essential hypertension    Hyperlipidemia    Chronic ulcerative colitis without complication    Primary osteoarthritis of both knees    Arthritis of knee, right    Status post total right knee replacement    Acute postoperative pain    Acute blood loss anemia    Primary osteoarthritis of left knee    Arthritis of knee, left    S/P TKR (total knee replacement), left    GERD (gastroesophageal reflux disease)     Past Medical History:   Diagnosis Date    Abdominal pain, epigastric     Abdominal pain, left lower quadrant     Abnormal findings on diagnostic imaging of abdomen     ABFND, RADIOLOGICAL, ABDOMINAL AREA     Adenomatous colon polyp 10/02/2023    Dr Gill    Allergic     Allergic rhinitis     Arthritis     Cancer 10/2014 removed    Renal Cell carcinoma left    Chronic gout involving toe without tophus 2024    Chronic ulcerative colitis without complication     Colon polyp     Constipation     Diarrhea     Diverticulitis of colon     Diverticulosis     Encounter for Hemoccult screening     HEMOCCULT POSITIVE STOOLS     Fracture of wrist     Wrist-Hairline fracture    Gastroesophageal reflux disease     esophagitis presence not specified    GERD (gastroesophageal reflux disease)     H/O mammogram 2024    Headache As long as I can remember    Herpes zoster     Hyperlipidemia     Hypertension     Irritable bowel syndrome     Knee swelling `    Nausea     Obesity     Rectal bleeding     Rectal bleeding     Regurgitation     Rotator cuff syndrome     Scoliosis     Ulcerative colitis     Wears glasses      Past Surgical History:   Procedure Laterality Date     CHOLECYSTECTOMY      COLONOSCOPY  09/23/2014    left side bx-minimal active inflammation - 2 yr    COLONOSCOPY  07/09/2014    active inflammation to extent of limited lower colonoscopy/45 cm    COLONOSCOPY  02/21/2013    mild inflammation in sigmoid, left-sided diverticulosis    COLONOSCOPY  03/05/2010    very tortuous colon not allowing visual of cecal base - 5 yrs    COLONOSCOPY  11/14/2003    FAIRLY TORTUOUS AND SPASMODIC COLON - 5 YR    COLONOSCOPY Left 09/28/2016    Procedure: COLONOSCOPY WITH ANESTHESIA;  Surgeon: David Alonzo DO;  Location:  [facility];  Service:     COLONOSCOPY N/A 06/11/2020    Procedure: COLONOSCOPY WITH ANESTHESIA;  Surgeon: David Alonzo DO;  Location:  [facility];  Service: Gastroenterology;  Laterality: N/A;  pre hx ulcerative colitis  post hx ulcerative colitis, diverticulosis  dr benedicto kaur    COLONOSCOPY W/ BIOPSIES  08/29/2022    COLONOSCOPY W/ BIOPSIES  08/09/2021    Dr Gill    COLONOSCOPY W/ POLYPECTOMY  10/02/2023    adenomatous polyp Dr Gill 1 yr f/u    ENDOSCOPY  03/14/2014    Normal    ENDOSCOPY  02/09/2006    MILD GASTRITIS    JOINT REPLACEMENT  12/18/2024    Right Knee replacement    KNEE SURGERY      NEPHRECTOMY PARTIAL Left 2014    OTHER SURGICAL HISTORY      Bilateral Eyelid Surgery     TONSILLECTOMY      TOTAL KNEE ARTHROPLASTY Right 12/18/2024    Procedure: TOTAL KNEE ARTHROPLASTY RIGHT;  Surgeon: Paul Whitaker MD;  Location:  [facility];  Service: Orthopedics;  Laterality: Right;    TOTAL KNEE ARTHROPLASTY Left 4/24/2025    Procedure: TOTAL KNEE ARTHROPLASTY LEFT;  Surgeon: Paul Whitaker MD;  Location:  [facility];  Service: Orthopedics;  Laterality: Left;    UPPER GASTROINTESTINAL ENDOSCOPY  03/14/2014    Dr David Alonzo    WRIST SURGERY Left       General Information       Row Name 04/25/25 0942          Physical Therapy Time and Intention    Document Type therapy note (daily note)  -     Mode of Treatment physical  therapy;individual therapy  -[facility] Name 04/25/25 0954          General Information    Existing Precautions/Restrictions other (see comments);fall  adductor canal nerve cath  -     Barriers to Rehab none identified  -       Row Name 04/25/25 0954          Cognition    Orientation Status (Cognition) oriented x 4  -[facility] Name 04/25/25 0954          Safety Issues/Impairments Affecting Functional Mobility    Impairments Affecting Function (Mobility) pain;strength;range of motion (ROM)  -               User Key  (r) = Recorded By, (t) = Taken By, (c) = Cosigned By      Initials Name Provider Type     Oh Gale, PT Physical Therapist                   Mobility       Row Name 04/25/25 0955          Bed Mobility    Comment, (Bed Mobility) Pt received and left sitting UIC.  -[facility] Name 04/25/25 0955          Sit-Stand Transfer    Sit-Stand [place] (Transfers) contact guard;verbal cues;nonverbal cues (demo/gesture)  -     Assistive Device (Sit-Stand Transfers) walker, front-wheeled  -     Comment, (Sit-Stand Transfer) Pt performed STS x2 from recliner. Occasional cues for proper hand placement and advancement of LLE forward prior to return to sitting.  -[facility] Name 04/25/25 0955          Gait/Stairs (Locomotion)    [place] Level (Gait) contact guard;1 person assist;verbal cues  -     Assistive Device (Gait) walker, front-wheeled  -     Distance in Feet (Gait) 325  -     Deviations/Abnormal Patterns (Gait) left sided deviations;antalgic;stride length decreased;weight shifting decreased;gait speed decreased  -     [place] Level (Stairs) contact guard;1 person assist;verbal cues  -     Assistive Device (Stairs) walker, front-wheeled  -     Number of Steps (Stairs) 1  -     Ascending Technique (Stairs) step-to-step  -     Descending Technique (Stairs) step-to-step  -     Comment, (Gait/Stairs) Pt demonstrated decreased gait speed and stride length  during ambulation, however pt with otherwise good gait mechanics. Pt required occasional cues for proper heel strike. No knee buckling or LOB noted throughout. Pt also ascended/descended  step with RW and backwards technique demonstrating good balance and safety.  -       Row Name 04/25/25 0955          Mobility    Extremity Weight-bearing Status left lower extremity  -     Left Lower Extremity (Weight-bearing Status) weight-bearing as tolerated (WBAT)  -               User Key  (r) = Recorded By, (t) = Taken By, (c) = Cosigned By      Initials Name Provider Type     Oh Gale, PT Physical Therapist                   Obj/Interventions       [facility] Name 04/25/25 1003          Range of Motion Comprehensive    Comment, General Range of Motion L knee 2-89 degrees AROM  -[facility] Name 04/25/25 1003          Motor Skills    Therapeutic Exercise knee;ankle  -LH       Row Name 04/25/25 1003          Knee (Therapeutic Exercise)    Knee Isometrics (Therapeutic Exercise) bilateral;quad sets;10 repetitions  -     Knee Strengthening (Therapeutic Exercise) left;SLR (straight leg raise);SAQ (short arc quad);LAQ (long arc quad);sitting;10 repetitions  -[facility] Name 04/25/25 1003          Ankle (Therapeutic Exercise)    Ankle AROM (Therapeutic Exercise) bilateral;dorsiflexion;plantarflexion;sitting;10 repetitions  -[facility] Name 04/25/25 1003          Balance    Balance Assessment sitting static balance;sitting dynamic balance;standing static balance;standing dynamic balance  -     Static Sitting Balance independent  -     Dynamic Sitting Balance independent  -     Position, Sitting Balance unsupported;sitting in chair  -     Static Standing Balance standby assist  -     Dynamic Standing Balance contact guard  -     Position/Device Used, Standing Balance supported;walker, front-wheeled  -     Comment, Balance Pt with good balance throughout, no LOB noted  -               User Key  (r) =  Recorded By, (t) = Taken By, (c) = Cosigned By      Initials Name Provider Type     Oh Gale, PT Physical Therapist                   Goals/Plan    No documentation.                  Clinical Impression       Row Name 04/25/25 1004          Pain    Pretreatment Pain Rating 4/10  -     Posttreatment Pain Rating 3/10  -     Pain Location knee  -     Pain Side/Orientation left;posterior  -     Pain Management Interventions exercise or physical activity utilized;cold applied  -     Response to Pain Interventions activity participation with tolerable pain;activity participation with decreased pain  -       Row Name 04/25/25 1004          Plan of Care Review    Plan of Care Reviewed With patient  -     Progress improving  -     Outcome Evaluation Pt with excellent motivation and participation in therapy session this date. Pt demonstrating good improvements in functional activity tolerance and independence improving ambualtion distance to 325ft with RW and CGA, pt also ascended/descended 1 step with RW and CGA. PT reviewed HEP. Pt L knee ROM 2-89 degrees. Pt is safe to return home with 24hr assist and OP PT when medically appropriate.  -[facility] Name 04/25/25 1004          Therapy Assessment/Plan (PT)    Therapy Frequency (PT) 2 times/day  -[facility] Name 04/25/25 1004          Vital Signs    Pre Systolic BP Rehab 153  -LH     Pre Treatment Diastolic BP 78  -LH     Pre SpO2 (%) 100  -LH     O2 Delivery Pre Treatment room air  -     O2 Delivery Intra Treatment room air  -LH     Post SpO2 (%) 98  -LH     O2 Delivery Post Treatment room air  -LH     Pre Patient Position Sitting  -     Intra Patient Position Standing  -LH     Post Patient Position Sitting  -LH       Row Name 04/25/25 1004          Positioning and Restraints    Pre-Treatment Position sitting in chair/recliner  -     Post Treatment Position chair  -LH     In Chair notified nsg;reclined;call light within reach;encouraged to  call for assist;exit alarm on;waffle cushion;legs elevated;heels elevated;compression device;with nsg  -               User Key  (r) = Recorded By, (t) = Taken By, (c) = Cosigned By      Initials Name Provider Type     Oh Gale, PT Physical Therapist                   Outcome Measures       Row Name 04/25/25 1007 04/25/25 0844       How much help from another person do you currently need...    Turning from your back to your side while in flat bed without using bedrails? 4  - 4  -MM    Moving from lying on back to sitting on the side of a flat bed without bedrails? 4  - 4  -MM    Moving to and from a bed to a chair (including a wheelchair)? 3  - 3  -MM    Standing up from a chair using your arms (e.g., wheelchair, bedside chair)? 3  - 3  -MM    Climbing 3-5 steps with a railing? 3  - 3  -MM    To walk in hospital room? 3  - 3  -MM    AM-PAC 6 Clicks Score (PT) 20  - 20  -MM    Highest Level of Mobility Goal 6 --> Walk 10 steps or more  - 6 --> Walk 10 steps or more  -MM              User Key  (r) = Recorded By, (t) = Taken By, (c) = Cosigned By      Initials Name Provider Type    Coco Jarerll, RN Registered Nurse     Oh Gale, PT Physical Therapist                                 Physical Therapy Education       Title: PT OT SLP Therapies (Done)       Topic: Physical Therapy (Done)       Point: Mobility training (Done)       Learning Progress Summary            Patient Acceptance, E,D,H,TB, VU,DU by  at 4/25/2025 1007    Comment: Reviewed HEP, stair training, safety with mobility, HEP, and DC recommendations.    HARJIT Skelton VU by SC at 4/24/2025 1202    Comment: reiewed HEP                      Point: Home exercise program (Done)       Learning Progress Summary            Patient Acceptance, E,D,H,TB, VU,DU by  at 4/25/2025 1007    Comment: Reviewed HEP, stair training, safety with mobility, HEP, and DC recommendations.    HARJIT Skelton VU by SC at 4/24/2025 1202    Comment:  reiewed HEP                      Point: Body mechanics (Done)       Learning Progress Summary            Patient Acceptance, E,D,H,TB, VU,DU by  at 4/25/2025 1007    Comment: Reviewed HEP, stair training, safety with mobility, HEP, and DC recommendations.    Nafisa, HARJIT VU by SC at 4/24/2025 1202    Comment: reiewed HEP                      Point: Precautions (Done)       Learning Progress Summary            Patient Acceptance, E,D,H,TB, VU,DU by  at 4/25/2025 1007    Comment: Reviewed HEP, stair training, safety with mobility, HEP, and DC recommendations.    HARJIT Skelton VU by SC at 4/24/2025 1202    Comment: reiewed HEP                                      User Key       Initials Effective Dates Name Provider Type Discipline    SC 02/03/23 -  Obinna Gu, PT Physical Therapist PT     09/21/21 -  Oh Gale, PT Physical Therapist PT                  PT Recommendation and Plan     Progress: improving  Outcome Evaluation: Pt with excellent motivation and participation in therapy session this date. Pt demonstrating good improvements in functional activity tolerance and independence improving ambualtion distance to 325ft with RW and CGA, pt also ascended/descended 1 step with RW and CGA. PT reviewed HEP. Pt L knee ROM 2-89 degrees. Pt is safe to return home with 24hr assist and OP PT when medically appropriate.     Time Calculation:         PT Charges       Row Name 04/25/25 1008             Time Calculation    Start Time 0910  -      PT Received On 04/25/25  -      PT Goal Re-Cert Due Date 05/04/25  -         Timed Charges    80011 - PT Therapeutic Exercise Minutes 14  -      38631 - Gait Training Minutes  8  -      77752 - PT Therapeutic Activity Minutes 17  -         Total Minutes    Timed Charges Total Minutes 39  -       Total Minutes 39  -                User Key  (r) = Recorded By, (t) = Taken By, (c) = Cosigned By      Initials Name Provider Type     Oh Gale, PT Physical  Therapist                  Therapy Charges for Today       Code Description Service Date Service Provider Modifiers Qty    [id number]  PT THER PROC EA 15 MIN 4/25/2025 Oh Gale, PT GP 1    [id number] HC PT THERAPEUTIC ACT EA 15 MIN 4/25/2025 Oh Gale, PT GP 2            PT G-Codes  Outcome Measure Options: AM-PAC 6 Clicks Basic Mobility (PT), PADD  AM-PAC 6 Clicks Score (PT): 20  PT Discharge Summary  Anticipated Discharge Disposition (PT): home with assist, home with outpatient therapy services    Oh Gale, PT  4/25/2025

## 2025-04-25 NOTE — PLAN OF CARE
Goal Outcome Evaluation:         Pt has rested well tonight.  NAD noted at this time.

## 2025-04-25 NOTE — PLAN OF CARE
Goal Outcome Evaluation:  Plan of Care Reviewed With: patient        Progress: improving  Outcome Evaluation: Pt with excellent motivation and participation in therapy session this date. Pt demonstrating good improvements in functional activity tolerance and independence improving ambualtion distance to 325ft with RW and CGA, pt also ascended/descended 1 step with RW and CGA. PT reviewed HEP. Pt L knee ROM 2-89 degrees. Pt is safe to return home with 24hr assist and OP PT when medically appropriate.    Anticipated Discharge Disposition (PT): home with assist, home with outpatient therapy services

## 2025-04-25 NOTE — PLAN OF CARE
Problem: Adult Inpatient Plan of Care  Goal: Plan of Care Review  Flowsheets  Taken 4/25/2025 1423 by Coco Ribeiro RN  Progress: improving  Plan of Care Reviewed With: patient  Taken 4/25/2025 1038 by Rashmi Lindsey OT  Outcome Evaluation: Patient presenting slightly below her baseline w/ mobility, transfers and balance, limited by L knee pain and ROM deficits. Good effort noted w/ ADL based tasks this date. Pt educated on nerve cath management to prevent accidental dislodgement and bathing. Deficits warrant skilled OT services. Recommend home w/ assist when medically appropriate for d/c.   Goal Outcome Evaluation:  Plan of Care Reviewed With: patient        Progress: improving POC dc home has friend who will transport home and assist at home . All education materials covered . VSS . Surgical site CDI ace wrap removed.

## 2025-04-25 NOTE — PROGRESS NOTES
[facility]    Acute pain service Inpatient Progress Note    Patient Name: Coco Steele  :  1945  MRN:  [id number]        Acute Pain  Service Inpatient Progress Note:    Analgesia:Good  Pain scale: A-2, P-4.  LOC: alert and awake  Side Effects:None  Catheter Site:clean, dry and dressing intact  Cath type: peripheral nerve cath([employer]System)  Infusion rate: Fem/ Add: Basal: 1ml/hr, PIB: 8ml q 8h, PCA: 8ml q 30 min  Catheter Plan:Catheter to remain Insitu and Continue catheter infusion rate unchanged

## 2025-04-25 NOTE — PLAN OF CARE
Goal Outcome Evaluation:  Plan of Care Reviewed With: patient        Progress: no change (Initial Eval)  Outcome Evaluation: Patient presenting slightly below her baseline w/ mobility, transfers and balance, limited by L knee pain and ROM deficits. Good effort noted w/ ADL based tasks this date. Pt educated on nerve cath management to prevent accidental dislodgement and bathing. Deficits warrant skilled OT services. Recommend home w/ assist when medically appropriate for d/c.    Anticipated Discharge Disposition (OT): home with assist

## 2025-04-25 NOTE — DISCHARGE SUMMARY
Patient Name: Coco Steele  MRN: [id number]  : 1945  DOS: 2025    Attending: Paul Whitaker MD    Primary Care Provider: Radha Berger MD    Date of Admission:.2025  5:16 AM    Date of Discharge:  2025    Discharge Diagnosis:   S/P TKR (total knee replacement), left    Essential hypertension    Hyperlipidemia    Primary osteoarthritis of left knee    Arthritis of knee, left    GERD (gastroesophageal reflux disease)      Hospital Course    At admit:  Patient is a pleasant 79 y.o. female presented for scheduled surgery by Dr. Whitaker     Per his note: (The patient has a long history of progressive knee pain, arthritis, and degeneration resulting in deformity in the left knee from predominantly medial wear and bone loss. Non-operative treatment and conservative therapeutic measures have been attempted, but have not improved or controlled the symptoms and pain that occurs during normal daily activities. Knee motion has also become limited and is restricting the patient. Total knee arthroplasty was recommended. )     Patient underwent left total knee arthroplasty under spinal anesthesia, tolerated surgery well.  Adductor canal nerve block catheter was placed by acute pain service, and patient was admitted for further management.     Seen postoperatively, doing well with good pain control, no complaints of nausea, vomiting, or shortness of breath.  Patient has no history of DVT or PE.     Reviewed with patient past medical history and home medications     After admit:    Patient was provided pain medications as needed for pain control, along with adductor canal nerve block infusion of Ropivacaine.      Adjustments were made to pain medications to optimize postop pain management. Risks and benefits of opiate medications discussed with patient. PATRICE report was reviewed.    She was seen by PT and OT and has progressed well over her stay.    Patient used an IS for atelectasis prophylaxis  and aspirin along with mechanicals for DVT prophylaxis.    Home medications were resumed as appropriate, and labs were monitored and remained fairly stable.     With the progress she has made, patient is ready for DC home today.      She will have an Infupump ( instructed on it during this admit).    Discussed with patient regarding plan and she shows understanding and agreement.      Patient will have PT following discharge.        Procedures Performed  Procedure(s):  TOTAL KNEE ARTHROPLASTY LEFT       Pertinent Test Results:    I reviewed the patient's new clinical results.   Results from last 7 days   Lab Units 25  0907   WBC 10*3/mm3 5.03   HEMOGLOBIN g/dL 9.3*   HEMATOCRIT % 29.3*   PLATELETS 10*3/mm3 150     Results from last 7 days   Lab Units 25  0907   SODIUM mmol/L 141   POTASSIUM mmol/L 3.9   CHLORIDE mmol/L 109*   CO2 mmol/L 22.0   BUN mg/dL 10   CREATININE mg/dL 0.85   CALCIUM mg/dL 8.5*   GLUCOSE mg/dL 105*     I reviewed the patient's new imaging including images and reports.      Physical therapy     Signed         Goal Outcome Evaluation:  Plan of Care Reviewed With: patient  Progress: improving  Outcome Evaluation: Pt with excellent motivation and participation in therapy session this date. Pt demonstrating good improvements in functional activity tolerance and independence improving ambualtion distance to 325ft with RW and CGA, pt also ascended/descended 1 step with RW and CGA. PT reviewed HEP. Pt L knee ROM 2-89 degrees. Pt is safe to return home with 24hr assist and OP PT when medically appropriate.     Anticipated Discharge Disposition (PT): home with assist, home with outpatient therapy services                 Discharge Assessment:       Visit Vitals  /53 (BP Location: Left arm, Patient Position: Sitting)   Pulse 74   Temp 98.6 °F (37 °C) (Oral)   Resp 18   SpO2 99%     Temp (24hrs), Av.1 °F (36.7 °C), Min:97.7 °F (36.5 °C), Max:98.6 °F (37 °C)      General Appearance:     Alert, cooperative, in no acute distress   Lungs:     Clear to auscultation,respirations regular, even and                   unlabored    Heart:    Regular rhythm and normal rate, normal S1 and S2   Abdomen:     Normal bowel sounds, no masses, no organomegaly, soft        non-tender, non-distended, no guarding, no rebound                 tenderness   Extremities:   CDI dressing surgical knee . ACB cath present, infupump.   Pulses:   Pulses palpable and equal bilaterally   Skin:   No bleeding, bruising or rash   Neurologic:   Cranial nerves 2 - 12 grossly intact, sensation intact, Flexion and dorsiflexion intact bilateral feet.         Discharge Disposition: Home          Discharge Medications        New Medications        Instructions Start Date   acetaminophen 500 MG tablet  Commonly known as: TYLENOL  Replaces: acetaminophen 650 MG 8 hr tablet   1,000 mg, Oral, Every 8 Hours, Change to every 8 hours  as needed after 1 week      meloxicam 7.5 MG tablet  Commonly known as: MOBIC   7.5 mg, Oral, Daily   Start Date: April 26, 2025     oxyCODONE 5 MG immediate release tablet  Commonly known as: Roxicodone   5 mg, Oral, Every 4 Hours PRN      ropivacaine 0.2 % infusion (INFUSYSTEM)  Commonly known as: NAROPIN   2 mL/hr (4 mg/hr), Peripheral Nerve, Continuous             Changes to Medications        Instructions Start Date   aspirin 81 MG EC tablet  Commonly known as: ASPIR  What changed: You were already taking a medication with the same name, and this prescription was added. Make sure you understand how and when to take each.   81 mg, Oral, 2 Times Daily   Start Date: April 26, 2025     aspirin 81 MG EC tablet  What changed: These instructions start on May 26, 2025. If you are unsure what to do until then, ask your doctor or other care provider.   81 mg, Oral, Daily, Resume in 1 month   Start Date: May 26, 2025     docusate sodium 100 MG capsule  Commonly known as: COLACE  What changed: when to take this   100 mg,  Oral, 2 Times Daily             Continue These Medications        Instructions Start Date   allopurinol 100 MG tablet  Commonly known as: ZYLOPRIM   100 mg, Oral, Daily      Calcium Carbonate 1500 (600 Ca) MG tablet   1 tablet, Daily      esomeprazole 40 MG capsule  Commonly known as: nexIUM   40 mg, Daily      fluticasone 50 MCG/ACT nasal spray  Commonly known as: FLONASE   1 spray, Daily      Melatonin 10 MG tablet   1 tablet, Nightly      mesalamine 1.2 g EC tablet  Commonly known as: LIALDA   2.4 g, Oral, 2 Times Daily      multivitamin with minerals tablet tablet   1 tablet, Daily      OSTEO BI-FLEX ADV TRIPLE ST PO   1 tablet, Daily      ramipril 10 MG capsule  Commonly known as: ALTACE   10 mg, Oral, Daily      simvastatin 10 MG tablet  Commonly known as: ZOCOR   10 mg, Oral, Daily      SUPER B COMPLEX/C PO   1 tablet, Daily             Stop These Medications      acetaminophen 650 MG 8 hr tablet  Commonly known as: TYLENOL  Replaced by: acetaminophen 500 MG tablet              Discharge Diet: Resume prior    Activity at Discharge: Weight-bear as tolerated       Future Appointments   Date Time Provider Department Center   4/28/2025  8:00 AM Bobby Avila, PT [facility]   5/2/2025 10:30 AM Paul Whitaker MD [facility]   7/17/2025  3:30 PM Ac Gill MD [facility]   9/24/2025  1:00 PM Radha Berger MD [facility]         Dragon disclaimer:  Part of this encounter note is an electronic transcription/translation of spoken language to printed text. The electronic translation of spoken language may permit erroneous, or at times, nonsensical words or phrases to be inadvertently transcribed; Although I have reviewed the note for such errors, some may still exist.       Christina Dickson MD  04/25/25  13:18 EDT

## 2025-04-28 ENCOUNTER — TREATMENT (OUTPATIENT)
Dept: PHYSICAL THERAPY | Facility: CLINIC | Age: 80
End: 2025-04-28
Payer: MEDICARE

## 2025-04-28 DIAGNOSIS — M17.12 PRIMARY OSTEOARTHRITIS OF LEFT KNEE: Primary | ICD-10-CM

## 2025-04-28 DIAGNOSIS — G89.29 CHRONIC PAIN OF LEFT KNEE: Primary | ICD-10-CM

## 2025-04-28 DIAGNOSIS — M25.562 CHRONIC PAIN OF LEFT KNEE: Primary | ICD-10-CM

## 2025-04-28 PROCEDURE — 97161 PT EVAL LOW COMPLEX 20 MIN: CPT | Performed by: PHYSICAL THERAPIST

## 2025-04-28 PROCEDURE — 97140 MANUAL THERAPY 1/> REGIONS: CPT | Performed by: PHYSICAL THERAPIST

## 2025-04-28 PROCEDURE — 97110 THERAPEUTIC EXERCISES: CPT | Performed by: PHYSICAL THERAPIST

## 2025-04-28 NOTE — PROGRESS NOTES
Physical Therapy Initial Evaluation and Plan of Care    [facility]    [address], [address] [place] [address]    Patient: Coco Steele   : 1945  Diagnosis/ICD-10 Code:  Chronic pain of left knee [M25.562, G89.29]  Referring practitioner: Self Referring  Date of Initial Visit: 2025  Today's Date: 2025  Patient seen for 1 session         Visit Diagnoses:    ICD-10-CM ICD-9-CM   1. Chronic pain of left knee  M25.562 719.46    G89.29 338.29         Subjective Questionnaire: LEFS:       Subjective Evaluation    History of Present Illness  Date of surgery: 2025  Mechanism of injury: Pt had Left knee TKA on 25 and had one night at the hospital to monitor her blood pressure. She had a pain pump up until this morning when she took it out. She feels like she is doing very well and is having an easier time with this knee as compared to the right. She has had some moderate discomfort on the medial aspect of the left knee and behind the knee, but she denies having any pain at rest. She has been walking with her rolling walker, but she has been able to take some steps without it.     Quality of life: good    Pain  Current pain ratin  At best pain ratin  At worst pain ratin  Location: left knee  Quality: dull ache  Relieving factors: rest, medications, ice and change in position  Aggravating factors: ambulation, movement and prolonged positioning  Progression: improved    Social Support  Lives in: one-story house (one step to get into the house. Able to go up and down now without difficulty.)    Treatments  No previous or current treatments  Patient Goals  Patient goals for therapy: decreased edema, decreased pain, improved balance, increased motion, increased strength and independence with ADLs/IADLs                       -   Patient Active Problem List    Diagnosis Date Noted    Arthritis of knee, left 2025    S/P TKR (total knee replacement), left 2025     GERD (gastroesophageal reflux disease) 04/24/2025    Primary osteoarthritis of left knee 03/17/2025    Acute postoperative pain 12/19/2024    Acute blood loss anemia 12/19/2024    Arthritis of knee, right 12/18/2024    Status post total right knee replacement 12/18/2024    Primary osteoarthritis of both knees 09/09/2024    Chronic ulcerative colitis without complication 03/01/2024    Osteopenia 11/23/2020    Essential hypertension 11/23/2020    Hyperlipidemia 11/23/2020    Ulcerative pancolitis without complication 06/02/2020     Note Last Updated: 6/2/2020     Added automatically from request for surgery [id number]         -   Past Medical History:   Diagnosis Date    Abdominal pain, epigastric     Abdominal pain, left lower quadrant     Abnormal findings on diagnostic imaging of abdomen     ABFND, RADIOLOGICAL, ABDOMINAL AREA     Adenomatous colon polyp 10/02/2023    Dr Gill    Allergic     Allergic rhinitis     Arthritis     Cancer 10/2014 removed    Renal Cell carcinoma left    Chronic gout involving toe without tophus 03/01/2024    Chronic ulcerative colitis without complication     Colon polyp     Constipation     Diarrhea     Diverticulitis of colon     Diverticulosis     Encounter for Hemoccult screening     HEMOCCULT POSITIVE STOOLS     Fracture of wrist 2009    Wrist-Hairline fracture    Gastroesophageal reflux disease     esophagitis presence not specified    GERD (gastroesophageal reflux disease)     H/O mammogram 03/08/2024    Headache As long as I can remember    Herpes zoster     Hyperlipidemia     Hypertension     Irritable bowel syndrome     Knee swelling `    Nausea     Obesity     Rectal bleeding     Rectal bleeding     Regurgitation     Rotator cuff syndrome     Scoliosis     Ulcerative colitis     Wears glasses        -   Past Surgical History:   Procedure Laterality Date    CHOLECYSTECTOMY      COLONOSCOPY  09/23/2014    left side bx-minimal active inflammation - 2 yr    COLONOSCOPY   07/09/2014    active inflammation to extent of limited lower colonoscopy/45 cm    COLONOSCOPY  02/21/2013    mild inflammation in sigmoid, left-sided diverticulosis    COLONOSCOPY  03/05/2010    very tortuous colon not allowing visual of cecal base - 5 yrs    COLONOSCOPY  11/14/2003    FAIRLY TORTUOUS AND SPASMODIC COLON - 5 YR    COLONOSCOPY Left 09/28/2016    Procedure: COLONOSCOPY WITH ANESTHESIA;  Surgeon: David Alonzo DO;  Location:  [facility];  Service:     COLONOSCOPY N/A 06/11/2020    Procedure: COLONOSCOPY WITH ANESTHESIA;  Surgeon: David Alonzo DO;  Location:  [facility];  Service: Gastroenterology;  Laterality: N/A;  pre hx ulcerative colitis  post hx ulcerative colitis, diverticulosis  dr benedicto kaur    COLONOSCOPY W/ BIOPSIES  08/29/2022    COLONOSCOPY W/ BIOPSIES  08/09/2021    Dr Gill    COLONOSCOPY W/ POLYPECTOMY  10/02/2023    adenomatous polyp Dr Gill 1 yr f/u    ENDOSCOPY  03/14/2014    Normal    ENDOSCOPY  02/09/2006    MILD GASTRITIS    JOINT REPLACEMENT  12/18/2024    Right Knee replacement    KNEE SURGERY      NEPHRECTOMY PARTIAL Left 2014    OTHER SURGICAL HISTORY      Bilateral Eyelid Surgery     TONSILLECTOMY      TOTAL KNEE ARTHROPLASTY Right 12/18/2024    Procedure: TOTAL KNEE ARTHROPLASTY RIGHT;  Surgeon: Paul Whitaker MD;  Location:  [facility];  Service: Orthopedics;  Laterality: Right;    TOTAL KNEE ARTHROPLASTY Left 4/24/2025    Procedure: TOTAL KNEE ARTHROPLASTY LEFT;  Surgeon: Paul Whitaker MD;  Location:  [facility];  Service: Orthopedics;  Laterality: Left;    UPPER GASTROINTESTINAL ENDOSCOPY  03/14/2014    Dr David Alonzo    WRIST SURGERY Left         Objective          Active Range of Motion     Right Knee   Flexion: 95 degrees   Extension: 0 degrees     Strength/Myotome Testing     Left Hip   Planes of Motion   Flexion: 4  Extension: 3+  Abduction: 3+    Left Knee   Flexion: 4  Extension: 4-    Ambulation     Ambulation: Level Surfaces      Additional Level Surfaces Ambulation Details  Patient left in the clinic using a rolling walker.  She demonstrates slightly decreased wake symptoms onto the left lower extremity and a decreased terminal knee extension and flexion and late stance phase.             Assessment & Plan       Assessment  Impairments: abnormal gait, abnormal muscle tone, abnormal or restricted ROM, activity intolerance, impaired balance, impaired physical strength, lacks appropriate home exercise program, pain with function and weight-bearing intolerance   Functional limitations: carrying objects, lifting, walking, uncomfortable because of pain, sitting and standing   Assessment details: Patient is a 79 year old female who comes to physical therapy s/p left TKA resulting in pain, decreased ROM, decreased strength, and inability to perform all essential functional activities. Pt will benefit from skilled PT services to address the above issues.     Prognosis details:   SHORT TERM GOALS:  2 weeks       1. Pt independent with HEP  2. Pt to demonstrate miki hip strength 4/5 or greater to improve stability with ambulation  3. Pt to report being able to walk for 10 minutes without increasing pain in the left knee    LONG TERM GOALS:   8 weeks  1. Pt to demonstrate ability to perform full functional squat with good form and control of the knees and without increasing pain  2. Pt to demonstrate miki hip strength to 4+/5 or greater to improve safety with ambulation on uneven surfaces  3. Pt to return to work full duty without increased pain in the left knee   4. Pt to demonstrate ability to perform step up/down 8 inch step x10 safely and without pain in the left knee       Plan  Therapy options: will be seen for skilled therapy services  Planned modality interventions: cryotherapy, electrical stimulation/Russian stimulation, high voltage pulsed current (pain management), iontophoresis, microcurrent electrical stimulation, TENS, thermotherapy  (hydrocollator packs) and ultrasound  Planned therapy interventions: abdominal trunk stabilization, ADL retraining, balance/weight-bearing training, body mechanics training, flexibility, functional ROM exercises, gait training, home exercise program, IADL retraining, joint mobilization, manual therapy, motor coordination training, neuromuscular re-education, soft tissue mobilization, strengthening, stretching and therapeutic activities  Frequency: 2x week  Duration in weeks: 12  Treatment plan discussed with: patient        History # of Personal Factors and/or Comorbidities: LOW (0)  Examination of Body System(s): # of elements: LOW (1-2)  Clinical Presentation: STABLE   Clinical Decision Making: LOW       Timed:         Manual Therapy:    10     mins  48011;     Therapeutic Exercise:    28     mins  53653;     Neuromuscular Ajay:        mins  81980;    Therapeutic Activity:          mins  06368;     Gait Training:           mins  38987;     Ultrasound:          mins  77720;    Ionto                                   mins   53754  Self Care                            mins   13678  Canalith Repos         mins 00536      Un-Timed:  Electrical Stimulation:         mins  57133 (MC );  Dry Needling          mins self-pay  Traction          mins 32249  Low Eval     20     Mins  79136  Mod Eval          Mins  50487  High Eval                            Mins  75297        Timed Treatment:   38   mins   Total Treatment:     58   mins          PT: Bobby Avila PT, DPT, OCS, Cert. DN   License Number: [id number]  Electronically signed by Bobby Avila PT, 04/28/25, 8:18 AM EDT    Certification Period: 4/28/2025 thru 7/26/2025  I certify that the therapy services are furnished while this patient is under my care.  The services outlined above are required by this patient, and will be reviewed every 90 days.         Physician Signature:__________________________________________________    PHYSICIAN: Referring, Self  NPI:                                        DATE:      Please sign and return via fax to [email or web address] . Thank you, [facility].

## 2025-04-30 ENCOUNTER — TREATMENT (OUTPATIENT)
Dept: PHYSICAL THERAPY | Facility: CLINIC | Age: 80
End: 2025-04-30
Payer: MEDICARE

## 2025-04-30 DIAGNOSIS — M25.562 CHRONIC PAIN OF LEFT KNEE: Primary | ICD-10-CM

## 2025-04-30 DIAGNOSIS — G89.29 CHRONIC PAIN OF LEFT KNEE: Primary | ICD-10-CM

## 2025-04-30 PROCEDURE — 97112 NEUROMUSCULAR REEDUCATION: CPT | Performed by: PHYSICAL THERAPIST

## 2025-04-30 PROCEDURE — 97140 MANUAL THERAPY 1/> REGIONS: CPT | Performed by: PHYSICAL THERAPIST

## 2025-04-30 PROCEDURE — 97110 THERAPEUTIC EXERCISES: CPT | Performed by: PHYSICAL THERAPIST

## 2025-05-01 ENCOUNTER — TELEPHONE (OUTPATIENT)
Dept: ORTHOPEDIC SURGERY | Facility: CLINIC | Age: 80
End: 2025-05-01
Payer: MEDICARE

## 2025-05-01 NOTE — TELEPHONE ENCOUNTER
Called pt to see how she was doing 7 days s/p TKA; She reports pain and swelling seem to be under control; Has had a few PT sessions already; Denies any concerns with her incision-denies any drainage. Confirmed that she was still taking her 81mg ASA BID but she reports only taking it once a day. I instructed her to start taking it BID for the duration of the 30 days post-op. She understood.    No further action needed at this time but will follow up with Dr. Whitaker as scheduled.    Deepthi DEL ANGEL CMA ([facility]), ROT

## 2025-05-01 NOTE — PROGRESS NOTES
Physical Therapy Daily Treatment Note    [facility]   [address], [address] [place] [address]      Patient: Coco Steele   : 1945  Referring practitioner: Self Referring  Date of Initial Visit: Type: THERAPY  Noted: 2025  Today's Date: 2025  Patient seen for 2 sessions    Certification Period 2025 thru 2025       Visit Diagnoses:    ICD-10-CM ICD-9-CM   1. Chronic pain of left knee  M25.562 719.46    G89.29 338.29       Subjective     Patient reports that she is feeling more pain today than she did her nurse evaluation but she still feels that she is doing much better with the left knee than she did with the right.  Patient reports compliance with a home exercise program.    Objective   See Exercise, Manual, and Modality Logs for complete treatment.       Assessment/Plan     Patient is making steady progress with therapy and she was able to perform resisted activity in the clinic today without an exacerbation of symptoms.  Will continue to progress activity as indicated.    Coco Steele will continue to benefit from skilled physical therapy services to address deficits and continue to work towards reaching functional goals.           Timed:         Manual Therapy:    16     mins  87848;     Therapeutic Exercise:    30     mins  76988;     Neuromuscular Ajay:    10    mins  55551;    Therapeutic Activity:          mins  31016;     Gait Training:           mins  67052;     Ultrasound:          mins  73994;    Ionto                                   mins   67679  Self Care                            mins   27299  Canalith Repos         mins 36576  Electrical Stimulation:         mins  04254    Un-Timed:  Electrical Stimulation:         mins  65163 (MC );  Dry Needling          mins self-pay  Traction          mins 77825      Timed Treatment:   56   mins   Total Treatment:     56   mins    Bobby Avila, PT, DPT, Cert. DN  [place] License: [id number]

## 2025-05-02 ENCOUNTER — OFFICE VISIT (OUTPATIENT)
Dept: ORTHOPEDIC SURGERY | Facility: CLINIC | Age: 80
End: 2025-05-02
Payer: MEDICARE

## 2025-05-02 VITALS — TEMPERATURE: 97.1 F

## 2025-05-02 DIAGNOSIS — Z96.652 S/P TOTAL KNEE ARTHROPLASTY, LEFT: Primary | ICD-10-CM

## 2025-05-02 DIAGNOSIS — Z96.651 S/P TOTAL KNEE ARTHROPLASTY, RIGHT: ICD-10-CM

## 2025-05-02 NOTE — PROGRESS NOTES
Chief Complaint   Patient presents with    Post-op     1 week s/p TOTAL KNEE ARTHROPLASTY LEFT (DOS 4/24/25)           HPI  She is doing well without complaint      Vitals:    05/02/25 1009   Temp: 97.1 °F (36.2 °C)         Physical Exam:  Left knee range of motion 0-1 90.  She does have bruising.  The incision looks good with the skin glue still intact negative Homans' sign      X-RAY REPORT:  Imaging Results (Last 7 Days)       Procedure Component Value Units Date/Time    XR Knee 3+ View With Storrs Left [[id number]] Resulted: 05/02/25 1028     Updated: 05/02/25 1028    Narrative:      TKA X-Ray  Indication: status-post TKA     AP, Lateral, and Sunrise views of Left knee     Findings:  No signs of fracture  No signs of loosening  No change compared to prior study  Components are well aligned                      ICD-10-CM ICD-9-CM   1. S/P total knee arthroplasty, left  Z96.652 V43.65       Orders Placed This Encounter   Procedures    XR Knee 3+ View With Storrs Left       She is doing well.  She is having less pain than her other knee.  She will continue physical therapy with Bobby in [place].  Follow-up in 5 weeks    Paul Whitaker M.D., FAAOS  Orthopedic Surgeon  [facility]ellow[facility] Trained Sports Medicine  [facility]  Orthopedics and Sports Medicine  [address] [place] [address], [address]  [place] [address]      EMR Dragon/Transcription disclaimer:  Please note that portions of this document were completed with a voice recognition program.      At [facility], we believe that sharing information builds trust and better relationships. You are receiving this note because you are receiving care at [facility] or have recently visited. It is possible you will see health information before a provider has talked with you about it. This kind of information can be easy to misunderstand as it is a medical document. It is intended as [id number] communication. It is written in medical language and may  contain abbreviations or verbiage that are unfamiliar. It may appear blunt or direct. Medical documents are intended to carry relevant information, facts as evident, and the clinical opinion of the practitioner.  To help you fully understand what it means for your health, we urge you to discuss this note with your provider.

## 2025-05-06 ENCOUNTER — TREATMENT (OUTPATIENT)
Dept: PHYSICAL THERAPY | Facility: CLINIC | Age: 80
End: 2025-05-06
Payer: MEDICARE

## 2025-05-06 DIAGNOSIS — M25.562 CHRONIC PAIN OF LEFT KNEE: Primary | ICD-10-CM

## 2025-05-06 DIAGNOSIS — G89.29 CHRONIC PAIN OF LEFT KNEE: Primary | ICD-10-CM

## 2025-05-06 PROCEDURE — G0283 ELEC STIM OTHER THAN WOUND: HCPCS | Performed by: PHYSICAL THERAPIST

## 2025-05-06 PROCEDURE — 97112 NEUROMUSCULAR REEDUCATION: CPT | Performed by: PHYSICAL THERAPIST

## 2025-05-06 PROCEDURE — 97110 THERAPEUTIC EXERCISES: CPT | Performed by: PHYSICAL THERAPIST

## 2025-05-06 PROCEDURE — 97140 MANUAL THERAPY 1/> REGIONS: CPT | Performed by: PHYSICAL THERAPIST

## 2025-05-07 ENCOUNTER — TREATMENT (OUTPATIENT)
Dept: PHYSICAL THERAPY | Facility: CLINIC | Age: 80
End: 2025-05-07
Payer: MEDICARE

## 2025-05-07 DIAGNOSIS — G89.29 CHRONIC PAIN OF LEFT KNEE: Primary | ICD-10-CM

## 2025-05-07 DIAGNOSIS — M25.562 CHRONIC PAIN OF LEFT KNEE: Primary | ICD-10-CM

## 2025-05-07 PROCEDURE — 97110 THERAPEUTIC EXERCISES: CPT | Performed by: PHYSICAL THERAPIST

## 2025-05-07 PROCEDURE — 97112 NEUROMUSCULAR REEDUCATION: CPT | Performed by: PHYSICAL THERAPIST

## 2025-05-07 PROCEDURE — G0283 ELEC STIM OTHER THAN WOUND: HCPCS | Performed by: PHYSICAL THERAPIST

## 2025-05-07 PROCEDURE — 97140 MANUAL THERAPY 1/> REGIONS: CPT | Performed by: PHYSICAL THERAPIST

## 2025-05-08 NOTE — PROGRESS NOTES
Physical Therapy Daily Treatment Note    [facility]   [address], [address] [place] [address]      Patient: Coco Steele   : 1945  Referring practitioner: Self Referring  Date of Initial Visit: Type: THERAPY  Noted: 2025  Today's Date: 2025  Patient seen for 3 sessions    Certification Period 2025 thru 2025       Visit Diagnoses:    ICD-10-CM ICD-9-CM   1. Chronic pain of left knee  M25.562 719.46    G89.29 338.29       Subjective     Patient states that she is feeling much better today than she did last week.  She reports having less soreness generally around the knee and has been able to move the knee a little better although she still has some discomfort at the back of the left knee.    Objective   See Exercise, Manual, and Modality Logs for complete treatment.       Assessment/Plan     Continue to focus more on stretching and pain relief with treatment in the clinic today.  Patient was able perform some light isometric and active range of motion exercise without having a significant exacerbation of symptoms.    Coco Steele will continue to benefit from skilled physical therapy services to address deficits and continue to work towards reaching functional goals.           Timed:         Manual Therapy:    14     mins  72691;     Therapeutic Exercise:    15     mins  81362;     Neuromuscular Ajay:    10    mins  66738;    Therapeutic Activity:          mins  56427;     Gait Training:           mins  00630;     Ultrasound:          mins  72820;    Ionto                                   mins   69068  Self Care                            mins   66806  Canalith Repos         mins 46154  Electrical Stimulation:         mins  10517    Un-Timed:  Electrical Stimulation:    20     mins  05861 ( );  Dry Needling          mins self-pay  Traction          mins 66872      Timed Treatment:   39   mins   Total Treatment:     59   mins    Bobby Avila, PT, DPT, Cert. DN  [place]  License: [id number]

## 2025-05-08 NOTE — PROGRESS NOTES
Physical Therapy Daily Treatment Note    [facility]   [address] [facility] [address], [address] [place] [address]      Patient: Coco Steele   : 1945  Referring practitioner: Self Referring  Date of Initial Visit: Type: THERAPY  Noted: 2025  Today's Date: 2025  Patient seen for 4 sessions    Certification Period 2025 thru 2025       Visit Diagnoses:    ICD-10-CM ICD-9-CM   1. Chronic pain of left knee  M25.562 719.46    G89.29 338.29       Subjective     Patient states that she feels better today than she did yesterday and she feels that the focus of treatment was helpful in decreasing her pain.  She does continue to have some discomfort at the    Objective   See Exercise, Manual, and Modality Logs for complete treatment.     Moderate tenderness to palpation noted at the posterior left knee specifically at the area of the distal hamstrings tendons.  Patient did not have any tenderness of the calf and no excessive swelling was noted in the lower leg.    Assessment/Plan     Patient responded well to treatment today and agreeable to resume some resisted activity without an exacerbation of symptoms.  Patient demonstrated improvement in her range of motion today as well.  Will continue to progress as indicated.    Coco Steele will continue to benefit from skilled physical therapy services to address deficits and continue to work towards reaching functional goals.           Timed:         Manual Therapy:    15     mins  04001;     Therapeutic Exercise:    14     mins  09071;     Neuromuscular Ajay:    15    mins  74751;    Therapeutic Activity:          mins  89736;     Gait Training:           mins  97951;     Ultrasound:          mins  68012;    Ionto                                   mins   18135  Self Care                            mins   07730  Canalith Repos         mins 30576  Electrical Stimulation:         mins  88201    Un-Timed:  Electrical Stimulation:    20     mins  07919 (  );  Dry Needling          mins self-pay  Traction          mins 47318      Timed Treatment:   44   mins   Total Treatment:     64   mins    Bobby Avila PT, DPT, Cert. DN  [place] License: [id number]

## 2025-05-13 ENCOUNTER — TREATMENT (OUTPATIENT)
Dept: PHYSICAL THERAPY | Facility: CLINIC | Age: 80
End: 2025-05-13
Payer: MEDICARE

## 2025-05-13 DIAGNOSIS — G89.29 CHRONIC PAIN OF LEFT KNEE: Primary | ICD-10-CM

## 2025-05-13 DIAGNOSIS — M25.562 CHRONIC PAIN OF LEFT KNEE: Primary | ICD-10-CM

## 2025-05-13 PROCEDURE — 97112 NEUROMUSCULAR REEDUCATION: CPT | Performed by: PHYSICAL THERAPIST

## 2025-05-13 PROCEDURE — 97530 THERAPEUTIC ACTIVITIES: CPT | Performed by: PHYSICAL THERAPIST

## 2025-05-13 PROCEDURE — 97110 THERAPEUTIC EXERCISES: CPT | Performed by: PHYSICAL THERAPIST

## 2025-05-13 PROCEDURE — 97140 MANUAL THERAPY 1/> REGIONS: CPT | Performed by: PHYSICAL THERAPIST

## 2025-05-15 NOTE — PROGRESS NOTES
Physical Therapy Daily Treatment Note    [facility]   [address] [facility], [address] [place] [address]      Patient: Coco Steele   : 1945  Referring practitioner: Self Referring  Date of Initial Visit: Type: THERAPY  Noted: 2025  Today's Date: 2025  Patient seen for 5 sessions    Certification Period 2025 thru 2025       Visit Diagnoses:    ICD-10-CM ICD-9-CM   1. Chronic pain of left knee  M25.562 719.46    G89.29 338.29       Subjective     Patient states that she feels like she is doing well and has noticed progress over the past week or week and a half.  She has less pain with performance of exercise and feels that she is able to walk more without fatigue in the left lower extremity.    Objective   See Exercise, Manual, and Modality Logs for complete treatment.       Assessment/Plan     Patient making steady progress and she demonstrates good passive and active range of motion of the left knee into flexion and extension.  Patient demonstrates an improvement in her ability to perform resisted activity in the clinic as well.  Will continue to progress activity as indicated.    Coco Steele will continue to benefit from skilled physical therapy services to address deficits and continue to work towards reaching functional goals.           Timed:         Manual Therapy:    14     mins  65493;     Therapeutic Exercise:    15     mins  22724;     Neuromuscular Ajay:    15    mins  56987;    Therapeutic Activity:     10     mins  39398;     Gait Training:           mins  67867;     Ultrasound:          mins  33781;    Ionto                                   mins   18779  Self Care                            mins   01626  Canalith Repos         mins 17524  Electrical Stimulation:         mins  08513    Un-Timed:  Electrical Stimulation:         mins  41261 ( );  Dry Needling          mins self-pay  Traction          mins 30491      Timed Treatment:   54   mins   Total  Treatment:     54   mins    Bobby Avila, PT, DPT, Cert. DN  [place] License: [id number]

## 2025-05-16 ENCOUNTER — TREATMENT (OUTPATIENT)
Dept: PHYSICAL THERAPY | Facility: CLINIC | Age: 80
End: 2025-05-16
Payer: MEDICARE

## 2025-05-16 DIAGNOSIS — M25.562 CHRONIC PAIN OF LEFT KNEE: Primary | ICD-10-CM

## 2025-05-16 DIAGNOSIS — G89.29 CHRONIC PAIN OF LEFT KNEE: Primary | ICD-10-CM

## 2025-05-16 PROCEDURE — 97112 NEUROMUSCULAR REEDUCATION: CPT | Performed by: PHYSICAL THERAPIST

## 2025-05-16 PROCEDURE — 97110 THERAPEUTIC EXERCISES: CPT | Performed by: PHYSICAL THERAPIST

## 2025-05-16 PROCEDURE — 97530 THERAPEUTIC ACTIVITIES: CPT | Performed by: PHYSICAL THERAPIST

## 2025-05-16 PROCEDURE — 97140 MANUAL THERAPY 1/> REGIONS: CPT | Performed by: PHYSICAL THERAPIST

## 2025-05-19 ENCOUNTER — TREATMENT (OUTPATIENT)
Dept: PHYSICAL THERAPY | Facility: CLINIC | Age: 80
End: 2025-05-19
Payer: MEDICARE

## 2025-05-19 DIAGNOSIS — G89.29 CHRONIC PAIN OF LEFT KNEE: Primary | ICD-10-CM

## 2025-05-19 DIAGNOSIS — M25.562 CHRONIC PAIN OF LEFT KNEE: Primary | ICD-10-CM

## 2025-05-19 PROCEDURE — 97530 THERAPEUTIC ACTIVITIES: CPT | Performed by: PHYSICAL THERAPIST

## 2025-05-19 PROCEDURE — 97110 THERAPEUTIC EXERCISES: CPT | Performed by: PHYSICAL THERAPIST

## 2025-05-19 PROCEDURE — 97140 MANUAL THERAPY 1/> REGIONS: CPT | Performed by: PHYSICAL THERAPIST

## 2025-05-19 PROCEDURE — 97112 NEUROMUSCULAR REEDUCATION: CPT | Performed by: PHYSICAL THERAPIST

## 2025-05-22 NOTE — PROGRESS NOTES
Physical Therapy Daily Treatment Note    [facility]   [address] [facility], [address] [place] [address]      Patient: Coco Steele   : 1945  Referring practitioner: Self Referring  Date of Initial Visit: Type: THERAPY  Noted: 2025  Today's Date: 2025  Patient seen for 7 sessions    Certification Period 2025 thru 2025       Visit Diagnoses:    ICD-10-CM ICD-9-CM   1. Chronic pain of left knee  M25.562 719.46    G89.29 338.29       Subjective     Patient states that she feels like she is doing well and she was able to stand and walk for longer periods of time over the weekend without a significant exacerbation of symptoms.  She reports compliance with her home exercise program.    Objective   See Exercise, Manual, and Modality Logs for complete treatment.       Assessment/Plan     Patient is making steady progress with therapy and she has been able to maintain good range of motion of the left knee into flexion and extension.  Patient has been able to progress resistive activity as well without having any significant exacerbation of pain.  Will continue to progress activity as indicated.    Coco Steele will continue to benefit from skilled physical therapy services to address deficits and continue to work towards reaching functional goals.           Timed:         Manual Therapy:    12     mins  09735;     Therapeutic Exercise:    14     mins  49993;     Neuromuscular Ajay:    15    mins  36285;    Therapeutic Activity:     15     mins  82095;     Gait Training:           mins  69087;     Ultrasound:          mins  26242;    Ionto                                   mins   06456  Self Care                            mins   55044  Canalith Repos         mins 37795  Electrical Stimulation:         mins  23922    Un-Timed:  Electrical Stimulation:         mins  53484 ( );  Dry Needling          mins self-pay  Traction          mins 70668      Timed Treatment:   56   mins   Total  Treatment:     56   mins    Bobby Avila, PT, DPT, Cert. DN  [place] License: [id number]

## 2025-05-22 NOTE — PROGRESS NOTES
Physical Therapy Daily Treatment Note    [facility]   [address] [facility], [address] [place] [address]      Patient: Coco Steele   : 1945  Referring practitioner: Self Referring  Date of Initial Visit: Type: THERAPY  Noted: 2025  Today's Date: 2025  Patient seen for 6 sessions    Certification Period 2025 thru 2025       Visit Diagnoses:    ICD-10-CM ICD-9-CM   1. Chronic pain of left knee  M25.562 719.46    G89.29 338.29       Subjective     Patient states that she feels like she is doing well and she feels more stable with being up and walking for longer distances.  Patient denies having any significant pain after her therapy sessions.  She reports compliance with home exercise program.    Objective   See Exercise, Manual, and Modality Logs for complete treatment.       Assessment/Plan     Patient is making steady progress and she has been able to maintain good range of motion of the left knee into flexion and extension.  Patient has been able to progress resisted and functional activity in the clinic without having any significant exacerbation of pain.  Will continue to progress activity as indicated.    Coco Steele will continue to benefit from skilled physical therapy services to address deficits and continue to work towards reaching functional goals.           Timed:         Manual Therapy:    12     mins  37917;     Therapeutic Exercise:    14     mins  20205;     Neuromuscular Ajay:    15    mins  18130;    Therapeutic Activity:     15     mins  57062;     Gait Training:           mins  24807;     Ultrasound:          mins  46572;    Ionto                                   mins   28349  Self Care                            mins   03632  Canalith Repos         mins 43930  Electrical Stimulation:         mins  82499    Un-Timed:  Electrical Stimulation:         mins  88459 ( );  Dry Needling          mins self-pay  Traction          mins 83231      Timed Treatment:    56   mins   Total Treatment:     56   mins    Bobby Avila PT, DPT, Cert. DN  [place] License: [id number]

## 2025-05-23 ENCOUNTER — TREATMENT (OUTPATIENT)
Dept: PHYSICAL THERAPY | Facility: CLINIC | Age: 80
End: 2025-05-23
Payer: MEDICARE

## 2025-05-23 DIAGNOSIS — M25.562 CHRONIC PAIN OF LEFT KNEE: Primary | ICD-10-CM

## 2025-05-23 DIAGNOSIS — G89.29 CHRONIC PAIN OF LEFT KNEE: Primary | ICD-10-CM

## 2025-05-23 PROCEDURE — 97110 THERAPEUTIC EXERCISES: CPT | Performed by: PHYSICAL THERAPIST

## 2025-05-23 PROCEDURE — 97112 NEUROMUSCULAR REEDUCATION: CPT | Performed by: PHYSICAL THERAPIST

## 2025-05-23 PROCEDURE — 97140 MANUAL THERAPY 1/> REGIONS: CPT | Performed by: PHYSICAL THERAPIST

## 2025-05-23 PROCEDURE — 97530 THERAPEUTIC ACTIVITIES: CPT | Performed by: PHYSICAL THERAPIST

## 2025-05-27 ENCOUNTER — TREATMENT (OUTPATIENT)
Dept: PHYSICAL THERAPY | Facility: CLINIC | Age: 80
End: 2025-05-27
Payer: MEDICARE

## 2025-05-27 DIAGNOSIS — M25.562 CHRONIC PAIN OF LEFT KNEE: Primary | ICD-10-CM

## 2025-05-27 DIAGNOSIS — G89.29 CHRONIC PAIN OF LEFT KNEE: Primary | ICD-10-CM

## 2025-05-27 PROCEDURE — 97110 THERAPEUTIC EXERCISES: CPT | Performed by: PHYSICAL THERAPIST

## 2025-05-27 PROCEDURE — 97530 THERAPEUTIC ACTIVITIES: CPT | Performed by: PHYSICAL THERAPIST

## 2025-05-27 PROCEDURE — 97112 NEUROMUSCULAR REEDUCATION: CPT | Performed by: PHYSICAL THERAPIST

## 2025-05-27 PROCEDURE — 97140 MANUAL THERAPY 1/> REGIONS: CPT | Performed by: PHYSICAL THERAPIST

## 2025-05-29 NOTE — PROGRESS NOTES
Physical Therapy Daily Treatment Note    [facility]   [address] [facility], [address] [place] [address]      Patient: Coco Steele   : 1945  Referring practitioner: Self Referring  Date of Initial Visit: Type: THERAPY  Noted: 2025  Today's Date: 2025  Patient seen for 8 sessions    Certification Period 2025 thru 2025       Visit Diagnoses:    ICD-10-CM ICD-9-CM   1. Chronic pain of left knee  M25.562 719.46    G89.29 338.29       Subjective     Patient states that she feels like she is making steady progress with therapy and she feels that the left knee is progressing much quicker than the right.  She reports compliance with her home exercise program and she has been working on standing and walking for longer periods of time.    Objective   See Exercise, Manual, and Modality Logs for complete treatment.       Assessment/Plan     Patient demonstrates very good passive and active range of motion of the left knee into flexion and extension.  Patient has been able to perform resisted activity to clinic without any significant limitation and she demonstrates good quad muscle activation.    Coco Steele will continue to benefit from skilled physical therapy services to address deficits and continue to work towards reaching functional goals.           Timed:         Manual Therapy:    14     mins  41081;     Therapeutic Exercise:    15     mins  28783;     Neuromuscular Ajay:    15    mins  50672;    Therapeutic Activity:     15     mins  43031;     Gait Training:           mins  40485;     Ultrasound:          mins  27270;    Ionto                                   mins   01803  Self Care                            mins   66958  Canalith Repos         mins 84928  Electrical Stimulation:         mins  35745    Un-Timed:  Electrical Stimulation:         mins  28338 ( );  Dry Needling          mins self-pay  Traction          mins 78864      Timed Treatment:   59   mins   Total  Treatment:     59   mins    Bobby Avila, PT, DPT, Cert. DN  [place] License: [id number]

## 2025-05-29 NOTE — PROGRESS NOTES
Physical Therapy Daily Treatment Note    [facility]   [address] [place] [address]      Patient: Coco Steele   : 1945  Referring practitioner: Self Referring  Date of Initial Visit: Type: THERAPY  Noted: 2025  Today's Date: 2025  Patient seen for 9 sessions    Certification Period 2025 thru 2025       Visit Diagnoses:    ICD-10-CM ICD-9-CM   1. Chronic pain of left knee  M25.562 719.46    G89.29 338.29       Subjective     Patient states that she feels like she is doing well she had a good weekend and reported having minimal pain with prolonged walking standing.  Patient reports compliance with home exercise program.    Objective   See Exercise, Manual, and Modality Logs for complete treatment.       Assessment/Plan     Patient is progressing well with therapy and she has been able to maintain excellent range of motion of the left knee into flexion extension.  Patient is making steady progress in regards to increasing the intensity of resisted activity in the clinic.  Will continue to progress as indicated.    Coco Steele will continue to benefit from skilled physical therapy services to address deficits and continue to work towards reaching functional goals.           Timed:         Manual Therapy:    12     mins  40603;     Therapeutic Exercise:    10     mins  32619;     Neuromuscular Ajay:    15    mins  72365;    Therapeutic Activity:     16     mins  82003;     Gait Training:           mins  00505;     Ultrasound:          mins  34755;    Ionto                                   mins   34337  Self Care                            mins   82553  Canalith Repos         mins 70142  Electrical Stimulation:         mins  57457    Un-Timed:  Electrical Stimulation:         mins  31138 (MC );  Dry Needling          mins self-pay  Traction          mins 69996      Timed Treatment:   53   mins   Total Treatment:     53   mins    Bobby Avila PT, DPT, Cert.  [place] License: [id number]

## 2025-05-30 ENCOUNTER — TREATMENT (OUTPATIENT)
Dept: PHYSICAL THERAPY | Facility: CLINIC | Age: 80
End: 2025-05-30
Payer: MEDICARE

## 2025-05-30 DIAGNOSIS — M25.562 CHRONIC PAIN OF LEFT KNEE: Primary | ICD-10-CM

## 2025-05-30 DIAGNOSIS — G89.29 CHRONIC PAIN OF LEFT KNEE: Primary | ICD-10-CM

## 2025-05-30 PROCEDURE — 97110 THERAPEUTIC EXERCISES: CPT | Performed by: PHYSICAL THERAPIST

## 2025-05-30 PROCEDURE — 97530 THERAPEUTIC ACTIVITIES: CPT | Performed by: PHYSICAL THERAPIST

## 2025-05-30 PROCEDURE — 97112 NEUROMUSCULAR REEDUCATION: CPT | Performed by: PHYSICAL THERAPIST

## 2025-05-30 PROCEDURE — 97140 MANUAL THERAPY 1/> REGIONS: CPT | Performed by: PHYSICAL THERAPIST

## 2025-05-31 NOTE — PROGRESS NOTES
Physical Therapy Daily Treatment Note    [facility]   [address], [address] [place] [address]      Patient: Coco Steele   : 1945  Referring practitioner: Self Referring  Date of Initial Visit: Type: THERAPY  Noted: 2025  Today's Date: 2025  Patient seen for 10 sessions    Certification Period 2025 thru 2025       Visit Diagnoses:    ICD-10-CM ICD-9-CM   1. Chronic pain of left knee  M25.562 719.46    G89.29 338.29       Subjective     Patient states that she feels like she is doing well and she has had significantly less pain over the past week.  She has been able to walk for longer periods of time and she reports compliance with home exercise program.    Objective   See Exercise, Manual, and Modality Logs for complete treatment.       Assessment/Plan     Patient is progressing very well and she demonstrates an improvement in her tolerance to activity, including standing and functional tasks.  Will continue to progress activity as indicated.    Coco Steele will continue to benefit from skilled physical therapy services to address deficits and continue to work towards reaching functional goals.           Timed:         Manual Therapy:    10     mins  97163;     Therapeutic Exercise:    14     mins  29439;     Neuromuscular Ajay:    15    mins  65598;    Therapeutic Activity:     15     mins  51826;     Gait Training:           mins  97274;     Ultrasound:          mins  29418;    Ionto                                   mins   51534  Self Care                            mins   65895  Canalith Repos         mins 53064  Electrical Stimulation:         mins  50259    Un-Timed:  Electrical Stimulation:         mins  82144 ( );  Dry Needling          mins self-pay  Traction          mins 88891      Timed Treatment:   54   mins   Total Treatment:     54   mins    Bobby Avila, PT, DPT, Cert. DN  [place] License: [id number]

## 2025-06-03 ENCOUNTER — TREATMENT (OUTPATIENT)
Dept: PHYSICAL THERAPY | Facility: CLINIC | Age: 80
End: 2025-06-03
Payer: MEDICARE

## 2025-06-03 DIAGNOSIS — G89.29 CHRONIC PAIN OF LEFT KNEE: Primary | ICD-10-CM

## 2025-06-03 DIAGNOSIS — M25.562 CHRONIC PAIN OF LEFT KNEE: Primary | ICD-10-CM

## 2025-06-03 PROCEDURE — 97112 NEUROMUSCULAR REEDUCATION: CPT | Performed by: PHYSICAL THERAPIST

## 2025-06-03 PROCEDURE — 97110 THERAPEUTIC EXERCISES: CPT | Performed by: PHYSICAL THERAPIST

## 2025-06-03 PROCEDURE — 97140 MANUAL THERAPY 1/> REGIONS: CPT | Performed by: PHYSICAL THERAPIST

## 2025-06-03 PROCEDURE — 97530 THERAPEUTIC ACTIVITIES: CPT | Performed by: PHYSICAL THERAPIST

## 2025-06-03 NOTE — PROGRESS NOTES
Physical Therapy Daily Treatment Note    [facility]   [address] [place] [address]      Patient: Coco Steele   : 1945  Referring practitioner: Self Referring  Date of Initial Visit: Type: THERAPY  Noted: 2025  Today's Date: 6/3/2025  Patient seen for 11 sessions    Certification Period 6/3/2025 thru 2025       Visit Diagnoses:    ICD-10-CM ICD-9-CM   1. Chronic pain of left knee  M25.562 719.46    G89.29 338.29       Subjective     Pt states that she feels like she is doing well and she denies any increase in pain with exercise in the clinic. She has intermittent tightness around the left knee and soreness in the muscles of the left leg, but denies any knee joint pain at this time.     Objective   See Exercise, Manual, and Modality Logs for complete treatment.       Assessment/Plan     Pt contiues to respond well to treatment and she is able to perform resisted activity without exacerbation of pain or excessive fatigue. Will cont to progress activity as indicated.      Coco Steele will continue to benefit from skilled physical therapy services to address deficits and continue to work towards reaching functional goals.           Timed:         Manual Therapy:    10     mins  39268;     Therapeutic Exercise:    14     mins  00980;     Neuromuscular Ajay:    15    mins  43432;    Therapeutic Activity:     15     mins  02057;     Gait Training:           mins  06331;     Ultrasound:          mins  39111;    Ionto                                   mins   37257  Self Care                            mins   60536  Canalith Repos         mins 75349  Electrical Stimulation:         mins  98218    Un-Timed:  Electrical Stimulation:         mins  56268 ( );  Dry Needling          mins self-pay  Traction          mins 09623      Timed Treatment:   54   mins   Total Treatment:     54   mins    Bobby Avila, PT, DPT, Cert. DN  [place] License: [id number]

## 2025-06-06 ENCOUNTER — OFFICE VISIT (OUTPATIENT)
Dept: ORTHOPEDIC SURGERY | Facility: CLINIC | Age: 80
End: 2025-06-06
Payer: MEDICARE

## 2025-06-06 DIAGNOSIS — Z96.652 S/P TOTAL KNEE ARTHROPLASTY, LEFT: Primary | ICD-10-CM

## 2025-06-06 NOTE — PROGRESS NOTES
Chief Complaint   Patient presents with    Post-op     5 week follow up -- 2.5 months s/p TOTAL KNEE ARTHROPLASTY LEFT (DOS 4/24/25)           HPI    She is doing well status post total knee arthroplasty.  She is 6 weeks postop.  She is doing well.    There were no vitals filed for this visit.      Physical Exam:    Left knee range of motion 0-1 20.  Incision looks good.  No erythema        ICD-10-CM ICD-9-CM   1. S/P total knee arthroplasty, left  Z96.652 V43.65       Orders Placed This Encounter   Procedures    Ambulatory Referral to Physical Therapy for Evaluation & Treatment     She will continue physical therapy at [facility].  Follow-up in 6 weeks with x-rays.        Paul Whitaker M.D., [facility]  Orthopedic Surgeon  Fellowship Trained Sports Medicine  [facility]  Orthopedics and Sports Medicine  [address] [place] [address], [address]  [place] [address]      EMR Dragon/Transcription disclaimer:  Please note that portions of this document were completed with a voice recognition program.      At [facility], we believe that sharing information builds trust and better relationships. You are receiving this note because you are receiving care at [facility] or have recently visited. It is possible you will see health information before a provider has talked with you about it. This kind of information can be easy to misunderstand as it is a medical document. It is intended as pzpq-xh-mlth communication. It is written in medical language and may contain abbreviations or verbiage that are unfamiliar. It may appear blunt or direct. Medical documents are intended to carry relevant information, facts as evident, and the clinical opinion of the practitioner.  To help you fully understand what it means for your health, we urge you to discuss this note with your provider.

## 2025-06-09 ENCOUNTER — TREATMENT (OUTPATIENT)
Dept: PHYSICAL THERAPY | Facility: CLINIC | Age: 80
End: 2025-06-09
Payer: MEDICARE

## 2025-06-09 DIAGNOSIS — M25.562 CHRONIC PAIN OF LEFT KNEE: Primary | ICD-10-CM

## 2025-06-09 DIAGNOSIS — G89.29 CHRONIC PAIN OF LEFT KNEE: Primary | ICD-10-CM

## 2025-06-09 PROCEDURE — 97140 MANUAL THERAPY 1/> REGIONS: CPT | Performed by: PHYSICAL THERAPIST

## 2025-06-09 PROCEDURE — 97112 NEUROMUSCULAR REEDUCATION: CPT | Performed by: PHYSICAL THERAPIST

## 2025-06-09 PROCEDURE — 97110 THERAPEUTIC EXERCISES: CPT | Performed by: PHYSICAL THERAPIST

## 2025-06-09 PROCEDURE — 97530 THERAPEUTIC ACTIVITIES: CPT | Performed by: PHYSICAL THERAPIST

## 2025-06-11 NOTE — PROGRESS NOTES
Physical Therapy Daily Treatment Note    [facility]   [address] [facility], [address] [place] [address]      Patient: Coco Steele   : 1945  Referring practitioner: Self Referring  Date of Initial Visit: Type: THERAPY  Noted: 2025  Today's Date: 2025  Patient seen for 12 sessions    Certification Period 2025 thru 2025       Visit Diagnoses:    ICD-10-CM ICD-9-CM   1. Chronic pain of left knee  M25.562 719.46    G89.29 338.29       Subjective     Pt states that she feels like she is doing very well and she is continue to make steady progress with therapy.  She feels much more stable when up and walking without the use of an assistive device and she is able to stand walk for longer periods of time.    Objective   See Exercise, Manual, and Modality Logs for complete treatment.       Assessment/Plan     Patient is making excellent progress with therapy and she demonstrates improvement in her overall strength and tolerance to activity.  Patient has been able to maintain good range of motion of the right knee between therapy sessions as well.  Will be able to progress to more functional activity in the clinic including step ups and prolonged standing.  Will continue to progress activity as indicated.    Coco Steele will continue to benefit from skilled physical therapy services to address deficits and continue to work towards reaching functional goals.           Timed:         Manual Therapy:    14     mins  82887;     Therapeutic Exercise:    10     mins  39614;     Neuromuscular Ajay:    14    mins  12926;    Therapeutic Activity:     15     mins  10527;     Gait Training:           mins  30450;     Ultrasound:          mins  75062;    Ionto                                   mins   65446  Self Care                            mins   36798  Canalith Repos         mins 84619  Electrical Stimulation:         mins  50094    Un-Timed:  Electrical Stimulation:         mins  46834 (  );  Dry Needling          mins self-pay  Traction          mins 39125      Timed Treatment:   53   mins   Total Treatment:     53   mins    Bobby Avila PT, DPT, Cert. DN  [place] License: [id number]

## 2025-06-12 ENCOUNTER — TREATMENT (OUTPATIENT)
Dept: PHYSICAL THERAPY | Facility: CLINIC | Age: 80
End: 2025-06-12
Payer: MEDICARE

## 2025-06-12 DIAGNOSIS — M25.562 CHRONIC PAIN OF LEFT KNEE: Primary | ICD-10-CM

## 2025-06-12 DIAGNOSIS — G89.29 CHRONIC PAIN OF LEFT KNEE: Primary | ICD-10-CM

## 2025-06-12 PROCEDURE — 97110 THERAPEUTIC EXERCISES: CPT | Performed by: PHYSICAL THERAPIST

## 2025-06-12 PROCEDURE — 97112 NEUROMUSCULAR REEDUCATION: CPT | Performed by: PHYSICAL THERAPIST

## 2025-06-12 PROCEDURE — 97530 THERAPEUTIC ACTIVITIES: CPT | Performed by: PHYSICAL THERAPIST

## 2025-06-12 PROCEDURE — 97140 MANUAL THERAPY 1/> REGIONS: CPT | Performed by: PHYSICAL THERAPIST

## 2025-06-13 NOTE — PROGRESS NOTES
Physical Therapy Daily Treatment Note    [facility]   [address], [address] [place] [address]      Patient: Coco Steele   : 1945  Referring practitioner: Self Referring  Date of Initial Visit: Type: THERAPY  Noted: 2025  Today's Date: 2025  Patient seen for 13 sessions    Certification Period 2025 thru 2025       Visit Diagnoses:    ICD-10-CM ICD-9-CM   1. Chronic pain of left knee  M25.562 719.46    G89.29 338.29       Subjective     Patient states that she feels like she is doing very well and she is continue to notice improvement in her left knee range of motion.  Patient is able to perform all exercise at home and feels more confident with prolonged standing and walking.    Objective   See Exercise, Manual, and Modality Logs for complete treatment.       Assessment/Plan     Patient is progressing well with therapy and she demonstrates an improvement in her overall tolerance to activity and strength.  Will continue to progress activity as indicated    Coco Steele will continue to benefit from skilled physical therapy services to address deficits and continue to work towards reaching functional goals.           Timed:         Manual Therapy:    10     mins  52233;     Therapeutic Exercise:    14     mins  66121;     Neuromuscular Ajay:    15    mins  79767;    Therapeutic Activity:     15     mins  56729;     Gait Training:           mins  23230;     Ultrasound:          mins  07180;    Ionto                                   mins   06297  Self Care                            mins   67728  Canalith Repos         mins 80544  Electrical Stimulation:         mins  51586    Un-Timed:  Electrical Stimulation:         mins  98853 ( );  Dry Needling          mins self-pay  Traction          mins 17855      Timed Treatment:   54   mins   Total Treatment:     54   mins    Bobby Avila, PT, DPT, Cert. DN  [place] License: [id number]

## 2025-06-18 ENCOUNTER — TREATMENT (OUTPATIENT)
Dept: PHYSICAL THERAPY | Facility: CLINIC | Age: 80
End: 2025-06-18
Payer: MEDICARE

## 2025-06-18 DIAGNOSIS — G89.29 CHRONIC PAIN OF LEFT KNEE: Primary | ICD-10-CM

## 2025-06-18 DIAGNOSIS — M25.562 CHRONIC PAIN OF LEFT KNEE: Primary | ICD-10-CM

## 2025-06-18 PROCEDURE — 97530 THERAPEUTIC ACTIVITIES: CPT | Performed by: PHYSICAL THERAPIST

## 2025-06-18 PROCEDURE — 97110 THERAPEUTIC EXERCISES: CPT | Performed by: PHYSICAL THERAPIST

## 2025-06-18 PROCEDURE — 97112 NEUROMUSCULAR REEDUCATION: CPT | Performed by: PHYSICAL THERAPIST

## 2025-06-18 PROCEDURE — 97140 MANUAL THERAPY 1/> REGIONS: CPT | Performed by: PHYSICAL THERAPIST

## 2025-06-20 ENCOUNTER — TREATMENT (OUTPATIENT)
Dept: PHYSICAL THERAPY | Facility: CLINIC | Age: 80
End: 2025-06-20
Payer: MEDICARE

## 2025-06-20 DIAGNOSIS — G89.29 CHRONIC PAIN OF LEFT KNEE: Primary | ICD-10-CM

## 2025-06-20 DIAGNOSIS — M25.562 CHRONIC PAIN OF LEFT KNEE: Primary | ICD-10-CM

## 2025-06-20 PROCEDURE — 97112 NEUROMUSCULAR REEDUCATION: CPT | Performed by: PHYSICAL THERAPIST

## 2025-06-20 PROCEDURE — 97530 THERAPEUTIC ACTIVITIES: CPT | Performed by: PHYSICAL THERAPIST

## 2025-06-20 PROCEDURE — 97140 MANUAL THERAPY 1/> REGIONS: CPT | Performed by: PHYSICAL THERAPIST

## 2025-06-20 PROCEDURE — 97110 THERAPEUTIC EXERCISES: CPT | Performed by: PHYSICAL THERAPIST

## 2025-06-20 NOTE — PROGRESS NOTES
Physical Therapy Daily Treatment Note    [facility]   [address], [address] [place] [address]      Patient: Coco Steele   : 1945  Referring practitioner: Self Referring  Date of Initial Visit: Type: THERAPY  Noted: 2025  Today's Date: 2025  Patient seen for 15 sessions    Certification Period 2025 thru 2025       Visit Diagnoses:    ICD-10-CM ICD-9-CM   1. Chronic pain of left knee  M25.562 719.46    G89.29 338.29       Subjective     Patient states that she feels like she is doing very well and that she is continuing to make progress with her left knee.  She denies having any exacerbation of symptoms with performance of exercise at home and has not had any excessive soreness with prolonged standing and walking    Objective   See Exercise, Manual, and Modality Logs for complete treatment.     Left knee flexion passive range of motion -115*    Assessment/Plan     Patient has progressed very well and she demonstrates a significant improvement in her overall strength and ability to perform functional activities and to perform standing exercise for prolonged periods of time.  At this point we will adjust patient's visit frequency and we will plan on seeing her 2 times over the next 3 weeks with discharge.    Coco Steele will continue to benefit from skilled physical therapy services to address deficits and continue to work towards reaching functional goals.           Timed:         Manual Therapy:    12     mins  40766;     Therapeutic Exercise:    14     mins  25959;     Neuromuscular Ajay:    15    mins  20510;    Therapeutic Activity:     15     mins  62979;     Gait Training:           mins  55199;     Ultrasound:          mins  32758;    Ionto                                   mins   54501  Self Care                            mins   52490  Canalith Repos         mins 82055  Electrical Stimulation:         mins  95223    Un-Timed:  Electrical Stimulation:         mins   84255 ( );  Dry Needling          mins self-pay  Traction          mins 04303      Timed Treatment:   56   mins   Total Treatment:     56   mins    Bobby Avila PT, DPT, Cert. DN  [place] License: [id number]

## 2025-06-20 NOTE — PROGRESS NOTES
Physical Therapy Daily Treatment Note    [facility]   [address], [address] [place] [address]      Patient: Coco Steele   : 1945  Referring practitioner: Self Referring  Date of Initial Visit: Type: THERAPY  Noted: 2025  Today's Date: 2025  Patient seen for 14 sessions    Certification Period 2025 thru 2025       Visit Diagnoses:    ICD-10-CM ICD-9-CM   1. Chronic pain of left knee  M25.562 719.46    G89.29 338.29       Subjective     Patient states that she feels like she is continue to make regular progress and she reports being able to do more exercise at home and more activity around her house.  Patient feels some occasional soreness around the left knee but denies any significant limiting pain.    Objective   See Exercise, Manual, and Modality Logs for complete treatment.       Assessment/Plan     Patient is continue to make steady progress with therapy and she demonstrates excellent passive and active range of motion of the left knee into flexion and extension.  Patient has improved her ability to perform functional and standing activity in the clinic as well.  Will continue to progress functional activity to improve overall safety with ADLs and community ambulation.    Coco Steele will continue to benefit from skilled physical therapy services to address deficits and continue to work towards reaching functional goals.           Timed:         Manual Therapy:    10     mins  35803;     Therapeutic Exercise:    14     mins  03797;     Neuromuscular Ajay:    15    mins  86523;    Therapeutic Activity:     15     mins  30458;     Gait Training:           mins  09894;     Ultrasound:          mins  77909;    Ionto                                   mins   57152  Self Care                            mins   57985  Canalith Repos         mins 53069  Electrical Stimulation:         mins  43886    Un-Timed:  Electrical Stimulation:         mins  97415 ( );  Dry  Needling          mins self-pay  Traction          mins 43117      Timed Treatment:   54   mins   Total Treatment:     54   mins    Bobby Avila PT, DPT, Cert. DN  [place] License: [id number]

## 2025-06-27 ENCOUNTER — TREATMENT (OUTPATIENT)
Dept: PHYSICAL THERAPY | Facility: CLINIC | Age: 80
End: 2025-06-27
Payer: MEDICARE

## 2025-06-27 DIAGNOSIS — G89.29 CHRONIC PAIN OF LEFT KNEE: Primary | ICD-10-CM

## 2025-06-27 DIAGNOSIS — M25.562 CHRONIC PAIN OF LEFT KNEE: Primary | ICD-10-CM

## 2025-06-27 PROCEDURE — 97530 THERAPEUTIC ACTIVITIES: CPT | Performed by: PHYSICAL THERAPIST

## 2025-06-27 PROCEDURE — 97112 NEUROMUSCULAR REEDUCATION: CPT | Performed by: PHYSICAL THERAPIST

## 2025-06-27 PROCEDURE — 97110 THERAPEUTIC EXERCISES: CPT | Performed by: PHYSICAL THERAPIST

## 2025-06-27 PROCEDURE — 97140 MANUAL THERAPY 1/> REGIONS: CPT | Performed by: PHYSICAL THERAPIST

## 2025-06-30 NOTE — PROGRESS NOTES
Physical Therapy Daily Treatment Note    [facility]   [address], [address] [place] [address]      Patient: Coco Steele   : 1945  Referring practitioner: Self Referring  Date of Initial Visit: Type: THERAPY  Noted: 2025  Today's Date: 2025  Patient seen for 16 sessions    Certification Period 2025 thru 2025       Visit Diagnoses:    ICD-10-CM ICD-9-CM   1. Chronic pain of left knee  M25.562 719.46    G89.29 338.29       Subjective     Patient states that she feels like she is doing very well and she feels that she is not limited with any activity because of her left knee.  She reports compliance with stretching and exercise at home and she feels safe when up and walking for longer periods of time.    Objective          Active Range of Motion   Left Knee   Flexion: 120 degrees   Extension: 0 degrees     Strength/Myotome Testing     Left Knee   Flexion: 5  Extension: 5      See Exercise, Manual, and Modality Logs for complete treatment.         Assessment/Plan     Pt has progressed well with therapy and she demonstrates an improvement in her left knee strength and her ability to perform functional and resisted activity.  Patient has been able to maintain excellent range of motion of the left knee and she demonstrates good understanding of her home exercise program and how to safely progress activity.  At this time patient is appropriate for discharge but she was struck to contact me if she has any issues in the coming weeks.          Timed:         Manual Therapy:    8     mins  94716;     Therapeutic Exercise:    14     mins  35051;     Neuromuscular Ajay:    15    mins  57226;    Therapeutic Activity:     17     mins  92641;     Gait Training:           mins  40463;     Ultrasound:          mins  89063;    Ionto                                   mins   72866  Self Care                            mins   38938  Canalith Repos         mins 11019  Electrical Stimulation:          mins  31054    Un-Timed:  Electrical Stimulation:         mins  45187 ( );  Dry Needling          mins self-pay  Traction          mins 24465      Timed Treatment:   54   mins   Total Treatment:     54   mins    Bobby Avila PT, DPT, Cert. DN  [place] License: [id number]

## 2025-07-01 ENCOUNTER — HOSPITAL ENCOUNTER (OUTPATIENT)
Dept: MAMMOGRAPHY | Facility: HOSPITAL | Age: 80
Discharge: HOME | End: 2025-07-01
Payer: MEDICARE

## 2025-07-01 ENCOUNTER — HOSPITAL ENCOUNTER (OUTPATIENT)
Dept: BONE DENSITY | Facility: HOSPITAL | Age: 80
Discharge: HOME | End: 2025-07-01
Payer: MEDICARE

## 2025-07-01 DIAGNOSIS — Z78.0 MENOPAUSE: ICD-10-CM

## 2025-07-01 DIAGNOSIS — Z12.31 ENCOUNTER FOR SCREENING MAMMOGRAM FOR MALIGNANT NEOPLASM OF BREAST: ICD-10-CM

## 2025-07-01 PROCEDURE — 77080 DXA BONE DENSITY AXIAL: CPT

## 2025-07-01 PROCEDURE — 77063 BREAST TOMOSYNTHESIS BI: CPT

## 2025-07-01 PROCEDURE — 77067 SCR MAMMO BI INCL CAD: CPT

## 2025-07-18 ENCOUNTER — OFFICE VISIT (OUTPATIENT)
Dept: ORTHOPEDIC SURGERY | Facility: CLINIC | Age: 80
End: 2025-07-18
Payer: MEDICARE

## 2025-07-18 DIAGNOSIS — Z96.652 S/P TOTAL KNEE ARTHROPLASTY, LEFT: Primary | ICD-10-CM

## 2025-07-18 NOTE — PROGRESS NOTES
Chief Complaint   Patient presents with    Follow-up     6 week follow up - s/p TOTAL KNEE ARTHROPLASTY LEFT (DOS 4/24/25)           HPI    She is doing great.  She is very pleased with both knees.    There were no vitals filed for this visit.      Physical Exam:    Both knees have full range of motion 0 to 120 degrees.  Incision is healed.    X-RAY REPORT:  Imaging Results (Last 7 Days)       Procedure Component Value Units Date/Time    XR Knee 3+ View With Terral Left [[id number]] Resulted: 07/18/25 1025     Updated: 07/18/25 1025    Narrative:      TKA X-Ray  Indication: status-post TKA     AP, Lateral, and Sunrise views of Left knee     Findings:  No signs of fracture  No signs of loosening  No change compared to prior study  Components are well aligned                      ICD-10-CM ICD-9-CM   1. S/P total knee arthroplasty, left  Z96.652 V43.65       Orders Placed This Encounter   Procedures    XR Knee 3+ View With Terral Left     She is doing great.  She is very pleased.  She will follow-up in April 2026 for x-rays of both knee replacements        Paul Whitaker M.D., FAAOS  Orthopedic Surgeon  Fellow[facility] Trained Sports Medicine  [facility]  Orthopedics and Sports Medicine  [address] [place] [address], [address]  [place] [address]      EMR Dragon/Transcription disclaimer:  Please note that portions of this document were completed with a voice recognition program.      At [facility], we believe that sharing information builds trust and better relationships. You are receiving this note because you are receiving care at [facility] or have recently visited. It is possible you will see health information before a provider has talked with you about it. This kind of information can be easy to misunderstand as it is a medical document. It is intended as tmqd-tl-wwch communication. It is written in medical language and may contain abbreviations or verbiage that are unfamiliar. It may appear  blunt or direct. Medical documents are intended to carry relevant information, facts as evident, and the clinical opinion of the practitioner.  To help you fully understand what it means for your health, we urge you to discuss this note with your provider.

## 2025-08-15 ENCOUNTER — OFFICE VISIT (OUTPATIENT)
Dept: GASTROENTEROLOGY | Facility: CLINIC | Age: 80
End: 2025-08-15
Payer: MEDICARE

## 2025-08-15 VITALS
WEIGHT: 179 LBS | HEART RATE: 89 BPM | TEMPERATURE: 97.3 F | HEIGHT: 67 IN | DIASTOLIC BLOOD PRESSURE: 80 MMHG | OXYGEN SATURATION: 97 % | SYSTOLIC BLOOD PRESSURE: 140 MMHG | BODY MASS INDEX: 28.09 KG/M2

## 2025-08-15 DIAGNOSIS — K51.00 ULCERATIVE PANCOLITIS WITHOUT COMPLICATION: Primary | ICD-10-CM

## 2025-08-15 PROCEDURE — 1160F RVW MEDS BY RX/DR IN RCRD: CPT | Performed by: INTERNAL MEDICINE

## 2025-08-15 PROCEDURE — 3077F SYST BP >= 140 MM HG: CPT | Performed by: INTERNAL MEDICINE

## 2025-08-15 PROCEDURE — 99213 OFFICE O/P EST LOW 20 MIN: CPT | Performed by: INTERNAL MEDICINE

## 2025-08-15 PROCEDURE — 3079F DIAST BP 80-89 MM HG: CPT | Performed by: INTERNAL MEDICINE

## 2025-08-15 PROCEDURE — 1159F MED LIST DOCD IN RCRD: CPT | Performed by: INTERNAL MEDICINE

## (undated) DEVICE — THE CHANNEL CLEANING BRUSH IS A NYLON FLEXI BRUSH ATTACHED TO A FLEXIBLE PLASTIC SHEATH DESIGNED TO SAFELY REMOVE DEBRIS FROM FLEXIBLE ENDOSCOPES.

## (undated) DEVICE — KT PUMP INFUBLOCK MDL 2100 PMKITSOLIS

## (undated) DEVICE — Device

## (undated) DEVICE — PK KN TOTL 10

## (undated) DEVICE — FRCP BX RADJAW4 NDL 2.8 240 STD OG

## (undated) DEVICE — IMPLANTABLE DEVICE
Type: IMPLANTABLE DEVICE | Site: KNEE | Status: NON-FUNCTIONAL
Brand: PERSONA™
Removed: 2024-12-18

## (undated) DEVICE — DRSNG PAD ABD 8X10IN STRL

## (undated) DEVICE — PENCL SMOKE/EVAC MEGADYNE TELESCP 10FT

## (undated) DEVICE — TRAP FLD MINIVAC MEGADYNE 100ML

## (undated) DEVICE — STRYKER PERFORMANCE SERIES SAGITTAL BLADE: Brand: STRYKER PERFORMANCE SERIES

## (undated) DEVICE — UNDERCAST PADDING: Brand: DEROYAL

## (undated) DEVICE — YANKAUER,BULB TIP WITH VENT: Brand: ARGYLE

## (undated) DEVICE — ANTIBACTERIAL UNDYED BRAIDED (POLYGLACTIN 910), SYNTHETIC ABSORBABLE SUTURE: Brand: COATED VICRYL

## (undated) DEVICE — SPINAL TRAY/P: Brand: MEDLINE INDUSTRIES, INC.

## (undated) DEVICE — BNDG ELAS W/CLIP 6IN 10YD LF STRL

## (undated) DEVICE — IMPLANTABLE DEVICE
Type: IMPLANTABLE DEVICE | Site: KNEE | Status: NON-FUNCTIONAL
Brand: PERSONA™
Removed: 2025-04-24

## (undated) DEVICE — SYS CLS SKIN PREMIERPRO EXOFINFUSION 22CM

## (undated) DEVICE — 3 BONE CEMENT MIXER: Brand: MIXEVAC

## (undated) DEVICE — PATIENT RETURN ELECTRODE, SINGLE-USE, CONTACT QUALITY MONITORING, ADULT, WITH 9FT CORD, FOR PATIENTS WEIGING OVER 33LBS. (15KG): Brand: MEGADYNE

## (undated) DEVICE — BLANKT WARM UPPR/BDY ARM/OUT 57X196CM

## (undated) DEVICE — TRY EPID SFTY 18G 3.5IN 1T7680

## (undated) DEVICE — TBG SMPL FLTR LINE NASL 02/C02 A/ BX/100

## (undated) DEVICE — PAD,ARMBOARD,CONV,FOAM,2X8X20",12PR/CS: Brand: MEDLINE

## (undated) DEVICE — Device: Brand: DEFENDO AIR/WATER/SUCTION AND BIOPSY VALVE

## (undated) DEVICE — MASK,OXYGEN,MED CONC,ADLT,7' TUB, UC: Brand: PENDING

## (undated) DEVICE — SENSR O2 OXIMAX FNGR A/ 18IN NONSTR

## (undated) DEVICE — GLV SURG SENSICARE PI MIC PF SZ8.5 LF STRL

## (undated) DEVICE — SYS SKIN EXOFIN WND CLS 4X22CM